# Patient Record
Sex: MALE | Race: WHITE | NOT HISPANIC OR LATINO
[De-identification: names, ages, dates, MRNs, and addresses within clinical notes are randomized per-mention and may not be internally consistent; named-entity substitution may affect disease eponyms.]

---

## 2017-01-25 ENCOUNTER — MEDICATION RENEWAL (OUTPATIENT)
Age: 77
End: 2017-01-25

## 2017-02-19 ENCOUNTER — RX RENEWAL (OUTPATIENT)
Age: 77
End: 2017-02-19

## 2017-03-17 ENCOUNTER — CLINICAL ADVICE (OUTPATIENT)
Age: 77
End: 2017-03-17

## 2017-04-11 ENCOUNTER — LABORATORY RESULT (OUTPATIENT)
Age: 77
End: 2017-04-11

## 2017-04-11 ENCOUNTER — APPOINTMENT (OUTPATIENT)
Dept: INTERNAL MEDICINE | Facility: CLINIC | Age: 77
End: 2017-04-11

## 2017-04-11 VITALS
HEART RATE: 76 BPM | TEMPERATURE: 97.9 F | OXYGEN SATURATION: 96 % | SYSTOLIC BLOOD PRESSURE: 130 MMHG | HEIGHT: 69 IN | RESPIRATION RATE: 16 BRPM | WEIGHT: 240 LBS | BODY MASS INDEX: 35.55 KG/M2 | DIASTOLIC BLOOD PRESSURE: 90 MMHG

## 2017-04-11 VITALS — SYSTOLIC BLOOD PRESSURE: 140 MMHG | DIASTOLIC BLOOD PRESSURE: 90 MMHG

## 2017-04-11 DIAGNOSIS — R21 RASH AND OTHER NONSPECIFIC SKIN ERUPTION: ICD-10-CM

## 2017-04-15 LAB
25(OH)D3 SERPL-MCNC: 33.6 NG/ML
ALBUMIN SERPL ELPH-MCNC: 4.8 G/DL
ALP BLD-CCNC: 26 U/L
ALT SERPL-CCNC: 35 U/L
ANION GAP SERPL CALC-SCNC: 20 MMOL/L
AST SERPL-CCNC: 34 U/L
BASOPHILS # BLD AUTO: 0.05 K/UL
BASOPHILS NFR BLD AUTO: 0.9 %
BILIRUB SERPL-MCNC: 0.4 MG/DL
BUN SERPL-MCNC: 37 MG/DL
CALCIUM SERPL-MCNC: 11 MG/DL
CHLORIDE SERPL-SCNC: 94 MMOL/L
CHOLEST SERPL-MCNC: 204 MG/DL
CHOLEST/HDLC SERPL: 7.6 RATIO
CK SERPL-CCNC: 128 U/L
CO2 SERPL-SCNC: 22 MMOL/L
CREAT SERPL-MCNC: 1.44 MG/DL
EOSINOPHIL # BLD AUTO: 0 K/UL
EOSINOPHIL NFR BLD AUTO: 0 %
GLUCOSE SERPL-MCNC: 152 MG/DL
HBA1C MFR BLD HPLC: 6.6 %
HCT VFR BLD CALC: 43.2 %
HDLC SERPL-MCNC: 27 MG/DL
HGB BLD-MCNC: 14 G/DL
LDLC SERPL CALC-MCNC: NORMAL MG/DL
LYMPHOCYTES # BLD AUTO: 1.52 K/UL
LYMPHOCYTES NFR BLD AUTO: 25.2 %
MAN DIFF?: NORMAL
MCHC RBC-ENTMCNC: 27.2 PG
MCHC RBC-ENTMCNC: 32.4 GM/DL
MCV RBC AUTO: 84 FL
MONOCYTES # BLD AUTO: 0.6 K/UL
MONOCYTES NFR BLD AUTO: 9.9 %
NEUTROPHILS # BLD AUTO: 3.59 K/UL
NEUTROPHILS NFR BLD AUTO: 59.5 %
PLATELET # BLD AUTO: 206 K/UL
POTASSIUM SERPL-SCNC: 5.3 MMOL/L
PROT SERPL-MCNC: 7 G/DL
PSA SERPL-MCNC: 0.77 NG/ML
RBC # BLD: 5.14 M/UL
RBC # FLD: 15.4 %
SODIUM SERPL-SCNC: 136 MMOL/L
TRIGL SERPL-MCNC: 640 MG/DL
TSH SERPL-ACNC: 3 UIU/ML
WBC # FLD AUTO: 6.03 K/UL

## 2017-05-15 ENCOUNTER — RX RENEWAL (OUTPATIENT)
Age: 77
End: 2017-05-15

## 2017-06-06 ENCOUNTER — MEDICATION RENEWAL (OUTPATIENT)
Age: 77
End: 2017-06-06

## 2017-07-19 ENCOUNTER — RX RENEWAL (OUTPATIENT)
Age: 77
End: 2017-07-19

## 2017-08-27 ENCOUNTER — INPATIENT (INPATIENT)
Facility: HOSPITAL | Age: 77
LOS: 3 days | Discharge: NURSING, REHAB OR LONG-TERM CARE | DRG: 177 | End: 2017-08-31
Attending: FAMILY MEDICINE | Admitting: HOSPITALIST
Payer: MEDICARE

## 2017-08-27 VITALS
RESPIRATION RATE: 18 BRPM | SYSTOLIC BLOOD PRESSURE: 134 MMHG | TEMPERATURE: 100 F | HEART RATE: 78 BPM | OXYGEN SATURATION: 95 % | WEIGHT: 229.94 LBS | DIASTOLIC BLOOD PRESSURE: 80 MMHG | HEIGHT: 69 IN

## 2017-08-27 DIAGNOSIS — N17.9 ACUTE KIDNEY FAILURE, UNSPECIFIED: ICD-10-CM

## 2017-08-27 DIAGNOSIS — I10 ESSENTIAL (PRIMARY) HYPERTENSION: ICD-10-CM

## 2017-08-27 DIAGNOSIS — R47.81 SLURRED SPEECH: ICD-10-CM

## 2017-08-27 DIAGNOSIS — D18.1 LYMPHANGIOMA, ANY SITE: ICD-10-CM

## 2017-08-27 DIAGNOSIS — E87.1 HYPO-OSMOLALITY AND HYPONATREMIA: ICD-10-CM

## 2017-08-27 DIAGNOSIS — N40.1 BENIGN PROSTATIC HYPERPLASIA WITH LOWER URINARY TRACT SYMPTOMS: ICD-10-CM

## 2017-08-27 DIAGNOSIS — R27.0 ATAXIA, UNSPECIFIED: ICD-10-CM

## 2017-08-27 DIAGNOSIS — E78.5 HYPERLIPIDEMIA, UNSPECIFIED: ICD-10-CM

## 2017-08-27 DIAGNOSIS — J18.9 PNEUMONIA, UNSPECIFIED ORGANISM: ICD-10-CM

## 2017-08-27 DIAGNOSIS — F17.200 NICOTINE DEPENDENCE, UNSPECIFIED, UNCOMPLICATED: ICD-10-CM

## 2017-08-27 DIAGNOSIS — R29.6 REPEATED FALLS: ICD-10-CM

## 2017-08-27 DIAGNOSIS — R47.1 DYSARTHRIA AND ANARTHRIA: ICD-10-CM

## 2017-08-27 DIAGNOSIS — E11.9 TYPE 2 DIABETES MELLITUS WITHOUT COMPLICATIONS: ICD-10-CM

## 2017-08-27 DIAGNOSIS — Z29.9 ENCOUNTER FOR PROPHYLACTIC MEASURES, UNSPECIFIED: ICD-10-CM

## 2017-08-27 LAB
ALBUMIN SERPL ELPH-MCNC: 3.1 G/DL — LOW (ref 3.3–5)
ALP SERPL-CCNC: 27 U/L — LOW (ref 40–120)
ALT FLD-CCNC: 25 U/L — SIGNIFICANT CHANGE UP (ref 12–78)
ANION GAP SERPL CALC-SCNC: 15 MMOL/L — SIGNIFICANT CHANGE UP (ref 5–17)
APTT BLD: 31.8 SEC — SIGNIFICANT CHANGE UP (ref 27.5–37.4)
AST SERPL-CCNC: 28 U/L — SIGNIFICANT CHANGE UP (ref 15–37)
BASOPHILS # BLD AUTO: 0.1 K/UL — SIGNIFICANT CHANGE UP (ref 0–0.2)
BASOPHILS NFR BLD AUTO: 0.9 % — SIGNIFICANT CHANGE UP (ref 0–2)
BILIRUB SERPL-MCNC: 0.9 MG/DL — SIGNIFICANT CHANGE UP (ref 0.2–1.2)
BUN SERPL-MCNC: 30 MG/DL — HIGH (ref 7–23)
CALCIUM SERPL-MCNC: 9.2 MG/DL — SIGNIFICANT CHANGE UP (ref 8.5–10.1)
CHLORIDE SERPL-SCNC: 99 MMOL/L — SIGNIFICANT CHANGE UP (ref 96–108)
CK MB BLD-MCNC: 0.1 % — SIGNIFICANT CHANGE UP (ref 0–3.5)
CK MB CFR SERPL CALC: 0.9 NG/ML — SIGNIFICANT CHANGE UP (ref 0–3.6)
CK MB CFR SERPL CALC: 1.4 NG/ML — SIGNIFICANT CHANGE UP (ref 0–3.6)
CK MB CFR SERPL CALC: 1.6 NG/ML — SIGNIFICANT CHANGE UP (ref 0–3.6)
CK SERPL-CCNC: 1266 U/L — HIGH (ref 26–308)
CK SERPL-CCNC: 1604 U/L — HIGH (ref 26–308)
CK SERPL-CCNC: 1853 U/L — HIGH (ref 26–308)
CO2 SERPL-SCNC: 20 MMOL/L — LOW (ref 22–31)
CREAT SERPL-MCNC: 1.7 MG/DL — HIGH (ref 0.5–1.3)
EOSINOPHIL # BLD AUTO: 0 K/UL — SIGNIFICANT CHANGE UP (ref 0–0.5)
EOSINOPHIL NFR BLD AUTO: 0.3 % — SIGNIFICANT CHANGE UP (ref 0–6)
GLUCOSE SERPL-MCNC: 192 MG/DL — HIGH (ref 70–99)
HCT VFR BLD CALC: 39 % — SIGNIFICANT CHANGE UP (ref 39–50)
HGB BLD-MCNC: 12.9 G/DL — LOW (ref 13–17)
INR BLD: 1.4 RATIO — HIGH (ref 0.88–1.16)
LACTATE SERPL-SCNC: 1.2 MMOL/L — SIGNIFICANT CHANGE UP (ref 0.7–2)
LIDOCAIN IGE QN: 101 U/L — SIGNIFICANT CHANGE UP (ref 73–393)
LYMPHOCYTES # BLD AUTO: 2 K/UL — SIGNIFICANT CHANGE UP (ref 1–3.3)
LYMPHOCYTES # BLD AUTO: 20 % — SIGNIFICANT CHANGE UP (ref 13–44)
MCHC RBC-ENTMCNC: 28.1 PG — SIGNIFICANT CHANGE UP (ref 27–34)
MCHC RBC-ENTMCNC: 33.1 GM/DL — SIGNIFICANT CHANGE UP (ref 32–36)
MCV RBC AUTO: 84.7 FL — SIGNIFICANT CHANGE UP (ref 80–100)
MONOCYTES # BLD AUTO: 1.1 K/UL — HIGH (ref 0–0.9)
MONOCYTES NFR BLD AUTO: 11 % — HIGH (ref 1–9)
NEUTROPHILS # BLD AUTO: 6.7 K/UL — SIGNIFICANT CHANGE UP (ref 1.8–7.4)
NEUTROPHILS NFR BLD AUTO: 67.7 % — SIGNIFICANT CHANGE UP (ref 43–77)
NT-PROBNP SERPL-SCNC: 1728 PG/ML — HIGH (ref 0–450)
PLATELET # BLD AUTO: 168 K/UL — SIGNIFICANT CHANGE UP (ref 150–400)
POTASSIUM SERPL-MCNC: 4.5 MMOL/L — SIGNIFICANT CHANGE UP (ref 3.5–5.3)
POTASSIUM SERPL-SCNC: 4.5 MMOL/L — SIGNIFICANT CHANGE UP (ref 3.5–5.3)
PROCALCITONIN SERPL-MCNC: 0.14 NG/ML — HIGH (ref 0–0.04)
PROT SERPL-MCNC: 6.6 G/DL — SIGNIFICANT CHANGE UP (ref 6–8.3)
PROTHROM AB SERPL-ACNC: 15.4 SEC — HIGH (ref 9.8–12.7)
RAPID RVP RESULT: SIGNIFICANT CHANGE UP
RBC # BLD: 4.61 M/UL — SIGNIFICANT CHANGE UP (ref 4.2–5.8)
RBC # FLD: 14.1 % — SIGNIFICANT CHANGE UP (ref 10.3–14.5)
SODIUM SERPL-SCNC: 134 MMOL/L — LOW (ref 135–145)
TROPONIN I SERPL-MCNC: <.015 NG/ML — SIGNIFICANT CHANGE UP (ref 0.01–0.04)
WBC # BLD: 9.9 K/UL — SIGNIFICANT CHANGE UP (ref 3.8–10.5)
WBC # FLD AUTO: 9.9 K/UL — SIGNIFICANT CHANGE UP (ref 3.8–10.5)

## 2017-08-27 PROCEDURE — 93010 ELECTROCARDIOGRAM REPORT: CPT

## 2017-08-27 PROCEDURE — 71250 CT THORAX DX C-: CPT | Mod: 26

## 2017-08-27 PROCEDURE — 70450 CT HEAD/BRAIN W/O DYE: CPT | Mod: 26

## 2017-08-27 PROCEDURE — 99285 EMERGENCY DEPT VISIT HI MDM: CPT

## 2017-08-27 PROCEDURE — 74176 CT ABD & PELVIS W/O CONTRAST: CPT | Mod: 26

## 2017-08-27 PROCEDURE — 99223 1ST HOSP IP/OBS HIGH 75: CPT | Mod: AI,GC

## 2017-08-27 PROCEDURE — 71010: CPT | Mod: 26

## 2017-08-27 RX ORDER — DEXTROSE 50 % IN WATER 50 %
12.5 SYRINGE (ML) INTRAVENOUS ONCE
Qty: 0 | Refills: 0 | Status: DISCONTINUED | OUTPATIENT
Start: 2017-08-27 | End: 2017-08-31

## 2017-08-27 RX ORDER — CEFTRIAXONE 500 MG/1
1 INJECTION, POWDER, FOR SOLUTION INTRAMUSCULAR; INTRAVENOUS ONCE
Qty: 0 | Refills: 0 | Status: COMPLETED | OUTPATIENT
Start: 2017-08-27 | End: 2017-08-27

## 2017-08-27 RX ORDER — INSULIN LISPRO 100/ML
VIAL (ML) SUBCUTANEOUS
Qty: 0 | Refills: 0 | Status: DISCONTINUED | OUTPATIENT
Start: 2017-08-27 | End: 2017-08-30

## 2017-08-27 RX ORDER — ACETAMINOPHEN 500 MG
650 TABLET ORAL EVERY 6 HOURS
Qty: 0 | Refills: 0 | Status: DISCONTINUED | OUTPATIENT
Start: 2017-08-27 | End: 2017-08-27

## 2017-08-27 RX ORDER — ACETAMINOPHEN 500 MG
650 TABLET ORAL ONCE
Qty: 0 | Refills: 0 | Status: COMPLETED | OUTPATIENT
Start: 2017-08-27 | End: 2017-08-27

## 2017-08-27 RX ORDER — GLUCAGON INJECTION, SOLUTION 0.5 MG/.1ML
1 INJECTION, SOLUTION SUBCUTANEOUS ONCE
Qty: 0 | Refills: 0 | Status: DISCONTINUED | OUTPATIENT
Start: 2017-08-27 | End: 2017-08-31

## 2017-08-27 RX ORDER — HEPARIN SODIUM 5000 [USP'U]/ML
5000 INJECTION INTRAVENOUS; SUBCUTANEOUS EVERY 12 HOURS
Qty: 0 | Refills: 0 | Status: DISCONTINUED | OUTPATIENT
Start: 2017-08-27 | End: 2017-08-31

## 2017-08-27 RX ORDER — AZITHROMYCIN 500 MG/1
500 TABLET, FILM COATED ORAL EVERY 24 HOURS
Qty: 0 | Refills: 0 | Status: DISCONTINUED | OUTPATIENT
Start: 2017-08-28 | End: 2017-08-30

## 2017-08-27 RX ORDER — CEFTRIAXONE 500 MG/1
1 INJECTION, POWDER, FOR SOLUTION INTRAMUSCULAR; INTRAVENOUS EVERY 24 HOURS
Qty: 0 | Refills: 0 | Status: DISCONTINUED | OUTPATIENT
Start: 2017-08-28 | End: 2017-08-30

## 2017-08-27 RX ORDER — SODIUM CHLORIDE 9 MG/ML
1000 INJECTION INTRAMUSCULAR; INTRAVENOUS; SUBCUTANEOUS
Qty: 0 | Refills: 0 | Status: DISCONTINUED | OUTPATIENT
Start: 2017-08-27 | End: 2017-08-29

## 2017-08-27 RX ORDER — AZITHROMYCIN 500 MG/1
500 TABLET, FILM COATED ORAL ONCE
Qty: 0 | Refills: 0 | Status: COMPLETED | OUTPATIENT
Start: 2017-08-27 | End: 2017-08-27

## 2017-08-27 RX ORDER — SODIUM CHLORIDE 9 MG/ML
1000 INJECTION INTRAMUSCULAR; INTRAVENOUS; SUBCUTANEOUS
Qty: 0 | Refills: 0 | Status: DISCONTINUED | OUTPATIENT
Start: 2017-08-27 | End: 2017-08-27

## 2017-08-27 RX ORDER — DEXTROSE 50 % IN WATER 50 %
1 SYRINGE (ML) INTRAVENOUS ONCE
Qty: 0 | Refills: 0 | Status: DISCONTINUED | OUTPATIENT
Start: 2017-08-27 | End: 2017-08-31

## 2017-08-27 RX ORDER — DEXTROSE 50 % IN WATER 50 %
25 SYRINGE (ML) INTRAVENOUS ONCE
Qty: 0 | Refills: 0 | Status: DISCONTINUED | OUTPATIENT
Start: 2017-08-27 | End: 2017-08-31

## 2017-08-27 RX ORDER — ALPRAZOLAM 0.25 MG
2 TABLET ORAL THREE TIMES A DAY
Qty: 0 | Refills: 0 | Status: DISCONTINUED | OUTPATIENT
Start: 2017-08-27 | End: 2017-08-31

## 2017-08-27 RX ORDER — IPRATROPIUM/ALBUTEROL SULFATE 18-103MCG
3 AEROSOL WITH ADAPTER (GRAM) INHALATION ONCE
Qty: 0 | Refills: 0 | Status: COMPLETED | OUTPATIENT
Start: 2017-08-27 | End: 2017-08-27

## 2017-08-27 RX ORDER — NICOTINE POLACRILEX 2 MG
1 GUM BUCCAL DAILY
Qty: 0 | Refills: 0 | Status: DISCONTINUED | OUTPATIENT
Start: 2017-08-27 | End: 2017-08-31

## 2017-08-27 RX ORDER — ISOSORBIDE MONONITRATE 60 MG/1
60 TABLET, EXTENDED RELEASE ORAL DAILY
Qty: 0 | Refills: 0 | Status: DISCONTINUED | OUTPATIENT
Start: 2017-08-27 | End: 2017-08-31

## 2017-08-27 RX ORDER — LABETALOL HCL 100 MG
200 TABLET ORAL THREE TIMES A DAY
Qty: 0 | Refills: 0 | Status: DISCONTINUED | OUTPATIENT
Start: 2017-08-27 | End: 2017-08-31

## 2017-08-27 RX ORDER — SODIUM CHLORIDE 9 MG/ML
1000 INJECTION, SOLUTION INTRAVENOUS
Qty: 0 | Refills: 0 | Status: DISCONTINUED | OUTPATIENT
Start: 2017-08-27 | End: 2017-08-31

## 2017-08-27 RX ORDER — ACETAMINOPHEN 500 MG
650 TABLET ORAL EVERY 6 HOURS
Qty: 0 | Refills: 0 | Status: DISCONTINUED | OUTPATIENT
Start: 2017-08-27 | End: 2017-08-31

## 2017-08-27 RX ORDER — AMLODIPINE BESYLATE 2.5 MG/1
10 TABLET ORAL DAILY
Qty: 0 | Refills: 0 | Status: DISCONTINUED | OUTPATIENT
Start: 2017-08-27 | End: 2017-08-31

## 2017-08-27 RX ADMIN — Medication 200 MILLIGRAM(S): at 14:53

## 2017-08-27 RX ADMIN — Medication 3 MILLILITER(S): at 08:30

## 2017-08-27 RX ADMIN — Medication 200 MILLIGRAM(S): at 21:14

## 2017-08-27 RX ADMIN — Medication 650 MILLIGRAM(S): at 08:33

## 2017-08-27 RX ADMIN — AMLODIPINE BESYLATE 10 MILLIGRAM(S): 2.5 TABLET ORAL at 19:32

## 2017-08-27 RX ADMIN — Medication 2 MILLIGRAM(S): at 22:34

## 2017-08-27 RX ADMIN — CEFTRIAXONE 100 GRAM(S): 500 INJECTION, POWDER, FOR SOLUTION INTRAMUSCULAR; INTRAVENOUS at 09:52

## 2017-08-27 RX ADMIN — HEPARIN SODIUM 5000 UNIT(S): 5000 INJECTION INTRAVENOUS; SUBCUTANEOUS at 18:00

## 2017-08-27 RX ADMIN — AZITHROMYCIN 255 MILLIGRAM(S): 500 TABLET, FILM COATED ORAL at 10:20

## 2017-08-27 RX ADMIN — Medication 2 MILLIGRAM(S): at 12:13

## 2017-08-27 RX ADMIN — SODIUM CHLORIDE 70 MILLILITER(S): 9 INJECTION INTRAMUSCULAR; INTRAVENOUS; SUBCUTANEOUS at 17:06

## 2017-08-27 RX ADMIN — Medication 1 PATCH: at 14:53

## 2017-08-27 RX ADMIN — Medication 1: at 16:30

## 2017-08-27 RX ADMIN — Medication: at 13:00

## 2017-08-27 RX ADMIN — ISOSORBIDE MONONITRATE 60 MILLIGRAM(S): 60 TABLET, EXTENDED RELEASE ORAL at 12:13

## 2017-08-27 RX ADMIN — Medication 650 MILLIGRAM(S): at 19:32

## 2017-08-27 NOTE — H&P ADULT - PROBLEM SELECTOR PLAN 3
Type 2 with no complications, hold home hyperglycemia agents   Low dose ISS with hypoglycemic protocol  Check HgbA1c

## 2017-08-27 NOTE — H&P ADULT - NSHPREVIEWOFSYSTEMS_GEN_ALL_CORE
Constitutional: denies fever, chills, diaphoresis   HEENT: denies blurry vision, difficulty hearing  Respiratory: denies SOB, CISNEROS, cough, sputum production, wheezing, hemoptysis  Cardiovascular: denies CP, palpitations, edema  Gastrointestinal: denies nausea, vomiting, diarrhea, constipation, abdominal pain, melena, hematochezia   Genitourinary: denies dysuria, frequency, urgency, hematuria   Skin/Breast: denies rash, itching  Musculoskeletal: denies myalgias, joint swelling, muscle weakness  Neurologic: denies headache, weakness, dizziness, paresthesias, numbness/tingling  Psychiatric: denies feeling anxious, depressed, suicidal, homicidal thoughts  Hematology/Oncology: denies bruising, tender or enlarged lymph nodes   ROS negative except as noted above Constitutional: ++ fever, chills,  no diaphoresis   HEENT: denies blurry vision, difficulty hearing  Respiratory: ++ SOB, CISNEROS, cough, - sputum production, wheezing, hemoptysis  Cardiovascular: denies CP, palpitations, edema  Gastrointestinal: denies nausea, vomiting, diarrhea, constipation, abdominal pain, melena, hematochezia   Genitourinary: denies dysuria, frequency, urgency, hematuria   Skin/Breast: denies rash, itching  Musculoskeletal: denies myalgias, joint swelling, muscle weakness  Neurologic: denies headache, weakness, dizziness, paresthesias, numbness/tingling  Psychiatric: denies feeling anxious, depressed, suicidal, homicidal thoughts  Hematology/Oncology: denies bruising, tender or enlarged lymph nodes   ROS negative except as noted above

## 2017-08-27 NOTE — PATIENT PROFILE ADULT. - SOCIAL CONCERNS
son expressing concerns about pt returning back to his apartment by himself, would like to speak with

## 2017-08-27 NOTE — PATIENT PROFILE ADULT. - HEALTHCARE QUESTIONS, PROFILE
"my son was not aware of I keep my night time meds in the bathroom cabenet he said he will call you with those so you can add them to my list"

## 2017-08-27 NOTE — H&P ADULT - NSHPOUTPATIENTPROVIDERS_GEN_ALL_CORE
PMD Dr. Dima Bush.    Cardio Dr. Mcallister PMD- Dr. Dima Bush  Cardio- Dr. Mcallister  Psych - Dr. Mukherjee

## 2017-08-27 NOTE — H&P ADULT - NSHPSOCIALHISTORY_GEN_ALL_CORE
Social History/Preventive Medicine:    Marital Status:   Occupation:   Lives with:   Ambulates at home:    Substance Use:  Tobacco Usage:    Alcohol Usage:  Illicit Drug Usage:    Health Management     Last Physical Exam:  Last Mammo:   Last Pap:   Last prostate exam:        Immunization Hx: Flu, Pneumonia, Tetanus, Hepatitis    Advanced Directives: Social History/Preventive Medicine:    Marital Status: Unknown   Occupation: Retired   Lives with: Alone   Ambulates at home: Limited, without walker     Substance Use:  Tobacco Usage:    Alcohol Usage:  Illicit Drug Usage: Social History/Preventive Medicine:    Marital Status: Unknown   Occupation: Retired   Lives with: Alone   Ambulates at home: Limited, without walker     Substance Use:  Tobacco Usage: 10-15 cigarettes per day, 60 yrs     Alcohol Usage: Denies   Illicit Drug Usage: Denies

## 2017-08-27 NOTE — H&P ADULT - HISTORY OF PRESENT ILLNESS
In ED, pt ahd temp at 101.3, As EMS, o2 sat was 86% on RA, other vitals wnl. No leukocytosis, no elevated lactate. Other CBC lab resultes grossly wnl. Mild hyponatremia 134, Elevated bun /cr at 30/1.70. CE positive with elevated CK at 1853. BNP at 1728. Pt had CT head done which showed no acute pathology, X ray and CT chest and abd with no contrast showed Right perihilar and lower lobe airspace opacities as discussed, may reflect infection/bronchopneumonia; cannot exclude perihilar mass; likely reflecting chronic interstitial lung disease bilateral lungs; Nonspecific subpleural opacity left lower lobe. Hepatic steatosis with hepatosplenomegaly. EKG showed___________________________Pt received 1x azithromycin, 1x Rocephin, 1x tylenols, 1x duoneb, BCX and UCX was sent. Pt was on O2 Patient is a 73 y/o male PMHX             In ED, pt ahd temp at 101.3, As EMS, o2 sat was 86% on RA, other vitals wnl. No leukocytosis, no elevated lactate. Other CBC lab resultes grossly wnl. Mild hyponatremia 134, Elevated bun /cr at 30/1.70. CE positive with elevated CK at 1853. BNP at 1728. Pt had CT head done which showed no acute pathology, X ray and CT chest and abd with no contrast showed Right perihilar and lower lobe airspace opacities as discussed, may reflect infection/bronchopneumonia; cannot exclude perihilar mass; likely reflecting chronic interstitial lung disease bilateral lungs; Nonspecific subpleural opacity left lower lobe. Hepatic steatosis with hepatosplenomegaly. EKG showed sinus rhythm with sinus arrhythmia with 1st degree AV block, inferior infarct age undetermined (unofficial read). Pt received 1x azithromycin, 1x Rocephin, 1x tylenols, 1x duoneb, BCX and UCX was sent. Pt was on O2. Patient is a 71 y/o male PMHx diastolic CHF, HTN, HLD, DM, GERD, BPH, Depression, Anxiety who presents with 1 week of progressively worsening SOB from his baseline SOB 2/2 diastolic CHF.    SOB was associated with productive cough, confusion (per son at bedside) and lower extremity weakness with an inability to get out of his chair. He admits to fever, chills, SOB, productive cough, right hip pain, back pain. He denies headache, dizziness, hearing or vision changes, sore throat, chest pain or palpitations, N/V/C/D, numbness or tingling in extremities.     Patient's son at bedside states his father walks with more difficulty in his apartment and has increased slurred speech from baseline since a fall onto his knees yesterday.     In ED, Vitals were: T 101.3, As EMS, o2 sat was 86% on RA, other vitals wnl. No leukocytosis, no elevated lactate. Other CBC lab results grossly wnl. Mild hyponatremia 134, Elevated bun /cr at 30/1.70. CE positive with elevated CK at 1853. BNP at 1728. Pt had CT head done which showed no acute pathology, X ray and CT chest and abd with no contrast showed Right perihilar and lower lobe airspace opacities as discussed, may reflect infection/bronchopneumonia; cannot exclude perihilar mass; likely reflecting chronic interstitial lung disease bilateral lungs; Nonspecific subpleural opacity left lower lobe. Hepatic steatosis with hepatosplenomegaly. EKG showed sinus rhythm with sinus arrhythmia with 1st degree AV block, inferior infarct age undetermined (unofficial read). Pt received 1x azithromycin, 1x Rocephin, 1x tylenols, 1x duoneb, BCX and UCX was sent. Pt was on 5L NC. Patient is a 71 y/o male PMHx diastolic CHF, HTN, HLD, DM, GERD, BPH, Depression, Anxiety who presents with 1 week of progressively worsening SOB from his baseline SOB 2/2 diastolic CHF.    SOB was associated with nonproductive cough, confusion (per son at bedside) and lower extremity weakness with an inability to get out of his chair. He admits to fever, chills, SOB, productive cough, right hip pain, back pain. He denies headache, dizziness, hearing or vision changes, sore throat, chest pain or palpitations, N/V/C/D, numbness or tingling in extremities.     Patient's son at bedside states his father walks with more difficulty in his apartment and has increased slurred speech from baseline since a fall onto his knees yesterday.     In ED, Vitals were: T 101.3, As EMS, o2 sat was 86% on RA, other vitals wnl. No leukocytosis, no elevated lactate. Other CBC lab results grossly wnl. Mild hyponatremia 134, Elevated bun /cr at 30/1.70. CE positive with elevated CK at 1853. BNP at 1728. Pt had CT head done which showed no acute pathology, X ray and CT chest and abd with no contrast showed Right perihilar and lower lobe airspace opacities as discussed, may reflect infection/bronchopneumonia; cannot exclude perihilar mass; likely reflecting chronic interstitial lung disease bilateral lungs; Nonspecific subpleural opacity left lower lobe. Hepatic steatosis with hepatosplenomegaly. EKG showed sinus rhythm with sinus arrhythmia with 1st degree AV block, inferior infarct age undetermined (unofficial read). Pt received 1x azithromycin, 1x Rocephin, 1x tylenols, 1x duoneb, BCX and UCX was sent. Pt was on 5L NC.

## 2017-08-27 NOTE — ED ADULT NURSE NOTE - PMH
BPH with obstruction/lower urinary tract symptoms    Diabetes mellitus    GERD (gastroesophageal reflux disease)    Hyperlipidemia    Hypertension    Smoker    Vitamin D deficiency

## 2017-08-27 NOTE — H&P ADULT - FAMILY HISTORY
<<-----Click on this checkbox to enter Family History Family history of lymphoma     Grandparent  Still living? No  Family history of heart disease, Age at diagnosis: Age Unknown

## 2017-08-27 NOTE — ED PROVIDER NOTE - OBJECTIVE STATEMENT
76 yo male hx of DM, CHF, HLD, HTN c/o 1 week of shortness of breath, getting progressively worse.  BIBEMS, here with son who states patient has not been able to get around his apartment this past week, no chest pain, also states he had a fall yesterday.  PMD Dr. Dima Bush.  Cards Dr. Mcallister.  As per EMS O2 sat 86% on RA. 78 yo male hx of DM, CHF, HLD, HTN c/o 1 week of shortness of breath, getting progressively worse.  BIBEMS, here with son who states patient has not been able to get around his apartment this past week, no chest pain, also states he had a fall yesterday.  PMD Dr. Dima Bush.  Cards Dr. Mcallister.  As per EMS O2 sat 86% on RA. Found to have fever of 101.3

## 2017-08-27 NOTE — PROVIDER CONTACT NOTE (OTHER) - BACKGROUND
during admit profile pt stated he "has his night time meds in the bathroom cabinet at home" ,son stated "only brought in the meds which were in the kitchen"

## 2017-08-27 NOTE — PROVIDER CONTACT NOTE (OTHER) - SITUATION
RN received callback from son"It would not be beneficial for me to come in with all the meds my father has in his bathroom,he has too many,someone must call his psychiatrist tomorrow and get the list"

## 2017-08-27 NOTE — H&P ADULT - ASSESSMENT
Patient is a 71 y/o male PMHx diastolic CHF, HTN, HLD, DM, GERD, BPH, Depression, Anxiety who presents with 1 week of progressively worsening SOB due to CAP.

## 2017-08-27 NOTE — PATIENT PROFILE ADULT. - PRO ANTICIPATED DISCH DISP
son requesting to speak with  r/t rehab or possible assisted living as a plan for discharge, son concern about pt living alone

## 2017-08-27 NOTE — H&P ADULT - PROBLEM SELECTOR PLAN 1
CAP, right lung,  Admit to Med/Surg floor.   Continue IV Ceftriazone, Azithromycin   f/u Legionella antigen, Strep antigen, RVP   Continue gentle hydration  f/u Blood and Urine Cxs, Procalcitonin

## 2017-08-27 NOTE — ED ADULT NURSE REASSESSMENT NOTE - NS ED NURSE REASSESS COMMENT FT1
report given to RN Bessie of [facility]. MD Rees speaking to RN regarding flu swab and awaiting neuro consult to discuss upgrading pt. flu swab sent. pt updated on POC. pt ate lunch. will monitor pt

## 2017-08-27 NOTE — H&P ADULT - ATTENDING COMMENTS
10: CK elevation, possibly 2/2 fall , pt has no chest pain and EKG no changes, monitor CE trend and cont IVF.  11. BNP elevation, most likely due to DEVYN, CXR is clear, there is no JVD, no LE edema, repeat at am.   12: Deconditioning and weakness, needs PT and possible JANELLE. spoke to his son in details and answered all his questions and concerns.   I notified Dr Bush pcp

## 2017-08-27 NOTE — ED ADULT NURSE NOTE - OBJECTIVE STATEMENT
pt brought in by EMS with reports of SOB, sats in the 80's on RA. pt states that he has been SOB for 1 week and has been having a hard time slpeeing. 2+ pedal edema noted. pt denies being on blood thinners. pt brought in by EMS with reports of SOB, sats in the 80's on RA. pt states that he has been SOB for 1 week and has been having a hard time sleeping. 2+ pedal edema noted. pt denies being on blood thinners. equal chest rise and fall, tachypnea but no use of accessory muscles. denies cough, chills, fever, cp, palpitations, lightheadedness, dizziness

## 2017-08-27 NOTE — H&P ADULT - PROBLEM SELECTOR PLAN 2
Due to dehydration, continue gentle hydration (will go with low rate IVF due to hx of heart failure)   f/u renal function   Check renal ultrasound r/o post renal hydronephrosis especially in this patient with hx of BPH. Will consider straight cath to be sure he is not retaining

## 2017-08-27 NOTE — H&P ADULT - NSHPPHYSICALEXAM_GEN_ALL_CORE
Physical Exam:  General: Well developed, well nourished, NAD  HEENT: NCAT, PERRLA, EOMI bl, moist mucous membranes   Neck: Supple, nontender, no mass  Neurology: A&Ox3, nonfocal, CN II-XII grossly intact, sensation intact, no gait abnormalities   Respiratory: CTA B/L, No W/R/R  CV: RRR, +S1/S2, no murmurs, rubs or gallops  Abdominal: Soft, NT, ND +BSx4  Extremities: No C/C/E, + peripheral pulses  MSK: Normal ROM, no joint erythema or warmth, no joint swelling   Skin: warm, dry, normal color, no rash or abnormal lesions Physical Exam:  General: Well developed, well nourished, NAD  HEENT: NCAT, PERRLA, EOMI bl, moist mucous membranes no JVD  Neck: Supple, nontender, no mass  Neurology: A&Ox3, nonfocal, CN II-XII grossly intact, sensation intact, no gait abnormalities   Respiratory: CTA B/L, No W/R/R  CV: RRR, +S1/S2, no murmurs, rubs or gallops  Abdominal: Soft, NT, ND +BSx4  Extremities: No C/C/E, + peripheral pulses  MSK: Normal ROM, no joint erythema or warmth, no joint swelling   Skin: warm, dry, normal color, no rash or abnormal lesions

## 2017-08-27 NOTE — H&P ADULT - PROBLEM SELECTOR PLAN 8
CT Head negative and no focal neurological deficits. On full dose ASA. Less likely CVA but due to risk factors will consult Neurology and defer to him for need for MRI CT Head negative and no focal neurological deficits. On full dose ASA. Less likely CVA but due to risk factors will consult Neurology and defer to him for need for MRI  will check bed side speech and swallow.

## 2017-08-27 NOTE — PROVIDER CONTACT NOTE (OTHER) - RECOMMENDATIONS
as per dr lind he will have someone follow up call to pt's psychiatrist Dr Mukherjee tomorrow so an accurate home med reconciliation can be completed. Phone number for Dr Mukherjee provided to Dr Lind

## 2017-08-28 DIAGNOSIS — E66.9 OBESITY, UNSPECIFIED: ICD-10-CM

## 2017-08-28 DIAGNOSIS — I50.30 UNSPECIFIED DIASTOLIC (CONGESTIVE) HEART FAILURE: ICD-10-CM

## 2017-08-28 LAB
ANION GAP SERPL CALC-SCNC: 14 MMOL/L — SIGNIFICANT CHANGE UP (ref 5–17)
APPEARANCE UR: ABNORMAL
BASOPHILS # BLD AUTO: 0 K/UL — SIGNIFICANT CHANGE UP (ref 0–0.2)
BASOPHILS NFR BLD AUTO: 0.6 % — SIGNIFICANT CHANGE UP (ref 0–2)
BILIRUB UR-MCNC: NEGATIVE — SIGNIFICANT CHANGE UP
BUN SERPL-MCNC: 25 MG/DL — HIGH (ref 7–23)
CALCIUM SERPL-MCNC: 8.8 MG/DL — SIGNIFICANT CHANGE UP (ref 8.5–10.1)
CHLORIDE SERPL-SCNC: 99 MMOL/L — SIGNIFICANT CHANGE UP (ref 96–108)
CO2 SERPL-SCNC: 22 MMOL/L — SIGNIFICANT CHANGE UP (ref 22–31)
COLOR SPEC: YELLOW — SIGNIFICANT CHANGE UP
CREAT SERPL-MCNC: 1.3 MG/DL — SIGNIFICANT CHANGE UP (ref 0.5–1.3)
DIFF PNL FLD: NEGATIVE — SIGNIFICANT CHANGE UP
EOSINOPHIL # BLD AUTO: 0 K/UL — SIGNIFICANT CHANGE UP (ref 0–0.5)
EOSINOPHIL NFR BLD AUTO: 0.3 % — SIGNIFICANT CHANGE UP (ref 0–6)
GLUCOSE SERPL-MCNC: 200 MG/DL — HIGH (ref 70–99)
GLUCOSE UR QL: 100 MG/DL
HBA1C BLD-MCNC: 7.9 % — HIGH (ref 4–5.6)
HCT VFR BLD CALC: 36.4 % — LOW (ref 39–50)
HGB BLD-MCNC: 12 G/DL — LOW (ref 13–17)
KETONES UR-MCNC: ABNORMAL
LEGIONELLA AG UR QL: NEGATIVE — SIGNIFICANT CHANGE UP
LEUKOCYTE ESTERASE UR-ACNC: NEGATIVE — SIGNIFICANT CHANGE UP
LYMPHOCYTES # BLD AUTO: 1.3 K/UL — SIGNIFICANT CHANGE UP (ref 1–3.3)
LYMPHOCYTES # BLD AUTO: 17.2 % — SIGNIFICANT CHANGE UP (ref 13–44)
MCHC RBC-ENTMCNC: 27.9 PG — SIGNIFICANT CHANGE UP (ref 27–34)
MCHC RBC-ENTMCNC: 32.8 GM/DL — SIGNIFICANT CHANGE UP (ref 32–36)
MCV RBC AUTO: 84.9 FL — SIGNIFICANT CHANGE UP (ref 80–100)
MONOCYTES # BLD AUTO: 0.8 K/UL — SIGNIFICANT CHANGE UP (ref 0–0.9)
MONOCYTES NFR BLD AUTO: 11 % — HIGH (ref 1–9)
NEUTROPHILS # BLD AUTO: 5.3 K/UL — SIGNIFICANT CHANGE UP (ref 1.8–7.4)
NEUTROPHILS NFR BLD AUTO: 70.9 % — SIGNIFICANT CHANGE UP (ref 43–77)
NITRITE UR-MCNC: NEGATIVE — SIGNIFICANT CHANGE UP
NT-PROBNP SERPL-SCNC: 1627 PG/ML — HIGH (ref 0–450)
PH UR: 5 — SIGNIFICANT CHANGE UP (ref 5–8)
PLATELET # BLD AUTO: 169 K/UL — SIGNIFICANT CHANGE UP (ref 150–400)
POTASSIUM SERPL-MCNC: 4.3 MMOL/L — SIGNIFICANT CHANGE UP (ref 3.5–5.3)
POTASSIUM SERPL-SCNC: 4.3 MMOL/L — SIGNIFICANT CHANGE UP (ref 3.5–5.3)
PROT UR-MCNC: 75 MG/DL
RBC # BLD: 4.29 M/UL — SIGNIFICANT CHANGE UP (ref 4.2–5.8)
RBC # FLD: 14 % — SIGNIFICANT CHANGE UP (ref 10.3–14.5)
SODIUM SERPL-SCNC: 135 MMOL/L — SIGNIFICANT CHANGE UP (ref 135–145)
SP GR SPEC: 1.02 — SIGNIFICANT CHANGE UP (ref 1.01–1.02)
UROBILINOGEN FLD QL: 1
WBC # BLD: 7.5 K/UL — SIGNIFICANT CHANGE UP (ref 3.8–10.5)
WBC # FLD AUTO: 7.5 K/UL — SIGNIFICANT CHANGE UP (ref 3.8–10.5)

## 2017-08-28 PROCEDURE — 99233 SBSQ HOSP IP/OBS HIGH 50: CPT | Mod: GC

## 2017-08-28 PROCEDURE — 99223 1ST HOSP IP/OBS HIGH 75: CPT

## 2017-08-28 RX ORDER — ALBUTEROL 90 UG/1
2.5 AEROSOL, METERED ORAL EVERY 8 HOURS
Qty: 0 | Refills: 0 | Status: DISCONTINUED | OUTPATIENT
Start: 2017-08-28 | End: 2017-08-31

## 2017-08-28 RX ORDER — ASENAPINE MALEATE 10 MG/1
10 TABLET SUBLINGUAL
Qty: 0 | Refills: 0 | Status: DISCONTINUED | OUTPATIENT
Start: 2017-08-28 | End: 2017-08-30

## 2017-08-28 RX ORDER — ACETAMINOPHEN 500 MG
650 TABLET ORAL EVERY 6 HOURS
Qty: 0 | Refills: 0 | Status: DISCONTINUED | OUTPATIENT
Start: 2017-08-28 | End: 2017-08-31

## 2017-08-28 RX ORDER — BUPROPION HYDROCHLORIDE 150 MG/1
300 TABLET, EXTENDED RELEASE ORAL DAILY
Qty: 0 | Refills: 0 | Status: DISCONTINUED | OUTPATIENT
Start: 2017-08-28 | End: 2017-08-31

## 2017-08-28 RX ORDER — INSULIN GLARGINE 100 [IU]/ML
5 INJECTION, SOLUTION SUBCUTANEOUS AT BEDTIME
Qty: 0 | Refills: 0 | Status: DISCONTINUED | OUTPATIENT
Start: 2017-08-28 | End: 2017-08-30

## 2017-08-28 RX ORDER — VENLAFAXINE HCL 75 MG
187.5 CAPSULE, EXT RELEASE 24 HR ORAL EVERY 12 HOURS
Qty: 0 | Refills: 0 | Status: DISCONTINUED | OUTPATIENT
Start: 2017-08-28 | End: 2017-08-30

## 2017-08-28 RX ADMIN — Medication 3: at 13:00

## 2017-08-28 RX ADMIN — HEPARIN SODIUM 5000 UNIT(S): 5000 INJECTION INTRAVENOUS; SUBCUTANEOUS at 05:35

## 2017-08-28 RX ADMIN — Medication 650 MILLIGRAM(S): at 21:39

## 2017-08-28 RX ADMIN — HEPARIN SODIUM 5000 UNIT(S): 5000 INJECTION INTRAVENOUS; SUBCUTANEOUS at 17:27

## 2017-08-28 RX ADMIN — Medication 650 MILLIGRAM(S): at 20:39

## 2017-08-28 RX ADMIN — Medication 200 MILLIGRAM(S): at 21:20

## 2017-08-28 RX ADMIN — ASENAPINE MALEATE 10 MILLIGRAM(S): 10 TABLET SUBLINGUAL at 21:20

## 2017-08-28 RX ADMIN — Medication 1 PATCH: at 14:54

## 2017-08-28 RX ADMIN — SODIUM CHLORIDE 70 MILLILITER(S): 9 INJECTION INTRAMUSCULAR; INTRAVENOUS; SUBCUTANEOUS at 08:29

## 2017-08-28 RX ADMIN — SODIUM CHLORIDE 70 MILLILITER(S): 9 INJECTION INTRAMUSCULAR; INTRAVENOUS; SUBCUTANEOUS at 05:34

## 2017-08-28 RX ADMIN — ISOSORBIDE MONONITRATE 60 MILLIGRAM(S): 60 TABLET, EXTENDED RELEASE ORAL at 13:00

## 2017-08-28 RX ADMIN — CEFTRIAXONE 100 GRAM(S): 500 INJECTION, POWDER, FOR SOLUTION INTRAMUSCULAR; INTRAVENOUS at 08:24

## 2017-08-28 RX ADMIN — Medication 2 MILLIGRAM(S): at 17:30

## 2017-08-28 RX ADMIN — Medication 1 PATCH: at 12:59

## 2017-08-28 RX ADMIN — Medication 1: at 08:24

## 2017-08-28 RX ADMIN — AMLODIPINE BESYLATE 10 MILLIGRAM(S): 2.5 TABLET ORAL at 05:35

## 2017-08-28 RX ADMIN — Medication 2 MILLIGRAM(S): at 08:24

## 2017-08-28 RX ADMIN — Medication 200 MILLIGRAM(S): at 13:01

## 2017-08-28 RX ADMIN — INSULIN GLARGINE 5 UNIT(S): 100 INJECTION, SOLUTION SUBCUTANEOUS at 21:21

## 2017-08-28 RX ADMIN — Medication 2: at 17:27

## 2017-08-28 RX ADMIN — Medication 200 MILLIGRAM(S): at 05:35

## 2017-08-28 RX ADMIN — AZITHROMYCIN 255 MILLIGRAM(S): 500 TABLET, FILM COATED ORAL at 10:00

## 2017-08-28 NOTE — SWALLOW BEDSIDE ASSESSMENT ADULT - COMMENTS
Patient is a 71 y/o male PMHx diastolic CHF, HTN, HLD, DM, GERD, BPH, Depression, Anxiety who presents with 1 week of progressively worsening SOB from his baseline SOB 2/2 diastolic CHF with nonproductive cough, confusion and lower extremity weakness with an inability to get out of his chair. fever, chills, SOB, productive cough, right hip pain, back pain.  Pt c discoordinated breathe/ swallow pattern increasing aspiration risk c thin liquids

## 2017-08-28 NOTE — CONSULT NOTE ADULT - SUBJECTIVE AND OBJECTIVE BOX
Date/Time Patient Seen:  		  Referring MD:   Data Reviewed	       Patient is a 77y old  Male who presents with a chief complaint of SOB, s/p fall early sat am per son (27 Aug 2017 16:14)      Subjective/HPI     seen and examined, vs and meds reviewed, ct chest and abd pel reviewed  on oxygen  currently on ABX for PNA    Patient is a 73 y/o male PMHx diastolic dysfunction, nonobstructive CAD based on cardiac cath from 12/2015, normal lvef (echo 10/2016), HTN, HLD, DM, GERD, BPH, Depression, Anxiety.  He has longstanding dyspnea for which multiple cardiac explanations have been considered, including CAD and diastolic impairment.  He has been found to have a degree of interstitial lung disease, the significance of which is unclear.    He presents with 1 week of progressively worsening SOB from his baseline.  SOB was associated with nonproductive cough, confusion and lower extremity weakness with an inability to get out of his chair. He admitted to fever, chills, SOB, productive cough, right hip pain, back pain. He denied headache, dizziness, hearing or vision changes, sore throat, chest pain or palpitations, N/V/C/D, numbness or tingling in extremities.     PAST MEDICAL & SURGICAL HISTORY:  Smoker  BPH with obstruction/lower urinary tract symptoms  Vitamin D deficiency  GERD (gastroesophageal reflux disease)  Diabetes mellitus  Hyperlipidemia  Hypertension  No significant past surgical history        Medication list         MEDICATIONS  (STANDING):  azithromycin  IVPB 500 milliGRAM(s) IV Intermittent every 24 hours  cefTRIAXone   IVPB 1 Gram(s) IV Intermittent every 24 hours  heparin  Injectable 5000 Unit(s) SubCutaneous every 12 hours  isosorbide   mononitrate ER Tablet (IMDUR) 60 milliGRAM(s) Oral daily  labetalol 200 milliGRAM(s) Oral three times a day  insulin lispro (HumaLOG) corrective regimen sliding scale   SubCutaneous three times a day before meals  dextrose 5%. 1000 milliLiter(s) (50 mL/Hr) IV Continuous <Continuous>  dextrose 50% Injectable 12.5 Gram(s) IV Push once  dextrose 50% Injectable 25 Gram(s) IV Push once  dextrose 50% Injectable 25 Gram(s) IV Push once  amLODIPine   Tablet 10 milliGRAM(s) Oral daily  sodium chloride 0.9%. 1000 milliLiter(s) (70 mL/Hr) IV Continuous <Continuous>  nicotine -   7 mG/24Hr(s) Patch 1 patch Transdermal daily  venlafaxine 187.5 milliGRAM(s) Oral every 12 hours  buPROPion XL . 300 milliGRAM(s) Oral daily  asenapine SL 10 milliGRAM(s) SubLingual two times a day  insulin glargine Injectable (LANTUS) 5 Unit(s) SubCutaneous at bedtime    MEDICATIONS  (PRN):  acetaminophen   Tablet 650 milliGRAM(s) Oral every 6 hours PRN For Temp greater than 38 C (100.4 F) & Pain  ALPRAZolam 2 milliGRAM(s) Oral three times a day PRN Anxiety  dextrose Gel 1 Dose(s) Oral once PRN Blood Glucose LESS THAN 70 milliGRAM(s)/deciliter  glucagon  Injectable 1 milliGRAM(s) IntraMuscular once PRN Glucose LESS THAN 70 milligrams/deciliter  acetaminophen   Tablet. 650 milliGRAM(s) Oral every 6 hours PRN Mild Pain (1 - 3)         Vitals log        ICU Vital Signs Last 24 Hrs  T(C): 37.1 (28 Aug 2017 21:19), Max: 37.2 (28 Aug 2017 05:31)  T(F): 98.8 (28 Aug 2017 21:19), Max: 99 (28 Aug 2017 05:31)  HR: 72 (28 Aug 2017 21:19) (72 - 82)  BP: 158/83 (28 Aug 2017 21:19) (144/78 - 168/92)  BP(mean): --  ABP: --  ABP(mean): --  RR: 17 (28 Aug 2017 20:00) (17 - 18)  SpO2: 97% (28 Aug 2017 20:00) (94% - 97%)           Input and Output:  I&O's Detail    27 Aug 2017 07:01  -  28 Aug 2017 07:00  --------------------------------------------------------  IN:    Oral Fluid: 240 mL    sodium chloride 0.9%.: 840 mL  Total IN: 1080 mL    OUT:    Voided: 800 mL  Total OUT: 800 mL    Total NET: 280 mL      28 Aug 2017 07:01  -  28 Aug 2017 21:56  --------------------------------------------------------  IN:    Oral Fluid: 640 mL  Total IN: 640 mL    OUT:    Voided: 300 mL  Total OUT: 300 mL    Total NET: 340 mL          Lab Data                        12.0   7.5   )-----------( 169      ( 28 Aug 2017 06:42 )             36.4     08-28    135  |  99  |  25<H>  ----------------------------<  200<H>  4.3   |  22  |  1.30    Ca    8.8      28 Aug 2017 06:42    TPro  6.6  /  Alb  3.1<L>  /  TBili  0.9  /  DBili  x   /  AST  28  /  ALT  25  /  AlkPhos  27<L>  08-27      CARDIAC MARKERS ( 27 Aug 2017 20:16 )  <.015 ng/mL / x     / 1266 U/L / x     / 0.9 ng/mL  CARDIAC MARKERS ( 27 Aug 2017 14:39 )  <.015 ng/mL / x     / 1604 U/L / x     / 1.4 ng/mL  CARDIAC MARKERS ( 27 Aug 2017 08:18 )  <.015 ng/mL / x     / 1853 U/L / x     / 1.6 ng/mL    SOCIAL HISTORY: No active tobacco, alcohol or illicit drug use    FAMILY HISTORY:  Family history of heart disease (Grandparent)  Family history of lymphoma    Review of Systems	      Objective     Physical Examination    obese  head at  heart - s1s2  lungs - dec BS  abd - soft  extr - chr changes, edema trace  neck obese      Pertinent Lab findings & Imaging      Sánchez:  NO   Adequate UO     I&O's Detail    27 Aug 2017 07:01  -  28 Aug 2017 07:00  --------------------------------------------------------  IN:    Oral Fluid: 240 mL    sodium chloride 0.9%.: 840 mL  Total IN: 1080 mL    OUT:    Voided: 800 mL  Total OUT: 800 mL    Total NET: 280 mL      28 Aug 2017 07:01  -  28 Aug 2017 21:56  --------------------------------------------------------  IN:    Oral Fluid: 640 mL  Total IN: 640 mL    OUT:    Voided: 300 mL  Total OUT: 300 mL    Total NET: 340 mL               Discussed with:     Cultures:	        Radiology      EXAM:  CT ABDOMEN AND PELVIS                          EXAM:  CT CHEST                            PROCEDURE DATE:  08/27/2017          INTERPRETATION:  CT CHEST/ABDOMEN/PELVIS:     CLINICAL INFORMATION:  Chest and abdominal pain following fall.    COMPARISON: None.    PROCEDURE:  Using multislice helical CT, 2.5 mm sections were obtained   from the thoracic inlet through the ischial tuberosities.    Multiplanar reformatted images.    There is right perihilar and lower lobe patchy airspace consolidation,   extending to the periphery of the basal segments of the right lower lobe,   with peribronchiolar thickening. There is mucous plugging involving   segmental right lower lobe bronchi.    There are poorly defined centrilobular nodular opacities throughout the   right lower lobe, likely reflecting infectious/inflammatory airway   disease.  There are scattered small subpleural nodular opacities right lower lobe.    There is peripheral/subpleural reticular banding and intralobular septal   thickening with small peripheral cystic airspaces right upper and right   lower lobe, likely representing sequelae of chronic interstitial lung   disease. Similar findings are noted within the left upper/left lingula   lobes.  There is an approximately  15-mm subpleural nodule opacity left lower   lobe.    No pleural effusion or pneumothorax is noted.    There is arteriosclerotic calcification of the thoracic aorta and   coronary artery calcifications.  There are small shotty prevascular, pretracheal and subcarinal   mediastinal lymph nodes.  The evaluation of the pulmonary hilum is limited without intravenous   contrast.    There is a chronic appearing sternal fracture.  There are multilevel degenerative changes of the thoracic spine.    The evaluation of the solid organ parenchyma is limited with out   intravenous contrast.    There is diffuse fatty infiltration of the liver which is enlarged.  No intra or extrahepatic biliary ductal dilatation is noted.  The gallbladder appears unremarkable.    The adrenal glands and nonenhanced pancreas are unremarkable in   appearance.    There is mild fullness of the left renal pelvis; no hydroureteronephrosis   or obstructing ureteral calculi are noted. There is mild to moderate   bilateral perinephric stranding.  There are several small low density renal lesions, likely representing   cysts.    There is arteriosclerotic calcification of the abdominal aorta.  No enlarged retroperitoneal lymphadenopathy is noted.    The evaluation of the stomach and gastrointestinal tract is limited   without oral contrast distention.  No bowel obstruction is noted. There is colonic diverticulosis, without   CT evidence of diverticulitis.  No localized intra-abdominal fluid collection or pneumoperitoneum is   noted.    There is a normal-appearing appendix.    There is mild enlargement of the prostate gland with coarse   calcifications. There is mild circumferential bladder wall thickening,   correlate for cystitis.  No free fluid is noted within the pelvis.    There is multilevel lumbar degenerative disc disease.  No acute lumbar compression fracture is noted.  The pelvic ring and sacroiliac joints appear intact.  There is periarticular ossification left hip.      Impression:    Right perihilar and lower lobe airspace opacities as discussed, may   reflect infection/bronchopneumonia; cannot exclude perihilar mass.    Findings likely reflecting chronic interstitial lung disease bilateral   lungs as discussed.    Nonspecific subpleural opacity left lower lobe.    Recommend short interval follow-up chest CT following the appropriate   clinical course of treatment.    Hepatic steatosis with hepatosplenomegaly.    No CT evidence for acute visceral abdominal organ injury.    Other findings as discussed above.                          MICHELINE GREENFIELD M.D., ATTENDING RADIOLOGIST  This document has been electronically signed. Aug 27 2017  9:32AM

## 2017-08-28 NOTE — PROGRESS NOTE ADULT - PROBLEM SELECTOR PLAN 1
CAP, right lung,  Continue IV Ceftriazone, Azithromycin   f/u Legionella antigen, Strep antigen,  RVP negative  Continue gentle hydration  f/u Blood and Urine Cxs, Procalcitonin CAP, right lung,  Continue IV Ceftriazone, Azithromycin   f/u Legionella antigen, Strep antigen,  RVP negative  Continue gentle hydration  f/u Blood and Urine Cxs, Procalcitonin  Pulm to evaluate for long standing shortness of breath.

## 2017-08-28 NOTE — PROGRESS NOTE ADULT - PROBLEM SELECTOR PLAN 3
Type 2 with no complications, hold home hyperglycemia agents   Low dose ISS with hypoglycemic protocol  Check HgbA1c Type 2 with no complications, hold home hyperglycemia agents   Will add Lantus 5 units qHS and adjust as needed.  Low dose ISS with hypoglycemic protocol  Check HgbA1c

## 2017-08-28 NOTE — PROGRESS NOTE ADULT - PROBLEM SELECTOR PLAN 2
continue to encourage hydration  - creatinine down WNL, BUN trending down  Check renal ultrasound r/o post renal hydronephrosis especially in this patient with hx of BPH. Will consider straight cath to be sure he is not retaining continue to encourage hydration  - creatinine down WNL, BUN trending down

## 2017-08-28 NOTE — PROGRESS NOTE ADULT - SUBJECTIVE AND OBJECTIVE BOX
Neurology follow up note    DAVID [id number]      Interval History:    Patient feels ok no new complaints.    MEDICATIONS    azithromycin  IVPB 500 milliGRAM(s) IV Intermittent every 24 hours  cefTRIAXone   IVPB 1 Gram(s) IV Intermittent every 24 hours  heparin  Injectable 5000 Unit(s) SubCutaneous every 12 hours  acetaminophen   Tablet 650 milliGRAM(s) Oral every 6 hours PRN  isosorbide   mononitrate ER Tablet (IMDUR) 60 milliGRAM(s) Oral daily  ALPRAZolam 2 milliGRAM(s) Oral three times a day PRN  labetalol 200 milliGRAM(s) Oral three times a day  insulin lispro (HumaLOG) corrective regimen sliding scale   SubCutaneous three times a day before meals  dextrose 5%. 1000 milliLiter(s) IV Continuous <Continuous>  dextrose Gel 1 Dose(s) Oral once PRN  dextrose 50% Injectable 12.5 Gram(s) IV Push once  dextrose 50% Injectable 25 Gram(s) IV Push once  dextrose 50% Injectable 25 Gram(s) IV Push once  glucagon  Injectable 1 milliGRAM(s) IntraMuscular once PRN  amLODIPine   Tablet 10 milliGRAM(s) Oral daily  sodium chloride 0.9%. 1000 milliLiter(s) IV Continuous <Continuous>  nicotine -   7 mG/24Hr(s) Patch 1 patch Transdermal daily      Allergies    No Known Allergies    Intolerances          Weight (kg): 110.9 ( @ 16:51)    Vital Signs Last 24 Hrs  T(C): 37.2 (28 Aug 2017 05:31), Max: 37.4 (27 Aug 2017 16:51)  T(F): 99 (28 Aug 2017 05:31), Max: 99.4 (27 Aug 2017 16:51)  HR: 82 (28 Aug 2017 05:31) (74 - 82)  BP: 168/92 (28 Aug 2017 05:31) (110/72 - 168/92)  BP(mean): --  RR: 17 (28 Aug 2017 05:31) (16 - 17)  SpO2: 95% (28 Aug 2017 05:31) (93% - 95%)      REVIEW OF SYSTEMS: Non Verbal  Limit or unable to obtain secondary to patient's poor mental status.    Constitutional: No fever, chills, fatigue, + weakness  Eyes: no eye pain, visual disturbances, or discharge  ENT:  No difficulty hearing, tinnitus, vertigo; No sinus or throat pain  Neck: No pain or stiffness  Respiratory: + cough, dyspnea, wheezing   Cardiovascular: No chest pain, palpitations,   Gastrointestinal: No abdominal or epigastric pain. No nausea, vomiting  No diarrhea or constipation.   Genitourinary: No dysuria, frequency, hematuria or incontinence  Neurological: No headaches, lightheadedness, vertigo, numbness or tremors  Psychiatric: No depression, anxiety, mood swings or difficulty sleeping  Musculoskeletal: No joint pain or swelling; No muscle, back or extremity pain  Skin: No itching, burning, rashes or lesions   Lymph Nodes: No enlarged glands  Endocrine: No heat or cold intolerance; No hair loss   Allergy and Immunologic: No hives or eczema    On Neurological Examination:    Mental Status - Patient is alert, awake, oriented       Follow simple commands  Follow complex commands    Speech -   slight  Dysarthria                       Cranial Nerves - Pupils 3 mm equal and reactive to light,   extraocular eye movements intact.   smile symmetric  intact bilateral NLF    Motor Exam -   Right upper 4/5  Left upper 4/5  Right lower 3/5  Left lower 3/5    Muscle tone - is normal all over.  No asymmetry is seen.      Sensory    Bilateral intact to light touch      GENERAL Exam: Nontoxic , No Acute Distress   	  HEENT:  normocephalic, atraumatic  		  LUNGS:  Decreased bilaterally  	  HEART: Normal S1S2   No murmur RRR        	  GI/ ABDOMEN:  Soft  Non tender    EXTREMITIES:   No Edema  No Clubbing  No Cyanosis No Edema    MUSCULOSKELETAL: Normal Range of Motion  	   SKIN: Normal  No Ecchymosis               LABS:  CBC Full  -  ( 28 Aug 2017 06:42 )  WBC Count : 7.5 K/uL  Hemoglobin : 12.0 g/dL  Hematocrit : 36.4 %  Platelet Count - Automated : 169 K/uL  Mean Cell Volume : 84.9 fl  Mean Cell Hemoglobin : 27.9 pg  Mean Cell Hemoglobin Concentration : 32.8 gm/dL  Auto Neutrophil # : 5.3 K/uL  Auto Lymphocyte # : 1.3 K/uL  Auto Monocyte # : 0.8 K/uL  Auto Eosinophil # : 0.0 K/uL  Auto Basophil # : 0.0 K/uL  Auto Neutrophil % : 70.9 %  Auto Lymphocyte % : 17.2 %  Auto Monocyte % : 11.0 %  Auto Eosinophil % : 0.3 %  Auto Basophil % : 0.6 %    Urinalysis Basic - ( 28 Aug 2017 08:15 )    Color: Yellow / Appearance: x / S.020 / pH: x  Gluc: x / Ketone: Trace  / Bili: Negative / Urobili: 1   Blood: x / Protein: 75 mg/dL / Nitrite: Negative   Leuk Esterase: Negative / RBC: 0-2 /HPF / WBC x   Sq Epi: x / Non Sq Epi: x / Bacteria: x          135  |  99  |  25<H>  ----------------------------<  200<H>  4.3   |  22  |  1.30    Ca    8.8      28 Aug 2017 06:42    TPro  6.6  /  Alb  3.1<L>  /  TBili  0.9  /  DBili  x   /  AST  28  /  ALT  25  /  AlkPhos  27<L>      Hemoglobin A1C: Hemoglobin A1C, Whole Blood: 7.9 % ( @ 08:08)      LIVER FUNCTIONS - ( 27 Aug 2017 08:18 )  Alb: 3.1 g/dL / Pro: 6.6 g/dL / ALK PHOS: 27 U/L / ALT: 25 U/L / AST: 28 U/L / GGT: x           Vitamin B12   PT/INR - ( 27 Aug 2017 08:18 )   PT: 15.4 sec;   INR: 1.40 ratio         PTT - ( 27 Aug 2017 08:18 )  PTT:31.8 sec      RADIOLOGY    ANALYSIS AND PLAN:  This is a 77-year-old with history of frequent falls, bilateral subdural hydromas/hematomas, dysarthria, and ataxia.    1.	For episode of frequent falls, suspect most likely secondary to age related changes and body habitus.  2.	For bilateral subdural hydromas/hematomas, these appeared to be chronic, would recommend supportive therapy.  3.	For dysarthria, questionable could be secondary to underlying respiratory issues versus medication.  4.	For ataxia, secondary to age related changes.  5.	For rhabdomyolysis, IV fluids.  6.	Had a lengthy discussion with son, Balaji.  We will see how the patient does after general medical issues have been corrected to evaluate dysarthria secondary to the patient shortness of breath and body habitus.  The patient has not lied flat for quite some time, unclear if he would be able to lie flat for an MRI.  7.	We will continue to follow.  8.	Spoke with son, Balaji, at [phone] understands reasoning and thought process.    Thank you for the courtesy of consultation.    Physical therapy evaluation.  OOB to chair/ambulation with assistance only.    30 minutes spent on total encounter; more than 50% of the visit was spent counseling and/or coordinating care by the attending physician.

## 2017-08-28 NOTE — PROGRESS NOTE ADULT - PROBLEM SELECTOR PLAN 5
continue Labetolol and Amlodipine with hold parameters    Hold ACE, HCTZ due to DEVYN  - 168/92 at 0530

## 2017-08-28 NOTE — CONSULT NOTE ADULT - PROBLEM SELECTOR RECOMMENDATION 4
supportive treatment
at risk for JOAQUIN  will check ABG in am to assess for OHS  keep HOB elevated  check Thyroid studies  supp o2  keep sat > 90 pct  will follow

## 2017-08-28 NOTE — CONSULT NOTE ADULT - SUBJECTIVE AND OBJECTIVE BOX
[facility]         Franco Mcallister, Benitez, Josse, Skylar, Kalia, Robert        [phone] (office)    CHIEF COMPLAINT: Patient is a 77y old  Male who presents with a chief complaint of SOB, s/p fall early sat am per son (27 Aug 2017 16:14)      HPI:  Patient is a 73 y/o male PMHx diastolic dysfunction, nonobstructive CAD based on cardiac cath from 12/2015, normal lvef (echo 10/2016), HTN, HLD, DM, GERD, BPH, Depression, Anxiety.  He has longstanding dyspnea for which multiple cardiac explanations have been considered, including CAD and diastolic impairment.  He has been found to have a degree of interstitial lung disease, the significance of which is unclear.    He presents with 1 week of progressively worsening SOB from his baseline.  SOB was associated with nonproductive cough, confusion and lower extremity weakness with an inability to get out of his chair. He admitted to fever, chills, SOB, productive cough, right hip pain, back pain. He denied headache, dizziness, hearing or vision changes, sore throat, chest pain or palpitations, N/V/C/D, numbness or tingling in extremities.     He had a fall prior to coming to the er.    In er, Vitals were: T 101.3, As EMS, o2 sat was 86% on RA, other vitals wnl. No leukocytosis, no elevated lactate. Other CBC lab results grossly wnl. Mild hyponatremia 134, Elevated bun /cr at 30/1.70. CE positive with elevated CK at 1853. BNP at 1728. Pt had CT head done which showed no acute pathology, X ray and CT chest and abd with no contrast showed Right perihilar and lower lobe airspace opacities as discussed, may reflect infection/bronchopneumonia; cannot exclude perihilar mass; likely reflecting chronic interstitial lung disease bilateral lungs; Nonspecific subpleural opacity left lower lobe. Hepatic steatosis with hepatosplenomegaly. EKG showed sinus rhythm with sinus arrhythmia with 1st degree AV block, inferior infarct age undetermined (unofficial read). Pt received 1x azithromycin, 1x Rocephin, 1x tylenols, 1x duoneb, BCX and UCX was sent. Pt was on 5L NC. (27 Aug 2017 10:01)      PAST MEDICAL & SURGICAL HISTORY:  Smoker  BPH with obstruction/lower urinary tract symptoms  Vitamin D deficiency  GERD (gastroesophageal reflux disease)  Diabetes mellitus  Hyperlipidemia  Hypertension  No significant past surgical history      SOCIAL HISTORY: No active tobacco, alcohol or illicit drug use    FAMILY HISTORY:  Family history of heart disease (Grandparent)  Family history of lymphoma      Outpatient medications:    MEDICATIONS  (STANDING):  azithromycin  IVPB 500 milliGRAM(s) IV Intermittent every 24 hours  cefTRIAXone   IVPB 1 Gram(s) IV Intermittent every 24 hours  heparin  Injectable 5000 Unit(s) SubCutaneous every 12 hours  isosorbide   mononitrate ER Tablet (IMDUR) 60 milliGRAM(s) Oral daily  labetalol 200 milliGRAM(s) Oral three times a day  insulin lispro (HumaLOG) corrective regimen sliding scale   SubCutaneous three times a day before meals  dextrose 5%. 1000 milliLiter(s) (50 mL/Hr) IV Continuous <Continuous>  dextrose 50% Injectable 12.5 Gram(s) IV Push once  dextrose 50% Injectable 25 Gram(s) IV Push once  dextrose 50% Injectable 25 Gram(s) IV Push once  amLODIPine   Tablet 10 milliGRAM(s) Oral daily  sodium chloride 0.9%. 1000 milliLiter(s) (70 mL/Hr) IV Continuous <Continuous>  nicotine -   7 mG/24Hr(s) Patch 1 patch Transdermal daily    MEDICATIONS  (PRN):  acetaminophen   Tablet 650 milliGRAM(s) Oral every 6 hours PRN For Temp greater than 38 C (100.4 F) & Pain  ALPRAZolam 2 milliGRAM(s) Oral three times a day PRN Anxiety  dextrose Gel 1 Dose(s) Oral once PRN Blood Glucose LESS THAN 70 milliGRAM(s)/deciliter  glucagon  Injectable 1 milliGRAM(s) IntraMuscular once PRN Glucose LESS THAN 70 milligrams/deciliter      Allergies    No Known Allergies    Intolerances        REVIEW OF SYSTEMS: Is negative for eye, ENT, GI, , allergic, dermatologic, musculoskeletal and neurologic, except as described above.    VITAL SIGNS:   Vital Signs Last 24 Hrs  T(C): 36.4 (28 Aug 2017 13:37), Max: 37.4 (27 Aug 2017 16:51)  T(F): 97.6 (28 Aug 2017 13:37), Max: 99.4 (27 Aug 2017 16:51)  HR: 76 (28 Aug 2017 13:37) (76 - 82)  BP: 152/90 (28 Aug 2017 13:37) (140/90 - 168/92)  BP(mean): --  RR: 18 (28 Aug 2017 13:37) (17 - 18)  SpO2: 94% (28 Aug 2017 13:37) (93% - 95%)    I&O's Summary    27 Aug 2017 07:01  -  28 Aug 2017 07:00  --------------------------------------------------------  IN: 1080 mL / OUT: 800 mL / NET: 280 mL    28 Aug 2017 07:01  -  28 Aug 2017 16:29  --------------------------------------------------------  IN: 240 mL / OUT: 300 mL / NET: -60 mL        PHYSICAL EXAM:    Constitutional: NAD, sleeping but arousable, well-developed  Eyes:  EOMI, no oral cyanosis, conjunctivae clear, anicteric.  Pulmonary: Non-labored, rhonchi with some wheezing river  Cardiovascular:  regular S1 and S2. No murmur.  No rubs, gallops or clicks  Gastrointestinal: Bowel Sounds present, soft, nontender.   Lymph: No peripheral edema.   Neurological: lethargic, strength and sensitivity cannot be evaluated  Skin: No obvious lesions/rashes.   Psych:  lethargic.    LABS: All Labs Reviewed:                        12.0   7.5   )-----------( 169      ( 28 Aug 2017 06:42 )             36.4                         12.9   9.9   )-----------( 168      ( 27 Aug 2017 08:18 )             39.0     28 Aug 2017 06:42    135    |  99     |  25     ----------------------------<  200    4.3     |  22     |  1.30   27 Aug 2017 08:18    134    |  99     |  30     ----------------------------<  192    4.5     |  20     |  1.70     Ca    8.8        28 Aug 2017 06:42  Ca    9.2        27 Aug 2017 08:18    TPro  6.6    /  Alb  3.1    /  TBili  0.9    /  DBili  x      /  AST  28     /  ALT  25     /  AlkPhos  27     27 Aug 2017 08:18    PT/INR - ( 27 Aug 2017 08:18 )   PT: 15.4 sec;   INR: 1.40 ratio         PTT - ( 27 Aug 2017 08:18 )  PTT:31.8 sec  CARDIAC MARKERS ( 27 Aug 2017 20:16 )  <.015 ng/mL / x     / 1266 U/L / x     / 0.9 ng/mL  CARDIAC MARKERS ( 27 Aug 2017 14:39 )  <.015 ng/mL / x     / 1604 U/L / x     / 1.4 ng/mL  CARDIAC MARKERS ( 27 Aug 2017 08:18 )  <.015 ng/mL / x     / 1853 U/L / x     / 1.6 ng/mL      Blood Culture:   08-28 @ 06:42  Pro Bnp 1627  08-27 @ 08:18  Pro Bnp 1728        RADIOLOGY:  < from: CT Chest No Cont (08.27.17 @ 09:05) >    EXAM:  CT ABDOMEN AND PELVIS                          EXAM:  CT CHEST                            PROCEDURE DATE:  08/27/2017          INTERPRETATION:  CT CHEST/ABDOMEN/PELVIS:     CLINICAL INFORMATION:  Chest and abdominal pain following fall.    COMPARISON: None.    PROCEDURE:  Using multislice helical CT, 2.5 mm sections were obtained   from the thoracic inlet through the ischial tuberosities.    Multiplanar reformatted images.    There is right perihilar and lower lobe patchy airspace consolidation,   extending to the periphery of the basal segments of the right lower lobe,   with peribronchiolar thickening. There is mucous plugging involving   segmental right lower lobe bronchi.    There are poorly defined centrilobular nodular opacities throughout the   right lower lobe, likely reflecting infectious/inflammatory airway   disease.  There are scattered small subpleural nodular opacities right lower lobe.    There is peripheral/subpleural reticular banding and intralobular septal   thickening with small peripheral cystic airspaces right upper and right   lower lobe, likely representing sequelae of chronic interstitial lung   disease. Similar findings are noted within the left upper/left lingula   lobes.  There is an [id number] subpleural nodule opacity left lower   lobe.    No pleural effusion or pneumothorax is noted.    There is arteriosclerotic calcification of the thoracic aorta and   coronary artery calcifications.  There are small shotty prevascular, pretracheal and subcarinal   mediastinal lymph nodes.  The evaluation of the pulmonary hilum is limited without intravenous   contrast.    There is a chronic appearing sternal fracture.  There are multilevel degenerative changes of the thoracic spine.    The evaluation of the solid organ parenchyma is limited with out   intravenous contrast.    There is diffuse fatty infiltration of the liver which is enlarged.  No intra or extrahepatic biliary ductal dilatation is noted.  The gallbladder appears unremarkable.    The adrenal glands and nonenhanced pancreas are unremarkable in   appearance.    There is mild fullness of the left renal pelvis; no hydroureteronephrosis   or obstructing ureteral calculi are noted. There is mild to moderate   bilateral perinephric stranding.  There are several small low density renal lesions, likely representing   cysts.    There is arteriosclerotic calcification of the abdominal aorta.  No enlarged retroperitoneal lymphadenopathy is noted.    The evaluation of the stomach and gastrointestinal tract is limited   without oral contrast distention.  No bowel obstruction is noted. There is colonic diverticulosis, without   CT evidence of diverticulitis.  No localized intra-abdominal fluid collection or pneumoperitoneum is  noted.    There is a normal-appearing appendix.    There is mild enlargement of the prostate gland with coarse   calcifications. There is mild circumferential bladder wall thickening,   correlate for cystitis.  No free fluid is noted within the pelvis.    There is multilevel lumbar degenerative disc disease.  No acute lumbar compression fracture is noted.  The pelvic ring and sacroiliac joints appear intact.  There is periarticular ossification left hip.      Impression:    Right perihilar and lower lobe airspace opacities as discussed, may   reflect infection/bronchopneumonia; cannot exclude perihilar mass.    Findings likely reflecting chronic interstitial lung disease bilateral   lungs as discussed.    Nonspecific subpleural opacity leftlower lobe.    Recommend short interval follow-up chest CT following the appropriate   clinical course of treatment.    Hepatic steatosis with hepatosplenomegaly.    No CT evidence for acute visceral abdominal organ injury.    Other findings as discussed above.                            MICHELINE GREENFIELD M.D., ATTENDING RADIOLOGIST  This document has been electronically signed. Aug 27 2017  9:32AM                < end of copied text >        EKG: sr 1st deg avb, imi ?age

## 2017-08-28 NOTE — PROGRESS NOTE ADULT - PROBLEM SELECTOR PLAN 8
CT Head negative and no focal neurological deficits. On full dose ASA. Less likely CVA but due to risk factors will consult Neurology and defer to him for need for MRI  will check bed side speech and swallow. CT Head negative and no focal neurological deficits.   Likely due to infection and/or polypharmacy.   Monitor.

## 2017-08-28 NOTE — PROGRESS NOTE ADULT - SUBJECTIVE AND OBJECTIVE BOX
Patient reports that he continues to have a productive cough.  He reports continuing "emotional problems" and reports feeling depressed.    We have verified home psych meds and his son will be bringing them from home for him.    HPI:  Patient is a 71 y/o male PMHx diastolic CHF, HTN, HLD, DM, GERD, BPH, Depression, Anxiety who presents with 1 week of progressively worsening SOB from his baseline SOB 2/2 diastolic CHF.    SOB was associated with nonproductive cough, confusion (per son at bedside) and lower extremity weakness with an inability to get out of his chair. He admits to fever, chills, SOB, productive cough, right hip pain, back pain. He denies headache, dizziness, hearing or vision changes, sore throat, chest pain or palpitations, N/V/C/D, numbness or tingling in extremities.     Patient's son at bedside states his father walks with more difficulty in his apartment and has increased slurred speech from baseline since a fall onto his knees yesterday.     In ED, Vitals were: T 101.3, As EMS, o2 sat was 86% on RA, other vitals wnl. No leukocytosis, no elevated lactate. Other CBC lab results grossly wnl. Mild hyponatremia 134, Elevated bun /cr at 30/1.70. CE positive with elevated CK at 1853. BNP at 1728. Pt had CT head done which showed no acute pathology, X ray and CT chest and abd with no contrast showed Right perihilar and lower lobe airspace opacities as discussed, may reflect infection/bronchopneumonia; cannot exclude perihilar mass; likely reflecting chronic interstitial lung disease bilateral lungs; Nonspecific subpleural opacity left lower lobe. Hepatic steatosis with hepatosplenomegaly. EKG showed sinus rhythm with sinus arrhythmia with 1st degree AV block, inferior infarct age undetermined (unofficial read). Pt received 1x azithromycin, 1x Rocephin, 1x tylenols, 1x duoneb, BCX and UCX was sent. Pt was on 5L NC. (27 Aug 2017 10:01)    REVIEW OF SYSTEMS:    CONSTITUTIONAL: No weakness, fevers or chills  EYES/ENT: No visual changes, no throat pain   RESPIRATORY: + cough, SOB, wheezing; No hemoptysis;   CARDIOVASCULAR: No chest pain or palpitations  GASTROINTESTINAL: + nausea;  No abdominal pain, vomiting, or hematemesis; No diarrhea or constipation. No melena or hematochezia.  GENITOURINARY: No dysuria, frequency or hematuria  NEUROLOGICAL: No dizziness, numbness, or weakness  SKIN: No itching, burning, rashes, or lesions   All other review of systems is negative unless indicated above.    VITAL SIGNS:  Vital Signs Last 24 Hrs  T(C): 36.4 (28 Aug 2017 13:37), Max: 37.4 (27 Aug 2017 16:51)  T(F): 97.6 (28 Aug 2017 13:37), Max: 99.4 (27 Aug 2017 16:51)  HR: 76 (28 Aug 2017 13:37) (74 - 82)  BP: 152/90 (28 Aug 2017 13:37) (140/90 - 168/92)  RR: 18 (28 Aug 2017 13:37) (16 - 18)  SpO2: 94% (28 Aug 2017 13:37) (93% - 95%)      PHYSICAL EXAM:     GENERAL: no acute distress  HEENT: NC/AT, EOMI, neck supple, MMM  RESPIRATORY: bilateral rhonchi at lung bases  CARDIOVASCULAR: RRR, no murmurs, gallops, rubs  ABDOMINAL: soft, non-tender, distended, positive bowel sounds   EXTREMITIES: no clubbing, cyanosis, or edema  NEUROLOGICAL: alert and oriented x 3, non-focal  SKIN: no rashes or lesions   MUSCULOSKELETAL: no gross joint deformity  PSYCH: patient emotionally labile; denies thoughts of harming himself or others                          12.0   7.5   )-----------( 169      ( 28 Aug 2017 06:42 )             36.4     08-28    135  |  99  |  25<H>  ----------------------------<  200<H>  4.3   |  22  |  1.30    Ca    8.8      28 Aug 2017 06:42    TPro  6.6  /  Alb  3.1<L>  /  TBili  0.9  /  DBili  x   /  AST  28  /  ALT  25  /  AlkPhos  27<L>  08-27      CAPILLARY BLOOD GLUCOSE  297 (28 Aug 2017 12:07)  194 (28 Aug 2017 07:55)  249 (27 Aug 2017 20:55)  171 (27 Aug 2017 16:55)          MEDICATIONS  (STANDING):  azithromycin  IVPB 500 milliGRAM(s) IV Intermittent every 24 hours  cefTRIAXone   IVPB 1 Gram(s) IV Intermittent every 24 hours  heparin  Injectable 5000 Unit(s) SubCutaneous every 12 hours  isosorbide   mononitrate ER Tablet (IMDUR) 60 milliGRAM(s) Oral daily  labetalol 200 milliGRAM(s) Oral three times a day  insulin lispro (HumaLOG) corrective regimen sliding scale   SubCutaneous three times a day before meals  dextrose 5%. 1000 milliLiter(s) (50 mL/Hr) IV Continuous <Continuous>  dextrose 50% Injectable 12.5 Gram(s) IV Push once  dextrose 50% Injectable 25 Gram(s) IV Push once  dextrose 50% Injectable 25 Gram(s) IV Push once  amLODIPine   Tablet 10 milliGRAM(s) Oral daily  sodium chloride 0.9%. 1000 milliLiter(s) (70 mL/Hr) IV Continuous <Continuous>  nicotine -   7 mG/24Hr(s) Patch 1 patch Transdermal daily Patient reports that he continues to have a productive cough.  He reports continuing "emotional problems" and reports feeling depressed.    We have verified home psych meds and his son will be bringing them from home for him.    HPI:  Patient is a 71 y/o male PMHx diastolic CHF, HTN, HLD, DM, GERD, BPH, Depression, Anxiety who presents with 1 week of progressively worsening SOB from his baseline SOB 2/2 diastolic CHF.    SOB was associated with nonproductive cough, confusion (per son at bedside) and lower extremity weakness with an inability to get out of his chair. He admits to fever, chills, SOB, productive cough, right hip pain, back pain. He denies headache, dizziness, hearing or vision changes, sore throat, chest pain or palpitations, N/V/C/D, numbness or tingling in extremities.     Patient's son at bedside states his father walks with more difficulty in his apartment and has increased slurred speech from baseline since a fall onto his knees yesterday.     In ED, Vitals were: T 101.3, As EMS, o2 sat was 86% on RA, other vitals wnl. No leukocytosis, no elevated lactate. Other CBC lab results grossly wnl. Mild hyponatremia 134, Elevated bun /cr at 30/1.70. CE positive with elevated CK at 1853. BNP at 1728. Pt had CT head done which showed no acute pathology, X ray and CT chest and abd with no contrast showed Right perihilar and lower lobe airspace opacities as discussed, may reflect infection/bronchopneumonia; cannot exclude perihilar mass; likely reflecting chronic interstitial lung disease bilateral lungs; Nonspecific subpleural opacity left lower lobe. Hepatic steatosis with hepatosplenomegaly. EKG showed sinus rhythm with sinus arrhythmia with 1st degree AV block, inferior infarct age undetermined (unofficial read). Pt received 1x azithromycin, 1x Rocephin, 1x tylenols, 1x duoneb, BCX and UCX was sent. Pt was on 5L NC. (27 Aug 2017 10:01)    REVIEW OF SYSTEMS:    CONSTITUTIONAL: No weakness, fevers or chills  EYES/ENT: No visual changes, no throat pain   RESPIRATORY: + cough, SOB, wheezing; No hemoptysis;   CARDIOVASCULAR: No chest pain or palpitations  GASTROINTESTINAL: + nausea;  No abdominal pain, vomiting, or hematemesis; No diarrhea or constipation. No melena or hematochezia.  GENITOURINARY: No dysuria, frequency or hematuria  NEUROLOGICAL: No dizziness, numbness, or weakness  SKIN: No itching, burning, rashes, or lesions   All other review of systems is negative unless indicated above.    VITAL SIGNS:  Vital Signs Last 24 Hrs  T(C): 36.4 (28 Aug 2017 13:37), Max: 37.4 (27 Aug 2017 16:51)  T(F): 97.6 (28 Aug 2017 13:37), Max: 99.4 (27 Aug 2017 16:51)  HR: 76 (28 Aug 2017 13:37) (74 - 82)  BP: 152/90 (28 Aug 2017 13:37) (140/90 - 168/92)  RR: 18 (28 Aug 2017 13:37) (16 - 18)  SpO2: 94% (28 Aug 2017 13:37) (93% - 95%)      PHYSICAL EXAM:     GENERAL: no acute distress  HEENT: NC/AT, EOMI, neck supple, MMM  RESPIRATORY: bilateral rhonchi   CARDIOVASCULAR: RRR, no murmurs, gallops, rubs  ABDOMINAL: soft, non-tender, distended, positive bowel sounds   EXTREMITIES: no clubbing, cyanosis, or edema  NEUROLOGICAL: alert and oriented x 3, non-focal  SKIN: no rashes or lesions   MUSCULOSKELETAL: no gross joint deformity  PSYCH: patient emotionally labile; denies thoughts of harming himself or others                          12.0   7.5   )-----------( 169      ( 28 Aug 2017 06:42 )             36.4     08-28    135  |  99  |  25<H>  ----------------------------<  200<H>  4.3   |  22  |  1.30    Ca    8.8      28 Aug 2017 06:42    TPro  6.6  /  Alb  3.1<L>  /  TBili  0.9  /  DBili  x   /  AST  28  /  ALT  25  /  AlkPhos  27<L>  08-27      CAPILLARY BLOOD GLUCOSE  297 (28 Aug 2017 12:07)  194 (28 Aug 2017 07:55)  249 (27 Aug 2017 20:55)  171 (27 Aug 2017 16:55)          MEDICATIONS  (STANDING):  azithromycin  IVPB 500 milliGRAM(s) IV Intermittent every 24 hours  cefTRIAXone   IVPB 1 Gram(s) IV Intermittent every 24 hours  heparin  Injectable 5000 Unit(s) SubCutaneous every 12 hours  isosorbide   mononitrate ER Tablet (IMDUR) 60 milliGRAM(s) Oral daily  labetalol 200 milliGRAM(s) Oral three times a day  insulin lispro (HumaLOG) corrective regimen sliding scale   SubCutaneous three times a day before meals  dextrose 5%. 1000 milliLiter(s) (50 mL/Hr) IV Continuous <Continuous>  dextrose 50% Injectable 12.5 Gram(s) IV Push once  dextrose 50% Injectable 25 Gram(s) IV Push once  dextrose 50% Injectable 25 Gram(s) IV Push once  amLODIPine   Tablet 10 milliGRAM(s) Oral daily  sodium chloride 0.9%. 1000 milliLiter(s) (70 mL/Hr) IV Continuous <Continuous>  nicotine -   7 mG/24Hr(s) Patch 1 patch Transdermal daily

## 2017-08-28 NOTE — CONSULT NOTE ADULT - ASSESSMENT
73 y/o male PMHx diastolic dysfunction, nonobstructive CAD based on cardiac cath from 12/2015, normal lvef (echo 10/2016), HTN, HLD, DM, GERD, BPH, Depression, Anxiety.  He has longstanding dyspnea for which multiple cardiac explanations have been considered, including CAD and diastolic impairment.  He has been found to have a degree of interstitial lung disease, the significance of which is unclear.  He presents with 1 week of progressively worsening SOB from his baseline, and other sxs as described.    -there is no evidence of acute ischemia.  -ce all negative and ekg is without evidence for ischemia  -there is no evidence of significant arrhythmia.  -there is no evidence for meaningful  volume overload.  -although his dyspnea has previously been felt to be related to diastolic dysfunction, the significance of his lung disease was not known when that statement was made.  -it seems likely that his longstanding dyspnea reflects interstitial lung disease, and not his diastolic dysfunction  -his acute on chronic presentation seems to be on the basis of an acute febrile illness, pna  -abx  -his outpt bp regimen includes amlodipine, benazepril, labetolol and httz; would resume what is not now being given when his renal fxn normalizes  -cont isosorbide  -should be on asa  -follow mental status  -dvt prophylaxis  -follow lytes and replete as needed  -will follow  -DVT prophylaxis  -monitor electrolytes, keep k>4, Mg>2  -cardiovascular status is otherwise without acute change. 71 y/o male PMHx diastolic dysfunction, nonobstructive CAD based on cardiac cath from 12/2015, normal lvef (echo 10/2016), HTN, HLD, DM, GERD, BPH, Depression, Anxiety.  He has longstanding dyspnea for which multiple cardiac explanations have been considered, including CAD and diastolic impairment.  He has been found to have a degree of interstitial lung disease, the significance of which is unclear.  He presents with 1 week of progressively worsening SOB from his baseline, and other sxs as described.    -there is no evidence of acute ischemia.  -ce all negative and ekg is without evidence for ischemia  -there is no evidence of significant arrhythmia.  -there is no evidence for meaningful  volume overload.  -although his dyspnea has previously been felt to be related to diastolic dysfunction, the significance of his lung disease was not known when that statement was made.  -it seems likely that his longstanding dyspnea reflects interstitial lung disease, and not his diastolic dysfunction  -his acute on chronic presentation seems to be on the basis of an acute febrile illness, pna  -i do not believe that there is any meaningful volume overload, despite the elevated bnp level  -abx  -his outpt bp regimen includes amlodipine, benazepril, labetolol and httz; would resume what is not now being given when his renal fxn normalizes  -cont isosorbide  -should be on asa  -follow mental status  -dvt prophylaxis  -follow lytes and replete as needed  -will follow  -DVT prophylaxis  -monitor electrolytes, keep k>4, Mg>2  -cardiovascular status is otherwise without acute change.

## 2017-08-28 NOTE — CONSULT NOTE ADULT - PROBLEM SELECTOR RECOMMENDATION 2
age related changes
on emp ABX  chronic lung disease  ct chest reviewe  cx data reviewed  suspect aspiration as the main culprit  pt is on dysphagia diet  keep HOB elevated  smoking cess ed  Albuterol ATC and Solumedrol IV  will check ABG in am  cont supp o2 support, keep sat > 90 pct  oral and skin care  prognosis guarded  suspect a combination of interstitial lung disease due to chronic aspiration and possible COPD due to smoking history

## 2017-08-28 NOTE — PHYSICAL THERAPY INITIAL EVALUATION ADULT - PERTINENT HX OF CURRENT PROBLEM, REHAB EVAL
Pt with PMH of diastolic CHF , HTN, HLD, DM, GERD, BPH, depression, anxiety admitted after falling to knees at home , pt also with slurred speech, increased confusion and SOB

## 2017-08-29 DIAGNOSIS — F39 UNSPECIFIED MOOD [AFFECTIVE] DISORDER: ICD-10-CM

## 2017-08-29 DIAGNOSIS — J98.4 OTHER DISORDERS OF LUNG: ICD-10-CM

## 2017-08-29 LAB
ANION GAP SERPL CALC-SCNC: 13 MMOL/L — SIGNIFICANT CHANGE UP (ref 5–17)
BASE EXCESS BLDA CALC-SCNC: 1.6 MMOL/L — SIGNIFICANT CHANGE UP (ref -2–2)
BLOOD GAS COMMENTS ARTERIAL: SIGNIFICANT CHANGE UP
BUN SERPL-MCNC: 22 MG/DL — SIGNIFICANT CHANGE UP (ref 7–23)
CALCIUM SERPL-MCNC: 8.9 MG/DL — SIGNIFICANT CHANGE UP (ref 8.5–10.1)
CHLORIDE SERPL-SCNC: 101 MMOL/L — SIGNIFICANT CHANGE UP (ref 96–108)
CO2 SERPL-SCNC: 24 MMOL/L — SIGNIFICANT CHANGE UP (ref 22–31)
CREAT SERPL-MCNC: 1.3 MG/DL — SIGNIFICANT CHANGE UP (ref 0.5–1.3)
CULTURE RESULTS: NO GROWTH — SIGNIFICANT CHANGE UP
GLUCOSE SERPL-MCNC: 191 MG/DL — HIGH (ref 70–99)
HCO3 BLDA-SCNC: 25 MMOL/L — SIGNIFICANT CHANGE UP (ref 23–27)
HCT VFR BLD CALC: 35.4 % — LOW (ref 39–50)
HGB BLD-MCNC: 11.6 G/DL — LOW (ref 13–17)
HOROWITZ INDEX BLDA+IHG-RTO: 21 — SIGNIFICANT CHANGE UP
MCHC RBC-ENTMCNC: 28 PG — SIGNIFICANT CHANGE UP (ref 27–34)
MCHC RBC-ENTMCNC: 32.6 GM/DL — SIGNIFICANT CHANGE UP (ref 32–36)
MCV RBC AUTO: 85.8 FL — SIGNIFICANT CHANGE UP (ref 80–100)
PCO2 BLDA: 43 MMHG — SIGNIFICANT CHANGE UP (ref 32–46)
PH BLDA: 7.4 — SIGNIFICANT CHANGE UP (ref 7.35–7.45)
PLATELET # BLD AUTO: 182 K/UL — SIGNIFICANT CHANGE UP (ref 150–400)
PO2 BLDA: 54 MMHG — LOW (ref 74–108)
POTASSIUM SERPL-MCNC: 4.2 MMOL/L — SIGNIFICANT CHANGE UP (ref 3.5–5.3)
POTASSIUM SERPL-SCNC: 4.2 MMOL/L — SIGNIFICANT CHANGE UP (ref 3.5–5.3)
RBC # BLD: 4.12 M/UL — LOW (ref 4.2–5.8)
RBC # FLD: 13.9 % — SIGNIFICANT CHANGE UP (ref 10.3–14.5)
SAO2 % BLDA: 86 % — LOW (ref 92–96)
SODIUM SERPL-SCNC: 138 MMOL/L — SIGNIFICANT CHANGE UP (ref 135–145)
SPECIMEN SOURCE: SIGNIFICANT CHANGE UP
T3 SERPL-MCNC: 62 NG/DL — LOW (ref 80–200)
T4 AB SER-ACNC: 5.5 UG/DL — SIGNIFICANT CHANGE UP (ref 4.6–12)
TSH SERPL-MCNC: 1.12 UIU/ML — SIGNIFICANT CHANGE UP (ref 0.36–3.74)
WBC # BLD: 6.4 K/UL — SIGNIFICANT CHANGE UP (ref 3.8–10.5)
WBC # FLD AUTO: 6.4 K/UL — SIGNIFICANT CHANGE UP (ref 3.8–10.5)

## 2017-08-29 PROCEDURE — 99232 SBSQ HOSP IP/OBS MODERATE 35: CPT

## 2017-08-29 PROCEDURE — 99233 SBSQ HOSP IP/OBS HIGH 50: CPT | Mod: GC

## 2017-08-29 RX ORDER — HYDROCHLOROTHIAZIDE 25 MG
12.5 TABLET ORAL DAILY
Qty: 0 | Refills: 0 | Status: DISCONTINUED | OUTPATIENT
Start: 2017-08-29 | End: 2017-08-30

## 2017-08-29 RX ADMIN — Medication 200 MILLIGRAM(S): at 17:13

## 2017-08-29 RX ADMIN — Medication 20 MILLIGRAM(S): at 17:13

## 2017-08-29 RX ADMIN — Medication 1 PATCH: at 12:03

## 2017-08-29 RX ADMIN — Medication 187.5 MILLIGRAM(S): at 17:17

## 2017-08-29 RX ADMIN — ALBUTEROL 2.5 MILLIGRAM(S): 90 AEROSOL, METERED ORAL at 05:20

## 2017-08-29 RX ADMIN — Medication 187.5 MILLIGRAM(S): at 06:18

## 2017-08-29 RX ADMIN — ASENAPINE MALEATE 10 MILLIGRAM(S): 10 TABLET SUBLINGUAL at 09:01

## 2017-08-29 RX ADMIN — CEFTRIAXONE 100 GRAM(S): 500 INJECTION, POWDER, FOR SOLUTION INTRAMUSCULAR; INTRAVENOUS at 09:01

## 2017-08-29 RX ADMIN — INSULIN GLARGINE 5 UNIT(S): 100 INJECTION, SOLUTION SUBCUTANEOUS at 21:59

## 2017-08-29 RX ADMIN — Medication 3: at 11:58

## 2017-08-29 RX ADMIN — HEPARIN SODIUM 5000 UNIT(S): 5000 INJECTION INTRAVENOUS; SUBCUTANEOUS at 06:18

## 2017-08-29 RX ADMIN — AZITHROMYCIN 255 MILLIGRAM(S): 500 TABLET, FILM COATED ORAL at 09:45

## 2017-08-29 RX ADMIN — Medication 200 MILLIGRAM(S): at 06:18

## 2017-08-29 RX ADMIN — Medication 20 MILLIGRAM(S): at 06:18

## 2017-08-29 RX ADMIN — AMLODIPINE BESYLATE 10 MILLIGRAM(S): 2.5 TABLET ORAL at 06:18

## 2017-08-29 RX ADMIN — ISOSORBIDE MONONITRATE 60 MILLIGRAM(S): 60 TABLET, EXTENDED RELEASE ORAL at 11:58

## 2017-08-29 RX ADMIN — ASENAPINE MALEATE 10 MILLIGRAM(S): 10 TABLET SUBLINGUAL at 21:59

## 2017-08-29 RX ADMIN — Medication 2 MILLIGRAM(S): at 12:02

## 2017-08-29 RX ADMIN — BUPROPION HYDROCHLORIDE 300 MILLIGRAM(S): 150 TABLET, EXTENDED RELEASE ORAL at 11:58

## 2017-08-29 RX ADMIN — Medication 12.5 MILLIGRAM(S): at 09:46

## 2017-08-29 RX ADMIN — ALBUTEROL 2.5 MILLIGRAM(S): 90 AEROSOL, METERED ORAL at 15:48

## 2017-08-29 RX ADMIN — HEPARIN SODIUM 5000 UNIT(S): 5000 INJECTION INTRAVENOUS; SUBCUTANEOUS at 17:09

## 2017-08-29 RX ADMIN — ALBUTEROL 2.5 MILLIGRAM(S): 90 AEROSOL, METERED ORAL at 08:05

## 2017-08-29 RX ADMIN — ALBUTEROL 2.5 MILLIGRAM(S): 90 AEROSOL, METERED ORAL at 23:55

## 2017-08-29 RX ADMIN — Medication 2: at 08:59

## 2017-08-29 RX ADMIN — Medication 200 MILLIGRAM(S): at 21:59

## 2017-08-29 RX ADMIN — Medication 3: at 17:10

## 2017-08-29 NOTE — PROGRESS NOTE ADULT - SUBJECTIVE AND OBJECTIVE BOX
Neurology follow up note    DAVID [id number]      Interval History:    Patient seen with son at bedside today speech is doing better     MEDICATIONS    azithromycin  IVPB 500 milliGRAM(s) IV Intermittent every 24 hours  cefTRIAXone   IVPB 1 Gram(s) IV Intermittent every 24 hours  heparin  Injectable 5000 Unit(s) SubCutaneous every 12 hours  acetaminophen   Tablet 650 milliGRAM(s) Oral every 6 hours PRN  isosorbide   mononitrate ER Tablet (IMDUR) 60 milliGRAM(s) Oral daily  ALPRAZolam 2 milliGRAM(s) Oral three times a day PRN  labetalol 200 milliGRAM(s) Oral three times a day  insulin lispro (HumaLOG) corrective regimen sliding scale   SubCutaneous three times a day before meals  dextrose 5%. 1000 milliLiter(s) IV Continuous <Continuous>  dextrose Gel 1 Dose(s) Oral once PRN  dextrose 50% Injectable 12.5 Gram(s) IV Push once  dextrose 50% Injectable 25 Gram(s) IV Push once  dextrose 50% Injectable 25 Gram(s) IV Push once  glucagon  Injectable 1 milliGRAM(s) IntraMuscular once PRN  amLODIPine   Tablet 10 milliGRAM(s) Oral daily  nicotine -   7 mG/24Hr(s) Patch 1 patch Transdermal daily  venlafaxine 187.5 milliGRAM(s) Oral every 12 hours  buPROPion XL . 300 milliGRAM(s) Oral daily  asenapine SL 10 milliGRAM(s) SubLingual two times a day  insulin glargine Injectable (LANTUS) 5 Unit(s) SubCutaneous at bedtime  acetaminophen   Tablet. 650 milliGRAM(s) Oral every 6 hours PRN  ALBUTerol    0.083% 2.5 milliGRAM(s) Nebulizer every 8 hours  methylPREDNISolone sodium succinate Injectable 20 milliGRAM(s) IV Push two times a day  hydrochlorothiazide 12.5 milliGRAM(s) Oral daily      Allergies    No Known Allergies    Intolerances            Vital Signs Last 24 Hrs  T(C): 36.6 (29 Aug 2017 05:29), Max: 37.1 (28 Aug 2017 20:00)  T(F): 97.9 (29 Aug 2017 05:29), Max: 98.8 (28 Aug 2017 21:19)  HR: 85 (29 Aug 2017 08:05) (69 - 86)  BP: 174/96 (29 Aug 2017 07:01) (144/78 - 185/100)  BP(mean): --  RR: 20 (29 Aug 2017 05:29) (17 - 20)  SpO2: 95% (29 Aug 2017 08:05) (88% - 97%)        REVIEW OF SYSTEMS: Non Verbal  Limit or unable to obtain secondary to patient's poor mental status.    Constitutional: No fever, chills, fatigue, + weakness generalized   Eyes: no eye pain, visual disturbances, or discharge  ENT:  No difficulty hearing, tinnitus, vertigo; No sinus or throat pain  Neck: No pain or stiffness  Respiratory: + cough, dyspnea, wheezing   Cardiovascular: No chest pain, palpitations,   Gastrointestinal: No abdominal or epigastric pain. No nausea, vomiting  No diarrhea or constipation.   Genitourinary: No dysuria, frequency, hematuria or incontinence  Neurological: No headaches, lightheadedness, vertigo, numbness or tremors  Psychiatric: No depression, anxiety, mood swings or difficulty sleeping  Musculoskeletal: No joint pain or swelling; No muscle, back or extremity pain  Skin: No itching, burning, rashes or lesions   Lymph Nodes: No enlarged glands  Endocrine: No heat or cold intolerance; No hair loss   Allergy and Immunologic: No hives or eczema    On Neurological Examination:    Mental Status - Patient is alert, awake, oriented       Follow simple commands  Follow complex commands    Speech -   slight  Dysarthria --- day by day better                       Cranial Nerves - Pupils 3 mm equal and reactive to light,   extraocular eye movements intact.   smile symmetric  intact bilateral NLF    Motor Exam -   Right upper 4/5  Left upper 4/5  Right lower 3/5  Left lower 3/5    Muscle tone - is normal all over.  No asymmetry is seen.      Sensory    Bilateral intact to light touch      GENERAL Exam: Nontoxic , No Acute Distress   	  HEENT:  normocephalic, atraumatic  		  LUNGS:  Decreased bilaterally  	  HEART: Normal S1S2   No murmur RRR        	  GI/ ABDOMEN:  Soft  Non tender    EXTREMITIES:   No Edema  No Clubbing  No Cyanosis No Edema    MUSCULOSKELETAL: Normal Range of Motion  	   SKIN: Normal  No Ecchymosis               LABS:  CBC Full  -  ( 29 Aug 2017 06:49 )  WBC Count : 6.4 K/uL  Hemoglobin : 11.6 g/dL  Hematocrit : 35.4 %  Platelet Count - Automated : 182 K/uL  Mean Cell Volume : 85.8 fl  Mean Cell Hemoglobin : 28.0 pg  Mean Cell Hemoglobin Concentration : 32.6 gm/dL  Auto Neutrophil # : x  Auto Lymphocyte # : x  Auto Monocyte # : x  Auto Eosinophil # : x  Auto Basophil # : x  Auto Neutrophil % : x  Auto Lymphocyte % : x  Auto Monocyte % : x  Auto Eosinophil % : x  Auto Basophil % : x    Urinalysis Basic - ( 28 Aug 2017 08:15 )    Color: Yellow / Appearance: x / S.020 / pH: x  Gluc: x / Ketone: Trace  / Bili: Negative / Urobili: 1   Blood: x / Protein: 75 mg/dL / Nitrite: Negative   Leuk Esterase: Negative / RBC: 0-2 /HPF / WBC x   Sq Epi: x / Non Sq Epi: x / Bacteria: x          138  |  101  |  22  ----------------------------<  191<H>  4.2   |  24  |  1.30    Ca    8.9      29 Aug 2017 06:49      Hemoglobin A1C:       Vitamin B12         RADIOLOGY        ANALYSIS AND PLAN:  This is a 77-year-old with history of frequent falls, bilateral subdural hydromas/hematomas, dysarthria, and ataxia.    1.	For episode of frequent falls, suspect most likely secondary to age related changes, body habitus deconditioning   2.	For bilateral subdural hydromas/hematomas, these appeared to be chronic, would recommend supportive therapy.  3.	For dysarthria, questionable could be secondary to underlying respiratory issues versus medication overall doing better speech wise   4.	For ataxia, secondary to age related changes.  5.	For rhabdomyolysis, IV fluids as needed  6.	Had a lengthy discussion with sonBalaji at bedside today   7.	We will continue to follow.  8.	Spoke with sonBalaji, at [phone] understands reasoning and thought process.    Thank you for the courtesy of consultation.    Physical therapy evaluation.  OOB to chair/ambulation with assistance only.    30 minutes spent on total encounter; more than 50% of the visit was spent counseling and/or coordinating care by the attending physician.

## 2017-08-29 NOTE — PROGRESS NOTE ADULT - PROBLEM SELECTOR PLAN 2
at risk for JOAQUIN  obesity, male sex, and age  monitor sat  supp o2  keep sat > 90 pct  outpatient eval with PSG

## 2017-08-29 NOTE — PROGRESS NOTE ADULT - SUBJECTIVE AND OBJECTIVE BOX
Resident Progress Note    Patient is a 78 yo M who presents with a chief complaint of SOB, s/p fall early sat am per son (27 Aug 2017 16:14).      HPI: Patient seen and examined at bedside. No acute events overnight.    ROS:  Constitutional: denies fever, chills  HEENT: denies blurry vision, difficulty hearing  Respiratory: reports SOB, cough w/ minimal sputum; denies hemoptysis  Cardiovascular: denies CP, palpitations, edema  Gastrointestinal: denies nausea, vomiting, diarrhea, constipation, abdominal pain, melena, hematochezia   Genitourinary: reports increased urinary frequency; denies dysuria, hematuria   Skin/Breast: denies rash, itching  Musculoskeletal: reports muscle pains in lower back, arms, and legs  Neurologic: denies headache, weakness, dizziness, paresthesias, numbness/tingling  ROS negative except as noted above    Vital Signs Last 24 Hrs  T(C): 36.6 (17 @ 05:29), Max: 37.1 (17 @ 20:00)  T(F): 97.9 (17 @ 05:29), Max: 98.8 (17 @ 21:19)  HR: 69 (17 @ 07:01) (69 - 84)  BP: 174/96 (17 @ 07:01) (144/78 - 185/100)  BP(mean): --  RR: 20 (17 @ 05:29) (17 - 20)  SpO2: 94% (17 @ 07:01) (88% - 97%)    Physical Exam:  General: Well developed, well nourished, NAD  HEENT: NCAT, EOMI BL, moist mucous membranes   Neurology: A&Ox3, nonfocal, CN II-XII grossly intact  Respiratory: BL rhonchi appreciated  CV: +S1/S2, no murmurs, rubs or gallops  Abdominal: Soft, NT, ND +BSx4  Extremities: No C/C/E, 2+ peripheral pulses  Skin: warm, mild diaphoresis    LABS:    29 Aug 2017 06:49    138    |  101    |  22     ----------------------------<  191    4.2     |  24     |  1.30     Ca    8.9        29 Aug 2017 06:49      Blood Gas Profile - Arterial in AM (17 @ 05:49)    pH, Arterial: 7.40    pCO2, Arterial: 43 mmHg    pO2, Arterial: 54 mmHg    HCO3, Arterial: 25 mmol/L    Base Excess, Arterial: 1.6 mmol/L    Oxygen Saturation, Arterial: 86 %    FIO2, Arterial: 21.0    Blood Gas Comments Arterial: dr becker aware of low pao2/sats on room air      PT/INR - ( 27 Aug 2017 08:18 )   PT: 15.4 sec;   INR: 1.40 ratio     PTT - ( 27 Aug 2017 08:18 )  PTT:31.8 sec      CARDIAC MARKERS ( 27 Aug 2017 20:16 )  <.015 ng/mL / x     / 1266 U/L / x     / 0.9 ng/mL  CARDIAC MARKERS ( 27 Aug 2017 14:39 )  <.015 ng/mL / x     / 1604 U/L / x     / 1.4 ng/mL  CARDIAC MARKERS ( 27 Aug 2017 08:18 )  <.015 ng/mL / x     / 1853 U/L / x     / 1.6 ng/mL      Urinalysis Basic - ( 28 Aug 2017 08:15 )    Color: Yellow / Appearance: x / S.020 / pH: x  Gluc: x / Ketone: Trace  / Bili: Negative / Urobili: 1   Blood: x / Protein: 75 mg/dL / Nitrite: Negative   Leuk Esterase: Negative / RBC: 0-2 /HPF / WBC x   Sq Epi: x / Non Sq Epi: x / Bacteria: x      CAPILLARY BLOOD GLUCOSE  207 (28 Aug 2017 21:19)  209 (28 Aug 2017 17:15)  297 (28 Aug 2017 12:07)        RADIOLOGY & ADDITIONAL TESTS: no new tests        MEDICATIONS  (STANDING):  azithromycin  IVPB 500 milliGRAM(s) IV Intermittent every 24 hours  cefTRIAXone   IVPB 1 Gram(s) IV Intermittent every 24 hours  heparin  Injectable 5000 Unit(s) SubCutaneous every 12 hours  isosorbide   mononitrate ER Tablet (IMDUR) 60 milliGRAM(s) Oral daily  labetalol 200 milliGRAM(s) Oral three times a day  insulin lispro (HumaLOG) corrective regimen sliding scale   SubCutaneous three times a day before meals  dextrose 5%. 1000 milliLiter(s) (50 mL/Hr) IV Continuous <Continuous>  dextrose 50% Injectable 12.5 Gram(s) IV Push once  dextrose 50% Injectable 25 Gram(s) IV Push once  dextrose 50% Injectable 25 Gram(s) IV Push once  amLODIPine   Tablet 10 milliGRAM(s) Oral daily  nicotine -   7 mG/24Hr(s) Patch 1 patch Transdermal daily  venlafaxine 187.5 milliGRAM(s) Oral every 12 hours  buPROPion XL . 300 milliGRAM(s) Oral daily  asenapine SL 10 milliGRAM(s) SubLingual two times a day  insulin glargine Injectable (LANTUS) 5 Unit(s) SubCutaneous at bedtime  ALBUTerol    0.083% 2.5 milliGRAM(s) Nebulizer every 8 hours  methylPREDNISolone sodium succinate Injectable 20 milliGRAM(s) IV Push two times a day    MEDICATIONS  (PRN):  acetaminophen   Tablet 650 milliGRAM(s) Oral every 6 hours PRN For Temp greater than 38 C (100.4 F) & Pain  ALPRAZolam 2 milliGRAM(s) Oral three times a day PRN Anxiety  dextrose Gel 1 Dose(s) Oral once PRN Blood Glucose LESS THAN 70 milliGRAM(s)/deciliter  glucagon  Injectable 1 milliGRAM(s) IntraMuscular once PRN Glucose LESS THAN 70 milligrams/deciliter  acetaminophen   Tablet. 650 milliGRAM(s) Oral every 6 hours PRN Mild Pain (1 - 3)      Allergies    No Known Allergies    Intolerances Resident Progress Note    Patient is a 78 yo M who presents with a chief complaint of SOB, s/p fall early sat am per son (27 Aug 2017 16:14).      HPI: Patient seen and examined at bedside. No acute events overnight.    ROS:  Constitutional: denies fever, chills  HEENT: denies blurry vision, difficulty hearing  Respiratory: reports SOB, cough w/ minimal sputum; denies hemoptysis  Cardiovascular: denies CP, palpitations, edema  Gastrointestinal: denies nausea, vomiting, diarrhea, constipation, abdominal pain, melena, hematochezia   Genitourinary: reports increased urinary frequency; denies dysuria, hematuria   Skin/Breast: denies rash, itching  Musculoskeletal: reports muscle pains in lower back, arms, and legs  Neurologic: denies headache, weakness, dizziness, paresthesias, numbness/tingling  ROS negative except as noted above    Vital Signs Last 24 Hrs  T(C): 36.6 (17 @ 05:29), Max: 37.1 (17 @ 20:00)  T(F): 97.9 (17 @ 05:29), Max: 98.8 (17 @ 21:19)  HR: 69 (17 @ 07:01) (69 - 84)  BP: 174/96 (17 @ 07:01) (144/78 - 185/100)  BP(mean): --  RR: 20 (17 @ 05:29) (17 - 20)  SpO2: 94% (17 @ 07:01) (88% - 97%)    Physical Exam:  General: Well developed, well nourished, NAD  HEENT: NCAT, EOMI BL, moist mucous membranes   Neurology: A&Ox3, nonfocal, CN II-XII grossly intact  Respiratory: BL rhonchi appreciated  CV: +S1/S2, no murmurs, rubs or gallops  Abdominal: Soft, NT, ND +BSx4  Extremities: No C/C/E, 2+ peripheral pulses  Skin: warm, mild diaphoresis    LABS:                          11.6   6.4   )-----------( 182      ( 29 Aug 2017 06:49 )             35.4       29 Aug 2017 06:49    138    |  101    |  22     ----------------------------<  191    4.2     |  24     |  1.30     Ca    8.9        29 Aug 2017 06:49      Blood Gas Profile - Arterial in AM (17 @ 05:49)    pH, Arterial: 7.40    pCO2, Arterial: 43 mmHg    pO2, Arterial: 54 mmHg    HCO3, Arterial: 25 mmol/L    Base Excess, Arterial: 1.6 mmol/L    Oxygen Saturation, Arterial: 86 %    FIO2, Arterial: 21.0    Blood Gas Comments Arterial: dr bustamante aware of low pao2/sats on room air      PT/INR - ( 27 Aug 2017 08:18 )   PT: 15.4 sec;   INR: 1.40 ratio     PTT - ( 27 Aug 2017 08:18 )  PTT:31.8 sec      CARDIAC MARKERS ( 27 Aug 2017 20:16 )  <.015 ng/mL / x     / 1266 U/L / x     / 0.9 ng/mL  CARDIAC MARKERS ( 27 Aug 2017 14:39 )  <.015 ng/mL / x     / 1604 U/L / x     / 1.4 ng/mL  CARDIAC MARKERS ( 27 Aug 2017 08:18 )  <.015 ng/mL / x     / 1853 U/L / x     / 1.6 ng/mL      Urinalysis Basic - ( 28 Aug 2017 08:15 )    Color: Yellow / Appearance: x / S.020 / pH: x  Gluc: x / Ketone: Trace  / Bili: Negative / Urobili: 1   Blood: x / Protein: 75 mg/dL / Nitrite: Negative   Leuk Esterase: Negative / RBC: 0-2 /HPF / WBC x   Sq Epi: x / Non Sq Epi: x / Bacteria: x      CAPILLARY BLOOD GLUCOSE  207 (28 Aug 2017 21:19)  209 (28 Aug 2017 17:15)  297 (28 Aug 2017 12:07)        RADIOLOGY & ADDITIONAL TESTS: no new tests, past images reviewed        MEDICATIONS  (STANDING):  azithromycin  IVPB 500 milliGRAM(s) IV Intermittent every 24 hours  cefTRIAXone   IVPB 1 Gram(s) IV Intermittent every 24 hours  heparin  Injectable 5000 Unit(s) SubCutaneous every 12 hours  isosorbide   mononitrate ER Tablet (IMDUR) 60 milliGRAM(s) Oral daily  labetalol 200 milliGRAM(s) Oral three times a day  insulin lispro (HumaLOG) corrective regimen sliding scale   SubCutaneous three times a day before meals  dextrose 5%. 1000 milliLiter(s) (50 mL/Hr) IV Continuous <Continuous>  dextrose 50% Injectable 12.5 Gram(s) IV Push once  dextrose 50% Injectable 25 Gram(s) IV Push once  dextrose 50% Injectable 25 Gram(s) IV Push once  amLODIPine   Tablet 10 milliGRAM(s) Oral daily  nicotine -   7 mG/24Hr(s) Patch 1 patch Transdermal daily  venlafaxine 187.5 milliGRAM(s) Oral every 12 hours  buPROPion XL . 300 milliGRAM(s) Oral daily  asenapine SL 10 milliGRAM(s) SubLingual two times a day  insulin glargine Injectable (LANTUS) 5 Unit(s) SubCutaneous at bedtime  ALBUTerol    0.083% 2.5 milliGRAM(s) Nebulizer every 8 hours  methylPREDNISolone sodium succinate Injectable 20 milliGRAM(s) IV Push two times a day    MEDICATIONS  (PRN):  acetaminophen   Tablet 650 milliGRAM(s) Oral every 6 hours PRN For Temp greater than 38 C (100.4 F) & Pain  ALPRAZolam 2 milliGRAM(s) Oral three times a day PRN Anxiety  dextrose Gel 1 Dose(s) Oral once PRN Blood Glucose LESS THAN 70 milliGRAM(s)/deciliter  glucagon  Injectable 1 milliGRAM(s) IntraMuscular once PRN Glucose LESS THAN 70 milligrams/deciliter  acetaminophen   Tablet. 650 milliGRAM(s) Oral every 6 hours PRN Mild Pain (1 - 3)      Allergies    No Known Allergies    Intolerances Resident Progress Note    Patient is a 76 yo M who presents with a chief complaint of SOB, s/p fall early sat am per son (27 Aug 2017 16:14).      HPI: Patient seen and examined at bedside. No acute events overnight. Still short of breath but somewhat improved. +cough.     ROS:  Constitutional: denies fever, chills  HEENT: denies blurry vision, difficulty hearing  Respiratory: reports SOB, cough w/ minimal sputum; denies hemoptysis  Cardiovascular: denies CP, palpitations, edema  Gastrointestinal: denies nausea, vomiting, diarrhea, constipation, abdominal pain, melena, hematochezia   Genitourinary: reports increased urinary frequency; denies dysuria, hematuria   Skin/Breast: denies rash, itching  Musculoskeletal: reports muscle pains in lower back, arms, and legs  Neurologic: denies headache, weakness, dizziness, paresthesias, numbness/tingling  ROS negative except as noted above    Vital Signs Last 24 Hrs  T(C): 36.6 (17 @ 05:29), Max: 37.1 (17 @ 20:00)  T(F): 97.9 (17 @ 05:29), Max: 98.8 (17 @ 21:19)  HR: 69 (17 @ 07:01) (69 - 84)  BP: 174/96 (17 @ 07:01) (144/78 - 185/100)  BP(mean): --  RR: 20 (17 @ 05:29) (17 - 20)  SpO2: 94% (17 @ 07:01) (88% - 97%)    Physical Exam:  General: Well developed, well nourished, NAD  HEENT: NCAT, EOMI BL, moist mucous membranes   Neurology: A&Ox3, nonfocal, CN II-XII grossly intact  Respiratory: BL rhonchi appreciated  CV: +S1/S2, no murmurs, rubs or gallops  Abdominal: Soft, NT, ND +BSx4  Extremities: No C/C/E, 2+ peripheral pulses  Skin: warm, mild diaphoresis    LABS:                          11.6   6.4   )-----------( 182      ( 29 Aug 2017 06:49 )             35.4       29 Aug 2017 06:49    138    |  101    |  22     ----------------------------<  191    4.2     |  24     |  1.30     Ca    8.9        29 Aug 2017 06:49      Blood Gas Profile - Arterial in AM (17 @ 05:49)    pH, Arterial: 7.40    pCO2, Arterial: 43 mmHg    pO2, Arterial: 54 mmHg    HCO3, Arterial: 25 mmol/L    Base Excess, Arterial: 1.6 mmol/L    Oxygen Saturation, Arterial: 86 %    FIO2, Arterial: 21.0    Blood Gas Comments Arterial: dr bustamante aware of low pao2/sats on room air      PT/INR - ( 27 Aug 2017 08:18 )   PT: 15.4 sec;   INR: 1.40 ratio     PTT - ( 27 Aug 2017 08:18 )  PTT:31.8 sec      CARDIAC MARKERS ( 27 Aug 2017 20:16 )  <.015 ng/mL / x     / 1266 U/L / x     / 0.9 ng/mL  CARDIAC MARKERS ( 27 Aug 2017 14:39 )  <.015 ng/mL / x     / 1604 U/L / x     / 1.4 ng/mL  CARDIAC MARKERS ( 27 Aug 2017 08:18 )  <.015 ng/mL / x     / 1853 U/L / x     / 1.6 ng/mL      Urinalysis Basic - ( 28 Aug 2017 08:15 )    Color: Yellow / Appearance: x / S.020 / pH: x  Gluc: x / Ketone: Trace  / Bili: Negative / Urobili: 1   Blood: x / Protein: 75 mg/dL / Nitrite: Negative   Leuk Esterase: Negative / RBC: 0-2 /HPF / WBC x   Sq Epi: x / Non Sq Epi: x / Bacteria: x      CAPILLARY BLOOD GLUCOSE  207 (28 Aug 2017 21:19)  209 (28 Aug 2017 17:15)  297 (28 Aug 2017 12:07)        RADIOLOGY & ADDITIONAL TESTS: no new tests, past images reviewed        MEDICATIONS  (STANDING):  azithromycin  IVPB 500 milliGRAM(s) IV Intermittent every 24 hours  cefTRIAXone   IVPB 1 Gram(s) IV Intermittent every 24 hours  heparin  Injectable 5000 Unit(s) SubCutaneous every 12 hours  isosorbide   mononitrate ER Tablet (IMDUR) 60 milliGRAM(s) Oral daily  labetalol 200 milliGRAM(s) Oral three times a day  insulin lispro (HumaLOG) corrective regimen sliding scale   SubCutaneous three times a day before meals  dextrose 5%. 1000 milliLiter(s) (50 mL/Hr) IV Continuous <Continuous>  dextrose 50% Injectable 12.5 Gram(s) IV Push once  dextrose 50% Injectable 25 Gram(s) IV Push once  dextrose 50% Injectable 25 Gram(s) IV Push once  amLODIPine   Tablet 10 milliGRAM(s) Oral daily  nicotine -   7 mG/24Hr(s) Patch 1 patch Transdermal daily  venlafaxine 187.5 milliGRAM(s) Oral every 12 hours  buPROPion XL . 300 milliGRAM(s) Oral daily  asenapine SL 10 milliGRAM(s) SubLingual two times a day  insulin glargine Injectable (LANTUS) 5 Unit(s) SubCutaneous at bedtime  ALBUTerol    0.083% 2.5 milliGRAM(s) Nebulizer every 8 hours  methylPREDNISolone sodium succinate Injectable 20 milliGRAM(s) IV Push two times a day    MEDICATIONS  (PRN):  acetaminophen   Tablet 650 milliGRAM(s) Oral every 6 hours PRN For Temp greater than 38 C (100.4 F) & Pain  ALPRAZolam 2 milliGRAM(s) Oral three times a day PRN Anxiety  dextrose Gel 1 Dose(s) Oral once PRN Blood Glucose LESS THAN 70 milliGRAM(s)/deciliter  glucagon  Injectable 1 milliGRAM(s) IntraMuscular once PRN Glucose LESS THAN 70 milligrams/deciliter  acetaminophen   Tablet. 650 milliGRAM(s) Oral every 6 hours PRN Mild Pain (1 - 3)      Allergies    No Known Allergies    Intolerances

## 2017-08-29 NOTE — DIETITIAN INITIAL EVALUATION ADULT. - PROBLEM SELECTOR PLAN 8
CT Head negative and no focal neurological deficits. On full dose ASA. Less likely CVA but due to risk factors will consult Neurology and defer to him for need for MRI  will check bed side speech and swallow.

## 2017-08-29 NOTE — GOALS OF CARE CONVERSATION - PERSONAL ADVANCE DIRECTIVE - CONVERSATION DETAILS
met pt, has hcp, will request copy from son, Balaji. pt has had discussions w son regarding his wishes. pt is not a dnr at present, pt wants all measures at this time. pt is a full code. contact # given.

## 2017-08-29 NOTE — PROGRESS NOTE ADULT - PROBLEM SELECTOR PLAN 7
Heparin SubQ and SCDs   Protonix for GERD CT Head negative and no focal neurological deficits.   Likely due to infection and/or polypharmacy  continue to monitor

## 2017-08-29 NOTE — DIETITIAN INITIAL EVALUATION ADULT. - OTHER INFO
Pt seen for HgbA1c of 7.9%. Pt admitted for SOB found with PNA and DEVYN. S/p speech and swallow eval 8/28 with recommendation for Dysphagia 3 soft with nectar thick liquids. Pt reports some difficulty chewing due to recent teeth removal. Pt states he does not check blood glucose at home nor modifies intake of carbohydrates. Pt denies GI distress. States UBW of 240 pounds remaining consistent for the past year. Per previous H&P (12/23/2015) pt noted with weight of 231 pounds showing 13 pound weight gain during previous 3 years. Reports NKFA.

## 2017-08-29 NOTE — PROGRESS NOTE ADULT - SUBJECTIVE AND OBJECTIVE BOX
Date/Time Patient Seen:  		  Referring MD:   Data Reviewed	       Patient is a 77y old  Male who presents with a chief complaint of SOB, s/p fall early sat am per son (27 Aug 2017 16:14)    in bed  seen and examined  am ABG noted on room air    vs and meds reviewed        Subjective/HPI     PAST MEDICAL & SURGICAL HISTORY:  Smoker  BPH with obstruction/lower urinary tract symptoms  Vitamin D deficiency  GERD (gastroesophageal reflux disease)  Diabetes mellitus  Hyperlipidemia  Hypertension  No significant past surgical history        Medication list         MEDICATIONS  (STANDING):  azithromycin  IVPB 500 milliGRAM(s) IV Intermittent every 24 hours  cefTRIAXone   IVPB 1 Gram(s) IV Intermittent every 24 hours  heparin  Injectable 5000 Unit(s) SubCutaneous every 12 hours  isosorbide   mononitrate ER Tablet (IMDUR) 60 milliGRAM(s) Oral daily  labetalol 200 milliGRAM(s) Oral three times a day  insulin lispro (HumaLOG) corrective regimen sliding scale   SubCutaneous three times a day before meals  dextrose 5%. 1000 milliLiter(s) (50 mL/Hr) IV Continuous <Continuous>  dextrose 50% Injectable 12.5 Gram(s) IV Push once  dextrose 50% Injectable 25 Gram(s) IV Push once  dextrose 50% Injectable 25 Gram(s) IV Push once  amLODIPine   Tablet 10 milliGRAM(s) Oral daily  nicotine -   7 mG/24Hr(s) Patch 1 patch Transdermal daily  venlafaxine 187.5 milliGRAM(s) Oral every 12 hours  buPROPion XL . 300 milliGRAM(s) Oral daily  asenapine SL 10 milliGRAM(s) SubLingual two times a day  insulin glargine Injectable (LANTUS) 5 Unit(s) SubCutaneous at bedtime  ALBUTerol    0.083% 2.5 milliGRAM(s) Nebulizer every 8 hours  methylPREDNISolone sodium succinate Injectable 20 milliGRAM(s) IV Push two times a day    MEDICATIONS  (PRN):  acetaminophen   Tablet 650 milliGRAM(s) Oral every 6 hours PRN For Temp greater than 38 C (100.4 F) & Pain  ALPRAZolam 2 milliGRAM(s) Oral three times a day PRN Anxiety  dextrose Gel 1 Dose(s) Oral once PRN Blood Glucose LESS THAN 70 milliGRAM(s)/deciliter  glucagon  Injectable 1 milliGRAM(s) IntraMuscular once PRN Glucose LESS THAN 70 milligrams/deciliter  acetaminophen   Tablet. 650 milliGRAM(s) Oral every 6 hours PRN Mild Pain (1 - 3)         Vitals log        ICU Vital Signs Last 24 Hrs  T(C): 36.6 (29 Aug 2017 05:29), Max: 37.1 (28 Aug 2017 20:00)  T(F): 97.9 (29 Aug 2017 05:29), Max: 98.8 (28 Aug 2017 21:19)  HR: 84 (29 Aug 2017 05:29) (72 - 84)  BP: 158/83 (28 Aug 2017 21:19) (144/78 - 158/83)  BP(mean): --  ABP: --  ABP(mean): --  RR: 20 (29 Aug 2017 05:29) (17 - 20)  SpO2: 95% (29 Aug 2017 06:07) (88% - 97%)           Input and Output:  I&O's Detail    27 Aug 2017 07:01  -  28 Aug 2017 07:00  --------------------------------------------------------  IN:    Oral Fluid: 240 mL    sodium chloride 0.9%: 840 mL  Total IN: 1080 mL    OUT:    Voided: 800 mL  Total OUT: 800 mL    Total NET: 280 mL      28 Aug 2017 07:01  -  29 Aug 2017 06:19  --------------------------------------------------------  IN:    Oral Fluid: 640 mL  Total IN: 640 mL    OUT:    Voided: 300 mL  Total OUT: 300 mL    Total NET: 340 mL          Lab Data                        12.0   7.5   )-----------( 169      ( 28 Aug 2017 06:42 )             36.4     08-28    135  |  99  |  25<H>  ----------------------------<  200<H>  4.3   |  22  |  1.30    Ca    8.8      28 Aug 2017 06:42    TPro  6.6  /  Alb  3.1<L>  /  TBili  0.9  /  DBili  x   /  AST  28  /  ALT  25  /  AlkPhos  27<L>  08-27    ABG - ( 29 Aug 2017 05:49 )  pH: 7.40  /  pCO2: 43    /  pO2: 54    / HCO3: 25    / Base Excess: 1.6   /  SaO2: 86                CARDIAC MARKERS ( 27 Aug 2017 20:16 )  <.015 ng/mL / x     / 1266 U/L / x     / 0.9 ng/mL  CARDIAC MARKERS ( 27 Aug 2017 14:39 )  <.015 ng/mL / x     / 1604 U/L / x     / 1.4 ng/mL  CARDIAC MARKERS ( 27 Aug 2017 08:18 )  <.015 ng/mL / x     / 1853 U/L / x     / 1.6 ng/mL        Review of Systems	      Objective     Physical Examination  obese  head at  heart - s1s2  cn grossly int  moves extr  lungs - occ rhonchi          Pertinent Lab findings & Imaging      Princess:  NO   Adequate UO     I&O's Detail    27 Aug 2017 07:01  -  28 Aug 2017 07:00  --------------------------------------------------------  IN:    Oral Fluid: 240 mL    sodium chloride 0.9%: 840 mL  Total IN: 1080 mL    OUT:    Voided: 800 mL  Total OUT: 800 mL    Total NET: 280 mL      28 Aug 2017 07:01  -  29 Aug 2017 06:19  --------------------------------------------------------  IN:    Oral Fluid: 640 mL  Total IN: 640 mL    OUT:    Voided: 300 mL  Total OUT: 300 mL    Total NET: 340 mL               Discussed with:     Cultures:	        Radiology

## 2017-08-29 NOTE — PROGRESS NOTE ADULT - PROBLEM SELECTOR PLAN 5
Continue Isosorbide moninitrate, Labetolol and Amlodipine with hold parameters    Hold ACE, HCTZ due to DEVYN  - 174/96 at 07:01 after receiving meds Continue Isosorbide moninitrate, Labetolol and Amlodipine with hold parameters    - 174/96 at 07:01 after receiving meds  - Restart HCTZ 12.5 mg 1 capsule PO qD in the morning  - Hold ACEi for now; gradually add home meds depending on renal function Continue Isosorbide moninitrate, Labetolol and Amlodipine with hold parameters    Restart HCTZ 12.5 mg 1 capsule PO qd in the morning  Hold ACEI for now; gradually add home meds depending on renal function

## 2017-08-29 NOTE — PROGRESS NOTE ADULT - PROBLEM SELECTOR PLAN 6
suspect chronic lung disease is a combination of years smoked and chronic aspiration  asp prec  oral care  HOB elevation  monitor sat, keep sat > 90 pct  dysphagia diet  COPD regimen - albuterol, Steroids Systemic and 02 support  am ABG reviewed

## 2017-08-29 NOTE — PROGRESS NOTE ADULT - ASSESSMENT
Patient is a 76 yo M w/ PMHx diastolic CHF, HTN, HLD, DM, GERD, BPH, Depression, Anxiety, and CT findings of interstitial lung disease who presents with 1 week of progressively worsening SOB due to CAP. Patient is a 76 yo M w/ PMHx diastolic CHF, HTN, HLD, DM, GERD, BPH, Depression, Anxiety, and CT findings of interstitial lung disease who presents with 1 week of progressively worsening SOB due to CAP and possible interstitial lung disease.

## 2017-08-29 NOTE — DIETITIAN INITIAL EVALUATION ADULT. - NS AS NUTRI INTERV ED CONTENT
Provided written and verbal education on T2DM nutrition therapy including which foods have carbohydrates and how to meal plan with MyPlate method. Provided written and verbal education on heart healthy nutrition therapy.

## 2017-08-29 NOTE — PROGRESS NOTE ADULT - PROBLEM SELECTOR PLAN 2
- Cr trending down 1.7 -> 1.3 -> 1.3  - BUN trending down 30 -> 25 -> 22 - Cr trending down 1.7 -> 1.3 -> 1.3  - BUN trending down 30 -> 25 -> 22  - Continue to encourage hydration improving, continue to encourage PO hydration

## 2017-08-29 NOTE — PROGRESS NOTE ADULT - PROBLEM SELECTOR PLAN 1
CAP, right lung; per pulm, aspiration suspected on top of interstitial lung disease  - Continue IV Ceftriaxone, Azithromycin   - Legionella Ag negative; f/u Strep antigen  - RVP negative  - IVF D/C per pulmonology  - Blood Cx show no growth; f/u Urine Cxs, Procalcitonin CAP, right lung; per pulm, aspiration suspected on top of interstitial lung disease  Continue IV Ceftriaxone for total 7 days, Azithromycin for total 5 day course Legionella Ag negative; f/u Strep antigen; RVP negative  Blood Cx/Urine Cultures showed no growth to date; procalcitonin of 0.14

## 2017-08-29 NOTE — PROGRESS NOTE ADULT - PROBLEM SELECTOR PLAN 3
Type 2 with no complications, hold home hyperglycemia agents   - Continue Lantus 5 units qHS and adjust as needed.  - Low dose ISS with hypoglycemic protocol  - Monitor HgbA1c Type 2 with no complications, hold home oral diabetic agents   continue Lantus 5 units qhs and Low dose ISS with hypoglycemic protocol  continue blood glucose testing with accuchecks

## 2017-08-29 NOTE — PROGRESS NOTE ADULT - ASSESSMENT
73 y/o male PMHx diastolic dysfunction, nonobstructive CAD based on cardiac cath from 12/2015, normal lvef (echo 10/2016), HTN, HLD, DM, GERD, BPH, Depression, Anxiety.  He has longstanding dyspnea for which multiple cardiac explanations have been considered, including CAD and diastolic impairment.  He has been found to have a degree of interstitial lung disease, the significance of which is unclear.  He presents with 1 week of progressively worsening SOB from his baseline, and other sxs as described.    -there is no evidence of acute ischemia.  -CE all negative and ekg is without evidence for ischemia  -there is no evidence of significant arrhythmia.  -his BNP is elevated, and he has some wheezing on exam, but there is no significant sign of volume overload.  - if no improvement with pulm therapy, I would consider giving him a one time Lasix 40 IV x 1 and see if there is improvement.  -dyspnea believed to be from diastolic dysfunction in the past, but now with interstitial lung dieseas  -his acute on chronic presentation seems to be on the basis of an acute febrile illness, pna  -abx and pulmonary follow up.  -his outpt bp regimen includes amlodipine, benazepril, labetolol and HCTZ  -cont isosorbide  -should be on asa  -DVT prophylaxis  -monitor electrolytes, keep k>4, Mg>2  - Will follow with you

## 2017-08-29 NOTE — PROGRESS NOTE ADULT - PROBLEM SELECTOR PLAN 5
smoking cess ed  prob has COPD  ct chest reviewed  will need rpt for nodule monitoring  NEBS and Solumedrol for COPD

## 2017-08-29 NOTE — PROGRESS NOTE ADULT - SUBJECTIVE AND OBJECTIVE BOX
[facility] - Franco Mcallister, Benitez, Josse, Skyalr, Robert Motta  Office Number:  [phone]    Patient resting comfortably in bed in NAD.  Laying flat with no respiratory distress.  Reports issues with his breathing    In er, Vitals were: T 101.3, As EMS, o2 sat was 86% on RA, other vitals wnl. No leukocytosis, no elevated lactate. Other CBC lab results grossly wnl. Mild hyponatremia 134, Elevated bun cr at 30/1.70. CE positive with elevated CK at 1853. BNP at 1728. Pt had CT head done which showed no acute pathology, X ray and CT chest and abd with no contrast showed Right perihilar and lower lobe airspace opacities as discussed, may reflect infection/bronchopneumonia; cannot exclude perihilar mass; likely reflecting chronic interstitial lung disease bilateral lungs; Nonspecific subpleural opacity left lower lobe. Hepatic steatosis with hepatosplenomegaly. EKG showed sinus rhythm with sinus arrhythmia with 1st degree AV block, inferior infarct age undetermined (unofficial read). Pt received 1x azithromycin, 1x Rocephin, 1x tylenols, 1x duoneb, BCX and UCX was sent. Pt was on 5L NC. (27 Aug 2017 10:01)      ROS: negative unless otherwise mentioned.    Telemetry:  Not on tele    MEDICATIONS  (STANDING):  azithromycin  IVPB 500 milliGRAM(s) IV Intermittent every 24 hours  cefTRIAXone   IVPB 1 Gram(s) IV Intermittent every 24 hours  heparin  Injectable 5000 Unit(s) SubCutaneous every 12 hours  isosorbide   mononitrate ER Tablet (IMDUR) 60 milliGRAM(s) Oral daily  labetalol 200 milliGRAM(s) Oral three times a day  insulin lispro (HumaLOG) corrective regimen sliding scale   SubCutaneous three times a day before meals  dextrose 5%. 1000 milliLiter(s) (50 mL/Hr) IV Continuous <Continuous>  dextrose 50% Injectable 12.5 Gram(s) IV Push once  dextrose 50% Injectable 25 Gram(s) IV Push once  dextrose 50% Injectable 25 Gram(s) IV Push once  amLODIPine   Tablet 10 milliGRAM(s) Oral daily  nicotine -   7 mG/24Hr(s) Patch 1 patch Transdermal daily  venlafaxine 187.5 milliGRAM(s) Oral every 12 hours  buPROPion XL . 300 milliGRAM(s) Oral daily  asenapine SL 10 milliGRAM(s) SubLingual two times a day  insulin glargine Injectable (LANTUS) 5 Unit(s) SubCutaneous at bedtime  ALBUTerol    0.083% 2.5 milliGRAM(s) Nebulizer every 8 hours  methylPREDNISolone sodium succinate Injectable 20 milliGRAM(s) IV Push two times a day  hydrochlorothiazide 12.5 milliGRAM(s) Oral daily    MEDICATIONS  (PRN):  acetaminophen   Tablet 650 milliGRAM(s) Oral every 6 hours PRN For Temp greater than 38 C (100.4 F) & Pain  ALPRAZolam 2 milliGRAM(s) Oral three times a day PRN Anxiety  dextrose Gel 1 Dose(s) Oral once PRN Blood Glucose LESS THAN 70 milliGRAM(s)/deciliter  glucagon  Injectable 1 milliGRAM(s) IntraMuscular once PRN Glucose LESS THAN 70 milligrams/deciliter  acetaminophen   Tablet. 650 milliGRAM(s) Oral every 6 hours PRN Mild Pain (1 - 3)      Allergies    No Known Allergies    Intolerances        Vital Signs Last 24 Hrs  T(C): 36.9 (29 Aug 2017 12:55), Max: 37.1 (28 Aug 2017 20:00)  T(F): 98.5 (29 Aug 2017 12:55), Max: 98.8 (28 Aug 2017 21:19)  HR: 77 (29 Aug 2017 12:55) (69 - 86)  BP: 150/83 (29 Aug 2017 12:55) (144/78 - 185/100)  BP(mean): --  RR: 28 (29 Aug 2017 12:55) (17 - 28)  SpO2: 95% (29 Aug 2017 12:55) (88% - 97%)    I&O's Summary    28 Aug 2017 07:01  -  29 Aug 2017 07:00  --------------------------------------------------------  IN: 640 mL / OUT: 300 mL / NET: 340 mL    29 Aug 2017 07:01  -  29 Aug 2017 13:57  --------------------------------------------------------  IN: 320 mL / OUT: 200 mL / NET: 120 mL        ON EXAM:    Constitutional: NAD, sleeping but arousable, well-developed  Eyes:  EOMI, no oral cyanosis, conjunctivae clear, anicteric.  Pulmonary: Non-labored, rhonchi with some wheezing river  Cardiovascular:  regular S1 and S2. No murmur.  No rubs, gallops or clicks  Gastrointestinal: Bowel Sounds present, soft, nontender.   Lymph: No peripheral edema.   Neurological: lethargic, strength and sensitivity cannot be evaluated  Skin: No obvious lesions/rashes.         LABS: All Labs Reviewed:                        11.6   6.4   )-----------( 182      ( 29 Aug 2017 06:49 )             35.4                         12.0   7.5   )-----------( 169      ( 28 Aug 2017 06:42 )             36.4                         12.9   9.9   )-----------( 168      ( 27 Aug 2017 08:18 )             39.0     29 Aug 2017 06:49    138    |  101    |  22     ----------------------------<  191    4.2     |  24     |  1.30   28 Aug 2017 06:42    135    |  99     |  25     ----------------------------<  200    4.3     |  22     |  1.30   27 Aug 2017 08:18    134    |  99     |  30     ----------------------------<  192    4.5     |  20     |  1.70     Ca    8.9        29 Aug 2017 06:49  Ca    8.8        28 Aug 2017 06:42  Ca    9.2        27 Aug 2017 08:18    TPro  6.6    /  Alb  3.1    /  TBili  0.9    /  DBili  x      /  AST  28     /  ALT  25     /  AlkPhos  27     27 Aug 2017 08:18      CARDIAC MARKERS ( 27 Aug 2017 20:16 )  <.015 ng/mL / x     / 1266 U/L / x     / 0.9 ng/mL  CARDIAC MARKERS ( 27 Aug 2017 14:39 )  <.015 ng/mL / x     / 1604 U/L / x     / 1.4 ng/mL      Blood Culture:   08-29 @ 06:49  Pro Bnp 1544  08-28 @ 06:42  Pro Bnp 1627  08-27 @ 08:18  Pro Bnp 1728    08-29 @ 00:13  TSH: 1.12

## 2017-08-29 NOTE — PROGRESS NOTE ADULT - PROBLEM SELECTOR PLAN 8
CT Head negative and no focal neurological deficits.   - Likely due to infection and/or polypharmacy.   - Monitor. continue wellbutrin, effexor, xanax PRN, saphris

## 2017-08-29 NOTE — DIETITIAN INITIAL EVALUATION ADULT. - NS AS NUTRI INTERV MEALS SNACK
Recommend change diet to Dysphagia 3 Soft Nectar Thick diet, Consistent Carbohydrate no snack, DASH/TLC

## 2017-08-30 ENCOUNTER — TRANSCRIPTION ENCOUNTER (OUTPATIENT)
Age: 77
End: 2017-08-30

## 2017-08-30 LAB
ANION GAP SERPL CALC-SCNC: 7 MMOL/L — SIGNIFICANT CHANGE UP (ref 5–17)
BUN SERPL-MCNC: 35 MG/DL — HIGH (ref 7–23)
CALCIUM SERPL-MCNC: 9.5 MG/DL — SIGNIFICANT CHANGE UP (ref 8.5–10.1)
CHLORIDE SERPL-SCNC: 99 MMOL/L — SIGNIFICANT CHANGE UP (ref 96–108)
CO2 SERPL-SCNC: 30 MMOL/L — SIGNIFICANT CHANGE UP (ref 22–31)
CREAT SERPL-MCNC: 1.6 MG/DL — HIGH (ref 0.5–1.3)
GLUCOSE SERPL-MCNC: 334 MG/DL — HIGH (ref 70–99)
HCT VFR BLD CALC: 35.4 % — LOW (ref 39–50)
HGB BLD-MCNC: 11.7 G/DL — LOW (ref 13–17)
MCHC RBC-ENTMCNC: 28.3 PG — SIGNIFICANT CHANGE UP (ref 27–34)
MCHC RBC-ENTMCNC: 33 GM/DL — SIGNIFICANT CHANGE UP (ref 32–36)
MCV RBC AUTO: 85.9 FL — SIGNIFICANT CHANGE UP (ref 80–100)
PLATELET # BLD AUTO: 207 K/UL — SIGNIFICANT CHANGE UP (ref 150–400)
POTASSIUM SERPL-MCNC: 4.8 MMOL/L — SIGNIFICANT CHANGE UP (ref 3.5–5.3)
POTASSIUM SERPL-SCNC: 4.8 MMOL/L — SIGNIFICANT CHANGE UP (ref 3.5–5.3)
RBC # BLD: 4.12 M/UL — LOW (ref 4.2–5.8)
RBC # FLD: 14.1 % — SIGNIFICANT CHANGE UP (ref 10.3–14.5)
S PNEUM AG UR QL: NEGATIVE — SIGNIFICANT CHANGE UP
SODIUM SERPL-SCNC: 136 MMOL/L — SIGNIFICANT CHANGE UP (ref 135–145)
WBC # BLD: 5.2 K/UL — SIGNIFICANT CHANGE UP (ref 3.8–10.5)
WBC # FLD AUTO: 5.2 K/UL — SIGNIFICANT CHANGE UP (ref 3.8–10.5)

## 2017-08-30 PROCEDURE — 99232 SBSQ HOSP IP/OBS MODERATE 35: CPT

## 2017-08-30 PROCEDURE — 99233 SBSQ HOSP IP/OBS HIGH 50: CPT | Mod: GC

## 2017-08-30 RX ORDER — ASENAPINE MALEATE 10 MG/1
10 TABLET SUBLINGUAL
Qty: 0 | Refills: 0 | Status: DISCONTINUED | OUTPATIENT
Start: 2017-08-30 | End: 2017-08-31

## 2017-08-30 RX ORDER — CEFUROXIME AXETIL 250 MG
250 TABLET ORAL EVERY 12 HOURS
Qty: 0 | Refills: 0 | Status: DISCONTINUED | OUTPATIENT
Start: 2017-08-31 | End: 2017-08-31

## 2017-08-30 RX ORDER — ASPIRIN/CALCIUM CARB/MAGNESIUM 324 MG
81 TABLET ORAL DAILY
Qty: 0 | Refills: 0 | Status: DISCONTINUED | OUTPATIENT
Start: 2017-08-30 | End: 2017-08-31

## 2017-08-30 RX ORDER — INSULIN GLARGINE 100 [IU]/ML
10 INJECTION, SOLUTION SUBCUTANEOUS AT BEDTIME
Qty: 0 | Refills: 0 | Status: DISCONTINUED | OUTPATIENT
Start: 2017-08-30 | End: 2017-08-31

## 2017-08-30 RX ORDER — INSULIN LISPRO 100/ML
VIAL (ML) SUBCUTANEOUS
Qty: 0 | Refills: 0 | Status: DISCONTINUED | OUTPATIENT
Start: 2017-08-30 | End: 2017-08-31

## 2017-08-30 RX ORDER — VENLAFAXINE HCL 75 MG
137.5 CAPSULE, EXT RELEASE 24 HR ORAL EVERY 12 HOURS
Qty: 0 | Refills: 0 | Status: DISCONTINUED | OUTPATIENT
Start: 2017-08-30 | End: 2017-08-31

## 2017-08-30 RX ORDER — INSULIN GLARGINE 100 [IU]/ML
8 INJECTION, SOLUTION SUBCUTANEOUS AT BEDTIME
Qty: 0 | Refills: 0 | Status: DISCONTINUED | OUTPATIENT
Start: 2017-08-30 | End: 2017-08-30

## 2017-08-30 RX ORDER — HYDRALAZINE HCL 50 MG
25 TABLET ORAL EVERY 6 HOURS
Qty: 0 | Refills: 0 | Status: DISCONTINUED | OUTPATIENT
Start: 2017-08-30 | End: 2017-08-31

## 2017-08-30 RX ORDER — AZITHROMYCIN 500 MG/1
500 TABLET, FILM COATED ORAL DAILY
Qty: 0 | Refills: 0 | Status: COMPLETED | OUTPATIENT
Start: 2017-08-30 | End: 2017-08-31

## 2017-08-30 RX ADMIN — AZITHROMYCIN 500 MILLIGRAM(S): 500 TABLET, FILM COATED ORAL at 11:18

## 2017-08-30 RX ADMIN — INSULIN GLARGINE 10 UNIT(S): 100 INJECTION, SOLUTION SUBCUTANEOUS at 22:58

## 2017-08-30 RX ADMIN — Medication 137.5 MILLIGRAM(S): at 17:22

## 2017-08-30 RX ADMIN — Medication 1 PATCH: at 11:16

## 2017-08-30 RX ADMIN — Medication 20 MILLIGRAM(S): at 18:08

## 2017-08-30 RX ADMIN — Medication 8: at 17:31

## 2017-08-30 RX ADMIN — ALBUTEROL 2.5 MILLIGRAM(S): 90 AEROSOL, METERED ORAL at 14:41

## 2017-08-30 RX ADMIN — Medication 20 MILLIGRAM(S): at 06:23

## 2017-08-30 RX ADMIN — CEFTRIAXONE 100 GRAM(S): 500 INJECTION, POWDER, FOR SOLUTION INTRAMUSCULAR; INTRAVENOUS at 09:33

## 2017-08-30 RX ADMIN — Medication 10: at 12:30

## 2017-08-30 RX ADMIN — Medication 4: at 08:30

## 2017-08-30 RX ADMIN — ALBUTEROL 2.5 MILLIGRAM(S): 90 AEROSOL, METERED ORAL at 07:43

## 2017-08-30 RX ADMIN — HEPARIN SODIUM 5000 UNIT(S): 5000 INJECTION INTRAVENOUS; SUBCUTANEOUS at 17:19

## 2017-08-30 RX ADMIN — Medication 12.5 MILLIGRAM(S): at 06:22

## 2017-08-30 RX ADMIN — Medication 81 MILLIGRAM(S): at 12:30

## 2017-08-30 RX ADMIN — ALBUTEROL 2.5 MILLIGRAM(S): 90 AEROSOL, METERED ORAL at 23:02

## 2017-08-30 RX ADMIN — BUPROPION HYDROCHLORIDE 300 MILLIGRAM(S): 150 TABLET, EXTENDED RELEASE ORAL at 11:16

## 2017-08-30 RX ADMIN — Medication 187.5 MILLIGRAM(S): at 06:22

## 2017-08-30 RX ADMIN — HEPARIN SODIUM 5000 UNIT(S): 5000 INJECTION INTRAVENOUS; SUBCUTANEOUS at 06:23

## 2017-08-30 RX ADMIN — AMLODIPINE BESYLATE 10 MILLIGRAM(S): 2.5 TABLET ORAL at 06:22

## 2017-08-30 RX ADMIN — Medication 200 MILLIGRAM(S): at 22:58

## 2017-08-30 RX ADMIN — Medication 200 MILLIGRAM(S): at 15:45

## 2017-08-30 RX ADMIN — Medication 200 MILLIGRAM(S): at 06:23

## 2017-08-30 RX ADMIN — Medication 25 MILLIGRAM(S): at 18:49

## 2017-08-30 RX ADMIN — ASENAPINE MALEATE 10 MILLIGRAM(S): 10 TABLET SUBLINGUAL at 22:58

## 2017-08-30 RX ADMIN — ISOSORBIDE MONONITRATE 60 MILLIGRAM(S): 60 TABLET, EXTENDED RELEASE ORAL at 11:16

## 2017-08-30 NOTE — PROGRESS NOTE ADULT - PROBLEM SELECTOR PLAN 5
Continue Isosorbide moninitrate, Labetolol and Amlodipine with hold parameters    Restarted HCTZ d/t persistently elevated BP but BUN/Cr trending up  Hold ACEI and HCTZ for now Continue Isosorbide moninitrate, Labetolol and Amlodipine with hold parameters    Hold ACEI and HCTZ for now due to DEVYN continue Isosorbide moninitrate, Labetolol and Amlodipine with hold parameters    discuss addition of additional antihypertensive or increased dosage of current medication with cardiology  hold ACEI and HCTZ for now due to DEVYN Continue wellbutrin, effexor, xanax PRN, saphris

## 2017-08-30 NOTE — PROGRESS NOTE ADULT - PROBLEM SELECTOR PLAN 3
Type 2 with no complications, hold home oral diabetic agents   continue Lantus 5 units qhs and Low dose ISS with hypoglycemic protocol  continue blood glucose testing with accuchecks Type 2 with no complications, hold home oral diabetic agents    -> 297 -> 256 -> 337 -> 322  continue Lantus 5 units qhs and Low dose ISS with hypoglycemic protocol  continue blood glucose testing with accuchecks Type 2 with no complications, hold home oral diabetic agents   Fingerstick readings trending up, likely correlated to addition of IV steroids  increase basal Lantus to 8 units qhs and continue BA with hypoglycemic protocol  continue blood glucose testing with accuchecks Type 2 with no complications, hold home oral diabetic agents   Fingerstick readings trending up, likely correlated to addition of IV steroids  increase basal Lantus to 10 units qhs and increase to moderate dose ISS with hypoglycemic protocol; continue blood glucose testing with accuchecks

## 2017-08-30 NOTE — PROGRESS NOTE ADULT - PROBLEM SELECTOR PLAN 1
CAP, right lung; per pulm, aspiration suspected on top of interstitial lung disease  Continue IV Ceftriaxone for total 7 days (today day 4), Azithromycin for total 5 days (today day 4)  Legionella Ag negative; f/u Strep antigen; RVP negative  Blood Cx/Urine Cultures showed no growth to date; procalcitonin of 0.14 CAP, right lung; per pulm, aspiration suspected on top of interstitial lung disease  Continue IV Ceftriaxone (day 4/7), Azithromycin (day 4/5)  Legionella Ag negative; f/u Strep antigen; RVP negative  Blood Cx/Urine Cultures showed no growth to date; procalcitonin of 0.14 CAP, right lung; per pulm, aspiration suspected on top of interstitial lung disease  Transition to PO antibiotics: ceftin 250mg bid, azithro 500mg qd  Today is day 4/7 of cephalosporin and day 4/5 of azithromycin  Blood Cx/Urine Cultures showed NGTD, Legionella Ag neg  f/u Dr. Banuelos (pulm) recs on continuation of IV steroids vs transition to oral  dispo plan for JANELLE tentatively tomorrow CAP, right lung; per pulm, aspiration suspected on top of interstitial lung disease  Transition to PO antibiotics: ceftin 250mg bid, azithro 500mg qd  Today is day 4/7 of cephalosporin and day 4/5 of azithromycin  Blood Cx/Urine Cultures showed NGTD, Legionella Ag neg  f/u Dr. Banuelos (pulm) recs on continuation of IV steroids vs transition to oral  check room air O2 sats at rest and on ambulation, evaluate for home O2 per pulm  dispo plan for JANELLE tentatively tomorrow

## 2017-08-30 NOTE — PROGRESS NOTE ADULT - SUBJECTIVE AND OBJECTIVE BOX
Neurology follow up note    DAVID [id number]      Interval History:    Patient feels ok no new complaints.    MEDICATIONS    heparin  Injectable 5000 Unit(s) SubCutaneous every 12 hours  acetaminophen   Tablet 650 milliGRAM(s) Oral every 6 hours PRN  isosorbide   mononitrate ER Tablet (IMDUR) 60 milliGRAM(s) Oral daily  ALPRAZolam 2 milliGRAM(s) Oral three times a day PRN  labetalol 200 milliGRAM(s) Oral three times a day  dextrose 5%. 1000 milliLiter(s) IV Continuous <Continuous>  dextrose Gel 1 Dose(s) Oral once PRN  dextrose 50% Injectable 12.5 Gram(s) IV Push once  dextrose 50% Injectable 25 Gram(s) IV Push once  dextrose 50% Injectable 25 Gram(s) IV Push once  glucagon  Injectable 1 milliGRAM(s) IntraMuscular once PRN  amLODIPine   Tablet 10 milliGRAM(s) Oral daily  nicotine -   7 mG/24Hr(s) Patch 1 patch Transdermal daily  buPROPion XL . 300 milliGRAM(s) Oral daily  acetaminophen   Tablet. 650 milliGRAM(s) Oral every 6 hours PRN  ALBUTerol    0.083% 2.5 milliGRAM(s) Nebulizer every 8 hours  methylPREDNISolone sodium succinate Injectable 20 milliGRAM(s) IV Push two times a day  venlafaxine 137.5 milliGRAM(s) Oral every 12 hours  azithromycin   Tablet 500 milliGRAM(s) Oral daily  insulin glargine Injectable (LANTUS) 10 Unit(s) SubCutaneous at bedtime  insulin lispro (HumaLOG) corrective regimen sliding scale   SubCutaneous three times a day before meals  aspirin  chewable 81 milliGRAM(s) Oral daily  asenapine SL 10 milliGRAM(s) SubLingual two times a day  hydrALAZINE 25 milliGRAM(s) Oral every 6 hours      Allergies    No Known Allergies    Intolerances            Vital Signs Last 24 Hrs  T(C): 36.6 (30 Aug 2017 05:27), Max: 37.2 (30 Aug 2017 00:49)  T(F): 97.9 (30 Aug 2017 05:27), Max: 98.9 (30 Aug 2017 00:49)  HR: 66 (30 Aug 2017 07:46) (61 - 84)  BP: 160/89 (30 Aug 2017 05:27) (140/90 - 176/90)  BP(mean): --  RR: 18 (30 Aug 2017 05:27) (18 - 28)  SpO2: 96% (30 Aug 2017 07:46) (95% - 96%)            REVIEW OF SYSTEMS: Non Verbal  Limit or unable to obtain secondary to patient's poor mental status.    Constitutional: No fever, chills, fatigue, + weakness generalized   Eyes: no eye pain, visual disturbances, or discharge  ENT:  No difficulty hearing, tinnitus, vertigo; No sinus or throat pain  Neck: No pain or stiffness  Respiratory: + cough, dyspnea, wheezing   Cardiovascular: No chest pain, palpitations,   Gastrointestinal: No abdominal or epigastric pain. No nausea, vomiting  No diarrhea or constipation.   Genitourinary: No dysuria, frequency, hematuria or incontinence  Neurological: No headaches, lightheadedness, vertigo, numbness or tremors  Psychiatric: No depression, anxiety, mood swings or difficulty sleeping  Musculoskeletal: No joint pain or swelling; No muscle, back or extremity pain  Skin: No itching, burning, rashes or lesions   Lymph Nodes: No enlarged glands  Endocrine: No heat or cold intolerance; No hair loss   Allergy and Immunologic: No hives or eczema    On Neurological Examination:    Mental Status - Patient is alert, awake, oriented       Follow simple commands  Follow complex commands    Speech -   slight  Dysarthria --- day by day better                       Cranial Nerves - Pupils 3 mm equal and reactive to light,   extraocular eye movements intact.   smile symmetric  intact bilateral NLF    Motor Exam -   Right upper 4/5  Left upper 4/5  Right lower 3/5  Left lower 3/5    Muscle tone - is normal all over.  No asymmetry is seen.      Sensory    Bilateral intact to light touch      GENERAL Exam: Nontoxic , No Acute Distress   	  HEENT:  normocephalic, atraumatic  		  LUNGS:  Decreased bilaterally  	  HEART: Normal S1S2   No murmur RRR        	  GI/ ABDOMEN:  Soft  Non tender    EXTREMITIES:   No Edema  No Clubbing  No Cyanosis No Edema    MUSCULOSKELETAL: Normal Range of Motion  	   SKIN: Normal  No Ecchymosis                  LABS:  CBC Full  -  ( 30 Aug 2017 06:59 )  WBC Count : 5.2 K/uL  Hemoglobin : 11.7 g/dL  Hematocrit : 35.4 %  Platelet Count - Automated : 207 K/uL  Mean Cell Volume : 85.9 fl  Mean Cell Hemoglobin : 28.3 pg  Mean Cell Hemoglobin Concentration : 33.0 gm/dL  Auto Neutrophil # : x  Auto Lymphocyte # : x  Auto Monocyte # : x  Auto Eosinophil # : x  Auto Basophil # : x  Auto Neutrophil % : x  Auto Lymphocyte % : x  Auto Monocyte % : x  Auto Eosinophil % : x  Auto Basophil % : x      08-30    136  |  99  |  35<H>  ----------------------------<  334<H>  4.8   |  30  |  1.60<H>    Ca    9.5      30 Aug 2017 06:59      Hemoglobin A1C:       Vitamin B12         RADIOLOGY      ANALYSIS AND PLAN:  This is a 77-year-old with history of frequent falls, bilateral subdural hydromas/hematomas, dysarthria, and ataxia.    1.	For episode of frequent falls, suspect most likely secondary to age related changes, body habitus deconditioning   2.	For bilateral subdural hydromas/hematomas, these appeared to be chronic, would recommend supportive therapy.  3.	For dysarthria, questionable could be secondary to underlying respiratory issues versus medication overall doing better speech wise   4.	For ataxia, secondary to age related changes.  5.	For rhabdomyolysis, IV fluids as needed  6.	Had a lengthy discussion with sonBalaji at bedside yesterday  7.	We will continue to follow.  8.	Spoke with sonBalaji, at [phone] 8/29/17 understands reasoning and thought process. left message today  9.	overall speech is doing better     Thank you for the courtesy of consultation.    Physical therapy evaluation.  OOB to chair/ambulation with assistance only.    30 minutes spent on total encounter; more than 50% of the visit was spent counseling and/or coordinating care by the attending physician.

## 2017-08-30 NOTE — DISCHARGE NOTE ADULT - NSTOBACCOREFERRAL_GEN_A_CS
Yes not motivated to stop smoking stated pt/Patient declined information not motivated to stop smoking stated pt/Yes

## 2017-08-30 NOTE — PROGRESS NOTE ADULT - ASSESSMENT
73 y/o male PMHx diastolic dysfunction, nonobstructive CAD based on cardiac cath from 12/2015, normal lvef (echo 10/2016), HTN, HLD, DM, GERD, BPH, Depression, Anxiety.  He has longstanding dyspnea for which multiple cardiac explanations have been considered, including CAD and diastolic impairment.  He has been found to have a degree of interstitial lung disease, the significance of which is unclear.  He presents with 1 week of progressively worsening SOB from his baseline, and other sxs as described.    -there is no evidence of acute ischemia.  -CE all negative and ekg is without evidence for ischemia  -there is no evidence of significant arrhythmia.  -his BNP is elevated, and he has some findings on exam, but there is no significant sign of volume overload.  -dyspnea believed to be from diastolic dysfunction in the past, but now with interstitial lung dieseas  -his acute on chronic presentation seems to be on the basis of an acute febrile illness, pna  -abx and pulmonary follow up.  -his outpt bp regimen includes amlodipine, benazepril, labetolol and HCTZ  -cont isosorbide  -cont asa  -DVT prophylaxis  -monitor electrolytes, keep k>4, Mg>2  - Will follow with you

## 2017-08-30 NOTE — PROGRESS NOTE ADULT - PROBLEM SELECTOR PLAN 1
chronic lung disease - likely due to chronic aspiration  on steroids and NEBS  asp prec  oral care  HOB elevation  ct chest reviewed  monitor sat  keep sat > 90 pct  prognosis guarded  will likely need home 02

## 2017-08-30 NOTE — DISCHARGE NOTE ADULT - CARE PLAN
Principal Discharge DX:	Pneumonia  Goal:	resolution  Instructions for follow-up, activity and diet:	- continue antibiotics as directed  - f/u with PMD in 1 week  - home O2  Secondary Diagnosis:	Chronic lung disease  Instructions for follow-up, activity and diet:	- f/u w/ Dr. Banuelos in 1 week  - consider smoking cessation  Secondary Diagnosis:	Diabetes mellitus  Instructions for follow-up, activity and diet:	- uncontrolled on admission  - f/u with PMD in 1 week  Secondary Diagnosis:	Hypertension  Instructions for follow-up, activity and diet:	- f/u with Dr. Marquis in 1 week  - continue  Secondary Diagnosis:	Hyperlipidemia  Instructions for follow-up, activity and diet:	- f/u with PMD in 1 week  - continue home meds  Secondary Diagnosis:	BPH with obstruction/lower urinary tract symptoms  Instructions for follow-up, activity and diet:	- f/u with urology outpatient  Secondary Diagnosis:	Ataxia  Instructions for follow-up, activity and diet:	- f/u with neurology Dr. Lowery in 1 week Principal Discharge DX:	Pneumonia  Goal:	resolution  Instructions for follow-up, activity and diet:	- continue antibiotics as directed  - f/u with PMD in 1 week  - home O2  Secondary Diagnosis:	Chronic lung disease  Instructions for follow-up, activity and diet:	- f/u with in 1 week  - consider smoking cessation  -o2 as needed.  Secondary Diagnosis:	Diabetes mellitus  Instructions for follow-up, activity and diet:	- uncontrolled on admission  -patient being discharged on lantus and sliding scale insulin as he has elevated creatinine and hyperglycemia so Janumet not an ideal choice at this time. Can possibly be transitioned back to oral meds outpatient.   - f/u with PMD in 1 week  Secondary Diagnosis:	Hypertension  Instructions for follow-up, activity and diet:	- f/u with Dr. Marquis in 1 week  - continue  Secondary Diagnosis:	Hyperlipidemia  Instructions for follow-up, activity and diet:	- f/u with PMD in 1 week  - continue home meds  Secondary Diagnosis:	BPH with obstruction/lower urinary tract symptoms  Instructions for follow-up, activity and diet:	- f/u with urology outpatient  Secondary Diagnosis:	Smoking  Instructions for follow-up, activity and diet:	Do not smoke. Principal Discharge DX:	Pneumonia  Goal:	resolution  Instructions for follow-up, activity and diet:	- continue antibiotics as directed  - f/u with PMD in 1 week  - home O2  Secondary Diagnosis:	Chronic lung disease  Instructions for follow-up, activity and diet:	- f/u with in 1 week  - consider smoking cessation  -o2 as needed.  Secondary Diagnosis:	Diabetes mellitus  Instructions for follow-up, activity and diet:	- uncontrolled on admission  -patient being discharged on lantus and sliding scale insulin as he has elevated creatinine and hyperglycemia so Janumet not an ideal choice at this time. Can possibly be transitioned back to oral meds outpatient.   - f/u with PMD in 1 week  Secondary Diagnosis:	Hypertension  Instructions for follow-up, activity and diet:	- f/u with Dr. Marquis in 1 week  - continue  Secondary Diagnosis:	Hyperlipidemia  Instructions for follow-up, activity and diet:	- f/u with PMD in 1 week  - continue home meds  Secondary Diagnosis:	BPH with obstruction/lower urinary tract symptoms  Instructions for follow-up, activity and diet:	- f/u with urology outpatient. Monitor for signs of urinary retention in setting of decreased mobility  Secondary Diagnosis:	Smoking  Instructions for follow-up, activity and diet:	Do not smoke.

## 2017-08-30 NOTE — PROGRESS NOTE ADULT - ASSESSMENT
Patient is a 76 yo M w/ PMHx diastolic CHF, HTN, HLD, DM, GERD, BPH, Depression, Anxiety, and CT findings of interstitial lung disease who presents with 1 week of progressively worsening SOB due to CAP and possible interstitial lung disease. 78 y/o M w/ PMHx diastolic CHF, HTN, HLD, DM, GERD, BPH, Depression, Anxiety, and CT findings of interstitial lung disease who presents with 1 week of progressively worsening SOB due to CAP and possible interstitial lung disease.

## 2017-08-30 NOTE — DISCHARGE NOTE ADULT - MEDICATION SUMMARY - MEDICATIONS TO STOP TAKING
I will STOP taking the medications listed below when I get home from the hospital:    amlodipine-benazepril 10 mg-20 mg oral capsule  -- 1 cap(s) by mouth once a day    hydroCHLOROthiazide 12.5 mg oral capsule  -- 1 cap(s) by mouth once a day    Janumet 50 mg-1000 mg oral tablet  -- 1 tab(s) by mouth 2 times a day

## 2017-08-30 NOTE — DISCHARGE NOTE ADULT - CARE PROVIDER_API CALL
Frederick Bush), Internal Medicine  [address] [place] [address]  Phone: [phone]  Fax: [phone]

## 2017-08-30 NOTE — PROGRESS NOTE ADULT - PROBLEM SELECTOR PLAN 4
smoking cess ed  pt has COPD  cont NEBS and Steroids - systemic  02 support  will likely need home o2  keep sat > 90 pct  mobilize as tolerated  pt has poor insight into his medical situation

## 2017-08-30 NOTE — PROGRESS NOTE ADULT - SUBJECTIVE AND OBJECTIVE BOX
Follow up: HFPEF    HPI:  Patient is a 71 y/o male PMHx diastolic CHF, HTN, HLD, DM, GERD, BPH, Depression, Anxiety who presents with 1 week of progressively worsening SOB from his baseline SOB 2/2 diastolic CHF.    SOB was associated with nonproductive cough, confusion and lower extremity weakness with an inability to get out of his chair. He admits to fever, chills, SOB, productive cough, right hip pain, back pain. He denies headache, dizziness, hearing or vision changes, sore throat, chest pain or palpitations, N/V/C/D, numbness or tingling in extremities.     he is being treated for pneumonia and followed by neurology for ataxia, dysarthria, and bilateral nonsense or hematomas. He is awake and alert with no new complaint    PAST MEDICAL & SURGICAL HISTORY:  Smoker  BPH with obstruction/lower urinary tract symptoms  Vitamin D deficiency  GERD (gastroesophageal reflux disease)  Diabetes mellitus  Hyperlipidemia  Hypertension  No significant past surgical history      MEDICATIONS  (STANDING):  heparin  Injectable 5000 Unit(s) SubCutaneous every 12 hours  isosorbide   mononitrate ER Tablet (IMDUR) 60 milliGRAM(s) Oral daily  labetalol 200 milliGRAM(s) Oral three times a day  dextrose 5%. 1000 milliLiter(s) (50 mL/Hr) IV Continuous <Continuous>  dextrose 50% Injectable 12.5 Gram(s) IV Push once  dextrose 50% Injectable 25 Gram(s) IV Push once  dextrose 50% Injectable 25 Gram(s) IV Push once  amLODIPine   Tablet 10 milliGRAM(s) Oral daily  nicotine -   7 mG/24Hr(s) Patch 1 patch Transdermal daily  buPROPion XL . 300 milliGRAM(s) Oral daily  ALBUTerol    0.083% 2.5 milliGRAM(s) Nebulizer every 8 hours  methylPREDNISolone sodium succinate Injectable 20 milliGRAM(s) IV Push two times a day  venlafaxine 137.5 milliGRAM(s) Oral every 12 hours  azithromycin   Tablet 500 milliGRAM(s) Oral daily  insulin glargine Injectable (LANTUS) 10 Unit(s) SubCutaneous at bedtime  insulin lispro (HumaLOG) corrective regimen sliding scale   SubCutaneous three times a day before meals  aspirin  chewable 81 milliGRAM(s) Oral daily  asenapine SL 10 milliGRAM(s) SubLingual two times a day  hydrALAZINE 25 milliGRAM(s) Oral every 6 hours    MEDICATIONS  (PRN):  acetaminophen   Tablet 650 milliGRAM(s) Oral every 6 hours PRN For Temp greater than 38 C (100.4 F) & Pain  ALPRAZolam 2 milliGRAM(s) Oral three times a day PRN Anxiety  dextrose Gel 1 Dose(s) Oral once PRN Blood Glucose LESS THAN 70 milliGRAM(s)/deciliter  glucagon  Injectable 1 milliGRAM(s) IntraMuscular once PRN Glucose LESS THAN 70 milligrams/deciliter  acetaminophen   Tablet. 650 milliGRAM(s) Oral every 6 hours PRN Mild Pain (1 - 3)    Vital Signs Last 24 Hrs  T(C): 36.9 (30 Aug 2017 13:23), Max: 37.2 (30 Aug 2017 00:49)  T(F): 98.5 (30 Aug 2017 13:23), Max: 98.9 (30 Aug 2017 00:49)  HR: 65 (30 Aug 2017 14:43) (61 - 77)  BP: 161/73 (30 Aug 2017 13:23) (140/90 - 176/90)  BP(mean): --  RR: 20 (30 Aug 2017 13:23) (18 - 20)  SpO2: 93% (30 Aug 2017 14:43) (93% - 96%)    I&O's Summary    29 Aug 2017 07:01  -  30 Aug 2017 07:00  --------------------------------------------------------  IN: 680 mL / OUT: 1200 mL / NET: -520 mL    30 Aug 2017 07:01  -  30 Aug 2017 16:31  --------------------------------------------------------  IN: 720 mL / OUT: 650 mL / NET: 70 mL        PHYSICAL EXAM:    Constitutional: NAD, awake and alert, well-developed  Eyes:  Pupils round, no lesions  ENMT: no exudate or erythema  Pulmonary: Bilateral rhonchi  Cardiovascular: PMI not palpable RRR normal S1 and S2, no murmurs, rubs, gallops or clicks  Gastrointestinal: Bowel Sounds present, soft, nontender.   Lymph: No cervical lymphadenopathy.  Skin: No rashes.  No cyanosis.  Psych: cannot assess  Ext: No lower ext edema                                11.7   5.2   )-----------( 207      ( 30 Aug 2017 06:59 )             35.4     08-30    136  |  99  |  35<H>  ----------------------------<  334<H>  4.8   |  30  |  1.60<H>    Ca    9.5      30 Aug 2017 06:59      < from: 12 Lead ECG (08.27.17 @ 08:20) >    Ventricular Rate 78 BPM    Atrial Rate 78 BPM    P-R Interval 254 ms    QRS Duration 96 ms    Q-T Interval 372 ms    QTC Calculation(Bezet) 424 ms    P Axis 9 degrees    R Axis -23 degrees    T Axis 43 degrees    Diagnosis Line Sinus rhythm with sinus arrhythmia with 1st degree AV block  Inferior infarct , age undetermined  Abnormal ECG    Confirmed by Otf Atkins ([id number]) on 8/28/2017 9:08:51 AM    < end of copied text >  < from: CT Chest No Cont (08.27.17 @ 09:05) >    EXAM:  CT ABDOMEN AND PELVIS                          EXAM:  CT CHEST                            PROCEDURE DATE:  08/27/2017          INTERPRETATION:  CT CHEST/ABDOMEN/PELVIS:     CLINICAL INFORMATION:  Chest and abdominal pain following fall.    COMPARISON: None.    PROCEDURE:  Using multislice helical CT, 2.5 mm sections were obtained   from the thoracic inlet through the ischial tuberosities.    Multiplanar reformatted images.    There is right perihilar and lower lobe patchy airspace consolidation,   extending to the periphery of the basal segments of the right lower lobe,   with peribronchiolar thickening. There is mucous plugging involving   segmental right lower lobe bronchi.    There are poorly defined centrilobular nodular opacities throughout the   right lower lobe, likely reflecting infectious/inflammatory airway   disease.  There are scattered small subpleural nodular opacities right lower lobe.    There is peripheral/subpleural reticular banding and intralobular septal   thickening with small peripheral cystic airspaces right upper and right   lower lobe, likely representing sequelae of chronic interstitial lung   disease. Similar findings are noted within the left upper/left lingula   lobes.  There is an [id number] subpleural nodule opacity left lower   lobe.    No pleural effusion or pneumothorax is noted.    There is arteriosclerotic calcification of the thoracic aorta and   coronary artery calcifications.  There are small shotty prevascular, pretracheal and subcarinal   mediastinal lymph nodes.  The evaluation of the pulmonary hilum is limited without intravenous   contrast.    There is a chronic appearing sternal fracture.  There are multilevel degenerative changes of the thoracic spine.    The evaluation of the solid organ parenchyma is limited with out   intravenous contrast.    There is diffuse fatty infiltration of the liver which is enlarged.  No intra or extrahepatic biliary ductal dilatation is noted.  The gallbladder appears unremarkable.    The adrenal glands and nonenhanced pancreas are unremarkable in   appearance.    There is mild fullness of the left renal pelvis; no hydroureteronephrosis   or obstructing ureteral calculi are noted. There is mild to moderate   bilateral perinephric stranding.  There are several small low density renal lesions, likely representing   cysts.    There is arteriosclerotic calcification of the abdominal aorta.  No enlarged retroperitoneal lymphadenopathy is noted.    The evaluation of the stomach and gastrointestinal tract is limited   without oral contrast distention.  No bowel obstruction is noted. There is colonic diverticulosis, without   CT evidence of diverticulitis.  No localized intra-abdominal fluid collection or pneumoperitoneum is  noted.    There is a normal-appearing appendix.    There is mild enlargement of the prostate gland with coarse   calcifications. There is mild circumferential bladder wall thickening,   correlate for cystitis.  No free fluid is noted within the pelvis.    There is multilevel lumbar degenerative disc disease.  No acute lumbar compression fracture is noted.  The pelvic ring and sacroiliac joints appear intact.  There is periarticular ossification left hip.      Impression:    Right perihilar and lower lobe airspace opacities as discussed, may   reflect infection/bronchopneumonia; cannot exclude perihilar mass.    Findings likely reflecting chronic interstitial lung disease bilateral   lungs as discussed.    Nonspecific subpleural opacity leftlower lobe.    Recommend short interval follow-up chest CT following the appropriate   clinical course of treatment.    Hepatic steatosis with hepatosplenomegaly.    No CT evidence for acute visceral abdominal organ injury.    Other findings as discussed above.                            MICHELINE GREENFIELD M.D., ATTENDING RADIOLOGIST  This document has been electronically signed. Aug 27 2017  9:32AM                < end of copied text >

## 2017-08-30 NOTE — DISCHARGE NOTE ADULT - PATIENT PORTAL LINK FT
“You can access the Follow[facility] Patient Portal, offered by [facility], by registering with the following website: [email or web address]”

## 2017-08-30 NOTE — DISCHARGE NOTE ADULT - MEDICATION SUMMARY - MEDICATIONS TO TAKE
I will START or STAY ON the medications listed below when I get home from the hospital:    predniSONE 5 mg oral tablet  -- 3 tab(s) by mouth 2 times a day for 5 days.  -- Indication: For reactive airway disease    acetaminophen 325 mg oral tablet  -- 2 tab(s) by mouth every 6 hours, As needed, Mild Pain (1 - 3)  -- Indication: For Pain    aspirin 81 mg oral tablet  -- 1 tab(s) by mouth once a day  -- Indication: For Prevention    isosorbide mononitrate 60 mg oral tablet, extended release  -- 1 tab(s) by mouth once a day (in the morning)  -- Indication: For Chf    venlafaxine extended release  -- 375 milligram(s) by mouth once a day  -- Indication: For Mood disorder    insulin glargine  -- 10 unit(s) subcutaneous once a day (at bedtime)  -- Indication: For Diabetes mellitus    insulin lispro 100 units/mL subcutaneous solution  --  subcutaneous 3 times a day (before meals); 2 Unit(s) if Glucose 151 - 200  4 Unit(s) if Glucose 201 - 250  6 Unit(s) if Glucose 251 - 300  8 Unit(s) if Glucose 301 - 350  10 Unit(s) if Glucose 351 - 400  12 Unit(s) if Glucose Greater Than 400  -- Indication: For Diabetes mellitus    Saphris 10 mg sublingual tablet  -- 1 tab(s) under tongue 2 times a day  -- Indication: For Mood disorder    Xanax 2 mg oral tablet  -- 1 tab(s) by mouth 3 times a day, As Needed  -- Indication: For Anxiety    labetalol 200 mg oral tablet  -- 1 tab(s) by mouth 3 times a day  -- Indication: For Htn    albuterol 2.5 mg/3 mL (0.083%) inhalation solution  -- 1 dose(s) inhaled every 8 hours, As Needed  -- Indication: For reactive airway disease    amLODIPine 10 mg oral tablet  -- 1 tab(s) by mouth once a day  -- Indication: For Htn    cefuroxime 250 mg oral tablet  -- 1 tab(s) by mouth every 12 hours for 2 more days.  -- Indication: For Pneumonia    Wellbutrin  mg/24 hours oral tablet, extended release  -- 1 tab(s) by mouth every 24 hours  -- Indication: For Mood disorder    hydrALAZINE 50 mg oral tablet  -- 1 tab(s) by mouth every 6 hours  -- Indication: For Htn

## 2017-08-30 NOTE — DISCHARGE NOTE ADULT - MEDICATION SUMMARY - MEDICATIONS TO CHANGE
I will SWITCH the dose or number of times a day I take the medications listed below when I get home from the hospital:    labetalol 200 mg oral tablet  -- 2 tab(s) by mouth once a day (in AM)    labetalol 200 mg oral tablet  -- 1 tab(s) by mouth once a day (at bedtime)

## 2017-08-30 NOTE — DISCHARGE NOTE ADULT - HOSPITAL COURSE
Patient is a 73 y/o male PMHx diastolic CHF, HTN, HLD, DM, GERD, BPH, Depression, Anxiety who presents with 1 week of progressively worsening SOB from his baseline SOB 2/2 diastolic CHF.    SOB was associated with nonproductive cough, confusion (per son at bedside) and lower extremity weakness with an inability to get out of his chair. He admits to fever, chills, SOB, productive cough, right hip pain, back pain. He denies headache, dizziness, hearing or vision changes, sore throat, chest pain or palpitations, N/V/C/D, numbness or tingling in extremities.     Patient's son at bedside states his father walks with more difficulty in his apartment and has increased slurred speech from baseline since a fall onto his knees yesterday.     In ED, Vitals were: T 101.3, As EMS, o2 sat was 86% on RA, other vitals wnl. No leukocytosis, no elevated lactate. Other CBC lab results grossly wnl. Mild hyponatremia 134, Elevated bun /cr at 30/1.70. CE positive with elevated CK at 1853. BNP at 1728. Pt had CT head done which showed no acute pathology, X ray and CT chest and abd with no contrast showed Right perihilar and lower lobe airspace opacities as discussed, may reflect infection/bronchopneumonia; cannot exclude perihilar mass; likely reflecting chronic interstitial lung disease bilateral lungs; Nonspecific subpleural opacity left lower lobe. Hepatic steatosis with hepatosplenomegaly. EKG showed sinus rhythm with sinus arrhythmia with 1st degree AV block, inferior infarct age undetermined (unofficial read). Pt received 1x azithromycin, 1x Rocephin, 1x tylenols, 1x duoneb, BCX and UCX was sent. Pt was on 5L NC. Patient is a 73 y/o male PMHx diastolic CHF, HTN, HLD, DM, GERD, BPH, Depression, Anxiety who presents with 1 week of progressively worsening SOB from his baseline SOB 2/2 diastolic CHF.    SOB was associated with nonproductive cough, confusion (per son at bedside) and lower extremity weakness with an inability to get out of his chair. He admits to fever, chills, SOB, productive cough, right hip pain, back pain. He denies headache, dizziness, hearing or vision changes, sore throat, chest pain or palpitations, N/V/C/D, numbness or tingling in extremities.     Patient's son at bedside states his father walks with more difficulty in his apartment and has increased slurred speech from baseline since a fall onto his knees yesterday.     In ED, Vitals were: T 101.3, As EMS, o2 sat was 86% on RA, other vitals wnl. No leukocytosis, no elevated lactate. Other CBC lab results grossly wnl. Mild hyponatremia 134, Elevated bun /cr at 30/1.70. CE positive with elevated CK at 1853. BNP at 1728. Pt had CT head done which showed no acute pathology, X ray and CT chest and abd with no contrast showed Right perihilar and lower lobe airspace opacities as discussed, may reflect infection/bronchopneumonia; cannot exclude perihilar mass; likely reflecting chronic interstitial lung disease bilateral lungs; Nonspecific subpleural opacity left lower lobe. Hepatic steatosis with hepatosplenomegaly. EKG showed sinus rhythm with sinus arrhythmia with 1st degree AV block, inferior infarct age undetermined (unofficial read). Pt received 1x azithromycin, 1x Rocephin, 1x tylenols, 1x duoneb, BCX and UCX was sent. Pt was on 5L NC.     Patient was admitted to general medical floor for pneumonia and started on empiric antibiotics. Patient was kept on 3L O2 with saturation of 94-96%.  Dr. Victoria was consulted and recommended Patient is a 71 y/o male PMHx diastolic CHF, HTN, HLD, DM, GERD, BPH, Depression, Anxiety who presents with 1 week of progressively worsening SOB from his baseline SOB 2/2 diastolic CHF.    SOB was associated with nonproductive cough, confusion (per son at bedside) and lower extremity weakness with an inability to get out of his chair. He admits to fever, chills, SOB, productive cough, right hip pain, back pain. He denies headache, dizziness, hearing or vision changes, sore throat, chest pain or palpitations, N/V/C/D, numbness or tingling in extremities.     Patient's son at bedside states his father walks with more difficulty in his apartment and has increased slurred speech from baseline since a fall onto his knees yesterday.     In ED, Vitals were: T 101.3, As EMS, o2 sat was 86% on RA, other vitals wnl. No leukocytosis, no elevated lactate. Other CBC lab results grossly wnl. Mild hyponatremia 134, Elevated bun /cr at 30/1.70. CE positive with elevated CK at 1853. BNP at 1728. Pt had CT head done which showed no acute pathology, X ray and CT chest and abd with no contrast showed Right perihilar and lower lobe airspace opacities as discussed, may reflect infection/bronchopneumonia; cannot exclude perihilar mass; likely reflecting chronic interstitial lung disease bilateral lungs; Nonspecific subpleural opacity left lower lobe. Hepatic steatosis with hepatosplenomegaly. EKG showed sinus rhythm with sinus arrhythmia with 1st degree AV block, inferior infarct age undetermined (unofficial read). Pt received 1x azithromycin, 1x Rocephin, 1x tylenols, 1x duoneb, BCX and UCX was sent. Pt was on 5L NC.     Patient was admitted to general medical floor for pneumonia and started on empiric antibiotics. Patient was kept on 3L O2 with saturation of 94-96%.  Dr. Brown was consulted and found that the patients dysarthria was likely due to underlying respiratory disease. Dr. Victoria was consulted and recommended continued O2 and empiric antibiotics for CAP. Patient is a 73 y/o male PMHx diastolic CHF, HTN, HLD, DM, GERD, BPH, Depression, Anxiety who presents with 1 week of progressively worsening SOB from his baseline SOB 2/2 diastolic CHF.    SOB was associated with nonproductive cough, confusion (per son at bedside) and lower extremity weakness with an inability to get out of his chair. He admits to fever, chills, SOB, productive cough, right hip pain, back pain. He denies headache, dizziness, hearing or vision changes, sore throat, chest pain or palpitations, N/V/C/D, numbness or tingling in extremities.     Patient's son at bedside states his father walks with more difficulty in his apartment and has increased slurred speech from baseline since a fall onto his knees yesterday.     In ED, Vitals were: T 101.3, As EMS, o2 sat was 86% on RA, other vitals wnl. No leukocytosis, no elevated lactate. Other CBC lab results grossly wnl. Mild hyponatremia 134, Elevated bun /cr at 30/1.70. CE positive with elevated CK at 1853. BNP at 1728. Pt had CT head done which showed no acute pathology, X ray and CT chest and abd with no contrast showed Right perihilar and lower lobe airspace opacities as discussed, may reflect infection/bronchopneumonia; cannot exclude perihilar mass; likely reflecting chronic interstitial lung disease bilateral lungs; Nonspecific subpleural opacity left lower lobe. Hepatic steatosis with hepatosplenomegaly. EKG showed sinus rhythm with sinus arrhythmia with 1st degree AV block, inferior infarct age undetermined (unofficial read). Pt received 1x azithromycin, 1x Rocephin, 1x tylenols, 1x duoneb, BCX and UCX was sent. Pt was on 5L NC.     Patient was admitted to general medical floor for pneumonia and started on empiric antibiotics. Patient was kept on 3L O2 with saturation of 94-96%.  Dr. Brown was consulted and found that the patients dysarthria was likely due to underlying respiratory disease. Dr. Victoria was consulted and recommended continued O2 and empiric antibiotics for pneumonia.     Patient showed signs of DEVYN and was d/c from HCTZ and ACE-I.  DEVYN resolved, however, patient became more hypertensive.  HCTZ was resumed, leading to increasing Creatinine 1.6 and BUN.  HCTZ was d/c and Dr. Marquis was consulted.  Per recs patient was started on hydralazine 25mg q6h. Patient is a 71 y/o male PMHx diastolic CHF, HTN, HLD, DM, GERD, BPH, Depression, Anxiety who presents with 1 week of progressively worsening SOB from his baseline SOB 2/2 diastolic CHF.    SOB was associated with nonproductive cough, confusion (per son at bedside) and lower extremity weakness with an inability to get out of his chair. He admits to fever, chills, SOB, productive cough, right hip pain, back pain. He denies headache, dizziness, hearing or vision changes, sore throat, chest pain or palpitations, N/V/C/D, numbness or tingling in extremities.     Patient's son at bedside states his father walks with more difficulty in his apartment and has increased slurred speech from baseline since a fall onto his knees yesterday.     In ED, Vitals were: T 101.3, As EMS, o2 sat was 86% on RA, other vitals wnl. No leukocytosis, no elevated lactate. Other CBC lab results grossly wnl. Mild hyponatremia 134, Elevated bun /cr at 30/1.70. CE positive with elevated CK at 1853. BNP at 1728. Pt had CT head done which showed no acute pathology, X ray and CT chest and abd with no contrast showed Right perihilar and lower lobe airspace opacities as discussed, may reflect infection/bronchopneumonia; cannot exclude perihilar mass; likely reflecting chronic interstitial lung disease bilateral lungs; Nonspecific subpleural opacity left lower lobe. Hepatic steatosis with hepatosplenomegaly. EKG showed sinus rhythm with sinus arrhythmia with 1st degree AV block, inferior infarct age undetermined (unofficial read). Pt received 1x azithromycin, 1x Rocephin, 1x tylenols, 1x duoneb, BCX and UCX was sent. Pt was on 5L NC.     Patient was admitted to general medical floor for pneumonia and started on empiric antibiotics. Patient was kept on 3L O2 with saturation of 94-96%.  Dr. Brown was consulted and found that the patients dysarthria was likely due to underlying respiratory disease, his dysarthyria resolved over the course of his hospital stay. Dr. Victoria was consulted and recommended continued O2 and empiric antibiotics for pneumonia, as well as IV prednisone.     Patient showed signs of DEVYN and was d/c from HCTZ and ACE-I.  DEVYN resolved, however, patient became more hypertensive.  HCTZ was resumed, leading to increasing Creatinine 1.6 and BUN.  HCTZ was d/c and Dr. Marquis was consulted.  Per recs patient was started on hydralazine 25mg q6h, but hypertension was unresponsive.  Per recs hydralazine was increased to 50mg q6h.    Patient was tapered on to oral prednisone as per recs by Dr. Banuelos. Patient is a 71 y/o male PMHx diastolic CHF, HTN, HLD, DM, GERD, BPH, Depression, Anxiety who presents with 1 week of progressively worsening SOB from his baseline SOB 2/2 pneumonia and reactive airway disease.    SOB was associated with nonproductive cough, confusion (per son at bedside) and lower extremity weakness with an inability to get out of his chair. He admits to fever, chills, SOB, productive cough, right hip pain, back pain. He denies headache, dizziness, hearing or vision changes, sore throat, chest pain or palpitations, N/V/C/D, numbness or tingling in extremities.     Patient's son at bedside states his father walks with more difficulty in his apartment and has increased slurred speech from baseline since a fall onto his knees the day prior to admission.    In ED, Vitals were: T 101.3, As EMS, o2 sat was 86% on RA, other vitals wnl. No leukocytosis, no elevated lactate. Other CBC lab results grossly wnl. Mild hyponatremia 134, Elevated bun /cr at 30/1.70. CE positive with elevated CK at 1853. BNP at 1728. Pt had CT head done which showed no acute pathology, X ray and CT chest and abd with no contrast showed Right perihilar and lower lobe airspace opacities as discussed, may reflect infection/bronchopneumonia; cannot exclude perihilar mass; likely reflecting chronic interstitial lung disease bilateral lungs; Nonspecific subpleural opacity left lower lobe. Hepatic steatosis with hepatosplenomegaly. EKG showed sinus rhythm with sinus arrhythmia with 1st degree AV block, inferior infarct age undetermined (unofficial read). Pt received 1x azithromycin, 1x Rocephin, 1x tylenols, 1x duoneb, BCX and UCX was sent. Pt was on 5L NC.     Patient was admitted to general medical floor for acute hypoxic respiratory failure secondary to pneumonia and likely reactive airway disease. He was started on empiric antibiotics. Patient was kept on 3L O2 with saturation of 94-96%.  Dr. Brown was consulted and found that the patients dysarthria was likely due to underlying respiratory disease, his dysarthyria resolved over the course of his hospital stay. Dr. Victoria was consulted and recommended continued O2 and empiric antibiotics for pneumonia, as well as IV Solumedrol.     Patient showed signs of DEVYN and was d/c from HCTZ and ACE-I.  DEVYN resolved, however, patient became more hypertensive.  HCTZ was resumed, leading to increasing Creatinine 1.6 and BUN.  HCTZ was d/c and Dr. Marquis was consulted.  Per recs patient was started on hydralazine 25mg q6h, but hypertension was unresponsive.  Per recs hydralazine was increased to 50mg q6h.    Patient was tapered on to oral prednisone as per recs by Dr. Banuelos. He was evaluated by PT and recommended JANELLE. Patient was eventually agreeable and he was accepted to [facility]. He will be transferred there.    Dr. Bush notified of discharge. DC will be faxed to him.

## 2017-08-30 NOTE — PROGRESS NOTE ADULT - PROBLEM SELECTOR PLAN 2
BUN and Cr trending up after restarting HCTZ for persistently elevated BP  BUN 30 -> 25 -> 22 -> 35  Cr 1.7 -> 1.3 -> 1.3 -> 1.6  Consider holding HCTZ  Continue to encourage PO hydration BUN and Cr trending up after restarting HCTZ for persistently elevated BP  D/C HCTZ  BUN 30 -> 25 -> 22 -> 35  Cr 1.7 -> 1.3 -> 1.3 -> 1.6  Consider holding HCTZ  Continue to encourage PO hydration worsening DEVYN after IV fluids were stopped and diuretic (hctz) restarted  will hold HCTZ and ACEI in hospital setting until DEVYN resolves  will renally dose effexor based on elevated creatinine  Continue to encourage PO hydration worsening DEVYN after IV fluids were stopped and diuretic (hctz) restarted  will hold HCTZ and ACEI in hospital setting until DEVYN resolves  will renally dose effexor based on elevated creatinine  Continue to encourage PO hydration, monitor i's and o's

## 2017-08-30 NOTE — PROGRESS NOTE ADULT - PROBLEM SELECTOR PLAN 7
CT Head negative and no focal neurological deficits.   Likely due to infection and/or polypharmacy  Continue to monitor Resolved, no slurring of speech noted today  CT Head negative and no focal neurological deficits.   Likely due to infection and/or polypharmacy, will continue to monitor Controlled, continue fenofibrate

## 2017-08-30 NOTE — DISCHARGE NOTE ADULT - SECONDARY DIAGNOSIS.
Chronic lung disease Diabetes mellitus Hypertension Hyperlipidemia BPH with obstruction/lower urinary tract symptoms Ataxia Smoking

## 2017-08-30 NOTE — PROGRESS NOTE ADULT - SUBJECTIVE AND OBJECTIVE BOX
Date/Time Patient Seen:  		  Referring MD:   Data Reviewed	       Patient is a 77y old  Male who presents with a chief complaint of SOB, s/p fall early sat am per son (27 Aug 2017 16:14)  in bed  seen and examined  vs and meds reviewed        Subjective/HPI     PAST MEDICAL & SURGICAL HISTORY:  Smoker  BPH with obstruction/lower urinary tract symptoms  Vitamin D deficiency  GERD (gastroesophageal reflux disease)  Diabetes mellitus  Hyperlipidemia  Hypertension  No significant past surgical history        Medication list         MEDICATIONS  (STANDING):  azithromycin  IVPB 500 milliGRAM(s) IV Intermittent every 24 hours  cefTRIAXone   IVPB 1 Gram(s) IV Intermittent every 24 hours  heparin  Injectable 5000 Unit(s) SubCutaneous every 12 hours  isosorbide   mononitrate ER Tablet (IMDUR) 60 milliGRAM(s) Oral daily  labetalol 200 milliGRAM(s) Oral three times a day  insulin lispro (HumaLOG) corrective regimen sliding scale   SubCutaneous three times a day before meals  dextrose 5%. 1000 milliLiter(s) (50 mL/Hr) IV Continuous <Continuous>  dextrose 50% Injectable 12.5 Gram(s) IV Push once  dextrose 50% Injectable 25 Gram(s) IV Push once  dextrose 50% Injectable 25 Gram(s) IV Push once  amLODIPine   Tablet 10 milliGRAM(s) Oral daily  nicotine -   7 mG/24Hr(s) Patch 1 patch Transdermal daily  venlafaxine 187.5 milliGRAM(s) Oral every 12 hours  buPROPion XL . 300 milliGRAM(s) Oral daily  asenapine SL 10 milliGRAM(s) SubLingual two times a day  insulin glargine Injectable (LANTUS) 5 Unit(s) SubCutaneous at bedtime  ALBUTerol    0.083% 2.5 milliGRAM(s) Nebulizer every 8 hours  methylPREDNISolone sodium succinate Injectable 20 milliGRAM(s) IV Push two times a day  hydrochlorothiazide 12.5 milliGRAM(s) Oral daily    MEDICATIONS  (PRN):  acetaminophen   Tablet 650 milliGRAM(s) Oral every 6 hours PRN For Temp greater than 38 C (100.4 F) & Pain  ALPRAZolam 2 milliGRAM(s) Oral three times a day PRN Anxiety  dextrose Gel 1 Dose(s) Oral once PRN Blood Glucose LESS THAN 70 milliGRAM(s)/deciliter  glucagon  Injectable 1 milliGRAM(s) IntraMuscular once PRN Glucose LESS THAN 70 milligrams/deciliter  acetaminophen   Tablet. 650 milliGRAM(s) Oral every 6 hours PRN Mild Pain (1 - 3)         Vitals log        ICU Vital Signs Last 24 Hrs  T(C): 36.6 (30 Aug 2017 05:27), Max: 37.2 (30 Aug 2017 00:49)  T(F): 97.9 (30 Aug 2017 05:27), Max: 98.9 (30 Aug 2017 00:49)  HR: 66 (30 Aug 2017 05:27) (61 - 86)  BP: 160/89 (30 Aug 2017 05:27) (140/90 - 176/90)  BP(mean): --  ABP: --  ABP(mean): --  RR: 18 (30 Aug 2017 05:27) (18 - 28)  SpO2: 96% (30 Aug 2017 05:27) (94% - 96%)           Input and Output:  I&O's Detail    28 Aug 2017 07:01  -  29 Aug 2017 07:00  --------------------------------------------------------  IN:    Oral Fluid: 640 mL  Total IN: 640 mL    OUT:    Voided: 300 mL  Total OUT: 300 mL    Total NET: 340 mL      29 Aug 2017 07:01  -  30 Aug 2017 06:14  --------------------------------------------------------  IN:    Oral Fluid: 680 mL  Total IN: 680 mL    OUT:    Voided: 1200 mL  Total OUT: 1200 mL    Total NET: -520 mL          Lab Data                        11.6   6.4   )-----------( 182      ( 29 Aug 2017 06:49 )             35.4     08-29    138  |  101  |  22  ----------------------------<  191<H>  4.2   |  24  |  1.30    Ca    8.9      29 Aug 2017 06:49      ABG - ( 29 Aug 2017 05:49 )  pH: 7.40  /  pCO2: 43    /  pO2: 54    / HCO3: 25    / Base Excess: 1.6   /  SaO2: 86                      Review of Systems	      Objective     Physical Examination    head at  heart - s1s2  lungs - dec BS  abd - soft  cn grossly int  obese      Pertinent Lab findings & Imaging      Princess:  NO   Adequate UO     I&O's Detail    28 Aug 2017 07:01  -  29 Aug 2017 07:00  --------------------------------------------------------  IN:    Oral Fluid: 640 mL  Total IN: 640 mL    OUT:    Voided: 300 mL  Total OUT: 300 mL    Total NET: 340 mL      29 Aug 2017 07:01  -  30 Aug 2017 06:14  --------------------------------------------------------  IN:    Oral Fluid: 680 mL  Total IN: 680 mL    OUT:    Voided: 1200 mL  Total OUT: 1200 mL    Total NET: -520 mL               Discussed with:     Cultures:	        Radiology

## 2017-08-30 NOTE — PROGRESS NOTE ADULT - PROBLEM SELECTOR PLAN 2
at risk for JOAQUIN  has all the risk factors and features of JOAQUIN patient  refuses trial of CPAP  will need eval as outpatient for JOAQUIN with PSG

## 2017-08-30 NOTE — PROGRESS NOTE ADULT - SUBJECTIVE AND OBJECTIVE BOX
Resident Progress Note    Patient is a 78 yo M who presents with a chief complaint of SOB, s/p fall early sat am per son (27 Aug 2017 16:14).      HPI: Patient seen and examined at bedside. Reports subjective improvement of SOB and body aches. No acute events overnight.    ROS:  Constitutional: denies fever, chills, diaphoresis   HEENT: denies blurry vision, difficulty hearing  Respiratory: reports SOB and cough with minimal sputum production; denies wheezing, hemoptysis  Cardiovascular: denies CP, palpitations, edema  Gastrointestinal: denies nausea, vomiting, diarrhea, constipation, abdominal pain, melena, hematochezia   Genitourinary: denies dysuria, frequency, urgency, hematuria   Musculoskeletal: denies myalgias, joint swelling, muscle weakness  Neurologic: denies headache, weakness, dizziness, paresthesias, numbness/tingling   ROS negative except as noted above    Vital Signs Last 24 Hrs  T(C): 36.6 (17 @ 05:27), Max: 37.2 (17 @ 00:49)  T(F): 97.9 (17 @ 05:27), Max: 98.9 (17 @ 00:49)  HR: 66 (17 @ 05:27) (61 - 86)  BP: 160/89 (17 @ 05:27) (140/90 - 176/90)  BP(mean): --  RR: 18 (17 @ 05:27) (18 - 28)  SpO2: 96% (17 @ 05:27) (95% - 96%)    Physical Exam:  General: Well developed, well nourished, NAD  HEENT: NCAT, EOMI BL, moist mucous membranes   Neurology: A&Ox3, nonfocal, CN II-XII grossly intact  Respiratory: BL rhonchi  CV: RRR, +S1/S2, no murmurs, rubs or gallops  Abdominal: Soft, NT, ND +BSx4  Extremities: No C/C/E, + peripheral pulses  MSK: Normal ROM, no joint erythema or warmth, no joint swelling   Skin: warm, dry, normal color, no rash or abnormal lesions    LABS:                        11.7   5.2   )-----------( 207      ( 30 Aug 2017 06:59 )             35.4     30 Aug 2017 06:59    136    |  99     |  35     ----------------------------<  334    4.8     |  30     |  1.60     Ca    9.5        30 Aug 2017 06:59        Blood Gas Profile - Arterial in AM (17 @ 05:49)    pH, Arterial: 7.40    pCO2, Arterial: 43 mmHg    pO2, Arterial: 54 mmHg    HCO3, Arterial: 25 mmol/L    Base Excess, Arterial: 1.6 mmol/L    Oxygen Saturation, Arterial: 86 %    FIO2, Arterial: 21.0    Blood Gas Comments Arterial: dr becker aware of low pao2/sats on room air        Urinalysis Basic - ( 28 Aug 2017 08:15 )    Color: Yellow / Appearance: x / S.020 / pH: x  Gluc: x / Ketone: Trace  / Bili: Negative / Urobili: 1   Blood: x / Protein: 75 mg/dL / Nitrite: Negative   Leuk Esterase: Negative / RBC: 0-2 /HPF / WBC x   Sq Epi: x / Non Sq Epi: x / Bacteria: x      CAPILLARY BLOOD GLUCOSE  337 (29 Aug 2017 20:44)  256 (29 Aug 2017 16:42)  297 (29 Aug 2017 11:51)  206 (29 Aug 2017 08:12)        RADIOLOGY & ADDITIONAL TESTS: no new tests, past images reviewed        MEDICATIONS  (STANDING):  azithromycin  IVPB 500 milliGRAM(s) IV Intermittent every 24 hours  cefTRIAXone   IVPB 1 Gram(s) IV Intermittent every 24 hours  heparin  Injectable 5000 Unit(s) SubCutaneous every 12 hours  isosorbide   mononitrate ER Tablet (IMDUR) 60 milliGRAM(s) Oral daily  labetalol 200 milliGRAM(s) Oral three times a day  insulin lispro (HumaLOG) corrective regimen sliding scale   SubCutaneous three times a day before meals  dextrose 5%. 1000 milliLiter(s) (50 mL/Hr) IV Continuous <Continuous>  dextrose 50% Injectable 12.5 Gram(s) IV Push once  dextrose 50% Injectable 25 Gram(s) IV Push once  dextrose 50% Injectable 25 Gram(s) IV Push once  amLODIPine   Tablet 10 milliGRAM(s) Oral daily  nicotine -   7 mG/24Hr(s) Patch 1 patch Transdermal daily  venlafaxine 187.5 milliGRAM(s) Oral every 12 hours  buPROPion XL . 300 milliGRAM(s) Oral daily  asenapine SL 10 milliGRAM(s) SubLingual two times a day  insulin glargine Injectable (LANTUS) 5 Unit(s) SubCutaneous at bedtime  ALBUTerol    0.083% 2.5 milliGRAM(s) Nebulizer every 8 hours  methylPREDNISolone sodium succinate Injectable 20 milliGRAM(s) IV Push two times a day  hydrochlorothiazide 12.5 milliGRAM(s) Oral daily    MEDICATIONS  (PRN):  acetaminophen   Tablet 650 milliGRAM(s) Oral every 6 hours PRN For Temp greater than 38 C (100.4 F) & Pain  ALPRAZolam 2 milliGRAM(s) Oral three times a day PRN Anxiety  dextrose Gel 1 Dose(s) Oral once PRN Blood Glucose LESS THAN 70 milliGRAM(s)/deciliter  glucagon  Injectable 1 milliGRAM(s) IntraMuscular once PRN Glucose LESS THAN 70 milligrams/deciliter  acetaminophen   Tablet. 650 milliGRAM(s) Oral every 6 hours PRN Mild Pain (1 - 3)      Allergies    No Known Allergies    Intolerances Resident Progress Note    Patient is a 78 yo M who presents with a chief complaint of SOB, s/p fall early sat am per son (27 Aug 2017 16:14).      HPI: Patient seen and examined at bedside. Reports subjective improvement of SOB (states that he feels "90% better") and body aches. No acute events overnight.    ROS:  Constitutional: denies fever, chills, diaphoresis   HEENT: denies blurry vision, difficulty hearing  Respiratory: reports SOB and cough with minimal sputum production; denies wheezing, hemoptysis  Cardiovascular: denies CP, palpitations, edema  Gastrointestinal: denies nausea, vomiting, diarrhea, constipation, abdominal pain, melena, hematochezia   Genitourinary: denies dysuria, frequency, urgency, hematuria   Musculoskeletal: denies myalgias, joint swelling, muscle weakness  Neurologic: denies headache, weakness, dizziness, paresthesias, numbness/tingling   ROS negative except as noted above    Vital Signs Last 24 Hrs  T(C): 36.6 (17 @ 05:27), Max: 37.2 (17 @ 00:49)  T(F): 97.9 (17 @ 05:27), Max: 98.9 (- @ 00:49)  HR: 66 (17 @ 05:27) (61 - 86)  BP: 160/89 (17 @ 05:27) (140/90 - 176/90)  BP(mean): --  RR: 18 (17 @ 05:27) (18 - 28)  SpO2: 96% (17 @ 05:27) (95% - 96%)    Physical Exam:  General: Well developed, well nourished, NAD  HEENT: NCAT, EOMI BL, moist mucous membranes   Neurology: A&Ox3, nonfocal, CN II-XII grossly intact  Respiratory: BL rhonchi  CV: RRR, +S1/S2, no murmurs, rubs or gallops  Abdominal: Soft, NT, ND +BSx4  Extremities: No C/C/E, +peripheral pulses  MSK: Normal ROM, no joint erythema or warmth, no joint swelling   Skin: warm, dry, normal color, no rash or abnormal lesions    LABS:                        11.7   5.2   )-----------( 207      ( 30 Aug 2017 06:59 )             35.4     30 Aug 2017 06:59    136    |  99     |  35     ----------------------------<  334    4.8     |  30     |  1.60     Ca    9.5        30 Aug 2017 06:59        Blood Gas Profile - Arterial in AM (17 @ 05:49)    pH, Arterial: 7.40    pCO2, Arterial: 43 mmHg    pO2, Arterial: 54 mmHg    HCO3, Arterial: 25 mmol/L    Base Excess, Arterial: 1.6 mmol/L    Oxygen Saturation, Arterial: 86 %    FIO2, Arterial: 21.0    Blood Gas Comments Arterial: dr becker aware of low pao2/sats on room air        Urinalysis Basic - ( 28 Aug 2017 08:15 )    Color: Yellow / Appearance: x / S.020 / pH: x  Gluc: x / Ketone: Trace  / Bili: Negative / Urobili: 1   Blood: x / Protein: 75 mg/dL / Nitrite: Negative   Leuk Esterase: Negative / RBC: 0-2 /HPF / WBC x   Sq Epi: x / Non Sq Epi: x / Bacteria: x      CAPILLARY BLOOD GLUCOSE  337 (29 Aug 2017 20:44)  256 (29 Aug 2017 16:42)  297 (29 Aug 2017 11:51)  206 (29 Aug 2017 08:12)        RADIOLOGY & ADDITIONAL TESTS: no new tests, past images reviewed        MEDICATIONS  (STANDING):  azithromycin  IVPB 500 milliGRAM(s) IV Intermittent every 24 hours  cefTRIAXone   IVPB 1 Gram(s) IV Intermittent every 24 hours  heparin  Injectable 5000 Unit(s) SubCutaneous every 12 hours  isosorbide   mononitrate ER Tablet (IMDUR) 60 milliGRAM(s) Oral daily  labetalol 200 milliGRAM(s) Oral three times a day  insulin lispro (HumaLOG) corrective regimen sliding scale   SubCutaneous three times a day before meals  dextrose 5%. 1000 milliLiter(s) (50 mL/Hr) IV Continuous <Continuous>  dextrose 50% Injectable 12.5 Gram(s) IV Push once  dextrose 50% Injectable 25 Gram(s) IV Push once  dextrose 50% Injectable 25 Gram(s) IV Push once  amLODIPine   Tablet 10 milliGRAM(s) Oral daily  nicotine -   7 mG/24Hr(s) Patch 1 patch Transdermal daily  venlafaxine 187.5 milliGRAM(s) Oral every 12 hours  buPROPion XL . 300 milliGRAM(s) Oral daily  asenapine SL 10 milliGRAM(s) SubLingual two times a day  insulin glargine Injectable (LANTUS) 5 Unit(s) SubCutaneous at bedtime  ALBUTerol    0.083% 2.5 milliGRAM(s) Nebulizer every 8 hours  methylPREDNISolone sodium succinate Injectable 20 milliGRAM(s) IV Push two times a day  hydrochlorothiazide 12.5 milliGRAM(s) Oral daily    MEDICATIONS  (PRN):  acetaminophen   Tablet 650 milliGRAM(s) Oral every 6 hours PRN For Temp greater than 38 C (100.4 F) & Pain  ALPRAZolam 2 milliGRAM(s) Oral three times a day PRN Anxiety  dextrose Gel 1 Dose(s) Oral once PRN Blood Glucose LESS THAN 70 milliGRAM(s)/deciliter  glucagon  Injectable 1 milliGRAM(s) IntraMuscular once PRN Glucose LESS THAN 70 milligrams/deciliter  acetaminophen   Tablet. 650 milliGRAM(s) Oral every 6 hours PRN Mild Pain (1 - 3)      Allergies    No Known Allergies    Intolerances Resident Progress Note    Patient is a 76 yo M who presents with a chief complaint of SOB, s/p fall early sat am per son (27 Aug 2017 16:14).      HPI: Patient seen and examined at bedside. Reports subjective improvement of SOB (states that he feels "90% better") and body aches. No acute events overnight.    ROS:  Constitutional: denies fever, chills, diaphoresis   HEENT: denies blurry vision, difficulty hearing  Respiratory: reports SOB and cough with minimal sputum production; denies wheezing, hemoptysis  Cardiovascular: denies CP, palpitations, edema  Gastrointestinal: denies nausea, vomiting, diarrhea, constipation, abdominal pain, melena, hematochezia   Genitourinary: denies dysuria, frequency, urgency, hematuria   Musculoskeletal: denies myalgias, joint swelling, muscle weakness  Neurologic: denies headache, weakness, dizziness, paresthesias, numbness/tingling   ROS negative except as noted above    Vital Signs Last 24 Hrs  T(C): 36.6 (17 @ 05:27), Max: 37.2 (17 @ 00:49)  T(F): 97.9 (17 @ 05:27), Max: 98.9 (- @ 00:49)  HR: 66 (17 @ 05:27) (61 - 86)  BP: 160/89 (17 @ 05:27) (140/90 - 176/90)  BP(mean): --  RR: 18 (17 @ 05:27) (18 - 28)  SpO2: 96% (17 @ 05:27) (95% - 96%)    Physical Exam:  General: Well developed, well nourished, NAD  HEENT: NCAT, EOMI BL, moist mucous membranes   Neurology: A&Ox3, nonfocal, CN II-XII grossly intact  Respiratory: BL rhonchi  CV: RRR, +S1/S2, no murmurs, rubs or gallops  Abdominal: Soft, NT, ND +BSx4  Extremities: No C/C/E, +peripheral pulses  MSK: Normal ROM, no joint erythema or warmth, no joint swelling   Skin: warm, dry, normal color, no rash or abnormal lesions    LABS:                        11.7   5.2   )-----------( 207      ( 30 Aug 2017 06:59 )             35.4     30 Aug 2017 06:59    136    |  99     |  35     ----------------------------<  334    4.8     |  30     |  1.60     Ca    9.5        30 Aug 2017 06:59        Blood Gas Profile - Arterial in AM (17 @ 05:49)    pH, Arterial: 7.40    pCO2, Arterial: 43 mmHg    pO2, Arterial: 54 mmHg    HCO3, Arterial: 25 mmol/L    Base Excess, Arterial: 1.6 mmol/L    Oxygen Saturation, Arterial: 86 %    FIO2, Arterial: 21.0    Blood Gas Comments Arterial: dr bustamante aware of low pao2/sats on room air        Urinalysis Basic - ( 28 Aug 2017 08:15 )    Color: Yellow / Appearance: x / S.020 / pH: x  Gluc: x / Ketone: Trace  / Bili: Negative / Urobili: 1   Blood: x / Protein: 75 mg/dL / Nitrite: Negative   Leuk Esterase: Negative / RBC: 0-2 /HPF / WBC x   Sq Epi: x / Non Sq Epi: x / Bacteria: x      CAPILLARY BLOOD GLUCOSE  337 (29 Aug 2017 20:44)  256 (29 Aug 2017 16:42)  297 (29 Aug 2017 11:51)  206 (29 Aug 2017 08:12)        RADIOLOGY & ADDITIONAL TESTS: no new tests, past images reviewed        MEDICATIONS  (STANDING):  azithromycin  IVPB 500 milliGRAM(s) IV Intermittent every 24 hours  cefTRIAXone   IVPB 1 Gram(s) IV Intermittent every 24 hours  heparin  Injectable 5000 Unit(s) SubCutaneous every 12 hours  isosorbide   mononitrate ER Tablet (IMDUR) 60 milliGRAM(s) Oral daily  labetalol 200 milliGRAM(s) Oral three times a day  insulin lispro (HumaLOG) corrective regimen sliding scale   SubCutaneous three times a day before meals  dextrose 5%. 1000 milliLiter(s) (50 mL/Hr) IV Continuous <Continuous>  dextrose 50% Injectable 12.5 Gram(s) IV Push once  dextrose 50% Injectable 25 Gram(s) IV Push once  dextrose 50% Injectable 25 Gram(s) IV Push once  amLODIPine   Tablet 10 milliGRAM(s) Oral daily  nicotine -   7 mG/24Hr(s) Patch 1 patch Transdermal daily  venlafaxine 187.5 milliGRAM(s) Oral every 12 hours  buPROPion XL . 300 milliGRAM(s) Oral daily  asenapine SL 10 milliGRAM(s) SubLingual two times a day  insulin glargine Injectable (LANTUS) 5 Unit(s) SubCutaneous at bedtime  ALBUTerol    0.083% 2.5 milliGRAM(s) Nebulizer every 8 hours  methylPREDNISolone sodium succinate Injectable 20 milliGRAM(s) IV Push two times a day  hydrochlorothiazide 12.5 milliGRAM(s) Oral daily    MEDICATIONS  (PRN):  acetaminophen   Tablet 650 milliGRAM(s) Oral every 6 hours PRN For Temp greater than 38 C (100.4 F) & Pain  ALPRAZolam 2 milliGRAM(s) Oral three times a day PRN Anxiety  dextrose Gel 1 Dose(s) Oral once PRN Blood Glucose LESS THAN 70 milliGRAM(s)/deciliter  glucagon  Injectable 1 milliGRAM(s) IntraMuscular once PRN Glucose LESS THAN 70 milligrams/deciliter  acetaminophen   Tablet. 650 milliGRAM(s) Oral every 6 hours PRN Mild Pain (1 - 3)      Allergies    No Known Allergies    Intolerances

## 2017-08-30 NOTE — PROGRESS NOTE ADULT - PROBLEM SELECTOR PLAN 9
Heparin SubQ and SCDs   Protonix for GERD patient counseled on obesity and risk of JOAQUIN  may need outpatient evaluation with sleep study  currently refusing CPAP trial

## 2017-08-30 NOTE — DISCHARGE NOTE ADULT - PLAN OF CARE
resolution - continue antibiotics as directed  - f/u with PMD in 1 week  - home O2 - f/u w/ Dr. Banuelos in 1 week  - consider smoking cessation - uncontrolled on admission  - f/u with PMD in 1 week - f/u with Dr. Marquis in 1 week  - continue - f/u with PMD in 1 week  - continue home meds - f/u with urology outpatient - f/u with neurology Dr. Lowery in 1 week - f/u with in 1 week  - consider smoking cessation  -o2 as needed. - uncontrolled on admission  -patient being discharged on lantus and sliding scale insulin as he has elevated creatinine and hyperglycemia so Janumet not an ideal choice at this time. Can possibly be transitioned back to oral meds outpatient.   - f/u with PMD in 1 week Do not smoke. - f/u with urology outpatient. Monitor for signs of urinary retention in setting of decreased mobility

## 2017-08-30 NOTE — DISCHARGE NOTE ADULT - NSTOBACCOHOTLINE_GEN_A_CS
[facility] Smokers Quitline ([phone]) [facility] Smokers Quitline ([phone]-LC-[phone])

## 2017-08-30 NOTE — PROGRESS NOTE ADULT - PROBLEM SELECTOR PLAN 6
Controlled, continue fenofibrate restart home dose of aspirin  continue beta blocker, imdur  daily weights, strict i's and o's restart home dose of aspirin (baby aspirin)  continue beta blocker, imdur  daily weights, strict i's and o's

## 2017-08-30 NOTE — PROGRESS NOTE ADULT - PROBLEM SELECTOR PLAN 3
may need diuresis  cardio following  consider a trial of LASIX as a one time dose  old echo in EMR  BP control

## 2017-08-30 NOTE — PROGRESS NOTE ADULT - PROBLEM SELECTOR PLAN 8
Continue wellbutrin, effexor, xanax PRN, saphris Patient not retaining urine; f/u outpatient patient counseled on obesity and risk of JOAQUIN  may need outpatient evaluation with sleep study  currently refusing CPAP trial Resolved, no slurring of speech noted today  CT Head negative and no focal neurological deficits.   Likely due to infection and/or polypharmacy, will continue to monitor Resolving, no slurring of speech noted today  CT Head negative and no focal neurological deficits.   Likely due to infection and/or polypharmacy, will continue to monitor

## 2017-08-30 NOTE — PROGRESS NOTE ADULT - PROBLEM SELECTOR PLAN 4
Patient not retaining urine; f/u outpatient continue Isosorbide mononitrate, Labetalol and Amlodipine with hold parameters    discuss addition of additional antihypertensive or increased dosage of current medication with cardiology (Dr. Jones)  hold ACEI and HCTZ for now due to DEVYN continue Labetalol and Amlodipine with hold parameters    discuss addition of additional antihypertensive or increased dosage of current medication with cardiology (Dr. Jones)  hold ACEI and HCTZ for now due to DEVYN continue Labetalol and Amlodipine with hold parameters    discussed addition of additional antihypertensive or increased dosage of current medication with cardiology (Dr. Marquis)--hydralazine 25mg q6 added  hold ACEI and HCTZ for now due to DEVYN

## 2017-08-31 VITALS
SYSTOLIC BLOOD PRESSURE: 138 MMHG | OXYGEN SATURATION: 94 % | HEART RATE: 62 BPM | RESPIRATION RATE: 18 BRPM | DIASTOLIC BLOOD PRESSURE: 80 MMHG

## 2017-08-31 LAB
ANION GAP SERPL CALC-SCNC: 9 MMOL/L — SIGNIFICANT CHANGE UP (ref 5–17)
BUN SERPL-MCNC: 41 MG/DL — HIGH (ref 7–23)
CALCIUM SERPL-MCNC: 9.9 MG/DL — SIGNIFICANT CHANGE UP (ref 8.5–10.1)
CHLORIDE SERPL-SCNC: 96 MMOL/L — SIGNIFICANT CHANGE UP (ref 96–108)
CO2 SERPL-SCNC: 31 MMOL/L — SIGNIFICANT CHANGE UP (ref 22–31)
CREAT SERPL-MCNC: 1.5 MG/DL — HIGH (ref 0.5–1.3)
GLUCOSE SERPL-MCNC: 247 MG/DL — HIGH (ref 70–99)
HCT VFR BLD CALC: 38.9 % — LOW (ref 39–50)
HGB BLD-MCNC: 12.6 G/DL — LOW (ref 13–17)
MCHC RBC-ENTMCNC: 27.8 PG — SIGNIFICANT CHANGE UP (ref 27–34)
MCHC RBC-ENTMCNC: 32.2 GM/DL — SIGNIFICANT CHANGE UP (ref 32–36)
MCV RBC AUTO: 86.1 FL — SIGNIFICANT CHANGE UP (ref 80–100)
PLATELET # BLD AUTO: 266 K/UL — SIGNIFICANT CHANGE UP (ref 150–400)
POTASSIUM SERPL-MCNC: 4.3 MMOL/L — SIGNIFICANT CHANGE UP (ref 3.5–5.3)
POTASSIUM SERPL-SCNC: 4.3 MMOL/L — SIGNIFICANT CHANGE UP (ref 3.5–5.3)
RBC # BLD: 4.52 M/UL — SIGNIFICANT CHANGE UP (ref 4.2–5.8)
RBC # FLD: 13.8 % — SIGNIFICANT CHANGE UP (ref 10.3–14.5)
SODIUM SERPL-SCNC: 136 MMOL/L — SIGNIFICANT CHANGE UP (ref 135–145)
WBC # BLD: 7.6 K/UL — SIGNIFICANT CHANGE UP (ref 3.8–10.5)
WBC # FLD AUTO: 7.6 K/UL — SIGNIFICANT CHANGE UP (ref 3.8–10.5)

## 2017-08-31 PROCEDURE — 96365 THER/PROPH/DIAG IV INF INIT: CPT

## 2017-08-31 PROCEDURE — 97116 GAIT TRAINING THERAPY: CPT

## 2017-08-31 PROCEDURE — 87086 URINE CULTURE/COLONY COUNT: CPT

## 2017-08-31 PROCEDURE — 82803 BLOOD GASES ANY COMBINATION: CPT

## 2017-08-31 PROCEDURE — 94760 N-INVAS EAR/PLS OXIMETRY 1: CPT

## 2017-08-31 PROCEDURE — 96376 TX/PRO/DX INJ SAME DRUG ADON: CPT

## 2017-08-31 PROCEDURE — 87798 DETECT AGENT NOS DNA AMP: CPT

## 2017-08-31 PROCEDURE — 84480 ASSAY TRIIODOTHYRONINE (T3): CPT

## 2017-08-31 PROCEDURE — 36600 WITHDRAWAL OF ARTERIAL BLOOD: CPT

## 2017-08-31 PROCEDURE — 87581 M.PNEUMON DNA AMP PROBE: CPT

## 2017-08-31 PROCEDURE — 99239 HOSP IP/OBS DSCHRG MGMT >30: CPT

## 2017-08-31 PROCEDURE — 87899 AGENT NOS ASSAY W/OPTIC: CPT

## 2017-08-31 PROCEDURE — 99221 1ST HOSP IP/OBS SF/LOW 40: CPT

## 2017-08-31 PROCEDURE — 99285 EMERGENCY DEPT VISIT HI MDM: CPT | Mod: 25

## 2017-08-31 PROCEDURE — 97530 THERAPEUTIC ACTIVITIES: CPT

## 2017-08-31 PROCEDURE — 87449 NOS EACH ORGANISM AG IA: CPT

## 2017-08-31 PROCEDURE — 82550 ASSAY OF CK (CPK): CPT

## 2017-08-31 PROCEDURE — 99232 SBSQ HOSP IP/OBS MODERATE 35: CPT

## 2017-08-31 PROCEDURE — 93005 ELECTROCARDIOGRAM TRACING: CPT

## 2017-08-31 PROCEDURE — 84145 PROCALCITONIN (PCT): CPT

## 2017-08-31 PROCEDURE — 82553 CREATINE MB FRACTION: CPT

## 2017-08-31 PROCEDURE — 80048 BASIC METABOLIC PNL TOTAL CA: CPT

## 2017-08-31 PROCEDURE — 87040 BLOOD CULTURE FOR BACTERIA: CPT

## 2017-08-31 PROCEDURE — 84436 ASSAY OF TOTAL THYROXINE: CPT

## 2017-08-31 PROCEDURE — 96367 TX/PROPH/DG ADDL SEQ IV INF: CPT

## 2017-08-31 PROCEDURE — 87486 CHLMYD PNEUM DNA AMP PROBE: CPT

## 2017-08-31 PROCEDURE — 85730 THROMBOPLASTIN TIME PARTIAL: CPT

## 2017-08-31 PROCEDURE — 94664 DEMO&/EVAL PT USE INHALER: CPT

## 2017-08-31 PROCEDURE — 84443 ASSAY THYROID STIM HORMONE: CPT

## 2017-08-31 PROCEDURE — 97110 THERAPEUTIC EXERCISES: CPT

## 2017-08-31 PROCEDURE — 74176 CT ABD & PELVIS W/O CONTRAST: CPT

## 2017-08-31 PROCEDURE — 81001 URINALYSIS AUTO W/SCOPE: CPT

## 2017-08-31 PROCEDURE — 83036 HEMOGLOBIN GLYCOSYLATED A1C: CPT

## 2017-08-31 PROCEDURE — 80053 COMPREHEN METABOLIC PANEL: CPT

## 2017-08-31 PROCEDURE — 85027 COMPLETE CBC AUTOMATED: CPT

## 2017-08-31 PROCEDURE — 70450 CT HEAD/BRAIN W/O DYE: CPT

## 2017-08-31 PROCEDURE — 94640 AIRWAY INHALATION TREATMENT: CPT

## 2017-08-31 PROCEDURE — 71045 X-RAY EXAM CHEST 1 VIEW: CPT

## 2017-08-31 PROCEDURE — 97162 PT EVAL MOD COMPLEX 30 MIN: CPT

## 2017-08-31 PROCEDURE — 87633 RESP VIRUS 12-25 TARGETS: CPT

## 2017-08-31 PROCEDURE — 83880 ASSAY OF NATRIURETIC PEPTIDE: CPT

## 2017-08-31 PROCEDURE — 85610 PROTHROMBIN TIME: CPT

## 2017-08-31 PROCEDURE — 96372 THER/PROPH/DIAG INJ SC/IM: CPT | Mod: 59

## 2017-08-31 PROCEDURE — 71250 CT THORAX DX C-: CPT

## 2017-08-31 PROCEDURE — 83605 ASSAY OF LACTIC ACID: CPT

## 2017-08-31 PROCEDURE — 83690 ASSAY OF LIPASE: CPT

## 2017-08-31 PROCEDURE — 84484 ASSAY OF TROPONIN QUANT: CPT

## 2017-08-31 RX ORDER — CEFUROXIME AXETIL 250 MG
1 TABLET ORAL
Qty: 0 | Refills: 0 | COMMUNITY
Start: 2017-08-31

## 2017-08-31 RX ORDER — VENLAFAXINE HCL 75 MG
137.5 CAPSULE, EXT RELEASE 24 HR ORAL
Qty: 0 | Refills: 0 | COMMUNITY
Start: 2017-08-31

## 2017-08-31 RX ORDER — INSULIN GLARGINE 100 [IU]/ML
10 INJECTION, SOLUTION SUBCUTANEOUS
Qty: 0 | Refills: 0 | COMMUNITY
Start: 2017-08-31

## 2017-08-31 RX ORDER — HYDRALAZINE HCL 50 MG
25 TABLET ORAL ONCE
Qty: 0 | Refills: 0 | Status: COMPLETED | OUTPATIENT
Start: 2017-08-31 | End: 2017-08-31

## 2017-08-31 RX ORDER — ACETAMINOPHEN 500 MG
2 TABLET ORAL
Qty: 0 | Refills: 0 | COMMUNITY
Start: 2017-08-31

## 2017-08-31 RX ORDER — ALBUTEROL 90 UG/1
1 AEROSOL, METERED ORAL
Qty: 0 | Refills: 0 | DISCHARGE
Start: 2017-08-31

## 2017-08-31 RX ORDER — HYDRALAZINE HCL 50 MG
50 TABLET ORAL EVERY 6 HOURS
Qty: 0 | Refills: 0 | Status: DISCONTINUED | OUTPATIENT
Start: 2017-08-31 | End: 2017-08-31

## 2017-08-31 RX ORDER — HYDRALAZINE HCL 50 MG
1 TABLET ORAL
Qty: 0 | Refills: 0 | COMMUNITY
Start: 2017-08-31

## 2017-08-31 RX ORDER — AMLODIPINE BESYLATE 2.5 MG/1
1 TABLET ORAL
Qty: 0 | Refills: 0 | DISCHARGE
Start: 2017-08-31

## 2017-08-31 RX ORDER — INSULIN LISPRO 100/ML
0 VIAL (ML) SUBCUTANEOUS
Qty: 0 | Refills: 0 | COMMUNITY
Start: 2017-08-31

## 2017-08-31 RX ADMIN — Medication 25 MILLIGRAM(S): at 00:04

## 2017-08-31 RX ADMIN — Medication 25 MILLIGRAM(S): at 06:45

## 2017-08-31 RX ADMIN — Medication 2 MILLIGRAM(S): at 13:31

## 2017-08-31 RX ADMIN — AZITHROMYCIN 500 MILLIGRAM(S): 500 TABLET, FILM COATED ORAL at 11:08

## 2017-08-31 RX ADMIN — Medication 25 MILLIGRAM(S): at 11:08

## 2017-08-31 RX ADMIN — Medication 200 MILLIGRAM(S): at 06:45

## 2017-08-31 RX ADMIN — ALBUTEROL 2.5 MILLIGRAM(S): 90 AEROSOL, METERED ORAL at 08:05

## 2017-08-31 RX ADMIN — Medication 137.5 MILLIGRAM(S): at 06:47

## 2017-08-31 RX ADMIN — Medication 81 MILLIGRAM(S): at 11:08

## 2017-08-31 RX ADMIN — AMLODIPINE BESYLATE 10 MILLIGRAM(S): 2.5 TABLET ORAL at 06:45

## 2017-08-31 RX ADMIN — Medication 1 PATCH: at 11:07

## 2017-08-31 RX ADMIN — Medication 4: at 07:54

## 2017-08-31 RX ADMIN — ISOSORBIDE MONONITRATE 60 MILLIGRAM(S): 60 TABLET, EXTENDED RELEASE ORAL at 11:08

## 2017-08-31 RX ADMIN — BUPROPION HYDROCHLORIDE 300 MILLIGRAM(S): 150 TABLET, EXTENDED RELEASE ORAL at 11:08

## 2017-08-31 RX ADMIN — Medication 1 PATCH: at 11:06

## 2017-08-31 RX ADMIN — Medication 6: at 11:54

## 2017-08-31 RX ADMIN — HEPARIN SODIUM 5000 UNIT(S): 5000 INJECTION INTRAVENOUS; SUBCUTANEOUS at 06:45

## 2017-08-31 RX ADMIN — Medication 50 MILLIGRAM(S): at 11:08

## 2017-08-31 RX ADMIN — ASENAPINE MALEATE 10 MILLIGRAM(S): 10 TABLET SUBLINGUAL at 11:07

## 2017-08-31 RX ADMIN — Medication 200 MILLIGRAM(S): at 13:28

## 2017-08-31 RX ADMIN — Medication 250 MILLIGRAM(S): at 06:45

## 2017-08-31 NOTE — PROGRESS NOTE ADULT - PROBLEM SELECTOR PLAN 3
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

## 2017-08-31 NOTE — PROGRESS NOTE ADULT - PROBLEM SELECTOR PLAN 3
at risk for JOAQUIN  will need outpatient work up  keep sat > 90 pct  may need supp oxygen at night time

## 2017-08-31 NOTE — PROGRESS NOTE ADULT - SUBJECTIVE AND OBJECTIVE BOX
[facility] -- Franco Mcallister, Josse Marquis, Kalia Cheng Savella  Office # [phone]      Follow Up:  dyspnea    Subjective/Observations: Patient seen and examined. Events noted. Resting comfortably in bed. No complaints of chest pain,  or palpitations reported. dyspnea improved.       REVIEW OF SYSTEMS: All other review of systems is negative unless indicated above    PAST MEDICAL & SURGICAL HISTORY:  Smoker  BPH with obstruction/lower urinary tract symptoms  Vitamin D deficiency  GERD (gastroesophageal reflux disease)  Diabetes mellitus  Hyperlipidemia  Hypertension  No significant past surgical history      MEDICATIONS  (STANDING):  heparin  Injectable 5000 Unit(s) SubCutaneous every 12 hours  isosorbide   mononitrate ER Tablet (IMDUR) 60 milliGRAM(s) Oral daily  labetalol 200 milliGRAM(s) Oral three times a day  dextrose 5%. 1000 milliLiter(s) (50 mL/Hr) IV Continuous <Continuous>  dextrose 50% Injectable 12.5 Gram(s) IV Push once  dextrose 50% Injectable 25 Gram(s) IV Push once  dextrose 50% Injectable 25 Gram(s) IV Push once  amLODIPine   Tablet 10 milliGRAM(s) Oral daily  nicotine -   7 mG/24Hr(s) Patch 1 patch Transdermal daily  buPROPion XL . 300 milliGRAM(s) Oral daily  ALBUTerol    0.083% 2.5 milliGRAM(s) Nebulizer every 8 hours  venlafaxine 137.5 milliGRAM(s) Oral every 12 hours  azithromycin   Tablet 500 milliGRAM(s) Oral daily  cefuroxime   Tablet 250 milliGRAM(s) Oral every 12 hours  insulin glargine Injectable (LANTUS) 10 Unit(s) SubCutaneous at bedtime  insulin lispro (HumaLOG) corrective regimen sliding scale   SubCutaneous three times a day before meals  aspirin  chewable 81 milliGRAM(s) Oral daily  asenapine SL 10 milliGRAM(s) SubLingual two times a day  hydrALAZINE 25 milliGRAM(s) Oral every 6 hours  predniSONE   Tablet 15 milliGRAM(s) Oral two times a day    MEDICATIONS  (PRN):  acetaminophen   Tablet 650 milliGRAM(s) Oral every 6 hours PRN For Temp greater than 38 C (100.4 F) & Pain  ALPRAZolam 2 milliGRAM(s) Oral three times a day PRN Anxiety  dextrose Gel 1 Dose(s) Oral once PRN Blood Glucose LESS THAN 70 milliGRAM(s)/deciliter  glucagon  Injectable 1 milliGRAM(s) IntraMuscular once PRN Glucose LESS THAN 70 milligrams/deciliter  acetaminophen   Tablet. 650 milliGRAM(s) Oral every 6 hours PRN Mild Pain (1 - 3)      Allergies    No Known Allergies    Intolerances            Vital Signs Last 24 Hrs  T(C): 36.7 (31 Aug 2017 05:07), Max: 36.9 (30 Aug 2017 13:23)  T(F): 98 (31 Aug 2017 05:07), Max: 98.5 (30 Aug 2017 13:23)  HR: 60 (31 Aug 2017 08:07) (59 - 74)  BP: 193/90 (31 Aug 2017 06:39) (161/73 - 194/99)  BP(mean): --  RR: 18 (31 Aug 2017 05:07) (18 - 20)  SpO2: 94% (31 Aug 2017 08:21) (91% - 96%)    I&O's Summary    30 Aug 2017 07:01  -  31 Aug 2017 07:00  --------------------------------------------------------  IN: 1080 mL / OUT: 1425 mL / NET: -345 mL      Weight (kg): 112.7 (08-31 @ 06:34)    PHYSICAL EXAM:     Constitutional: NAD, awake and alert, well-developed  HEENT: Moist Mucous Membranes, Anicteric  Pulmonary: Decreased breath sounds b/l.   Cardiovascular: Regular, S1 and S2, No murmurs, rubs, gallops or clicks  Gastrointestinal: Bowel Sounds present, soft, nontender.   Lymph: No peripheral edema. No lymphadenopathy.  Skin: No visible rashes or ulcers.  Psych: flat affect    LABS: All Labs Reviewed:                        12.6   7.6   )-----------( 266      ( 31 Aug 2017 07:17 )             38.9                         11.7   5.2   )-----------( 207      ( 30 Aug 2017 06:59 )             35.4                         11.6   6.4   )-----------( 182      ( 29 Aug 2017 06:49 )             35.4     31 Aug 2017 07:17    136    |  96     |  41     ----------------------------<  247    4.3     |  31     |  1.50   30 Aug 2017 06:59    136    |  99     |  35     ----------------------------<  334    4.8     |  30     |  1.60   29 Aug 2017 06:49    138    |  101    |  22     ----------------------------<  191    4.2     |  24     |  1.30     Ca    9.9        31 Aug 2017 07:17  Ca    9.5        30 Aug 2017 06:59  Ca    8.9        29 Aug 2017 06:49

## 2017-08-31 NOTE — PROGRESS NOTE ADULT - PROBLEM SELECTOR PLAN 4
smoking cess ed  NRT  COPD regimen and treatment  prognosis guarded  poor insight into medical situation

## 2017-08-31 NOTE — PROGRESS NOTE ADULT - PROBLEM SELECTOR PLAN 1
CAP, right lung; per pulm, aspiration suspected on top of interstitial lung disease  Transitioned to PO antibiotics: ceftin 250mg bid, azithro 500mg qd  Today is day 5/7 of cephalosporin and day 5/5 of azithromycin  Blood Cx/Urine Cultures showed NGTD, Legionella Ag neg, Strep Ag negative  per Lakisha, transitioned to Prednisone 15mg PO BID and taper  check room air O2 sats at rest and on ambulation, evaluate for home O2 per pulm  dispo plan for [facility] tentatively tomorrow CAP, right lung; per pulm, aspiration suspected on top of interstitial lung disease  Transitioned to PO antibiotics: ceftin 250mg bid, azithro 500mg qd  Today is day 5/7 of cephalosporin and day 5/5 of azithromycin  Blood Cx/Urine Cultures showed NGTD, Legionella Ag neg, Strep Ag negative  per Lakisha, transitioned to Prednisone 15mg PO BID and taper, rec's CXR in 3-4 weeks to evaluate resolution  check room air O2 sats at rest and on ambulation, evaluate for home O2 per pulm  dispo plan for [facility] tentatively tomorrow CAP, right lung; per pulm, aspiration suspected on top of interstitial lung disease  Transitioned to PO antibiotics: ceftin 250mg bid, azithro 500mg qd  Today is day 5/7 of cephalosporin and day 5/5 of azithromycin  Blood Cx/Urine Cultures showed NGTD, Legionella Ag neg, Strep Ag negative  per Lakisha, transitioned to Prednisone 15mg PO BID and taper, continue Albuterol nebs, rec's CXR in 3-4 weeks to evaluate resolution  check room air O2 sats at rest and on ambulation, evaluate for home O2 per pulm  dispo plan for [facility] tentatively tomorrow CAP, right lung; per pulm, aspiration suspected on top of interstitial lung disease  Transitioned to PO antibiotics: ceftin 250mg bid, azithro 500mg qd  Today is day 5/7 of cephalosporin and day 5/5 of azithromycin  Blood Cx/Urine Cultures showed NGTD, Legionella Ag neg, Strep Ag negative  per Lakisha, transitioned to Prednisone 15mg PO BID and taper, continue Albuterol nebs, rec's CXR in 3-4 weeks to evaluate resolution  check room air O2 sats at rest and on ambulation, evaluate for home O2 per pulm  dispo plan for discharge to [facility]

## 2017-08-31 NOTE — PROGRESS NOTE ADULT - SUBJECTIVE AND OBJECTIVE BOX
Resident Progress Note    Patient is a 76 yo M who presents with a chief complaint of SOB, s/p fall early sat am per son (30 Aug 2017 15:24)      HPI: Patient seen and examined at bedside. Continues to report improvement of SOB and denies cough. No longer on supplemental oxygen. No acute events overnight.    ROS:  Constitutional: denies fever, chills, diaphoresis   HEENT: denies blurry vision, difficulty hearing  Respiratory: denies SOB, cough, sputum production, hemoptysis  Cardiovascular: denies CP, palpitations, edema  Gastrointestinal: denies nausea, vomiting, diarrhea, constipation, abdominal pain, melena, hematochezia   Genitourinary: denies dysuria, frequency, urgency, hematuria   Skin/Breast: denies rash, itching  Musculoskeletal: denies myalgias, joint swelling, muscle weakness  Neurologic: denies headache, weakness, dizziness, paresthesias, numbness/tingling  Psychiatric: denies feeling anxious, depressed, suicidal, homicidal thoughts  Hematology/Oncology: denies bruising, tender or enlarged lymph nodes   ROS negative except as noted above    Vital Signs Last 24 Hrs  T(C): 36.7 (08-31-17 @ 05:07), Max: 36.9 (08-30-17 @ 13:23)  T(F): 98 (08-31-17 @ 05:07), Max: 98.5 (08-30-17 @ 13:23)  HR: 61 (08-31-17 @ 06:39) (59 - 74)  BP: 193/90 (08-31-17 @ 06:39) (161/73 - 194/99)  BP(mean): --  RR: 18 (08-31-17 @ 05:07) (18 - 20)  SpO2: 94% (08-31-17 @ 05:07) (91% - 96%)    Physical Exam:  General: Well developed, well nourished, NAD  HEENT: NCAT, EOMI BL, moist mucous membranes   Neurology: A&Ox3, nonfocal, CN II-XII grossly intact  Respiratory: BL rhonchi improvement, No W/R/R  CV: RRR, +S1/S2, no murmurs, rubs or gallops  Abdominal: Soft, NT, ND +BSx4  Extremities: No C/C/E, 2+ peripheral pulses  MSK: No joint erythema or warmth, no joint swelling   Skin: Warm, dry, normal color, no rash or abnormal lesions      LABS:      Ca    9.5        30 Aug 2017 06:59              CAPILLARY BLOOD GLUCOSE  282 (30 Aug 2017 21:00)  347 (30 Aug 2017 17:28)  381 (30 Aug 2017 11:54)  322 (30 Aug 2017 07:57)        RADIOLOGY & ADDITIONAL TESTS: no new tests        MEDICATIONS  (STANDING):  heparin  Injectable 5000 Unit(s) SubCutaneous every 12 hours  isosorbide   mononitrate ER Tablet (IMDUR) 60 milliGRAM(s) Oral daily  labetalol 200 milliGRAM(s) Oral three times a day  dextrose 5%. 1000 milliLiter(s) (50 mL/Hr) IV Continuous <Continuous>  dextrose 50% Injectable 12.5 Gram(s) IV Push once  dextrose 50% Injectable 25 Gram(s) IV Push once  dextrose 50% Injectable 25 Gram(s) IV Push once  amLODIPine   Tablet 10 milliGRAM(s) Oral daily  nicotine -   7 mG/24Hr(s) Patch 1 patch Transdermal daily  buPROPion XL . 300 milliGRAM(s) Oral daily  ALBUTerol    0.083% 2.5 milliGRAM(s) Nebulizer every 8 hours  venlafaxine 137.5 milliGRAM(s) Oral every 12 hours  azithromycin   Tablet 500 milliGRAM(s) Oral daily  cefuroxime   Tablet 250 milliGRAM(s) Oral every 12 hours  insulin glargine Injectable (LANTUS) 10 Unit(s) SubCutaneous at bedtime  insulin lispro (HumaLOG) corrective regimen sliding scale   SubCutaneous three times a day before meals  aspirin  chewable 81 milliGRAM(s) Oral daily  asenapine SL 10 milliGRAM(s) SubLingual two times a day  hydrALAZINE 25 milliGRAM(s) Oral every 6 hours  predniSONE   Tablet 15 milliGRAM(s) Oral two times a day    MEDICATIONS  (PRN):  acetaminophen   Tablet 650 milliGRAM(s) Oral every 6 hours PRN For Temp greater than 38 C (100.4 F) & Pain  ALPRAZolam 2 milliGRAM(s) Oral three times a day PRN Anxiety  dextrose Gel 1 Dose(s) Oral once PRN Blood Glucose LESS THAN 70 milliGRAM(s)/deciliter  glucagon  Injectable 1 milliGRAM(s) IntraMuscular once PRN Glucose LESS THAN 70 milligrams/deciliter  acetaminophen   Tablet. 650 milliGRAM(s) Oral every 6 hours PRN Mild Pain (1 - 3)      Allergies    No Known Allergies    Intolerances Resident Progress Note    Patient is a 76 yo M who presents with a chief complaint of SOB, s/p fall early sat am per son (30 Aug 2017 15:24)      HPI: Patient seen and examined at bedside. Continues to report improvement of SOB and denies cough. No longer on supplemental oxygen. No acute events overnight.    ROS:  Constitutional: denies fever, chills, diaphoresis   HEENT: denies blurry vision, difficulty hearing  Respiratory: denies SOB, cough, sputum production, hemoptysis  Cardiovascular: denies CP, palpitations, edema  Gastrointestinal: denies nausea, vomiting, diarrhea, constipation, abdominal pain, melena, hematochezia   Genitourinary: denies dysuria, frequency, urgency, hematuria   Skin/Breast: denies rash, itching  Musculoskeletal: denies myalgias, joint swelling, muscle weakness  Neurologic: denies headache, weakness, dizziness, paresthesias, numbness/tingling  Psychiatric: denies feeling anxious, depressed, suicidal, homicidal thoughts  Hematology/Oncology: denies bruising, tender or enlarged lymph nodes   ROS negative except as noted above    Vital Signs Last 24 Hrs  T(C): 36.7 (08-31-17 @ 05:07), Max: 36.9 (08-30-17 @ 13:23)  T(F): 98 (08-31-17 @ 05:07), Max: 98.5 (08-30-17 @ 13:23)  HR: 61 (08-31-17 @ 06:39) (59 - 74)  BP: 193/90 (08-31-17 @ 06:39) (161/73 - 194/99)  BP(mean): --  RR: 18 (08-31-17 @ 05:07) (18 - 20)  SpO2: 94% (08-31-17 @ 05:07) (91% - 96%)    Physical Exam:  General: Well developed, well nourished, NAD  HEENT: NCAT, EOMI BL, moist mucous membranes   Neurology: A&Ox3, nonfocal, CN II-XII grossly intact  Respiratory: BL rhonchi improvement, No W/R/R  CV: RRR, +S1/S2, no murmurs, rubs or gallops  Abdominal: Soft, NT, ND +BSx4  Extremities: No C/C/E, 2+ peripheral pulses  MSK: No joint erythema or warmth, no joint swelling   Skin: Warm, dry, normal color, no rash or abnormal lesions        LABS:                 12.6  7.6   )-----------( 266      ( 31 Aug 2017 07:517 )             38.9       31 Aug 2017 07:17    136    |  96     |  41  ----------------------------<  247  4.3     |  31     | 1.5           CAPILLARY BLOOD GLUCOSE  282 (30 Aug 2017 21:00)  347 (30 Aug 2017 17:28)  381 (30 Aug 2017 11:54)  322 (30 Aug 2017 07:57)        RADIOLOGY & ADDITIONAL TESTS: no new tests        MEDICATIONS  (STANDING):  heparin  Injectable 5000 Unit(s) SubCutaneous every 12 hours  isosorbide   mononitrate ER Tablet (IMDUR) 60 milliGRAM(s) Oral daily  labetalol 200 milliGRAM(s) Oral three times a day  dextrose 5%. 1000 milliLiter(s) (50 mL/Hr) IV Continuous <Continuous>  dextrose 50% Injectable 12.5 Gram(s) IV Push once  dextrose 50% Injectable 25 Gram(s) IV Push once  dextrose 50% Injectable 25 Gram(s) IV Push once  amLODIPine   Tablet 10 milliGRAM(s) Oral daily  nicotine -   7 mG/24Hr(s) Patch 1 patch Transdermal daily  buPROPion XL . 300 milliGRAM(s) Oral daily  ALBUTerol    0.083% 2.5 milliGRAM(s) Nebulizer every 8 hours  venlafaxine 137.5 milliGRAM(s) Oral every 12 hours  azithromycin   Tablet 500 milliGRAM(s) Oral daily  cefuroxime   Tablet 250 milliGRAM(s) Oral every 12 hours  insulin glargine Injectable (LANTUS) 10 Unit(s) SubCutaneous at bedtime  insulin lispro (HumaLOG) corrective regimen sliding scale   SubCutaneous three times a day before meals  aspirin  chewable 81 milliGRAM(s) Oral daily  asenapine SL 10 milliGRAM(s) SubLingual two times a day  hydrALAZINE 25 milliGRAM(s) Oral every 6 hours  predniSONE   Tablet 15 milliGRAM(s) Oral two times a day    MEDICATIONS  (PRN):  acetaminophen   Tablet 650 milliGRAM(s) Oral every 6 hours PRN For Temp greater than 38 C (100.4 F) & Pain  ALPRAZolam 2 milliGRAM(s) Oral three times a day PRN Anxiety  dextrose Gel 1 Dose(s) Oral once PRN Blood Glucose LESS THAN 70 milliGRAM(s)/deciliter  glucagon  Injectable 1 milliGRAM(s) IntraMuscular once PRN Glucose LESS THAN 70 milligrams/deciliter  acetaminophen   Tablet. 650 milliGRAM(s) Oral every 6 hours PRN Mild Pain (1 - 3)      Allergies    No Known Allergies    Intolerances Resident Progress Note    Patient is a 78 yo M who presents with a chief complaint of SOB, s/p fall early sat am per son (30 Aug 2017 15:24)      HPI: Patient seen and examined at bedside. Continues to report improvement of SOB and denies cough. No longer on supplemental oxygen. No acute events overnight.    ROS:  Constitutional: denies fever, chills, diaphoresis   HEENT: denies blurry vision, difficulty hearing  Respiratory: denies SOB, cough, sputum production, hemoptysis  Cardiovascular: denies CP, palpitations, edema  Gastrointestinal: denies nausea, vomiting, diarrhea, constipation, abdominal pain, melena, hematochezia   Genitourinary: denies dysuria, frequency, urgency, hematuria   Skin/Breast: denies rash, itching  Musculoskeletal: denies myalgias, joint swelling, muscle weakness  Neurologic: denies headache, weakness, dizziness, paresthesias, numbness/tingling  Psychiatric: denies feeling anxious, depressed, suicidal, homicidal thoughts  Hematology/Oncology: denies bruising, tender or enlarged lymph nodes   ROS negative except as noted above    Vital Signs Last 24 Hrs  T(C): 36.7 (08-31-17 @ 05:07), Max: 36.9 (08-30-17 @ 13:23)  T(F): 98 (08-31-17 @ 05:07), Max: 98.5 (08-30-17 @ 13:23)  HR: 61 (08-31-17 @ 06:39) (59 - 74)  BP: 193/90 (08-31-17 @ 06:39) (161/73 - 194/99)  BP(mean): --  RR: 18 (08-31-17 @ 05:07) (18 - 20)  SpO2: 94% (08-31-17 @ 05:07) (91% - 96%)    Physical Exam:  General: Well developed, well nourished, NAD  HEENT: NCAT, EOMI BL, moist mucous membranes   Neurology: A&Ox3, nonfocal, CN II-XII grossly intact  Respiratory: BL rhonchi improvement, No W/R/R  CV: RRR, +S1/S2, no murmurs, rubs or gallops  Abdominal: Soft, NT, ND +BSx4  Extremities: No C/C/E, 2+ peripheral pulses  MSK: No joint erythema or warmth, no joint swelling   Skin: Warm, dry, normal color, no rash or abnormal lesions        LABS:                 12.6  7.6   )-----------( 266      ( 31 Aug 2017 07:517 )             38.9       31 Aug 2017 07:17    136    |  96     |  41  ----------------------------<  247  4.3     |  31     | 1.5       Ca,  9.9 mg/dL (08.31.17 @ 07:17)      CAPILLARY BLOOD GLUCOSE  282 (30 Aug 2017 21:00)  347 (30 Aug 2017 17:28)  381 (30 Aug 2017 11:54)  322 (30 Aug 2017 07:57)        RADIOLOGY & ADDITIONAL TESTS: no new tests        MEDICATIONS  (STANDING):  heparin  Injectable 5000 Unit(s) SubCutaneous every 12 hours  isosorbide   mononitrate ER Tablet (IMDUR) 60 milliGRAM(s) Oral daily  labetalol 200 milliGRAM(s) Oral three times a day  dextrose 5%. 1000 milliLiter(s) (50 mL/Hr) IV Continuous <Continuous>  dextrose 50% Injectable 12.5 Gram(s) IV Push once  dextrose 50% Injectable 25 Gram(s) IV Push once  dextrose 50% Injectable 25 Gram(s) IV Push once  amLODIPine   Tablet 10 milliGRAM(s) Oral daily  nicotine -   7 mG/24Hr(s) Patch 1 patch Transdermal daily  buPROPion XL . 300 milliGRAM(s) Oral daily  ALBUTerol    0.083% 2.5 milliGRAM(s) Nebulizer every 8 hours  venlafaxine 137.5 milliGRAM(s) Oral every 12 hours  azithromycin   Tablet 500 milliGRAM(s) Oral daily  cefuroxime   Tablet 250 milliGRAM(s) Oral every 12 hours  insulin glargine Injectable (LANTUS) 10 Unit(s) SubCutaneous at bedtime  insulin lispro (HumaLOG) corrective regimen sliding scale   SubCutaneous three times a day before meals  aspirin  chewable 81 milliGRAM(s) Oral daily  asenapine SL 10 milliGRAM(s) SubLingual two times a day  hydrALAZINE 25 milliGRAM(s) Oral every 6 hours  predniSONE   Tablet 15 milliGRAM(s) Oral two times a day    MEDICATIONS  (PRN):  acetaminophen   Tablet 650 milliGRAM(s) Oral every 6 hours PRN For Temp greater than 38 C (100.4 F) & Pain  ALPRAZolam 2 milliGRAM(s) Oral three times a day PRN Anxiety  dextrose Gel 1 Dose(s) Oral once PRN Blood Glucose LESS THAN 70 milliGRAM(s)/deciliter  glucagon  Injectable 1 milliGRAM(s) IntraMuscular once PRN Glucose LESS THAN 70 milligrams/deciliter  acetaminophen   Tablet. 650 milliGRAM(s) Oral every 6 hours PRN Mild Pain (1 - 3)      Allergies    No Known Allergies    Intolerances

## 2017-08-31 NOTE — PROGRESS NOTE ADULT - PROBLEM SELECTOR PROBLEM 1
Pneumonia
Chronic lung disease
Chronic lung disease
Diastolic heart failure

## 2017-08-31 NOTE — PROGRESS NOTE ADULT - PROBLEM SELECTOR PROBLEM 2
DEVYN (acute kidney injury)
Diastolic heart failure
Obesity
Obesity

## 2017-08-31 NOTE — PROGRESS NOTE ADULT - ASSESSMENT
71 y/o male PMHx diastolic dysfunction, nonobstructive CAD based on cardiac cath from 12/2015, normal lvef (echo 10/2016), HTN, HLD, DM, GERD, BPH, Depression, Anxiety.  He has longstanding dyspnea for which multiple cardiac explanations have been considered, including CAD and diastolic impairment.  He has been found to have a degree of interstitial lung disease, the significance of which is unclear.  He presents with 1 week of progressively worsening SOB from his baseline, and other sxs as described.    -there is no evidence of acute ischemia.  -CE all negative and ekg is without evidence for ischemia  -there is no evidence of significant arrhythmia.  - no signs of sig vol ol.   -dyspnea believed to be from diastolic dysfunction in the past, but now with interstitial lung disease  -his acute on chronic presentation seems to be on the basis of an acute febrile illness, pna  -abx and pulmonary follow up.  - BP uncontrolled. Cont labetelol 200mg q8, norvasc 10mg qday, Imdur 60mg Qday Increase hydralazine 50mg q6.   -cont asa  -DVT prophylaxis  -monitor electrolytes, keep k>4, Mg>2  - Further cardiac workup will depend on clinical course.   - All other workup per primary team. Will followup.

## 2017-08-31 NOTE — PROGRESS NOTE ADULT - SUBJECTIVE AND OBJECTIVE BOX
Neurology follow up note    DAVID [id number]      Interval History:    Patient feels ok no new complaints sitting in chair     MEDICATIONS    heparin  Injectable 5000 Unit(s) SubCutaneous every 12 hours  acetaminophen   Tablet 650 milliGRAM(s) Oral every 6 hours PRN  isosorbide   mononitrate ER Tablet (IMDUR) 60 milliGRAM(s) Oral daily  ALPRAZolam 2 milliGRAM(s) Oral three times a day PRN  labetalol 200 milliGRAM(s) Oral three times a day  dextrose 5%. 1000 milliLiter(s) IV Continuous <Continuous>  dextrose Gel 1 Dose(s) Oral once PRN  dextrose 50% Injectable 12.5 Gram(s) IV Push once  dextrose 50% Injectable 25 Gram(s) IV Push once  dextrose 50% Injectable 25 Gram(s) IV Push once  glucagon  Injectable 1 milliGRAM(s) IntraMuscular once PRN  amLODIPine   Tablet 10 milliGRAM(s) Oral daily  nicotine -   7 mG/24Hr(s) Patch 1 patch Transdermal daily  buPROPion XL . 300 milliGRAM(s) Oral daily  acetaminophen   Tablet. 650 milliGRAM(s) Oral every 6 hours PRN  ALBUTerol    0.083% 2.5 milliGRAM(s) Nebulizer every 8 hours  venlafaxine 137.5 milliGRAM(s) Oral every 12 hours  cefuroxime   Tablet 250 milliGRAM(s) Oral every 12 hours  insulin glargine Injectable (LANTUS) 10 Unit(s) SubCutaneous at bedtime  insulin lispro (HumaLOG) corrective regimen sliding scale   SubCutaneous three times a day before meals  aspirin  chewable 81 milliGRAM(s) Oral daily  asenapine SL 10 milliGRAM(s) SubLingual two times a day  predniSONE   Tablet 15 milliGRAM(s) Oral two times a day  hydrALAZINE 50 milliGRAM(s) Oral every 6 hours      Allergies    No Known Allergies    Intolerances          Weight (kg): 112.7 (08-31 @ 06:34)    Vital Signs Last 24 Hrs  T(C): 37.1 (31 Aug 2017 12:30), Max: 37.1 (31 Aug 2017 12:30)  T(F): 98.7 (31 Aug 2017 12:30), Max: 98.7 (31 Aug 2017 12:30)  HR: 69 (31 Aug 2017 12:30) (59 - 74)  BP: 142/82 (31 Aug 2017 12:30) (142/82 - 194/99)  BP(mean): --  RR: 16 (31 Aug 2017 12:30) (16 - 18)  SpO2: 92% (31 Aug 2017 12:30) (91% - 95%)    REVIEW OF SYSTEMS:     Constitutional: No fever, chills, fatigue,  Eyes: no eye pain, visual disturbances, or discharge  ENT:  No difficulty hearing, tinnitus, vertigo; No sinus or throat pain  Neck: No pain or stiffness  Respiratory: dyspnea, wheezing   Cardiovascular: No chest pain, palpitations,   Gastrointestinal: No abdominal or epigastric pain. No nausea, vomiting  No diarrhea or constipation.   Genitourinary: No dysuria, frequency, hematuria or incontinence  Neurological: No headaches, lightheadedness, vertigo, numbness or tremors  Psychiatric: No depression, anxiety, mood swings or difficulty sleeping  Musculoskeletal: No joint pain or swelling; No muscle, back or extremity pain  Skin: No itching, burning, rashes or lesions   Lymph Nodes: No enlarged glands  Endocrine: No heat or cold intolerance; No hair loss   Allergy and Immunologic: No hives or eczema    On Neurological Examination:    Mental Status - Patient is alert, awake, oriented       Follow simple commands  Follow complex commands    Speech -   appear fluent today overall better     Cranial Nerves - Pupils 3 mm equal and reactive to light,   extraocular eye movements intact.   smile symmetric  intact bilateral NLF    Motor Exam -   Right upper 4/5  Left upper 4/5  Right lower 3/5  Left lower 3/5    Muscle tone - is normal all over.  No asymmetry is seen.      Sensory    Bilateral intact to light touch      GENERAL Exam: Nontoxic , No Acute Distress   	  HEENT:  normocephalic, atraumatic  		  LUNGS:  Decreased bilaterally  	  HEART: Normal S1S2   No murmur RRR        	  GI/ ABDOMEN:  Soft  Non tender    EXTREMITIES:   No Edema  No Clubbing  No Cyanosis No Edema    MUSCULOSKELETAL: Normal Range of Motion  	   SKIN: Normal  No Ecchymosis        LABS:  CBC Full  -  ( 31 Aug 2017 07:17 )  WBC Count : 7.6 K/uL  Hemoglobin : 12.6 g/dL  Hematocrit : 38.9 %  Platelet Count - Automated : 266 K/uL  Mean Cell Volume : 86.1 fl  Mean Cell Hemoglobin : 27.8 pg  Mean Cell Hemoglobin Concentration : 32.2 gm/dL  Auto Neutrophil # : x  Auto Lymphocyte # : x  Auto Monocyte # : x  Auto Eosinophil # : x  Auto Basophil # : x  Auto Neutrophil % : x  Auto Lymphocyte % : x  Auto Monocyte % : x  Auto Eosinophil % : x  Auto Basophil % : x      08-31    136  |  96  |  41<H>  ----------------------------<  247<H>  4.3   |  31  |  1.50<H>    Ca    9.9      31 Aug 2017 07:17      Hemoglobin A1C:       Vitamin B12         RADIOLOGY        ANALYSIS AND PLAN:  This is a 77-year-old with history of frequent falls, bilateral subdural hydromas/hematomas, dysarthria, and ataxia.    1.	For episode of frequent falls, suspect most likely secondary to age related changes, body habitus deconditioning   2.	For bilateral subdural hydromas/hematomas, these appeared to be chronic, would recommend supportive therapy.  3.	For dysarthria, questionable could be secondary to underlying respiratory issues versus medication overall doing better speech wise   4.	For ataxia, secondary to age related changes.  5.	For rhabdomyolysis, IV fluids as needed  6.	We will continue to follow.  7.	Spoke with son, Balaji, at [phone] 8/29/17 understands reasoning and thought process. no response today   8.	overall speech is doing better   9.	neurology wise stable -- no new events     Thank you for the courtesy of consultation.    Physical therapy evaluation.  OOB to chair/ambulation with assistance only.    20 minutes spent on total encounter; more than 50% of the visit was spent counseling and/or coordinating care by the attending physician.

## 2017-08-31 NOTE — PROGRESS NOTE ADULT - PROVIDER SPECIALTY LIST ADULT
Cardiology
Hospitalist
Neurology
Pulmonology
Neurology

## 2017-08-31 NOTE — PROGRESS NOTE ADULT - PROBLEM SELECTOR PLAN 6
restart home dose of aspirin (baby aspirin)  continue beta blocker, imdur  daily weights, strict i's and o's continue aspirin 81mg qd  continue beta blocker, imdur  daily weights, strict i's and o's

## 2017-08-31 NOTE — PROGRESS NOTE ADULT - PROBLEM SELECTOR PLAN 4
continue Labetalol and Amlodipine with hold parameters    discussed addition of additional antihypertensive or increased dosage of current medication with cardiology (Dr. Marquis)--hydralazine 25mg q6 added  hold ACEI and HCTZ for now due to DEVYN persistently hypertensive  continue Labetalol and Amlodipine with hold parameters    hydralazine 25mg q6 was added yesterday, f/u cardio recs for further BP control  hold ACEI and HCTZ for now due to DEVYN uncontrolled, persistently hypertensive  continue Labetalol and Amlodipine with hold parameters    f/u cardio recs for further BP control - hydralazine increased to 50mg q6hrs  hold ACEI and HCTZ for now due to DEVYN

## 2017-08-31 NOTE — PROGRESS NOTE ADULT - PROBLEM SELECTOR PROBLEM 3
Diabetes mellitus
Ataxia
Diastolic heart failure
Obesity

## 2017-08-31 NOTE — PROGRESS NOTE ADULT - PROBLEM SELECTOR PLAN 10
Current smoker; offered nicotine patch and smoking cessation counseling.
Heparin SubQ and SCDs   Protonix for GERD
current smoker, offered nicotine patch and smoking cessation counseling.
Heparin SubQ and SCDs   Protonix for GERD

## 2017-08-31 NOTE — PROGRESS NOTE ADULT - PROBLEM SELECTOR PLAN 8
Resolving, no slurring of speech noted today  CT Head negative and no focal neurological deficits.   Likely due to infection and/or polypharmacy, will continue to monitor

## 2017-08-31 NOTE — PROGRESS NOTE ADULT - SUBJECTIVE AND OBJECTIVE BOX
Date/Time Patient Seen:  		  Referring MD:   Data Reviewed	       Patient is a 77y old  Male who presents with a chief complaint of SOB, s/p fall early sat am per son (30 Aug 2017 15:24)    in bed  on room air  appears better  occ cough    vs and meds reviewed        Subjective/HPI     PAST MEDICAL & SURGICAL HISTORY:  Smoker  BPH with obstruction/lower urinary tract symptoms  Vitamin D deficiency  GERD (gastroesophageal reflux disease)  Diabetes mellitus  Hyperlipidemia  Hypertension  No significant past surgical history        Medication list         MEDICATIONS  (STANDING):  heparin  Injectable 5000 Unit(s) SubCutaneous every 12 hours  isosorbide   mononitrate ER Tablet (IMDUR) 60 milliGRAM(s) Oral daily  labetalol 200 milliGRAM(s) Oral three times a day  dextrose 5%. 1000 milliLiter(s) (50 mL/Hr) IV Continuous <Continuous>  dextrose 50% Injectable 12.5 Gram(s) IV Push once  dextrose 50% Injectable 25 Gram(s) IV Push once  dextrose 50% Injectable 25 Gram(s) IV Push once  amLODIPine   Tablet 10 milliGRAM(s) Oral daily  nicotine -   7 mG/24Hr(s) Patch 1 patch Transdermal daily  buPROPion XL . 300 milliGRAM(s) Oral daily  ALBUTerol    0.083% 2.5 milliGRAM(s) Nebulizer every 8 hours  venlafaxine 137.5 milliGRAM(s) Oral every 12 hours  azithromycin   Tablet 500 milliGRAM(s) Oral daily  cefuroxime   Tablet 250 milliGRAM(s) Oral every 12 hours  insulin glargine Injectable (LANTUS) 10 Unit(s) SubCutaneous at bedtime  insulin lispro (HumaLOG) corrective regimen sliding scale   SubCutaneous three times a day before meals  aspirin  chewable 81 milliGRAM(s) Oral daily  asenapine SL 10 milliGRAM(s) SubLingual two times a day  hydrALAZINE 25 milliGRAM(s) Oral every 6 hours  predniSONE   Tablet 15 milliGRAM(s) Oral two times a day    MEDICATIONS  (PRN):  acetaminophen   Tablet 650 milliGRAM(s) Oral every 6 hours PRN For Temp greater than 38 C (100.4 F) & Pain  ALPRAZolam 2 milliGRAM(s) Oral three times a day PRN Anxiety  dextrose Gel 1 Dose(s) Oral once PRN Blood Glucose LESS THAN 70 milliGRAM(s)/deciliter  glucagon  Injectable 1 milliGRAM(s) IntraMuscular once PRN Glucose LESS THAN 70 milligrams/deciliter  acetaminophen   Tablet. 650 milliGRAM(s) Oral every 6 hours PRN Mild Pain (1 - 3)         Vitals log        ICU Vital Signs Last 24 Hrs  T(C): 36.7 (31 Aug 2017 05:07), Max: 36.9 (30 Aug 2017 13:23)  T(F): 98 (31 Aug 2017 05:07), Max: 98.5 (30 Aug 2017 13:23)  HR: 59 (31 Aug 2017 05:07) (59 - 74)  BP: 190/98 (31 Aug 2017 05:07) (161/73 - 194/99)  BP(mean): --  ABP: --  ABP(mean): --  RR: 18 (31 Aug 2017 05:07) (18 - 20)  SpO2: 94% (31 Aug 2017 05:07) (91% - 96%)           Input and Output:  I&O's Detail    29 Aug 2017 07:01  -  30 Aug 2017 07:00  --------------------------------------------------------  IN:    Oral Fluid: 680 mL  Total IN: 680 mL    OUT:    Voided: 1200 mL  Total OUT: 1200 mL    Total NET: -520 mL      30 Aug 2017 07:01  -  31 Aug 2017 06:07  --------------------------------------------------------  IN:    Oral Fluid: 1080 mL  Total IN: 1080 mL    OUT:    Voided: 1425 mL  Total OUT: 1425 mL    Total NET: -345 mL          Lab Data                        11.7   5.2   )-----------( 207      ( 30 Aug 2017 06:59 )             35.4     08-30    136  |  99  |  35<H>  ----------------------------<  334<H>  4.8   |  30  |  1.60<H>    Ca    9.5      30 Aug 2017 06:59              Review of Systems	      Objective     Physical Examination  head at  heart - s1s2  lungs - dec BS  abd - soft  cn grossly int  verbal          Pertinent Lab findings & Imaging      Princess:  NO   Adequate UO     I&O's Detail    29 Aug 2017 07:01  -  30 Aug 2017 07:00  --------------------------------------------------------  IN:    Oral Fluid: 680 mL  Total IN: 680 mL    OUT:    Voided: 1200 mL  Total OUT: 1200 mL    Total NET: -520 mL      30 Aug 2017 07:01  -  31 Aug 2017 06:07  --------------------------------------------------------  IN:    Oral Fluid: 1080 mL  Total IN: 1080 mL    OUT:    Voided: 1425 mL  Total OUT: 1425 mL    Total NET: -345 mL               Discussed with:     Cultures:	        Radiology

## 2017-08-31 NOTE — PROGRESS NOTE ADULT - PROBLEM SELECTOR PLAN 1
copd and aspiration on chronic basis  asp prec, oral care and skin care, HOB elevation, keep sat > 90 pct  smoking cess ed, copd management, albuterol NEBS, taper Prednisone to PO and 15 mg PO BID  overall prognosis guarded  pt will be going to [facility]

## 2017-08-31 NOTE — PROGRESS NOTE ADULT - ASSESSMENT
76 y/o M w/ PMHx diastolic CHF, HTN, HLD, DM, GERD, BPH, Depression, Anxiety, and CT findings of interstitial lung disease who presents with 1 week of progressively worsening SOB due to CAP and possible interstitial lung disease.

## 2017-08-31 NOTE — PROGRESS NOTE ADULT - PROBLEM SELECTOR PLAN 2
worsening DEVYN after IV fluids were stopped and diuretic (hctz) restarted  will hold HCTZ and ACEI in hospital setting until DEVYN resolves  will renally dose effexor based on elevated creatinine  Continue to encourage PO hydration, monitor i's and o's creatinine slightly improving  may see increase in BUN with addition of steroid therapy  will hold HCTZ and ACEI in hospital setting until DEVYN resolves  will renally dose effexor based on elevated creatinine  Continue to encourage PO hydration, monitor i's and o's

## 2017-08-31 NOTE — PROGRESS NOTE ADULT - PROBLEM SELECTOR PLAN 9
patient counseled on obesity and risk of JOAQUIN  may need outpatient evaluation with sleep study  currently refusing CPAP trial

## 2017-09-01 LAB
CULTURE RESULTS: SIGNIFICANT CHANGE UP
CULTURE RESULTS: SIGNIFICANT CHANGE UP
SPECIMEN SOURCE: SIGNIFICANT CHANGE UP
SPECIMEN SOURCE: SIGNIFICANT CHANGE UP

## 2017-09-05 ENCOUNTER — MEDICATION RENEWAL (OUTPATIENT)
Age: 77
End: 2017-09-05

## 2017-09-05 DIAGNOSIS — J44.9 CHRONIC OBSTRUCTIVE PULMONARY DISEASE, UNSPECIFIED: ICD-10-CM

## 2017-09-05 DIAGNOSIS — M25.551 PAIN IN RIGHT HIP: ICD-10-CM

## 2017-09-05 DIAGNOSIS — R47.81 SLURRED SPEECH: ICD-10-CM

## 2017-09-05 DIAGNOSIS — F17.210 NICOTINE DEPENDENCE, CIGARETTES, UNCOMPLICATED: ICD-10-CM

## 2017-09-05 DIAGNOSIS — M62.82 RHABDOMYOLYSIS: ICD-10-CM

## 2017-09-05 DIAGNOSIS — E86.0 DEHYDRATION: ICD-10-CM

## 2017-09-05 DIAGNOSIS — N40.1 BENIGN PROSTATIC HYPERPLASIA WITH LOWER URINARY TRACT SYMPTOMS: ICD-10-CM

## 2017-09-05 DIAGNOSIS — R27.0 ATAXIA, UNSPECIFIED: ICD-10-CM

## 2017-09-05 DIAGNOSIS — J45.909 UNSPECIFIED ASTHMA, UNCOMPLICATED: ICD-10-CM

## 2017-09-05 DIAGNOSIS — N17.9 ACUTE KIDNEY FAILURE, UNSPECIFIED: ICD-10-CM

## 2017-09-05 DIAGNOSIS — J96.01 ACUTE RESPIRATORY FAILURE WITH HYPOXIA: ICD-10-CM

## 2017-09-05 DIAGNOSIS — F41.9 ANXIETY DISORDER, UNSPECIFIED: ICD-10-CM

## 2017-09-05 DIAGNOSIS — E87.1 HYPO-OSMOLALITY AND HYPONATREMIA: ICD-10-CM

## 2017-09-05 DIAGNOSIS — I11.0 HYPERTENSIVE HEART DISEASE WITH HEART FAILURE: ICD-10-CM

## 2017-09-05 DIAGNOSIS — K21.9 GASTRO-ESOPHAGEAL REFLUX DISEASE WITHOUT ESOPHAGITIS: ICD-10-CM

## 2017-09-05 DIAGNOSIS — J69.0 PNEUMONITIS DUE TO INHALATION OF FOOD AND VOMIT: ICD-10-CM

## 2017-09-05 DIAGNOSIS — R16.0 HEPATOMEGALY, NOT ELSEWHERE CLASSIFIED: ICD-10-CM

## 2017-09-05 DIAGNOSIS — K76.0 FATTY (CHANGE OF) LIVER, NOT ELSEWHERE CLASSIFIED: ICD-10-CM

## 2017-09-05 DIAGNOSIS — M54.9 DORSALGIA, UNSPECIFIED: ICD-10-CM

## 2017-09-05 DIAGNOSIS — I50.32 CHRONIC DIASTOLIC (CONGESTIVE) HEART FAILURE: ICD-10-CM

## 2017-09-05 DIAGNOSIS — F32.9 MAJOR DEPRESSIVE DISORDER, SINGLE EPISODE, UNSPECIFIED: ICD-10-CM

## 2017-09-05 DIAGNOSIS — E11.65 TYPE 2 DIABETES MELLITUS WITH HYPERGLYCEMIA: ICD-10-CM

## 2017-09-05 DIAGNOSIS — E78.5 HYPERLIPIDEMIA, UNSPECIFIED: ICD-10-CM

## 2017-09-05 DIAGNOSIS — E66.9 OBESITY, UNSPECIFIED: ICD-10-CM

## 2017-09-05 DIAGNOSIS — I44.0 ATRIOVENTRICULAR BLOCK, FIRST DEGREE: ICD-10-CM

## 2017-09-05 DIAGNOSIS — I25.10 ATHEROSCLEROTIC HEART DISEASE OF NATIVE CORONARY ARTERY WITHOUT ANGINA PECTORIS: ICD-10-CM

## 2017-09-05 DIAGNOSIS — R47.1 DYSARTHRIA AND ANARTHRIA: ICD-10-CM

## 2017-09-05 DIAGNOSIS — E55.9 VITAMIN D DEFICIENCY, UNSPECIFIED: ICD-10-CM

## 2017-09-19 ENCOUNTER — LABORATORY RESULT (OUTPATIENT)
Age: 77
End: 2017-09-19

## 2017-09-19 ENCOUNTER — APPOINTMENT (OUTPATIENT)
Dept: INTERNAL MEDICINE | Facility: CLINIC | Age: 77
End: 2017-09-19
Payer: MEDICARE

## 2017-09-19 VITALS
HEART RATE: 72 BPM | WEIGHT: 240 LBS | BODY MASS INDEX: 39.99 KG/M2 | HEIGHT: 65 IN | RESPIRATION RATE: 16 BRPM | SYSTOLIC BLOOD PRESSURE: 120 MMHG | TEMPERATURE: 98 F | DIASTOLIC BLOOD PRESSURE: 70 MMHG | OXYGEN SATURATION: 92 %

## 2017-09-19 DIAGNOSIS — F41.9 ANXIETY DISORDER, UNSPECIFIED: ICD-10-CM

## 2017-09-19 DIAGNOSIS — J18.9 PNEUMONIA, UNSPECIFIED ORGANISM: ICD-10-CM

## 2017-09-19 PROCEDURE — 90686 IIV4 VACC NO PRSV 0.5 ML IM: CPT

## 2017-09-19 PROCEDURE — G0008: CPT

## 2017-09-19 PROCEDURE — 36415 COLL VENOUS BLD VENIPUNCTURE: CPT

## 2017-09-19 PROCEDURE — 99214 OFFICE O/P EST MOD 30 MIN: CPT | Mod: 25

## 2017-09-20 PROBLEM — Z00.00 ENCOUNTER FOR PREVENTIVE HEALTH EXAMINATION: Noted: 2017-09-20

## 2017-09-20 LAB
ALBUMIN SERPL ELPH-MCNC: 3.9 G/DL
ALP BLD-CCNC: 40 U/L
ALT SERPL-CCNC: 22 U/L
ANION GAP SERPL CALC-SCNC: 16 MMOL/L
AST SERPL-CCNC: 20 U/L
BASOPHILS # BLD AUTO: 0.08 K/UL
BASOPHILS NFR BLD AUTO: 0.9 %
BILIRUB SERPL-MCNC: 0.2 MG/DL
BUN SERPL-MCNC: 34 MG/DL
CALCIUM SERPL-MCNC: 10.9 MG/DL
CHLORIDE SERPL-SCNC: 98 MMOL/L
CHOLEST SERPL-MCNC: 177 MG/DL
CHOLEST/HDLC SERPL: 6.1 RATIO
CK SERPL-CCNC: 136 U/L
CO2 SERPL-SCNC: 23 MMOL/L
CREAT SERPL-MCNC: 1.67 MG/DL
EOSINOPHIL # BLD AUTO: 0.61 K/UL
EOSINOPHIL NFR BLD AUTO: 7 %
GLUCOSE SERPL-MCNC: 140 MG/DL
HBA1C MFR BLD HPLC: 8.2 %
HCT VFR BLD CALC: 34.6 %
HDLC SERPL-MCNC: 29 MG/DL
HGB BLD-MCNC: 10.8 G/DL
LDLC SERPL CALC-MCNC: 83 MG/DL
LYMPHOCYTES # BLD AUTO: 1.9 K/UL
LYMPHOCYTES NFR BLD AUTO: 21.7 %
MAN DIFF?: NORMAL
MCHC RBC-ENTMCNC: 26.9 PG
MCHC RBC-ENTMCNC: 31.2 GM/DL
MCV RBC AUTO: 86.1 FL
MONOCYTES # BLD AUTO: 0.68 K/UL
MONOCYTES NFR BLD AUTO: 7.8 %
NEUTROPHILS # BLD AUTO: 5.18 K/UL
NEUTROPHILS NFR BLD AUTO: 58.2 %
PLATELET # BLD AUTO: 139 K/UL
POTASSIUM SERPL-SCNC: 5 MMOL/L
PROT SERPL-MCNC: 6.6 G/DL
RBC # BLD: 4.02 M/UL
RBC # FLD: 17.1 %
SODIUM SERPL-SCNC: 137 MMOL/L
TRIGL SERPL-MCNC: 323 MG/DL
WBC # FLD AUTO: 8.76 K/UL

## 2017-09-21 ENCOUNTER — FORM ENCOUNTER (OUTPATIENT)
Age: 77
End: 2017-09-21

## 2017-09-22 ENCOUNTER — APPOINTMENT (OUTPATIENT)
Dept: RADIOLOGY | Facility: CLINIC | Age: 77
End: 2017-09-22

## 2017-09-22 ENCOUNTER — APPOINTMENT (OUTPATIENT)
Dept: CARDIOLOGY | Facility: CLINIC | Age: 77
End: 2017-09-22
Payer: MEDICARE

## 2017-09-22 ENCOUNTER — OUTPATIENT (OUTPATIENT)
Dept: OUTPATIENT SERVICES | Facility: HOSPITAL | Age: 77
LOS: 1 days | End: 2017-09-22
Payer: MEDICARE

## 2017-09-22 ENCOUNTER — NON-APPOINTMENT (OUTPATIENT)
Age: 77
End: 2017-09-22

## 2017-09-22 VITALS
DIASTOLIC BLOOD PRESSURE: 70 MMHG | WEIGHT: 240 LBS | OXYGEN SATURATION: 90 % | BODY MASS INDEX: 39.99 KG/M2 | SYSTOLIC BLOOD PRESSURE: 108 MMHG | HEIGHT: 65 IN | HEART RATE: 74 BPM

## 2017-09-22 DIAGNOSIS — Z00.8 ENCOUNTER FOR OTHER GENERAL EXAMINATION: ICD-10-CM

## 2017-09-22 PROCEDURE — 71046 X-RAY EXAM CHEST 2 VIEWS: CPT

## 2017-09-22 PROCEDURE — 71020: CPT | Mod: 26

## 2017-09-22 PROCEDURE — 99214 OFFICE O/P EST MOD 30 MIN: CPT

## 2017-09-22 PROCEDURE — 93000 ELECTROCARDIOGRAM COMPLETE: CPT

## 2017-09-25 ENCOUNTER — OTHER (OUTPATIENT)
Age: 77
End: 2017-09-25

## 2017-10-09 ENCOUNTER — APPOINTMENT (OUTPATIENT)
Dept: CT IMAGING | Facility: CLINIC | Age: 77
End: 2017-10-09
Payer: MEDICARE

## 2017-10-09 ENCOUNTER — OUTPATIENT (OUTPATIENT)
Dept: OUTPATIENT SERVICES | Facility: HOSPITAL | Age: 77
LOS: 1 days | End: 2017-10-09
Payer: MEDICARE

## 2017-10-09 DIAGNOSIS — Z00.8 ENCOUNTER FOR OTHER GENERAL EXAMINATION: ICD-10-CM

## 2017-10-09 PROCEDURE — 71250 CT THORAX DX C-: CPT | Mod: 26

## 2017-10-09 PROCEDURE — 71250 CT THORAX DX C-: CPT

## 2017-10-24 ENCOUNTER — RX RENEWAL (OUTPATIENT)
Age: 77
End: 2017-10-24

## 2017-11-12 RX ORDER — SITAGLIPTIN AND METFORMIN HYDROCHLORIDE 500; 50 MG/1; MG/1
1 TABLET, FILM COATED ORAL
Qty: 0 | Refills: 0 | COMMUNITY

## 2017-11-12 RX ORDER — ASENAPINE MALEATE 10 MG/1
1 TABLET SUBLINGUAL
Qty: 0 | Refills: 0 | COMMUNITY

## 2017-11-12 RX ORDER — LABETALOL HCL 100 MG
1 TABLET ORAL
Qty: 0 | Refills: 0 | COMMUNITY

## 2017-11-12 RX ORDER — AMLODIPINE BESYLATE AND BENAZEPRIL HYDROCHLORIDE 10; 20 MG/1; MG/1
1 CAPSULE ORAL
Qty: 0 | Refills: 0 | COMMUNITY

## 2017-11-12 RX ORDER — LABETALOL HCL 100 MG
2 TABLET ORAL
Qty: 0 | Refills: 0 | COMMUNITY

## 2017-11-29 ENCOUNTER — MEDICATION RENEWAL (OUTPATIENT)
Age: 77
End: 2017-11-29

## 2017-12-08 NOTE — PATIENT PROFILE ADULT. - NS TRANSFER EYEGLASSES PAIRS
"Pt's pharmacy switched to a new generic OCP and her period is a lot heavier than it was in the past on this new generic, changing pads every 2-3 hours. One pack so far of the new generic. Pt is wondering if it could be related and if so if she could request microgestin FE YESSICA because her \"pelvic floor has dropped\" so she cannot use tampons and would prefer to keep having her lighter periods if possible. Routing to Dr. Thompson to review and advise.   " 1 pair

## 2017-12-11 ENCOUNTER — APPOINTMENT (OUTPATIENT)
Dept: CARDIOLOGY | Facility: CLINIC | Age: 77
End: 2017-12-11

## 2017-12-28 ENCOUNTER — INPATIENT (INPATIENT)
Facility: HOSPITAL | Age: 77
LOS: 8 days | Discharge: HOME HEALTH | DRG: 194 | End: 2018-01-06
Attending: FAMILY MEDICINE | Admitting: HOSPITALIST
Payer: MEDICARE

## 2017-12-28 VITALS
DIASTOLIC BLOOD PRESSURE: 75 MMHG | TEMPERATURE: 98 F | OXYGEN SATURATION: 93 % | HEART RATE: 87 BPM | SYSTOLIC BLOOD PRESSURE: 113 MMHG | RESPIRATION RATE: 20 BRPM | WEIGHT: 235.01 LBS | HEIGHT: 69 IN

## 2017-12-28 DIAGNOSIS — Z90.89 ACQUIRED ABSENCE OF OTHER ORGANS: Chronic | ICD-10-CM

## 2017-12-28 LAB
ALBUMIN SERPL ELPH-MCNC: 2.6 G/DL — LOW (ref 3.3–5)
ALP SERPL-CCNC: 24 U/L — LOW (ref 40–120)
ALT FLD-CCNC: 14 U/L — SIGNIFICANT CHANGE UP (ref 12–78)
AMYLASE P1 CFR SERPL: 22 U/L — LOW (ref 25–115)
ANION GAP SERPL CALC-SCNC: 10 MMOL/L — SIGNIFICANT CHANGE UP (ref 5–17)
APPEARANCE UR: CLEAR — SIGNIFICANT CHANGE UP
APTT BLD: 33 SEC — SIGNIFICANT CHANGE UP (ref 27.5–37.4)
AST SERPL-CCNC: 17 U/L — SIGNIFICANT CHANGE UP (ref 15–37)
BASOPHILS # BLD AUTO: 0.1 K/UL — SIGNIFICANT CHANGE UP (ref 0–0.2)
BASOPHILS NFR BLD AUTO: 1.1 % — SIGNIFICANT CHANGE UP (ref 0–2)
BILIRUB SERPL-MCNC: 0.4 MG/DL — SIGNIFICANT CHANGE UP (ref 0.2–1.2)
BILIRUB UR-MCNC: NEGATIVE — SIGNIFICANT CHANGE UP
BUN SERPL-MCNC: 33 MG/DL — HIGH (ref 7–23)
CALCIUM SERPL-MCNC: 8.9 MG/DL — SIGNIFICANT CHANGE UP (ref 8.5–10.1)
CHLORIDE SERPL-SCNC: 101 MMOL/L — SIGNIFICANT CHANGE UP (ref 96–108)
CK MB BLD-MCNC: 2.2 % — SIGNIFICANT CHANGE UP (ref 0–3.5)
CK MB CFR SERPL CALC: 3.7 NG/ML — HIGH (ref 0–3.6)
CK SERPL-CCNC: 165 U/L — SIGNIFICANT CHANGE UP (ref 26–308)
CO2 SERPL-SCNC: 25 MMOL/L — SIGNIFICANT CHANGE UP (ref 22–31)
COLOR SPEC: YELLOW — SIGNIFICANT CHANGE UP
CREAT SERPL-MCNC: 1.4 MG/DL — HIGH (ref 0.5–1.3)
DIFF PNL FLD: NEGATIVE — SIGNIFICANT CHANGE UP
EOSINOPHIL # BLD AUTO: 0 K/UL — SIGNIFICANT CHANGE UP (ref 0–0.5)
EOSINOPHIL NFR BLD AUTO: 0.4 % — SIGNIFICANT CHANGE UP (ref 0–6)
GLUCOSE SERPL-MCNC: 120 MG/DL — HIGH (ref 70–99)
GLUCOSE UR QL: NEGATIVE — SIGNIFICANT CHANGE UP
HCT VFR BLD CALC: 34.7 % — LOW (ref 39–50)
HGB BLD-MCNC: 11.1 G/DL — LOW (ref 13–17)
INR BLD: 1.5 RATIO — HIGH (ref 0.88–1.16)
KETONES UR-MCNC: NEGATIVE — SIGNIFICANT CHANGE UP
LACTATE SERPL-SCNC: 1.2 MMOL/L — SIGNIFICANT CHANGE UP (ref 0.7–2)
LEUKOCYTE ESTERASE UR-ACNC: NEGATIVE — SIGNIFICANT CHANGE UP
LIDOCAIN IGE QN: 100 U/L — SIGNIFICANT CHANGE UP (ref 73–393)
LYMPHOCYTES # BLD AUTO: 2 K/UL — SIGNIFICANT CHANGE UP (ref 1–3.3)
LYMPHOCYTES # BLD AUTO: 21 % — SIGNIFICANT CHANGE UP (ref 13–44)
MCHC RBC-ENTMCNC: 25.6 PG — LOW (ref 27–34)
MCHC RBC-ENTMCNC: 32.1 GM/DL — SIGNIFICANT CHANGE UP (ref 32–36)
MCV RBC AUTO: 79.7 FL — LOW (ref 80–100)
MONOCYTES # BLD AUTO: 1.2 K/UL — HIGH (ref 0–0.9)
MONOCYTES NFR BLD AUTO: 12.8 % — HIGH (ref 1–9)
NEUTROPHILS # BLD AUTO: 6.1 K/UL — SIGNIFICANT CHANGE UP (ref 1.8–7.4)
NEUTROPHILS NFR BLD AUTO: 64.7 % — SIGNIFICANT CHANGE UP (ref 43–77)
NITRITE UR-MCNC: NEGATIVE — SIGNIFICANT CHANGE UP
NT-PROBNP SERPL-SCNC: 1881 PG/ML — HIGH (ref 0–450)
PH UR: 5 — SIGNIFICANT CHANGE UP (ref 5–8)
PLATELET # BLD AUTO: 299 K/UL — SIGNIFICANT CHANGE UP (ref 150–400)
POTASSIUM SERPL-MCNC: 4.3 MMOL/L — SIGNIFICANT CHANGE UP (ref 3.5–5.3)
POTASSIUM SERPL-SCNC: 4.3 MMOL/L — SIGNIFICANT CHANGE UP (ref 3.5–5.3)
PROCALCITONIN SERPL-MCNC: 0.28 NG/ML — HIGH (ref 0–0.04)
PROT SERPL-MCNC: 6.8 G/DL — SIGNIFICANT CHANGE UP (ref 6–8.3)
PROT UR-MCNC: 75 MG/DL
PROTHROM AB SERPL-ACNC: 16.5 SEC — HIGH (ref 9.8–12.7)
RAPID RVP RESULT: SIGNIFICANT CHANGE UP
RBC # BLD: 4.35 M/UL — SIGNIFICANT CHANGE UP (ref 4.2–5.8)
RBC # FLD: 14.9 % — HIGH (ref 10.3–14.5)
SODIUM SERPL-SCNC: 136 MMOL/L — SIGNIFICANT CHANGE UP (ref 135–145)
SP GR SPEC: 1.02 — SIGNIFICANT CHANGE UP (ref 1.01–1.02)
TROPONIN I SERPL-MCNC: <.015 NG/ML — SIGNIFICANT CHANGE UP (ref 0.01–0.04)
UROBILINOGEN FLD QL: NEGATIVE — SIGNIFICANT CHANGE UP
WBC # BLD: 9.4 K/UL — SIGNIFICANT CHANGE UP (ref 3.8–10.5)
WBC # FLD AUTO: 9.4 K/UL — SIGNIFICANT CHANGE UP (ref 3.8–10.5)

## 2017-12-28 PROCEDURE — 71250 CT THORAX DX C-: CPT | Mod: 26

## 2017-12-28 PROCEDURE — 93010 ELECTROCARDIOGRAM REPORT: CPT

## 2017-12-28 PROCEDURE — 71010: CPT | Mod: 26

## 2017-12-28 RX ORDER — HYDRALAZINE HCL 50 MG
50 TABLET ORAL ONCE
Qty: 0 | Refills: 0 | Status: COMPLETED | OUTPATIENT
Start: 2017-12-28 | End: 2017-12-28

## 2017-12-28 RX ORDER — CEFTRIAXONE 500 MG/1
1 INJECTION, POWDER, FOR SOLUTION INTRAMUSCULAR; INTRAVENOUS ONCE
Qty: 0 | Refills: 0 | Status: COMPLETED | OUTPATIENT
Start: 2017-12-28 | End: 2017-12-28

## 2017-12-28 RX ORDER — ALBUTEROL 90 UG/1
2.5 AEROSOL, METERED ORAL ONCE
Qty: 0 | Refills: 0 | Status: COMPLETED | OUTPATIENT
Start: 2017-12-28 | End: 2017-12-28

## 2017-12-28 RX ORDER — LABETALOL HCL 100 MG
200 TABLET ORAL ONCE
Qty: 0 | Refills: 0 | Status: COMPLETED | OUTPATIENT
Start: 2017-12-28 | End: 2017-12-28

## 2017-12-28 RX ORDER — IPRATROPIUM/ALBUTEROL SULFATE 18-103MCG
3 AEROSOL WITH ADAPTER (GRAM) INHALATION ONCE
Qty: 0 | Refills: 0 | Status: COMPLETED | OUTPATIENT
Start: 2017-12-28 | End: 2017-12-28

## 2017-12-28 RX ORDER — VENLAFAXINE HCL 75 MG
150 CAPSULE, EXT RELEASE 24 HR ORAL ONCE
Qty: 0 | Refills: 0 | Status: COMPLETED | OUTPATIENT
Start: 2017-12-28 | End: 2017-12-28

## 2017-12-28 RX ORDER — VENLAFAXINE HCL 75 MG
300 CAPSULE, EXT RELEASE 24 HR ORAL ONCE
Qty: 0 | Refills: 0 | Status: DISCONTINUED | OUTPATIENT
Start: 2017-12-28 | End: 2017-12-28

## 2017-12-28 RX ORDER — AZITHROMYCIN 500 MG/1
500 TABLET, FILM COATED ORAL ONCE
Qty: 0 | Refills: 0 | Status: COMPLETED | OUTPATIENT
Start: 2017-12-28 | End: 2017-12-28

## 2017-12-28 RX ORDER — SODIUM CHLORIDE 9 MG/ML
3 INJECTION INTRAMUSCULAR; INTRAVENOUS; SUBCUTANEOUS ONCE
Qty: 0 | Refills: 0 | Status: COMPLETED | OUTPATIENT
Start: 2017-12-28 | End: 2017-12-28

## 2017-12-28 RX ADMIN — SODIUM CHLORIDE 3 MILLILITER(S): 9 INJECTION INTRAMUSCULAR; INTRAVENOUS; SUBCUTANEOUS at 20:10

## 2017-12-28 RX ADMIN — Medication 3 MILLILITER(S): at 20:39

## 2017-12-28 NOTE — ED ADULT NURSE NOTE - OBJECTIVE STATEMENT
Pt A&Ox4, ambulatory to ED c/o shortness of breath.  Pt states that for the last week and a half he has had cough with very little sputum production, shortness of breath and difficulty breathing.  Pt has had some chills and "may" have had a low grade fever.  Pt was seen at urgent care today and sent to ED for eval.  Pt denies CP.

## 2017-12-28 NOTE — ED PROVIDER NOTE - MEDICAL DECISION MAKING DETAILS
screening septic work up, INR, cardiac enzymes, procalcitonin, BNP, RVP, chest x-ray, CT chest, IVF, oxygen, nebs, abx, observation

## 2017-12-28 NOTE — ED ADULT TRIAGE NOTE - CHIEF COMPLAINT QUOTE
sob, diarrhea, general malaise x 10 days. seen at urgent care, unable to read cxr, sent for eval. tested negative for the flu at urgent care.

## 2017-12-28 NOTE — ED PROVIDER NOTE - OBJECTIVE STATEMENT
Pt is a 76 yo male who presents to the ED with a cc of SOB.  PMHx of BPH, CHF last EF from 12/15 showed EF 65%, DM, GERD, HLD, HTN, pulmonary fibrosis, Vit D deficiency.  Pt reports that he has been experiencing SOB, diarrhea, and generalized weakness for the last 10 days.  Today he was seen at urgent care and sent to the ED for further work up.  Pt reports that symptoms seemed to significantly worsen last Thursday.  He reports that he developed a heavy chest, nasal congestion, cough non-productive, decreased appetite, watery stool, and diffuse myalgias.  He took 3 Imodium today at 1 pm and has not had a bowel movement since yesterday.   Pt reports that while having the diarrhea he did not note any blood in the stool, and he denied melena.  Denies fever, reports chills, and generalized lightheadedness.  Pt reports that the diagnosis of pulmonary fibrosis was recent and he is still in the process of developing a medication regime for this.

## 2017-12-28 NOTE — ED ADULT NURSE NOTE - PMH
BPH with obstruction/lower urinary tract symptoms    Congestive heart failure    Diabetes mellitus    GERD (gastroesophageal reflux disease)    Hyperlipidemia    Hypertension    Pulmonary fibrosis    Pulmonary fibrosis    Smoker    Vitamin D deficiency

## 2017-12-28 NOTE — ED PROVIDER NOTE - FAMILY HISTORY
Family history of lymphoma     Grandparent  Still living? No  Family history of heart disease, Age at diagnosis: Age Unknown

## 2017-12-28 NOTE — ED PROVIDER NOTE - CARE PLAN
Principal Discharge DX:	Pneumonia  Instructions for follow-up, activity and diet:	admit  Secondary Diagnosis:	Pulmonary fibrosis  Secondary Diagnosis:	Renal insufficiency

## 2017-12-29 ENCOUNTER — TRANSCRIPTION ENCOUNTER (OUTPATIENT)
Age: 77
End: 2017-12-29

## 2017-12-29 DIAGNOSIS — Z29.9 ENCOUNTER FOR PROPHYLACTIC MEASURES, UNSPECIFIED: ICD-10-CM

## 2017-12-29 DIAGNOSIS — N28.9 DISORDER OF KIDNEY AND URETER, UNSPECIFIED: ICD-10-CM

## 2017-12-29 DIAGNOSIS — E11.9 TYPE 2 DIABETES MELLITUS WITHOUT COMPLICATIONS: ICD-10-CM

## 2017-12-29 DIAGNOSIS — J18.9 PNEUMONIA, UNSPECIFIED ORGANISM: ICD-10-CM

## 2017-12-29 DIAGNOSIS — K21.9 GASTRO-ESOPHAGEAL REFLUX DISEASE WITHOUT ESOPHAGITIS: ICD-10-CM

## 2017-12-29 DIAGNOSIS — J22 UNSPECIFIED ACUTE LOWER RESPIRATORY INFECTION: ICD-10-CM

## 2017-12-29 DIAGNOSIS — N40.1 BENIGN PROSTATIC HYPERPLASIA WITH LOWER URINARY TRACT SYMPTOMS: ICD-10-CM

## 2017-12-29 DIAGNOSIS — F32.9 MAJOR DEPRESSIVE DISORDER, SINGLE EPISODE, UNSPECIFIED: ICD-10-CM

## 2017-12-29 DIAGNOSIS — E78.5 HYPERLIPIDEMIA, UNSPECIFIED: ICD-10-CM

## 2017-12-29 DIAGNOSIS — I50.9 HEART FAILURE, UNSPECIFIED: ICD-10-CM

## 2017-12-29 DIAGNOSIS — J84.10 PULMONARY FIBROSIS, UNSPECIFIED: ICD-10-CM

## 2017-12-29 DIAGNOSIS — I10 ESSENTIAL (PRIMARY) HYPERTENSION: ICD-10-CM

## 2017-12-29 DIAGNOSIS — R94.31 ABNORMAL ELECTROCARDIOGRAM [ECG] [EKG]: ICD-10-CM

## 2017-12-29 LAB
ANION GAP SERPL CALC-SCNC: 9 MMOL/L — SIGNIFICANT CHANGE UP (ref 5–17)
BASE EXCESS BLDA CALC-SCNC: -0.1 MMOL/L — SIGNIFICANT CHANGE UP (ref -2–2)
BLOOD GAS COMMENTS ARTERIAL: SIGNIFICANT CHANGE UP
BUN SERPL-MCNC: 27 MG/DL — HIGH (ref 7–23)
CALCIUM SERPL-MCNC: 8.9 MG/DL — SIGNIFICANT CHANGE UP (ref 8.5–10.1)
CHLORIDE SERPL-SCNC: 98 MMOL/L — SIGNIFICANT CHANGE UP (ref 96–108)
CO2 SERPL-SCNC: 27 MMOL/L — SIGNIFICANT CHANGE UP (ref 22–31)
CREAT SERPL-MCNC: 1.4 MG/DL — HIGH (ref 0.5–1.3)
CULTURE RESULTS: NO GROWTH — SIGNIFICANT CHANGE UP
GLUCOSE SERPL-MCNC: 203 MG/DL — HIGH (ref 70–99)
HCO3 BLDA-SCNC: 24 MMOL/L — SIGNIFICANT CHANGE UP (ref 23–27)
HCT VFR BLD CALC: 35.5 % — LOW (ref 39–50)
HGB BLD-MCNC: 11.4 G/DL — LOW (ref 13–17)
HOROWITZ INDEX BLDA+IHG-RTO: 21 — SIGNIFICANT CHANGE UP
MAGNESIUM SERPL-MCNC: 1.8 MG/DL — SIGNIFICANT CHANGE UP (ref 1.6–2.6)
MCHC RBC-ENTMCNC: 25.6 PG — LOW (ref 27–34)
MCHC RBC-ENTMCNC: 32 GM/DL — SIGNIFICANT CHANGE UP (ref 32–36)
MCV RBC AUTO: 80 FL — SIGNIFICANT CHANGE UP (ref 80–100)
PCO2 BLDA: 36 MMHG — SIGNIFICANT CHANGE UP (ref 32–46)
PH BLDA: 7.43 — SIGNIFICANT CHANGE UP (ref 7.35–7.45)
PLATELET # BLD AUTO: 301 K/UL — SIGNIFICANT CHANGE UP (ref 150–400)
PO2 BLDA: 83 MMHG — SIGNIFICANT CHANGE UP (ref 74–108)
POTASSIUM SERPL-MCNC: 4.6 MMOL/L — SIGNIFICANT CHANGE UP (ref 3.5–5.3)
POTASSIUM SERPL-SCNC: 4.6 MMOL/L — SIGNIFICANT CHANGE UP (ref 3.5–5.3)
RBC # BLD: 4.44 M/UL — SIGNIFICANT CHANGE UP (ref 4.2–5.8)
RBC # FLD: 15 % — HIGH (ref 10.3–14.5)
SAO2 % BLDA: 96 % — SIGNIFICANT CHANGE UP (ref 92–96)
SODIUM SERPL-SCNC: 134 MMOL/L — LOW (ref 135–145)
SPECIMEN SOURCE: SIGNIFICANT CHANGE UP
WBC # BLD: 7.3 K/UL — SIGNIFICANT CHANGE UP (ref 3.8–10.5)
WBC # FLD AUTO: 7.3 K/UL — SIGNIFICANT CHANGE UP (ref 3.8–10.5)

## 2017-12-29 PROCEDURE — 99281 EMR DPT VST MAYX REQ PHY/QHP: CPT

## 2017-12-29 PROCEDURE — 93010 ELECTROCARDIOGRAM REPORT: CPT

## 2017-12-29 PROCEDURE — 99223 1ST HOSP IP/OBS HIGH 75: CPT | Mod: AI,GC

## 2017-12-29 PROCEDURE — 99223 1ST HOSP IP/OBS HIGH 75: CPT

## 2017-12-29 PROCEDURE — 12345: CPT | Mod: GC,NC

## 2017-12-29 RX ORDER — INSULIN LISPRO 100/ML
VIAL (ML) SUBCUTANEOUS
Qty: 0 | Refills: 0 | Status: DISCONTINUED | OUTPATIENT
Start: 2017-12-29 | End: 2018-01-06

## 2017-12-29 RX ORDER — SODIUM CHLORIDE 9 MG/ML
1000 INJECTION, SOLUTION INTRAVENOUS
Qty: 0 | Refills: 0 | Status: DISCONTINUED | OUTPATIENT
Start: 2017-12-29 | End: 2018-01-06

## 2017-12-29 RX ORDER — DEXTROSE 50 % IN WATER 50 %
1 SYRINGE (ML) INTRAVENOUS ONCE
Qty: 0 | Refills: 0 | Status: DISCONTINUED | OUTPATIENT
Start: 2017-12-29 | End: 2018-01-06

## 2017-12-29 RX ORDER — HYDRALAZINE HCL 50 MG
50 TABLET ORAL THREE TIMES A DAY
Qty: 0 | Refills: 0 | Status: DISCONTINUED | OUTPATIENT
Start: 2017-12-29 | End: 2017-12-29

## 2017-12-29 RX ORDER — DEXTROSE 50 % IN WATER 50 %
25 SYRINGE (ML) INTRAVENOUS ONCE
Qty: 0 | Refills: 0 | Status: DISCONTINUED | OUTPATIENT
Start: 2017-12-29 | End: 2018-01-06

## 2017-12-29 RX ORDER — HYDRALAZINE HCL 50 MG
50 TABLET ORAL THREE TIMES A DAY
Qty: 0 | Refills: 0 | Status: DISCONTINUED | OUTPATIENT
Start: 2017-12-29 | End: 2018-01-01

## 2017-12-29 RX ORDER — BUPROPION HYDROCHLORIDE 150 MG/1
150 TABLET, EXTENDED RELEASE ORAL DAILY
Qty: 0 | Refills: 0 | Status: DISCONTINUED | OUTPATIENT
Start: 2017-12-29 | End: 2017-12-29

## 2017-12-29 RX ORDER — IPRATROPIUM/ALBUTEROL SULFATE 18-103MCG
3 AEROSOL WITH ADAPTER (GRAM) INHALATION EVERY 8 HOURS
Qty: 0 | Refills: 0 | Status: DISCONTINUED | OUTPATIENT
Start: 2017-12-29 | End: 2018-01-02

## 2017-12-29 RX ORDER — ISOSORBIDE MONONITRATE 60 MG/1
60 TABLET, EXTENDED RELEASE ORAL DAILY
Qty: 0 | Refills: 0 | Status: DISCONTINUED | OUTPATIENT
Start: 2017-12-29 | End: 2018-01-06

## 2017-12-29 RX ORDER — ASPIRIN/CALCIUM CARB/MAGNESIUM 324 MG
81 TABLET ORAL DAILY
Qty: 0 | Refills: 0 | Status: DISCONTINUED | OUTPATIENT
Start: 2017-12-29 | End: 2018-01-06

## 2017-12-29 RX ORDER — LABETALOL HCL 100 MG
200 TABLET ORAL THREE TIMES A DAY
Qty: 0 | Refills: 0 | Status: DISCONTINUED | OUTPATIENT
Start: 2017-12-29 | End: 2017-12-29

## 2017-12-29 RX ORDER — PANTOPRAZOLE SODIUM 20 MG/1
40 TABLET, DELAYED RELEASE ORAL ONCE
Qty: 0 | Refills: 0 | Status: COMPLETED | OUTPATIENT
Start: 2017-12-29 | End: 2017-12-29

## 2017-12-29 RX ORDER — ALPRAZOLAM 0.25 MG
2 TABLET ORAL THREE TIMES A DAY
Qty: 0 | Refills: 0 | Status: DISCONTINUED | OUTPATIENT
Start: 2017-12-29 | End: 2018-01-05

## 2017-12-29 RX ORDER — PANTOPRAZOLE SODIUM 20 MG/1
40 TABLET, DELAYED RELEASE ORAL
Qty: 0 | Refills: 0 | Status: DISCONTINUED | OUTPATIENT
Start: 2017-12-29 | End: 2018-01-06

## 2017-12-29 RX ORDER — LABETALOL HCL 100 MG
200 TABLET ORAL THREE TIMES A DAY
Qty: 0 | Refills: 0 | Status: DISCONTINUED | OUTPATIENT
Start: 2017-12-29 | End: 2018-01-06

## 2017-12-29 RX ORDER — AMLODIPINE BESYLATE 2.5 MG/1
10 TABLET ORAL DAILY
Qty: 0 | Refills: 0 | Status: DISCONTINUED | OUTPATIENT
Start: 2017-12-29 | End: 2017-12-29

## 2017-12-29 RX ORDER — AMLODIPINE BESYLATE 2.5 MG/1
10 TABLET ORAL DAILY
Qty: 0 | Refills: 0 | Status: DISCONTINUED | OUTPATIENT
Start: 2017-12-29 | End: 2018-01-06

## 2017-12-29 RX ORDER — VENLAFAXINE HCL 75 MG
150 CAPSULE, EXT RELEASE 24 HR ORAL DAILY
Qty: 0 | Refills: 0 | Status: DISCONTINUED | OUTPATIENT
Start: 2017-12-29 | End: 2017-12-29

## 2017-12-29 RX ORDER — HYDROCHLOROTHIAZIDE 25 MG
12.5 TABLET ORAL DAILY
Qty: 0 | Refills: 0 | Status: DISCONTINUED | OUTPATIENT
Start: 2017-12-29 | End: 2017-12-29

## 2017-12-29 RX ORDER — HYDROCHLOROTHIAZIDE 25 MG
12.5 TABLET ORAL DAILY
Qty: 0 | Refills: 0 | Status: DISCONTINUED | OUTPATIENT
Start: 2017-12-29 | End: 2018-01-03

## 2017-12-29 RX ORDER — ENOXAPARIN SODIUM 100 MG/ML
40 INJECTION SUBCUTANEOUS DAILY
Qty: 0 | Refills: 0 | Status: DISCONTINUED | OUTPATIENT
Start: 2017-12-29 | End: 2018-01-06

## 2017-12-29 RX ORDER — GLUCAGON INJECTION, SOLUTION 0.5 MG/.1ML
1 INJECTION, SOLUTION SUBCUTANEOUS ONCE
Qty: 0 | Refills: 0 | Status: DISCONTINUED | OUTPATIENT
Start: 2017-12-29 | End: 2018-01-06

## 2017-12-29 RX ORDER — VENLAFAXINE HCL 75 MG
150 CAPSULE, EXT RELEASE 24 HR ORAL
Qty: 0 | Refills: 0 | Status: DISCONTINUED | OUTPATIENT
Start: 2017-12-29 | End: 2018-01-06

## 2017-12-29 RX ORDER — BUPROPION HYDROCHLORIDE 150 MG/1
150 TABLET, EXTENDED RELEASE ORAL
Qty: 0 | Refills: 0 | Status: DISCONTINUED | OUTPATIENT
Start: 2017-12-29 | End: 2018-01-06

## 2017-12-29 RX ORDER — DEXTROSE 50 % IN WATER 50 %
12.5 SYRINGE (ML) INTRAVENOUS ONCE
Qty: 0 | Refills: 0 | Status: DISCONTINUED | OUTPATIENT
Start: 2017-12-29 | End: 2018-01-06

## 2017-12-29 RX ORDER — VENLAFAXINE HCL 75 MG
375 CAPSULE, EXT RELEASE 24 HR ORAL
Qty: 0 | Refills: 0 | Status: DISCONTINUED | OUTPATIENT
Start: 2017-12-29 | End: 2017-12-29

## 2017-12-29 RX ORDER — AZITHROMYCIN 500 MG/1
500 TABLET, FILM COATED ORAL EVERY 24 HOURS
Qty: 0 | Refills: 0 | Status: DISCONTINUED | OUTPATIENT
Start: 2017-12-29 | End: 2018-01-02

## 2017-12-29 RX ORDER — BUPROPION HYDROCHLORIDE 150 MG/1
300 TABLET, EXTENDED RELEASE ORAL DAILY
Qty: 0 | Refills: 0 | Status: DISCONTINUED | OUTPATIENT
Start: 2017-12-29 | End: 2017-12-29

## 2017-12-29 RX ORDER — CEFTRIAXONE 500 MG/1
1 INJECTION, POWDER, FOR SOLUTION INTRAMUSCULAR; INTRAVENOUS EVERY 24 HOURS
Qty: 0 | Refills: 0 | Status: DISCONTINUED | OUTPATIENT
Start: 2017-12-29 | End: 2018-01-03

## 2017-12-29 RX ADMIN — Medication 50 MILLIGRAM(S): at 06:23

## 2017-12-29 RX ADMIN — Medication 2 MILLIGRAM(S): at 06:23

## 2017-12-29 RX ADMIN — ISOSORBIDE MONONITRATE 60 MILLIGRAM(S): 60 TABLET, EXTENDED RELEASE ORAL at 12:58

## 2017-12-29 RX ADMIN — Medication 12.5 MILLIGRAM(S): at 06:24

## 2017-12-29 RX ADMIN — AMLODIPINE BESYLATE 10 MILLIGRAM(S): 2.5 TABLET ORAL at 06:24

## 2017-12-29 RX ADMIN — Medication 3 MILLILITER(S): at 15:01

## 2017-12-29 RX ADMIN — Medication 40 MILLIGRAM(S): at 06:24

## 2017-12-29 RX ADMIN — BUPROPION HYDROCHLORIDE 150 MILLIGRAM(S): 150 TABLET, EXTENDED RELEASE ORAL at 06:23

## 2017-12-29 RX ADMIN — Medication 200 MILLIGRAM(S): at 15:27

## 2017-12-29 RX ADMIN — BUPROPION HYDROCHLORIDE 150 MILLIGRAM(S): 150 TABLET, EXTENDED RELEASE ORAL at 17:42

## 2017-12-29 RX ADMIN — Medication 600 MILLIGRAM(S): at 17:41

## 2017-12-29 RX ADMIN — Medication 40 MILLIGRAM(S): at 15:27

## 2017-12-29 RX ADMIN — Medication 2 MILLIGRAM(S): at 22:21

## 2017-12-29 RX ADMIN — Medication 1: at 07:00

## 2017-12-29 RX ADMIN — Medication 150 MILLIGRAM(S): at 00:29

## 2017-12-29 RX ADMIN — Medication 81 MILLIGRAM(S): at 12:58

## 2017-12-29 RX ADMIN — Medication 3 MILLILITER(S): at 08:20

## 2017-12-29 RX ADMIN — AZITHROMYCIN 255 MILLIGRAM(S): 500 TABLET, FILM COATED ORAL at 22:21

## 2017-12-29 RX ADMIN — Medication 150 MILLIGRAM(S): at 09:10

## 2017-12-29 RX ADMIN — Medication 3 MILLILITER(S): at 23:36

## 2017-12-29 RX ADMIN — Medication 200 MILLIGRAM(S): at 22:21

## 2017-12-29 RX ADMIN — ENOXAPARIN SODIUM 40 MILLIGRAM(S): 100 INJECTION SUBCUTANEOUS at 12:00

## 2017-12-29 RX ADMIN — Medication 50 MILLIGRAM(S): at 00:29

## 2017-12-29 RX ADMIN — AZITHROMYCIN 255 MILLIGRAM(S): 500 TABLET, FILM COATED ORAL at 00:47

## 2017-12-29 RX ADMIN — Medication 2 MILLIGRAM(S): at 15:27

## 2017-12-29 RX ADMIN — Medication 60 MILLIGRAM(S): at 00:30

## 2017-12-29 RX ADMIN — Medication 150 MILLIGRAM(S): at 17:41

## 2017-12-29 RX ADMIN — Medication 200 MILLIGRAM(S): at 00:29

## 2017-12-29 RX ADMIN — Medication 200 MILLIGRAM(S): at 06:23

## 2017-12-29 RX ADMIN — Medication 50 MILLIGRAM(S): at 15:27

## 2017-12-29 RX ADMIN — Medication 2: at 11:30

## 2017-12-29 RX ADMIN — CEFTRIAXONE 100 GRAM(S): 500 INJECTION, POWDER, FOR SOLUTION INTRAMUSCULAR; INTRAVENOUS at 00:30

## 2017-12-29 RX ADMIN — AMLODIPINE BESYLATE 10 MILLIGRAM(S): 2.5 TABLET ORAL at 12:58

## 2017-12-29 RX ADMIN — Medication 50 MILLIGRAM(S): at 22:22

## 2017-12-29 RX ADMIN — PANTOPRAZOLE SODIUM 40 MILLIGRAM(S): 20 TABLET, DELAYED RELEASE ORAL at 02:16

## 2017-12-29 RX ADMIN — ALBUTEROL 2.5 MILLIGRAM(S): 90 AEROSOL, METERED ORAL at 00:31

## 2017-12-29 NOTE — DISCHARGE NOTE ADULT - PLAN OF CARE
prevent recurrence of infection You have completed 5 days of IV antibiotics. Your EKG showed 1st degree AV jose block which was unchanged from a prior EKG. Continue meds as above. You have completed a course of antibiotics (Azithromycin and Rocephin)   No need for further treatment.  Follow up with your PCP Dr. Bush.  Repeat CXR in 6 weeks. Your EKG showed 1st degree AV jose block which was unchanged from a prior EKG.  Follow up with outpatient cardiologist. You have diastolic CHF.   Continue meds as above. Hold Janumet until you follow up with your PCP and have repeat bloodwork and ensure that your kidney function has recovered. (eGFG at time of discharge is 45). Continue home meds as above.  Your Hydralazine dose has been increased to 75 mg three times daily.  Do not take Hydrochlorothiazide (HCTZ).

## 2017-12-29 NOTE — H&P ADULT - ASSESSMENT
76yo M with PMH of chronic diastolic CHF (normal LVF 2016), non obstructive CAD (cardiac cath 2015), interstitial lung disease, HTN, HLD, DM, GERD, BPH, depression, anxiety presents with SOB. admitted to [facility] for community acquired PNA. 76yo M with PMH of chronic diastolic CHF (normal LVF 2016), non obstructive CAD (cardiac cath 2015), interstitial lung disease, HTN, HLD, DM, GERD, BPH, depression, anxiety presents with SOB. Admitted to [facility] with remote telemetry for community acquired PNA.

## 2017-12-29 NOTE — H&P ADULT - NSHPLABSRESULTS_GEN_ALL_CORE
11.1   9.4   )-----------( 299      ( 28 Dec 2017 20:37 )             34.7     28 Dec 2017 20:37    136    |  101    |  33     ----------------------------<  120    4.3     |  25     |  1.40     Ca    8.9        28 Dec 2017 20:37    TPro  6.8    /  Alb  2.6    /  TBili  0.4    /  DBili  x      /  AST  17     /  ALT  14     /  AlkPhos  24     28 Dec 2017 20:37    LIVER FUNCTIONS - ( 28 Dec 2017 20:37 )  Alb: 2.6 g/dL / Pro: 6.8 g/dL / ALK PHOS: 24 U/L / ALT: 14 U/L / AST: 17 U/L / GGT: x           PT/INR - ( 28 Dec 2017 20:37 )   PT: 16.5 sec;   INR: 1.50 ratio    PTT - ( 28 Dec 2017 20:37 )  PTT:33.0 sec    CARDIAC MARKERS ( 28 Dec 2017 20:37 )  <.015 ng/mL / x     / 165 U/L / x     / 3.7 ng/mL    Urinalysis Basic - ( 28 Dec 2017 20:37 )  Color: Yellow / Appearance: Clear / S.020 / pH: x  Gluc: x / Ketone: Negative  / Bili: Negative / Urobili: Negative   Blood: x / Protein: 75 mg/dL / Nitrite: Negative   Leuk Esterase: Negative / RBC: Negative /HPF / WBC 0-2   Sq Epi: x / Non Sq Epi: Occasional / Bacteria: Negative    CT chest: interval increase in airspace opacity in the right lower lobe compatible with pneumonia; follow-up to resolution is recommended to exclude underlying neoplasm; again seen is interstitial lung disease

## 2017-12-29 NOTE — H&P ADULT - PROBLEM SELECTOR PLAN 2
-chronic diastolic CHF (normal LVF 2016)  -stable, currently not volume overloaded. Patient not on lasix at this time Patient has hx of chronic lung disease, pulmonary fibrosis with element of COPD.  -Consult (Lakisha)  -[place], steroids

## 2017-12-29 NOTE — DISCHARGE NOTE ADULT - HOSPITAL COURSE
76yo M with PMH of chronic diastolic CHF (normal LVF 2016), non obstructive CAD (cardiac cath 2015), interstitial lung disease, HTN, HLD, DM, GERD, BPH, depression, anxiety presents with SOB. Admitted to [facility] with remote telemetry for community acquired PNA. Was evaluated by Pulmonology (Dr. Banuelos) and was continued on IV Abx. Cardiology was consulted for initial EKG showing AV jose block, which was essentially unchanged from prior EKG, did not need remote tele monitoring. 78yo M with PMH of chronic diastolic CHF (normal LVF 2016), non obstructive CAD (cardiac cath 2015), interstitial lung disease, HTN, HLD, DM, GERD, BPH, depression, anxiety presents with SOB. Admitted to [facility] with remote telemetry for community acquired PNA. Was evaluated by Pulmonology (Dr. aBnuelos) and was continued on IV Abx for 5 days. Blood cultures remained negative to date and patient did not show any signs of worsening infection. Cardiology was consulted for initial EKG showing AV jose block, which was essentially unchanged from prior EKG, did not need remote tele monitoring. Patient clinically improved. Was evaluated by physical therapy and was recommended for [facility]. 76yo M with PMH of chronic diastolic CHF (normal LVF 2016), non obstructive CAD (cardiac cath 2015), interstitial lung disease, HTN, HLD, DM, GERD, BPH, depression, anxiety presents with SOB. Admitted to [facility] with remote telemetry for community acquired PNA. Was evaluated by Pulmonology (Dr. Banuelos) and was continued on IV Abx for 5 days. Blood cultures remained negative to date and patient did not show any signs of worsening infection. Cardiology was consulted for initial EKG showing AV jose block, which was essentially unchanged from prior EKG, did not need remote tele monitoring. RVP was completed on the patient and was found to have influenza. Patient was placed on Tamiflu. Patient clinically improved. Was evaluated by physical therapy and was recommended for [facility]. On day of discharge, patient was clinically stable discharge.     Upon day of discharge:    Vital Signs Last 24 Hrs  T(C): 36.3 (05 Jan 2018 05:20), Max: 36.9 (04 Jan 2018 13:54)  T(F): 97.3 (05 Jan 2018 05:20), Max: 98.5 (04 Jan 2018 13:54)  HR: 69 (05 Jan 2018 08:36) (64 - 78)  BP: 134/88 (05 Jan 2018 05:20) (102/68 - 134/88)  BP(mean): --  RR: 18 (05 Jan 2018 05:20) (17 - 18)  SpO2: 94% (05 Jan 2018 08:36) (91% - 95%)    PHYSICAL EXAM:  GENERAL: NAD, well-developed  HEAD:  Atraumatic, Normocephalic  EYES: EOMI, PERRLA, conjunctiva and sclera clear  NECK: Supple, No JVD  CHEST/LUNG: Clear to auscultation bilaterally; No wheeze  HEART: Regular rate and rhythm; No murmurs, rubs, or gallops  ABDOMEN: Soft, Nontender, Nondistended; Bowel sounds present  EXTREMITIES:  2+ Peripheral Pulses, No clubbing, cyanosis, or edema  PSYCH: AAOx3  NEUROLOGY: non-focal  SKIN: No rashes or lesions 76yo M with PMH of chronic diastolic CHF (normal LVF 2016), non obstructive CAD (cardiac cath 2015), interstitial lung disease, HTN, HLD, DM, GERD, BPH, depression, anxiety presents with SOB. Admitted to [facility] with remote telemetry for community acquired PNA. Was evaluated by Pulmonology (Dr. Banuelos) and completed a course of antibiotics. He was also treated with steroids. Blood cultures remained negative to date and patient did not show any signs of worsening infection. Cardiology was consulted for initial EKG showing AV jose block, which was essentially unchanged from prior EKG, did not need remote tele monitoring. RVP was sent when patient developed a fever and was influenza +. Patient was treated with Tamiflu and will complete course post discharge. Patient clinically improved. Patient had DEVYN and was seen by renal. Received gentle IV hydration and HCTZ stopped. It will be held at time of discharge, along with Janumet. Creatinine downtrending at time of discharge. Was evaluated by physical therapy and was recommended for [facility]. Patient refused and wanted to go home with home care.     Upon day of discharge:  Vital Signs Last 24 Hrs  T(C): 36.4 (06 Jan 2018 05:38), Max: 37 (05 Jan 2018 22:12)  T(F): 97.5 (06 Jan 2018 05:38), Max: 98.6 (05 Jan 2018 22:12)  HR: 67 (06 Jan 2018 05:38) (67 - 80)  BP: 137/93 (06 Jan 2018 05:38) (137/93 - 139/70)  BP(mean): --  RR: 18 (06 Jan 2018 05:38) (18 - 18)  SpO2: 96% (06 Jan 2018 05:38) (82% - 96%)    PHYSICAL EXAM:  GENERAL: NAD, well-developed  HEAD:  Atraumatic, Normocephalic  EYES: EOMI, PERRLA, conjunctiva and sclera clear  NECK: Supple, No JVD  CHEST/LUNG: scattered expiratory wheeze.  HEART: Regular rate and rhythm; No murmurs, rubs, or gallops  ABDOMEN: Soft, Nontender, Nondistended; Bowel sounds present  EXTREMITIES:  2+ Peripheral Pulses, No clubbing, cyanosis, or edema  PSYCH: somewhat flat affect  NEUROLOGY: non-focal exam.  SKIN: No rashes or lesions    Patient stable for discharge home. He will make an appointment to follow up with his PCP, his pulmonologist, and nephrology.   PCP Rachelle notified of discharge--dc note to be faxed to his office as well.    Time spent: 40 mins.

## 2017-12-29 NOTE — DISCHARGE NOTE ADULT - MEDICATION SUMMARY - MEDICATIONS TO TAKE
I will START or STAY ON the medications listed below when I get home from the hospital:    budesonide 0.5 mg/2 mL inhalation suspension  -- 0.5 milligram(s) inhaled 2 times a day   -- Indication: For Pulmonary fibrosis    predniSONE 10 mg oral tablet  -- 2 tabs daily x 1 day, then  1 tab daily x 3 days, then STOP.  -- It is very important that you take or use this exactly as directed.  Do not skip doses or discontinue unless directed by your doctor.  Obtain medical advice before taking any non-prescription drugs as some may affect the action of this medication.  Take with food or milk.    -- Indication: For Pulmonary fibrosis    aspirin 81 mg oral tablet  -- 1 tab(s) by mouth once a day  -- Indication: For Prevention    isosorbide mononitrate 60 mg oral tablet, extended release  -- 1 tab(s) by mouth once a day (in the morning)  -- Indication: For Htn    venlafaxine extended release  -- 375 milligram(s) by mouth once a day  -- Indication: For mood disorder    allopurinol 100 mg oral tablet  -- 1 tab(s) by mouth once a day  -- Indication: For Hyperuricemia    Saphris 10 mg sublingual tablet  -- 1 tab(s) under tongue once a day (at bedtime)  -- Indication: For mood disorder    oseltamivir 75 mg oral capsule  -- 1 cap(s) by mouth 2 times a day  -- Indication: For Flu    Xanax 2 mg oral tablet  -- 1 tab(s) by mouth 3 times a day, As Needed  -- Indication: For Anxiety    labetalol 200 mg oral tablet  -- 1 tab(s) by mouth 3 times a day  -- Indication: For Htn    albuterol 2.5 mg/3 mL (0.083%) inhalation solution  -- 1 dose(s) inhaled every 8 hours, As Needed  -- Indication: For Pulmonary fibrosis    amLODIPine 10 mg oral tablet  -- 1 tab(s) by mouth once a day  -- Indication: For Htn    guaiFENesin 600 mg oral tablet, extended release  -- 1 tab(s) by mouth every 12 hours, As Needed  -- Indication: For Congestion    docusate sodium 100 mg oral capsule  -- 1 cap(s) by mouth 2 times a day  -- Indication: For Constipation    polyethylene glycol 3350 oral powder for reconstitution  -- 17 gram(s) by mouth once a day  -- Indication: For Constipation    senna oral tablet  -- 2 tab(s) by mouth once a day (at bedtime)  -- Indication: For Constipation    Protonix 40 mg oral delayed release tablet  -- 1 tab(s) by mouth once a day (before a meal)  -- Indication: For GERD (gastroesophageal reflux disease)    Wellbutrin  mg/24 hours oral tablet, extended release  -- 1 tab(s) by mouth every 24 hours  -- Indication: For mood disorder    hydrALAZINE 25 mg oral tablet  -- 3 tab(s) by mouth 3 times a day   -- It is very important that you take or use this exactly as directed.  Do not skip doses or discontinue unless directed by your doctor.  Some non-prescription drugs may aggravate your condition.  Read all labels carefully.  If a warning appears, check with your doctor before taking.    -- Indication: For Htn

## 2017-12-29 NOTE — CONSULT NOTE ADULT - ASSESSMENT
73 y/o male PMHx diastolic dysfunction, nonobstructive CAD based on cardiac cath from 12/2015, normal lvef (echo 10/2016), HTN, HLD, DM, GERD, BPH, Depression, Anxiety.  He has longstanding dyspnea for which multiple cardiac explanations have been considered, including CAD and diastolic impairment.  He has been found to have a degree of interstitial lung disease, the significance of which is unclear.     now with AV block per tele tech.  -Stat EKG: sinus rhythm with 1st degree AV block and inferior infarct, appears unchanged from EKG on Aug 27/17 73 y/o male PMHx diastolic dysfunction, nonobstructive CAD based on cardiac cath from 12/2015, normal lvef (echo 10/2016), HTN, HLD, DM, GERD, BPH, Depression, Anxiety.  He has longstanding dyspnea for which multiple cardiac explanations have been considered, including CAD and diastolic impairment.  He has been found to have a degree of interstitial lung disease, the significance of which is unclear. Admitted with pna.    - now with AV block per tele tech.  -Stat EKG: sinus rhythm with 1st degree AV block and inferior infarct, appears unchanged from EKG on Aug 17  - No clear evidence of acute ischemia, CE slightly elevated likely 2/2 to demand ischemia  - ProBNP slightly elevated. LE slightly edematous b/l.   -Cardiac cath 2015: EF 65%, LAD 40% stenosis, circumflex 30% stenosis, RCA 40% stenosis  - Previous ECHO ?  - BP well controlled, continue home BP meds, monitor routine hemodynamics  - Monitor and replete lytes, keep K>4, Mg>2  - Other cardiovascular workup will depend on clinical course.  - All other workup per primary team  - Will follow 71 y/o male PMHx diastolic dysfunction, nonobstructive CAD based on cardiac cath from 12/2015, normal lvef (echo 10/2016), HTN, HLD, DM, GERD, BPH, Depression, Anxiety.  He has longstanding dyspnea for which multiple cardiac explanations have been considered, including CAD and diastolic impairment.  He has been found to have a degree of interstitial lung disease, the significance of which is unclear. Admitted with pna.    -EKG shows wandering atrial pacemaker, 1st degree av block, not a fib. Unchanged from previous EKG from September.   -Can dc tele  - No clear evidence of acute ischemia, CE slightly elevated likely 2/2 to demand ischemia  - ProBNP slightly elevated. LE slightly edematous b/l, but does not appear to be fluid overloaded.  -Cardiac cath 2015: EF 65%, LAD 40% stenosis, circumflex 30% stenosis, RCA 40% stenosis. ECHO October 2016: normal left ventricular function, normal LV and EF  - BP well controlled, continue home BP meds, monitor routine hemodynamics  - Monitor and replete lytes, keep K>4, Mg>2  - Other cardiovascular workup will depend on clinical course.  - All other workup per primary team  - Will follow 73 y/o male PMHx diastolic dysfunction, nonobstructive CAD based on cardiac cath from 12/2015, normal lvef (echo 10/2016), HTN, HLD, DM, GERD, BPH, Depression, Anxiety.  He has longstanding dyspnea for which multiple cardiac explanations have been considered, including CAD and diastolic impairment.  He has been found to have a degree of interstitial lung disease, the significance of which is unclear. Admitted with pna.    -EKG shows wandering atrial pacemaker, 1st degree av block, not a fib. Unchanged from previous EKG from September.   -Can dc tele  - No clear evidence of acute ischemia, CE slightly elevated likely 2/2 to demand ischemia  - ProBNP slightly elevated. LE slightly edematous b/l, but does not appear to have pulmonary edema  -Cardiac cath 2015: EF 65%, LAD 40% stenosis, circumflex 30% stenosis, RCA 40% stenosis. ECHO October 2016: normal left ventricular function, normal LV and EF  - BP well controlled, continue home BP meds, monitor routine hemodynamics  - Monitor and replete lytes, keep K>4, Mg>2  - Other cardiovascular workup will depend on clinical course.  - All other workup per primary team  - Will follow

## 2017-12-29 NOTE — DISCHARGE NOTE ADULT - PROVIDER TOKENS
TOKEN:'[id number] [id number]

## 2017-12-29 NOTE — H&P ADULT - NSHPREVIEWOFSYSTEMS_GEN_ALL_CORE
Constitutional: denies fever, chills, general malaise, weight loss, weight gain, diaphoresis   HEENT: denies dry mouth, sore throat, runny nose, photophobia, blurry vision, double vision, discharge, eye pain, difficulty hearing, vertigo, dysphagia, epistaxis, recent dental work    Respiratory: denies SOB, CINSEROS, cough, sputum production, wheezing, hemoptysis  Cardiovascular: denies CP, palpitations, edema  Gastrointestinal: denies nausea, vomiting, diarrhea, constipation, abdominal pain, melena, hematochezia   Genitourinary: denies dysuria, frequency, urgency, incontinence, hematuria   Skin/Breast: denies rash, hives, itching, masses, hair loss   Musculoskeletal: denies myalgias, arthritis, joint swelling, muscle weakness  Neurologic: denies syncope, LOC, headache, weakness, dizziness, parasthesias, numbness, seizures, confusion, dementia   Psychiatric: denies feeling anxious, depressed, suicidal, homicidal  Endocrine: denies increased fingerstick glucoses, cold or heat intolerance, polydipsia, polyuria, polyphagia   Hematology/Oncology: denies bruising, tender or enlarged lymph nodes   ROS negative except as noted above Constitutional: denies fever, chills, admits general malaise  HEENT: denies dry mouth, sore throat, runny nose, photophobia, blurry vision  Respiratory: admits SOB, CISNEROS, cough, sputum production, denies  wheezing, hemoptysis  Cardiovascular: denies CP, palpitations, edema  Gastrointestinal: denies nausea, vomiting, diarrhea, constipation, abdominal pain, melena, hematochezia   Genitourinary: denies dysuria, frequency, urgency, incontinence, hematuria   Skin/Breast: denies rash, hives  Musculoskeletal: denies myalgias, arthritis, joint swelling, muscle weakness  Neurologic: denies syncope, LOC, headache, weakness, dizziness, parasthesias  Psychiatric: denies feeling anxious, depressed  Endocrine: denies increased fingerstick glucoses  Hematology/Oncology: denies bruising, tender or enlarged lymph nodes

## 2017-12-29 NOTE — CONSULT NOTE ADULT - SUBJECTIVE AND OBJECTIVE BOX
[facility] Consultation    CHIEF COMPLAINT: Patient is a 77y old  Male who presents with a chief complaint of SOB, s/p fall early sat am per son (29 Dec 2017 03:18)      HPI:  78yo M with PMH of chronic diastolic CHF (normal LVF 2016), non obstructive CAD (cardiac cath 2015), interstitial lung disease, HTN, HLD, DMT2 on Janumet, GERD, BPH, depression, anxiety presents with SOB. Patient states that he has had dyspnea fro the past 1.5 weeks along with a productive cough  with white sputum. Patient went to walk-in clinic today where CXR was performed and showed consolidation in the lung so patient was told to come to ED. Patient uses O2 at home at nighttime but is unsure how much. He admits to weakness, lethargy as well. Denies f/c, n/v, headaches.    In ED, VSS, lactate 1.2, procalcitonin elevated 0.28. CT chest showed interval increase in airspace opacity in the right lower lobe compatible with pneumonia; follow-up to resolution is recommended to exclude underlying neoplasm; again seen is interstitial lung disease. Received azithromycin, rocephin, solumedrol, albuterol treatment. (29 Dec 2017 00:05)      PAST MEDICAL & SURGICAL HISTORY:  Congestive heart failure  Pulmonary fibrosis  Pulmonary fibrosis  Smoker  BPH with obstruction/lower urinary tract symptoms  Vitamin D deficiency  GERD (gastroesophageal reflux disease)  Diabetes mellitus  Hyperlipidemia  Hypertension  S/P tonsillectomy      SOCIAL HISTORY: No history of smoking, alcohol or illicit drugs    FAMILY HISTORY: FAMILY HISTORY:  Family history of heart disease (Grandparent)  Family history of lymphoma      MEDICATIONS  (STANDING):  ALBUTerol/ipratropium for Nebulization 3 milliLiter(s) Nebulizer every 8 hours  ALPRAZolam 2 milliGRAM(s) Oral three times a day  amLODIPine   Tablet 10 milliGRAM(s) Oral daily  aspirin enteric coated 81 milliGRAM(s) Oral daily  azithromycin  IVPB 500 milliGRAM(s) IV Intermittent every 24 hours  buPROPion  milliGRAM(s) Oral two times a day  cefTRIAXone   IVPB 1 Gram(s) IV Intermittent every 24 hours  dextrose 5%. 1000 milliLiter(s) (50 mL/Hr) IV Continuous <Continuous>  dextrose 50% Injectable 12.5 Gram(s) IV Push once  dextrose 50% Injectable 25 Gram(s) IV Push once  dextrose 50% Injectable 25 Gram(s) IV Push once  enoxaparin Injectable 40 milliGRAM(s) SubCutaneous daily  hydrALAZINE 50 milliGRAM(s) Oral three times a day  hydrochlorothiazide 12.5 milliGRAM(s) Oral daily  insulin lispro (HumaLOG) corrective regimen sliding scale   SubCutaneous three times a day before meals  isosorbide   mononitrate ER Tablet (IMDUR) 60 milliGRAM(s) Oral daily  labetalol 200 milliGRAM(s) Oral three times a day  methylPREDNISolone sodium succinate Injectable 40 milliGRAM(s) IV Push every 8 hours  venlafaxine 150 milliGRAM(s) Oral two times a day with meals    MEDICATIONS  (PRN):  dextrose Gel 1 Dose(s) Oral once PRN Blood Glucose LESS THAN 70 milliGRAM(s)/deciliter  glucagon  Injectable 1 milliGRAM(s) IntraMuscular once PRN Glucose LESS THAN 70 milligrams/deciliter      Allergies    No Known Allergies    Intolerances        REVIEW OF SYSTEMS:    CONSTITUTIONAL: No weakness, fevers or chills  EYES: No visual changes, No diplopia  ENMT: No throat pain , No exudate  NECK: No pain or stiffness  RESPIRATORY: No cough, wheezing, hemoptysis; No shortness of breath  CARDIOVASCULAR: No chest pain or palpitations  GASTROINTESTINAL: No abdominal pain. No nausea, vomiting, or hematemesis; No diarrhea or constipation. No melena or hematochezia.  GENITOURINARY: No dysuria, frequency or hematuria  NEUROLOGICAL: No numbness or weakness  SKIN: No itching or rash  All other review of systems is negative unless indicated above    VITAL SIGNS:   Vital Signs Last 24 Hrs  T(C): 37.1 (29 Dec 2017 05:42), Max: 37.2 (29 Dec 2017 00:41)  T(F): 98.8 (29 Dec 2017 05:42), Max: 99 (29 Dec 2017 00:41)  HR: 92 (29 Dec 2017 05:42) (87 - 107)  BP: 121/82 (29 Dec 2017 05:42) (113/75 - 162/79)  BP(mean): --  RR: 18 (29 Dec 2017 05:42) (17 - 20)  SpO2: 95% (29 Dec 2017 05:42) (93% - 96%)    I&O's Summary    28 Dec 2017 07:01  -  29 Dec 2017 07:00  --------------------------------------------------------  IN: 600 mL / OUT: 0 mL / NET: 600 mL        PHYSICAL EXAM:    	General: lethargic appearing, obese male, poorly nourished, NAD  	HEENT: NCAT, PERRLA, EOMI bl, moist mucous membranes   	Neck: Supple, nontender, no mass  	Neurology: A&Ox3, nonfocal, CN II-XII grossly intact, sensation intact  	Respiratory: +decreased right sided breath sounds, no wheezing, no rhales, +coughing, poor inspiratory effort  	CV: irregularly irregular, +S1/S2, no murmurs, rubs or gallops  	Abdominal: Soft, NT, +distention, +BSx4  	Extremities: No C/C/E, + peripheral pulses  	MSK: Normal ROM, no joint erythema or warmth, no joint swelling   Skin: warm, dry, normal color, no rash or abnormal lesions    LABS: All Labs Reviewed:                        11.1   9.4   )-----------( 299      ( 28 Dec 2017 20:37 )             34.7     28 Dec 2017 20:37    136    |  101    |  33     ----------------------------<  120    4.3     |  25     |  1.40     Ca    8.9        28 Dec 2017 20:37    TPro  6.8    /  Alb  2.6    /  TBili  0.4    /  DBili  x      /  AST  17     /  ALT  14     /  AlkPhos  24     28 Dec 2017 20:37    PT/INR - ( 28 Dec 2017 20:37 )   PT: 16.5 sec;   INR: 1.50 ratio         PTT - ( 28 Dec 2017 20:37 )  PTT:33.0 sec  CARDIAC MARKERS ( 28 Dec 2017 20:37 )  <.015 ng/mL / x     / 165 U/L / x     / 3.7 ng/mL      Blood Culture:   12-28 @ 20:37  Pro Bnp 1881        RADIOLOGY/EKG: [facility] Consultation    CHIEF COMPLAINT: Patient is a 77y old  Male who presents with a chief complaint of SOB (29 Dec 2017 03:18)      HPI:  76yo M with PMH of chronic diastolic CHF (normal LVF 2016), non obstructive CAD (cardiac cath 2015), interstitial lung disease, HTN, HLD, DMT2 on Janumet, GERD, BPH, depression, anxiety presents with SOB. Patient states that he has had dyspnea fro the past 1.5 weeks along with a productive cough  with white sputum. Patient went to walk-in clinic today where CXR was performed and showed consolidation in the lung so patient was told to come to ED. Patient uses O2 at home at nighttime but is unsure how much. He admits to weakness, lethargy as well. Denies f/c, n/v, headaches.    In ED, VSS, lactate 1.2, procalcitonin elevated 0.28. CT chest showed interval increase in airspace opacity in the right lower lobe compatible with pneumonia; follow-up to resolution is recommended to exclude underlying neoplasm; again seen is interstitial lung disease. Received azithromycin, rocephin, solumedrol, albuterol treatment. (29 Dec 2017 00:05)      PAST MEDICAL & SURGICAL HISTORY:  Congestive heart failure  Pulmonary fibrosis  Pulmonary fibrosis  Smoker  BPH with obstruction/lower urinary tract symptoms  Vitamin D deficiency  GERD (gastroesophageal reflux disease)  Diabetes mellitus  Hyperlipidemia  Hypertension  S/P tonsillectomy      SOCIAL HISTORY: No history of smoking, alcohol or illicit drugs    FAMILY HISTORY: FAMILY HISTORY:  Family history of heart disease (Grandparent)  Family history of lymphoma      MEDICATIONS  (STANDING):  ALBUTerol/ipratropium for Nebulization 3 milliLiter(s) Nebulizer every 8 hours  ALPRAZolam 2 milliGRAM(s) Oral three times a day  amLODIPine   Tablet 10 milliGRAM(s) Oral daily  aspirin enteric coated 81 milliGRAM(s) Oral daily  azithromycin  IVPB 500 milliGRAM(s) IV Intermittent every 24 hours  buPROPion  milliGRAM(s) Oral two times a day  cefTRIAXone   IVPB 1 Gram(s) IV Intermittent every 24 hours  dextrose 5%. 1000 milliLiter(s) (50 mL/Hr) IV Continuous <Continuous>  dextrose 50% Injectable 12.5 Gram(s) IV Push once  dextrose 50% Injectable 25 Gram(s) IV Push once  dextrose 50% Injectable 25 Gram(s) IV Push once  enoxaparin Injectable 40 milliGRAM(s) SubCutaneous daily  hydrALAZINE 50 milliGRAM(s) Oral three times a day  hydrochlorothiazide 12.5 milliGRAM(s) Oral daily  insulin lispro (HumaLOG) corrective regimen sliding scale   SubCutaneous three times a day before meals  isosorbide   mononitrate ER Tablet (IMDUR) 60 milliGRAM(s) Oral daily  labetalol 200 milliGRAM(s) Oral three times a day  methylPREDNISolone sodium succinate Injectable 40 milliGRAM(s) IV Push every 8 hours  venlafaxine 150 milliGRAM(s) Oral two times a day with meals    MEDICATIONS  (PRN):  dextrose Gel 1 Dose(s) Oral once PRN Blood Glucose LESS THAN 70 milliGRAM(s)/deciliter  glucagon  Injectable 1 milliGRAM(s) IntraMuscular once PRN Glucose LESS THAN 70 milligrams/deciliter      Allergies    No Known Allergies    Intolerances        REVIEW OF SYSTEMS:    CONSTITUTIONAL: admits to vweakness, no fevers or chills  EYES: No visual changes, No diplopia  ENMT: No throat pain , No exudate  NECK: No pain or stiffness  RESPIRATORY: admits to cough, wheezing, shortness of breath, no hemoptysis  CARDIOVASCULAR: No chest pain or palpitations  GASTROINTESTINAL: admits to diarrhea, possible dark stools, No abdominal pain. No nausea, vomiting, or hematemesis; No  constipation  GENITOURINARY: No dysuria, frequency or hematuria  NEUROLOGICAL: No numbness or weakness  SKIN: No itching or rash  All other review of systems is negative unless indicated above    VITAL SIGNS:   Vital Signs Last 24 Hrs  T(C): 37.1 (29 Dec 2017 05:42), Max: 37.2 (29 Dec 2017 00:41)  T(F): 98.8 (29 Dec 2017 05:42), Max: 99 (29 Dec 2017 00:41)  HR: 92 (29 Dec 2017 05:42) (87 - 107)  BP: 121/82 (29 Dec 2017 05:42) (113/75 - 162/79)  BP(mean): --  RR: 18 (29 Dec 2017 05:42) (17 - 20)  SpO2: 95% (29 Dec 2017 05:42) (93% - 96%)    I&O's Summary    28 Dec 2017 07:01  -  29 Dec 2017 07:00  --------------------------------------------------------  IN: 600 mL / OUT: 0 mL / NET: 600 mL        PHYSICAL EXAM:  General: obese male, NAD  HEENT: NCAT, PERRLA, EOMI bl, moist mucous membranes   Neck: Supple, nontender, no mass  Neurology: A&Ox3, nonfocal, CN II-XII grossly intact, sensation intact  Respiratory: +decreased right sided breath sounds, no wheezing, no rhales, +coughing, poor inspiratory effort  CV: irregularly irregular, +S1/S2, no murmurs, rubs or gallops  Abdominal: Soft, NT, +distention, +BSx4  Extremities: No C/C/E, + peripheral pulses  MSK: slight nonpitting edema in LE, R>L, Normal ROM, no joint erythema or warmth,   Skin: warm, dry, normal color, no rash or abnormal lesions    LABS: All Labs Reviewed:                        11.1   9.4   )-----------( 299      ( 28 Dec 2017 20:37 )             34.7     28 Dec 2017 20:37    136    |  101    |  33     ----------------------------<  120    4.3     |  25     |  1.40     Ca    8.9        28 Dec 2017 20:37    TPro  6.8    /  Alb  2.6    /  TBili  0.4    /  DBili  x      /  AST  17     /  ALT  14     /  AlkPhos  24     28 Dec 2017 20:37    PT/INR - ( 28 Dec 2017 20:37 )   PT: 16.5 sec;   INR: 1.50 ratio         PTT - ( 28 Dec 2017 20:37 )  PTT:33.0 sec  CARDIAC MARKERS ( 28 Dec 2017 20:37 )  <.015 ng/mL / x     / 165 U/L / x     / 3.7 ng/mL      Blood Culture:   12-28 @ 20:37  Pro Bnp 1881        RADIOLOGY/EKG:

## 2017-12-29 NOTE — CONSULT NOTE ADULT - PROBLEM SELECTOR RECOMMENDATION 9
HFpEF  Pulm HTN  CAD  ASHD  Cardio following  monitor labs  assess for volume overload on daily basis

## 2017-12-29 NOTE — DISCHARGE NOTE ADULT - PATIENT PORTAL LINK FT
“You can access the Follow[employer] Patient Portal, offered by [facility], by registering with the following website: [email or web address]”

## 2017-12-29 NOTE — H&P ADULT - NSHPPHYSICALEXAM_GEN_ALL_CORE
T(C): 36.7 (12-28-17 @ 19:09), Max: 36.7 (12-28-17 @ 19:09)  HR: 87 (12-28-17 @ 19:09) (87 - 87)  BP: 113/75 (12-28-17 @ 19:09) (113/75 - 113/75)  RR: 20 (12-28-17 @ 19:09) (20 - 20)  SpO2: 93% (12-28-17 @ 19:09) (93% - 93%)      General: Well developed, well nourished, NAD  HEENT: NCAT, PERRLA, EOMI bl, moist mucous membranes   Neck: Supple, nontender, no mass  Neurology: A&Ox3, nonfocal, CN II-XII grossly intact, sensation intact, no gait abnormalities   Respiratory: CTA B/L, No W/R/R  CV: RRR, +S1/S2, no murmurs, rubs or gallops  Abdominal: Soft, NT, ND +BSx4  Extremities: No C/C/E, + peripheral pulses  MSK: Normal ROM, no joint erythema or warmth, no joint swelling   Skin: warm, dry, normal color, no rash or abnormal lesions T(C): 36.7 (12-28-17 @ 19:09), Max: 36.7 (12-28-17 @ 19:09)  HR: 87 (12-28-17 @ 19:09) (87 - 87)  BP: 113/75 (12-28-17 @ 19:09) (113/75 - 113/75)  RR: 20 (12-28-17 @ 19:09) (20 - 20)  SpO2: 93% (12-28-17 @ 19:09) (93% - 93%)    General: lethargic appearing, obese male, poorly nourished, NAD  HEENT: NCAT, PERRLA, EOMI bl, moist mucous membranes   Neck: Supple, nontender, no mass  Neurology: A&Ox3, nonfocal, CN II-XII grossly intact, sensation intact  Respiratory: +decreased right sided breath sounds, no wheezing, no rhales, +coughing, poor inspiratory effort  CV: irregularly irregular, +S1/S2, no murmurs, rubs or gallops  Abdominal: Soft, NT, +distention, +BSx4  Extremities: No C/C/E, + peripheral pulses  MSK: Normal ROM, no joint erythema or warmth, no joint swelling   Skin: warm, dry, normal color, no rash or abnormal lesions

## 2017-12-29 NOTE — H&P ADULT - PROBLEM SELECTOR PLAN 10
DVt ppx: Lovenox  IMPROVE VTE Individual Risk Assessment          RISK                                                          Points  [ 0 ] Previous VTE                                                3  [ 0 ] Thrombophilia                                             2  [ 0 ] Lower limb paralysis                                   2        (unable to hold up >15 seconds)    [ 0 ] Current Cancer                                             2         (within 6 months)  [  0] Immobilization > 24 hrs                              1  [ 0 ] ICU/CCU stay > 24 hours                             1  [ 1 ] Age > 60                                                         1    IMPROVE VTE Score: 1

## 2017-12-29 NOTE — PHYSICAL THERAPY INITIAL EVALUATION ADULT - PERTINENT HX OF CURRENT PROBLEM, REHAB EVAL
Pt is a 78 y/o male admitted 12/29 with dx of PNA. PMH of chronic diastolic CHF (normal LVF 2016), non obstructive CAD (cardiac cath 2015), interstitial lung disease, HTN, HLD, DMT2 on Janumet, GERD, BPH, depression, anxiety presents with SOB, dyspnea for the past 1.5 weeks along with a productive cough with white sputum. CT Chest: + PNA, increase in airspace opacity in the right lower lobe. Chest X-ray: +PNA, trace pleural effusion

## 2017-12-29 NOTE — DISCHARGE NOTE ADULT - CARE PROVIDER_API CALL
Jonny Mcallister (MD), Cardiovascular Disease; Internal Medicine  [address] [place] [address]  Phone: [phone]  Fax: [phone]    Frederick Bush), Internal Medicine  [address] [place] [address]  Phone: [phone]  Fax: [phone] Jonny Mcallister (MD), Cardiovascular Disease; Internal Medicine  [address] [place] [address]  Phone: [phone]  Fax: [phone]    Frederick Bush), Internal Medicine  [address] [place] [address]  Phone: [phone]  Fax: [phone]    Bobby Page), Critical Care Medicine; Internal Medicine; Pulmonary Disease; Sleep Medicine  [address] [facility]  [address]  [place] [address]  Phone: [phone]  Fax: [phone]    Perry Ibanez), Medicine  [address] [facility]  [address] [place] [address]  Phone: [phone]  Fax: [phone]

## 2017-12-29 NOTE — H&P ADULT - PROBLEM SELECTOR PLAN 3
- EKG read as atrial fibrillation however looks like AV block. Compared with prior (which showed 1st degree AV Heart block). R/o new onset atrial fibrillation. Troponins WNL  -Cardio consult (Esvin) -chronic diastolic CHF (normal LVF 2016)  -stable, currently not volume overloaded. Patient not on lasix at this time  -Monitor closely for symptoms of volume overload

## 2017-12-29 NOTE — DISCHARGE NOTE ADULT - CARE PROVIDERS DIRECT ADDRESSES
,[email or web address],DirectAddress_Unknown,DirectAddress_Unknown

## 2017-12-29 NOTE — DISCHARGE NOTE ADULT - MEDICATION SUMMARY - MEDICATIONS TO STOP TAKING
I will STOP taking the medications listed below when I get home from the hospital:    predniSONE 5 mg oral tablet  -- 3 tab(s) by mouth 2 times a day for 5 days.    cefuroxime 250 mg oral tablet  -- 1 tab(s) by mouth every 12 hours for 2 more days.    insulin glargine  -- 10 unit(s) subcutaneous once a day (at bedtime)    insulin lispro 100 units/mL subcutaneous solution  --  subcutaneous 3 times a day (before meals); 2 Unit(s) if Glucose 151 - 200  4 Unit(s) if Glucose 201 - 250  6 Unit(s) if Glucose 251 - 300  8 Unit(s) if Glucose 301 - 350  10 Unit(s) if Glucose 351 - 400  12 Unit(s) if Glucose Greater Than 400    Janumet 50 mg-1000 mg oral tablet  -- 1 tab(s) by mouth 2 times a day    hydroCHLOROthiazide 12.5 mg oral tablet  -- 1 tab(s) by mouth once a day

## 2017-12-29 NOTE — DISCHARGE NOTE ADULT - ADDITIONAL INSTRUCTIONS
Follow up with your PCP within 1-2 weeks of discharge. Make an appointment with your pulmonologist within 1 week of discharge. Pulmonary fibrosis: Oxygen 24 hours/day until you see your pulmonologist. Continue Pulmicort and Albuterol nebulizer treatments.  Possible sleep apnea: You should have a sleep study outpatient.  Elevated uric acid: Take allopurinol daily. Prescription sent to your pharmacy.  Acute kidney injury: Follow up with nephrology Dr. Ibanez outpatient. Do not take HCTZ, Do not take Janumet. Follow up with PCP early next week for repeat blood work to check your kidney function.  Follow up with your PCP and pulmonologist within 1 week of discharge.

## 2017-12-29 NOTE — H&P ADULT - PROBLEM SELECTOR PLAN 7
-Continue home meds of Xanax, venlafaxine, buproprion. Patient will bring Saphris from home - Resistant HTN: continue home meds of labetalol, hydralazine, amlodipine, HCTZ, isosorbide mononitrate ER with hold parameters  -DASH diet

## 2017-12-29 NOTE — PHYSICAL THERAPY INITIAL EVALUATION ADULT - RANGE OF MOTION EXAMINATION, REHAB EVAL
grossly assessed/bilateral upper extremity ROM was WNL (within normal limits)/bilateral lower extremity was ROM was WNL (within normal limits)

## 2017-12-29 NOTE — CHART NOTE - NSCHARTNOTEFT_GEN_A_CORE
Called by RN that tele tech reported an questionable AV node block. Patient seen and examined at bedside, currently sleeping. Denies chest pain, palpitations, SOB.     Vital Signs Last 24 Hrs  T(C): 37.1 (29 Dec 2017 05:42), Max: 37.2 (29 Dec 2017 00:41)  T(F): 98.8 (29 Dec 2017 05:42), Max: 99 (29 Dec 2017 00:41)  HR: 92 (29 Dec 2017 05:42) (87 - 107)  BP: 121/82 (29 Dec 2017 05:42) (113/75 - 162/79)  BP(mean): --  RR: 18 (29 Dec 2017 05:42) (17 - 20)  SpO2: 95% (29 Dec 2017 05:42) (93% - 96%)    Physical Exam:  General: obese male in NAD   HEENT: NCAT, PERRL, EOMI bl  Neurology: A&Ox3, nonfocal, grossly moves all extremities    Respiratory: decreased breath sounds at bases  CV: RRR, +S1/S2, no murmurs, rubs or gallops  Abdominal: Soft, NT, ND    A/P: 77M with PMH of chronic diastolic CHF (normal LVF 2016), non obstructive CAD (cardiac cath 2015), interstitial lung disease, HTN, HLD, DM, GERD, BPH, depression, anxiety presents with SOB, now with AV block per tele tech.  -Stat EKG: sinus rhythm with 1st degree AV block and inferior infarct, appears unchanged from EKG on Aug 27/17  -Physical exam not consistent with a fib  -Patient currently asymptomatic, will continue to monitor  -RN to call for any changes

## 2017-12-29 NOTE — PROGRESS NOTE ADULT - PROBLEM SELECTOR PLAN 2
Patient has hx of chronic lung disease, pulmonary fibrosis with element of COPD.  -Consult (Lakisha)  -[place], steroids

## 2017-12-29 NOTE — H&P ADULT - PROBLEM SELECTOR PLAN 1
-Admit to [facility] w/ remote telemetry for community acquired PNA. Patient has hx of chronic lung disease, pulmonary fibrosis with element of COPD.   -Continue Rocephin, zithromax  -Consult (Lakisha)  -F/u blood cx, urine cx  -Fall precautions  -Diet: DASH with consistent carbs  -Duonebs, steroids  Activity: OOB with assistance -Admit to [facility] w/ remote telemetry for community acquired PNA. Currently no leukocytosis with left shift or fevers however clinically patient is hypoxic and imaging showed consolidation in RLL, representing PNA.  -Maintain O2>92%, patient on home O2, unclear how much  -Continue Rocephin, Zithromax  -Consult (Lakisha)  -F/u blood cx, urine cx  -Fall precautions  -F/u Am Labs  -Diet: DASH with consistent carbs  Activity: OOB with assistance -Admit to [facility] w/ remote telemetry for community acquired PNA. Currently no leukocytosis with left shift or fevers however clinically patient is hypoxic and imaging showed consolidation in RLL, representing PNA with abnormal PCT.   -Maintain O2>92%, patient on home O2, unclear how much  -Continue Rocephin, Zithromax  -Consult (Lakisha)  -F/u blood cx, urine cx  -Fall precautions  -F/u Am Labs  -Diet: DASH with consistent carbs  Activity: OOB with assistance

## 2017-12-29 NOTE — DISCHARGE NOTE ADULT - CARE PLAN
Principal Discharge DX:	Pneumonia  Goal:	prevent recurrence of infection  Secondary Diagnosis:	Abnormal EKG  Secondary Diagnosis:	Congestive heart failure  Secondary Diagnosis:	BPH with obstruction/lower urinary tract symptoms  Secondary Diagnosis:	Depression  Secondary Diagnosis:	Diabetes mellitus  Secondary Diagnosis:	Hypertension Principal Discharge DX:	Pneumonia  Goal:	prevent recurrence of infection  Instructions for follow-up, activity and diet:	You have completed 5 days of IV antibiotics.  Secondary Diagnosis:	Abnormal EKG  Instructions for follow-up, activity and diet:	Your EKG showed 1st degree AV jose block which was unchanged from a prior EKG.  Secondary Diagnosis:	Congestive heart failure  Instructions for follow-up, activity and diet:	Continue meds as above.  Secondary Diagnosis:	BPH with obstruction/lower urinary tract symptoms  Instructions for follow-up, activity and diet:	Continue meds as above.  Secondary Diagnosis:	Depression  Instructions for follow-up, activity and diet:	Continue meds as above.  Secondary Diagnosis:	Diabetes mellitus  Instructions for follow-up, activity and diet:	Continue meds as above.  Secondary Diagnosis:	Hypertension  Instructions for follow-up, activity and diet:	Continue meds as above. Principal Discharge DX:	Pneumonia  Goal:	prevent recurrence of infection  Instructions for follow-up, activity and diet:	You have completed a course of antibiotics (Azithromycin and Rocephin)   No need for further treatment.  Follow up with your PCP Dr. Bush.  Repeat CXR in 6 weeks.  Secondary Diagnosis:	Abnormal EKG  Instructions for follow-up, activity and diet:	Your EKG showed 1st degree AV jose block which was unchanged from a prior EKG.  Follow up with outpatient cardiologist.  Secondary Diagnosis:	Congestive heart failure  Instructions for follow-up, activity and diet:	You have diastolic CHF.   Continue meds as above.  Secondary Diagnosis:	BPH with obstruction/lower urinary tract symptoms  Instructions for follow-up, activity and diet:	Continue meds as above.  Secondary Diagnosis:	Depression  Instructions for follow-up, activity and diet:	Continue meds as above.  Secondary Diagnosis:	Diabetes mellitus  Instructions for follow-up, activity and diet:	Hold Janumet until you follow up with your PCP and have repeat bloodwork and ensure that your kidney function has recovered. (eGFG at time of discharge is 45).  Secondary Diagnosis:	Hypertension  Instructions for follow-up, activity and diet:	Continue home meds as above.  Your Hydralazine dose has been increased to 75 mg three times daily.  Do not take Hydrochlorothiazide (HCTZ). Principal Discharge DX:	Pneumonia  Goal:	prevent recurrence of infection  Instructions for follow-up, activity and diet:	You have completed a course of antibiotics (Azithromycin and Rocephin)   No need for further treatment.  Follow up with your PCP Dr. Bush.  Repeat CXR in 6 weeks.  Secondary Diagnosis:	Abnormal EKG  Instructions for follow-up, activity and diet:	Your EKG showed 1st degree AV jose block which was unchanged from a prior EKG.  Follow up with outpatient cardiologist.  Secondary Diagnosis:	Congestive heart failure  Instructions for follow-up, activity and diet:	You have diastolic CHF.   Continue meds as above.  Secondary Diagnosis:	Depression  Instructions for follow-up, activity and diet:	Continue meds as above.  Secondary Diagnosis:	Diabetes mellitus  Instructions for follow-up, activity and diet:	Continue meds as above.  Secondary Diagnosis:	Hypertension  Instructions for follow-up, activity and diet:	Hold Janumet until you follow up with your PCP and have repeat bloodwork and ensure that your kidney function has recovered. (eGFG at time of discharge is 45).  Instructions for follow-up, activity and diet:	Continue home meds as above.  Your Hydralazine dose has been increased to 75 mg three times daily.  Do not take Hydrochlorothiazide (HCTZ).

## 2017-12-29 NOTE — PHYSICAL THERAPY INITIAL EVALUATION ADULT - ADDITIONAL COMMENTS
Pt lives alone in a first floor apartment with no stairs. Pt owns a RW for ambulation and states he is independent with ADLs. Pt uses supplemental O2 via n/c at night. Pt drives, and has a son who lives 10 minutes away who stops by frequently. Pt states he would like to own a wheelchair in order for his son to travel with him long distances.

## 2017-12-29 NOTE — CONSULT NOTE ADULT - PROBLEM SELECTOR RECOMMENDATION 2
ILD - Pulm Fibrosis as per pt, however, CT chest does not necessarily support that  pt states that his PFTs were very poor in the office with Dr. Page  pt is home o2 dep.   pt uses walker to ambulate  cont NEBS  chest PT  I justyna  keep sat > 88 pct  ABG noted  CT chest reviewed  HOB elev  asp prec  PPI for possible GERD / acid reflux

## 2017-12-29 NOTE — H&P ADULT - HISTORY OF PRESENT ILLNESS
78yo M with PMH of chronic diastolic CHF (normal LVF 2016), non obstructive CAD (cardiac cath 2015), interstitial lung disease, HTN, HLD, DM, GERD, BPH, depression, anxiety presents with SOB.    In ED, VSS, lactate 1.2, procalcitonin elevated 0.28. CT chest showed interval increase in airspace opacity in the right lower lobe compatible with pneumonia; follow-up to resolution is recommended to exclude underlying neoplasm; again seen is interstitial lung disease. Received azithromycin, rocephin, solumedrol, albuterol treatment. 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 Patient states that he has had dyspnea fro the past 1.5 weeks along with a productive cough  with white sputum. Patient went to walk-in clinic today where CXR was performed and showed consolidation in the lung so patient was told to come to ED. Patient uses O2 at home at nighttime but is unsure how much. He admits to weakness, lethargy as well. Denies f/c, n/v, headaches.    In ED, VSS, lactate 1.2, procalcitonin elevated 0.28. CT chest showed interval increase in airspace opacity in the right lower lobe compatible with pneumonia; follow-up to resolution is recommended to exclude underlying neoplasm; again seen is interstitial lung disease. Received azithromycin, rocephin, solumedrol, albuterol treatment.

## 2017-12-29 NOTE — H&P ADULT - PROBLEM SELECTOR PLAN 5
-Currently at baseline with Cr function  -No IVF hydration necessary at this time -Hold home med of Janumet 50 mg, start low dose ISS

## 2017-12-29 NOTE — H&P ADULT - PROBLEM SELECTOR PLAN 6
- Resistant HTN: continue home meds of labetalol, hydralazine, amlodipine, HCTZ, isosorbide mononitrate ER with hold parameters  -DASH diet -Currently at baseline with Cr function  -No IVF hydration necessary at this time  -F/u Am BUN/Cr

## 2017-12-29 NOTE — DISCHARGE NOTE ADULT - NSTOBACCOHOTLINE_GEN_A_CS
[facility] Smokers Quitline ([id number]) [facility] Smokers Quitline ([id number]) [facility] Smokers Quitline ([phone]-[id number])

## 2017-12-29 NOTE — PROGRESS NOTE ADULT - PROBLEM SELECTOR PLAN 1
Tre Guzmanonald 85 y.o.   11/13/17    Subjective: Patient doing well has no complaints and states has gotten up to the bedside commode.  Objective: Vital signs stable still crackles in lungs and extremities with the  Vital Signs (last 72 hrs)       11/09 0700  -  11/10 0659 11/10 0700  -  11/11 0659 11/11 0700  -  11/12 0659 11/12 0700  -  11/13 0631   Most Recent    Temp (°F) 97.4 -  97.8    97.5 -  98.7    97.5 -  97.8    97.5 -  98     97.8 (36.6)    Heart Rate 78 -  89    68 -  83    79 -  81    77 -  92     80    Resp   18    18 -  20    18 -  20    18 -  20     20    /69 -  145/86    110/50 -  147/73    121/82 -  155/72    120/79 -  153/81     143/75    SpO2 (%) 97 -  99    93 -  98    92 -  97    94 -  97     96        Lab Results (last 72 hours)     Procedure Component Value Units Date/Time    Basic Metabolic Panel [[id number]]  (Abnormal) Collected:  11/10/17 0633    Specimen:  Blood Updated:  11/10/17 0717     Glucose 93 mg/dL      BUN 39 (H) mg/dL      Creatinine 2.03 (H) mg/dL      Sodium 140 mmol/L      Potassium 3.8 mmol/L      Chloride 104 mmol/L      CO2 30.2 mmol/L      Calcium 8.8 mg/dL      eGFR Non African Amer 23 (L) mL/min/1.73      BUN/Creatinine Ratio 19.2     Anion Gap 5.8 mmol/L     Narrative:       The MDRD GFR formula is only valid for adults with stable renal function between ages 18 and 70.    Osmolality, Calculated [[id number]]  (Normal) Collected:  11/10/17 0633    Specimen:  Blood Updated:  11/10/17 0717     Osmolality Calc 288.5 mOsm/kg     Protime-INR [[id number]]  (Abnormal) Collected:  11/11/17 0307    Specimen:  Blood Updated:  11/11/17 0337     Protime 29.5 (H) Seconds       Note new Reference Range        INR 2.75 (H)    Narrative:       Suggested INR therapeutic range for stable oral anticoagulant therapy:    Low Intensity therapy:   1.5-2.0  Moderate Intensity therapy:   2.0-3.0  High Intensity therapy:   2.5-4.0    CK [[id number]]  (Normal) Collected:  11/11/17 1843     Specimen:  Blood Updated:  11/11/17 1921     Creatine Kinase 32 U/L     Troponin [[id number]]  (Normal) Collected:  11/11/17 1844    Specimen:  Blood Updated:  11/11/17 1924     Troponin I 0.040 ng/mL     Narrative:       Ultra Troponin I Reference Range:         <=0.039 ng/mL: Negative    0.04-0.779 ng/mL: Indeterminate Range. Suspicious of MI.  Clinical correlation required.       >=0.78  ng/mL: Consistent with myocardial injury.  Clinical correlation required.    CK-MB [[id number]]  (Normal) Collected:  11/11/17 1844    Specimen:  Blood Updated:  11/11/17 1924     CKMB 1.79 ng/mL     CK-MB Index [[id number]]  (Abnormal) Collected:  11/11/17 1844    Specimen:  Blood Updated:  11/11/17 1924     CK-MB Index 5.6 (H) %     CBC & Differential [[id number]] Collected:  11/12/17 0026    Specimen:  Blood Updated:  11/12/17 0046    Narrative:       The following orders were created for panel order CBC & Differential.  Procedure                               Abnormality         Status                     ---------                               -----------         ------                     CBC Auto Differential[[id number]]        Abnormal            Final result                 Please view results for these tests on the individual orders.    CBC Auto Differential [[id number]]  (Abnormal) Collected:  11/12/17 0026    Specimen:  Blood Updated:  11/12/17 0046     WBC 4.83 10*3/mm3      RBC 3.51 (L) 10*6/mm3      Hemoglobin 9.7 (L) g/dL      Hematocrit 30.9 (L) %      MCV 88.0 fL      MCH 27.6 pg      MCHC 31.4 (L) g/dL      RDW 15.9 (H) %      RDW-SD 50.0 fl      MPV 9.6 fL      Platelets 172 10*3/mm3      Neutrophil % 71.3 %      Lymphocyte % 14.7 (L) %      Monocyte % 9.3 %      Eosinophil % 4.1 %      Basophil % 0.6 %      Immature Grans % 0.0 %      Neutrophils, Absolute 3.44 10*3/mm3      Lymphocytes, Absolute 0.71 (L) 10*3/mm3      Monocytes, Absolute 0.45 10*3/mm3      Eosinophils, Absolute 0.20 10*3/mm3      Basophils,  Absolute 0.03 10*3/mm3      Immature Grans, Absolute 0.00 10*3/mm3     Protime-INR [[id number]]  (Abnormal) Collected:  11/12/17 0026    Specimen:  Blood Updated:  11/12/17 0054     Protime 28.6 (H) Seconds       Note new Reference Range        INR 2.64 (H)    Narrative:       Suggested INR therapeutic range for stable oral anticoagulant therapy:    Low Intensity therapy:   1.5-2.0  Moderate Intensity therapy:   2.0-3.0  High Intensity therapy:   2.5-4.0    Basic Metabolic Panel [[id number]]  (Abnormal) Collected:  11/12/17 0026    Specimen:  Blood Updated:  11/12/17 0105     Glucose 110 mg/dL      BUN 44 (H) mg/dL      Creatinine 2.01 (H) mg/dL      Sodium 136 mmol/L      Potassium 4.0 mmol/L      Chloride 101 mmol/L      CO2 31.2 mmol/L      Calcium 8.5 mg/dL      eGFR Non African Amer 24 (L) mL/min/1.73      BUN/Creatinine Ratio 21.9     Anion Gap 3.8 mmol/L     Narrative:       The MDRD GFR formula is only valid for adults with stable renal function between ages 18 and 70.    Osmolality, Calculated [[id number]]  (Normal) Collected:  11/12/17 0026    Specimen:  Blood Updated:  11/12/17 0105     Osmolality Calc 283.8 mOsm/kg     Troponin [[id number]]  (Abnormal) Collected:  11/12/17 0026    Specimen:  Blood Updated:  11/12/17 0114     Troponin I 0.046 (H) ng/mL     Narrative:       Ultra Troponin I Reference Range:         <=0.039 ng/mL: Negative    0.04-0.779 ng/mL: Indeterminate Range. Suspicious of MI.  Clinical correlation required.       >=0.78  ng/mL: Consistent with myocardial injury.  Clinical correlation required.    Troponin [[id number]]  (Abnormal) Collected:  11/12/17 0657    Specimen:  Blood Updated:  11/12/17 0754     Troponin I 0.043 (H) ng/mL     Narrative:       Ultra Troponin I Reference Range:         <=0.039 ng/mL: Negative    0.04-0.779 ng/mL: Indeterminate Range. Suspicious of MI.  Clinical correlation required.       >=0.78  ng/mL: Consistent with myocardial injury.  Clinical correlation  required.    CBC & Differential [[id number]] Collected:  11/13/17 0022    Specimen:  Blood Updated:  11/13/17 0141    Narrative:       The following orders were created for panel order CBC & Differential.  Procedure                               Abnormality         Status                     ---------                               -----------         ------                     CBC Auto Differential[[id number]]        Abnormal            Final result                 Please view results for these tests on the individual orders.    CBC Auto Differential [[id number]]  (Abnormal) Collected:  11/13/17 0022    Specimen:  Blood Updated:  11/13/17 0141     WBC 4.08 (L) 10*3/mm3      RBC 3.61 (L) 10*6/mm3      Hemoglobin 9.8 (L) g/dL      Hematocrit 31.8 (L) %      MCV 88.1 fL      MCH 27.1 pg      MCHC 30.8 (L) g/dL      RDW 15.8 (H) %      RDW-SD 49.7 fl      MPV 10.3 (H) fL      Platelets 165 10*3/mm3      Neutrophil % 71.4 %      Lymphocyte % 12.0 (L) %      Monocyte % 11.0 %      Eosinophil % 4.9 %      Basophil % 0.7 %      Immature Grans % 0.0 %      Neutrophils, Absolute 2.91 10*3/mm3      Lymphocytes, Absolute 0.49 (L) 10*3/mm3      Monocytes, Absolute 0.45 10*3/mm3      Eosinophils, Absolute 0.20 10*3/mm3      Basophils, Absolute 0.03 10*3/mm3      Immature Grans, Absolute 0.00 10*3/mm3     Protime-INR [[id number]]  (Abnormal) Collected:  11/13/17 0022    Specimen:  Blood Updated:  11/13/17 0148     Protime 29.6 (H) Seconds       Note new Reference Range        INR 2.77 (H)    Narrative:       Suggested INR therapeutic range for stable oral anticoagulant therapy:    Low Intensity therapy:   1.5-2.0  Moderate Intensity therapy:   2.0-3.0  High Intensity therapy:   2.5-4.0    Basic Metabolic Panel [[id number]]  (Abnormal) Collected:  11/13/17 0022    Specimen:  Blood Updated:  11/13/17 0202     Glucose 120 (H) mg/dL      BUN 44 (H) mg/dL      Creatinine 2.03 (H) mg/dL      Sodium 136 mmol/L      Potassium 3.7 mmol/L       Chloride 101 mmol/L      CO2 31.3 mmol/L      Calcium 8.6 mg/dL      eGFR Non African Amer 23 (L) mL/min/1.73      BUN/Creatinine Ratio 21.7     Anion Gap 3.7 mmol/L     Narrative:       The MDRD GFR formula is only valid for adults with stable renal function between ages 18 and 70.    Osmolality, Calculated [[id number]]  (Normal) Collected:  11/13/17 0022    Specimen:  Blood Updated:  11/13/17 0202     Osmolality Calc 284.3 mOsm/kg         Imaging Results (last 24 hours)     ** No results found for the last 24 hours. **        Assessment:Active Problems:    CHF (congestive heart failure)    Congestive heart failure systolic continue with diuresis and nephrology consult for elevated creatinine  Plan:   -Admit to [facility] w/ remote telemetry for community acquired PNA. Currently no leukocytosis with left shift or fevers however clinically patient is hypoxic and imaging showed consolidation in RLL, representing PNA with abnormal PCT.   -Maintain O2>92%, patient on home O2, unclear how much  -Continue Rocephin, Zithromax  -Consult (Lakisha)  -F/u blood cx, urine cx  -Fall precautions  -F/u Am Labs  -Diet: DASH with consistent carbs  Activity: OOB with assistance

## 2017-12-29 NOTE — CONSULT NOTE ADULT - PROBLEM SELECTOR RECOMMENDATION 5
LRTI  procalcitonin noted  RVP neg  on emp ABX  assess speech and swallow eval  HOB elev  oral hygiene  skin care  attempt more out of bed  I justyna  NEBS and chest pt  increase activity as tolerated

## 2017-12-29 NOTE — PROGRESS NOTE ADULT - PROBLEM SELECTOR PLAN 3
-chronic diastolic CHF (normal LVF 2016)  -stable, currently not volume overloaded. Patient not on lasix at this time  -Monitor closely for symptoms of volume overload

## 2017-12-29 NOTE — PROGRESS NOTE ADULT - ASSESSMENT
78yo M with PMH of chronic diastolic CHF (normal LVF 2016), non obstructive CAD (cardiac cath 2015), interstitial lung disease, HTN, HLD, DM, GERD, BPH, depression, anxiety presents with SOB. Admitted to [facility] with remote telemetry for community acquired PNA.

## 2017-12-29 NOTE — H&P ADULT - PROBLEM SELECTOR PLAN 4
-Hold home med of Janumet 50 mg, start low dose ISS - EKG read as atrial fibrillation however looks like AV block. Compared with prior (which showed 1st degree AV Heart block). R/o new onset atrial fibrillation. Troponins WNL  -Cardio consult (Esvin)

## 2017-12-29 NOTE — PROGRESS NOTE ADULT - SUBJECTIVE AND OBJECTIVE BOX
Patient is a 77y old  Male who presents with a chief complaint of SOB, s/p fall early sat am per son (29 Dec 2017 03:18)   SOB    INTERVAL HPI/OVERNIGHT EVENTS: no acute events overnight, patient resting comfortably      MEDICATIONS  (STANDING):  ALBUTerol/ipratropium for Nebulization 3 milliLiter(s) Nebulizer every 8 hours  ALPRAZolam 2 milliGRAM(s) Oral three times a day  amLODIPine   Tablet 10 milliGRAM(s) Oral daily  aspirin enteric coated 81 milliGRAM(s) Oral daily  azithromycin  IVPB 500 milliGRAM(s) IV Intermittent every 24 hours  buPROPion  milliGRAM(s) Oral two times a day  cefTRIAXone   IVPB 1 Gram(s) IV Intermittent every 24 hours  dextrose 5%. 1000 milliLiter(s) (50 mL/Hr) IV Continuous <Continuous>  dextrose 50% Injectable 12.5 Gram(s) IV Push once  dextrose 50% Injectable 25 Gram(s) IV Push once  dextrose 50% Injectable 25 Gram(s) IV Push once  enoxaparin Injectable 40 milliGRAM(s) SubCutaneous daily  hydrALAZINE 50 milliGRAM(s) Oral three times a day  hydrochlorothiazide 12.5 milliGRAM(s) Oral daily  insulin lispro (HumaLOG) corrective regimen sliding scale   SubCutaneous three times a day before meals  isosorbide   mononitrate ER Tablet (IMDUR) 60 milliGRAM(s) Oral daily  labetalol 200 milliGRAM(s) Oral three times a day  methylPREDNISolone sodium succinate Injectable 40 milliGRAM(s) IV Push every 8 hours  venlafaxine 150 milliGRAM(s) Oral two times a day with meals    MEDICATIONS  (PRN):  dextrose Gel 1 Dose(s) Oral once PRN Blood Glucose LESS THAN 70 milliGRAM(s)/deciliter  glucagon  Injectable 1 milliGRAM(s) IntraMuscular once PRN Glucose LESS THAN 70 milligrams/deciliter      Allergies    No Known Allergies    Intolerances    REVIEW OF SYSTEMS:  CONSTITUTIONAL: No fever, weight loss, or fatigue  EYES: No eye pain, visual disturbances, or discharge  ENMT:  No difficulty hearing, tinnitus, vertigo; No sinus or throat pain  NECK: No pain or stiffness  BREASTS: No pain, masses, or nipple discharge  RESPIRATORY: No cough, wheezing, chills or hemoptysis; No shortness of breath  CARDIOVASCULAR: No chest pain, palpitations, dizziness, or leg swelling  GASTROINTESTINAL: No abdominal or epigastric pain. No nausea, vomiting, or hematemesis; No diarrhea or constipation. No melena or hematochezia.  GENITOURINARY: No dysuria, frequency, hematuria, or incontinence  NEUROLOGICAL: No headaches, memory loss, loss of strength, numbness, or tremors  SKIN: No itching, burning, rashes, or lesions   LYMPH NODES: No enlarged glands  ENDOCRINE: No heat or cold intolerance; No hair loss; No polydipsia or polyuria  MUSCULOSKELETAL: No joint pain or swelling; No muscle, back, or extremity pain  PSYCHIATRIC: No depression, anxiety, mood swings, or difficulty sleeping  HEME/LYMPH: No easy bruising, or bleeding gums  ALLERGY AND IMMUNOLOGIC: No hives or eczema    Vital Signs Last 24 Hrs  T(C): 37.1 (29 Dec 2017 05:42), Max: 37.2 (29 Dec 2017 00:41)  T(F): 98.8 (29 Dec 2017 05:42), Max: 99 (29 Dec 2017 00:41)  HR: 92 (29 Dec 2017 05:42) (87 - 107)  BP: 121/82 (29 Dec 2017 05:42) (113/75 - 162/79)  BP(mean): --  RR: 18 (29 Dec 2017 05:42) (17 - 20)  SpO2: 95% (29 Dec 2017 05:42) (93% - 96%)    PHYSICAL EXAM:  GENERAL: No acute distress, well-groomed, well-developed  HEAD:  Atraumatic, Normocephalic  EYES: EOMI, PERRL, conjunctiva and sclera clear  ENMT: No tonsillar erythema, exudates, or enlargement; Moist mucous membranes, Good dentition, No lesions  NECK: Supple, No Jugular Venous Distension, Normal thyroid  NERVOUS SYSTEM:  Alert & Oriented X3, Good concentration; Motor Strength 5/5 B/L upper and lower extremities; Deep Tendon Reflexess 2+ intact and symmetric  CHEST/LUNG: decreased right sided breath sounds, poor inspiratory effort; No rales, rhonchi, wheezing, or rubs  HEART: Regular rate and rhythm; No murmurs, rubs, or gallops  ABDOMEN: Soft, Nontender, Nondistended; Bowel sounds present  EXTREMITIES:  2+ Peripheral Pulses, No clubbing, cyanosis, or edema  LYMPH: No lymphadenopathy noted  SKIN: No rashes or lesions    LABS:                        11.1   9.4   )-----------( 299      ( 28 Dec 2017 20:37 )             34.7     28 Dec 2017 20:37    136    |  101    |  33     ----------------------------<  120    4.3     |  25     |  1.40     Ca    8.9        28 Dec 2017 20:37    TPro  6.8    /  Alb  2.6    /  TBili  0.4    /  DBili  x      /  AST  17     /  ALT  14     /  AlkPhos  24     28 Dec 2017 20:37    PT/INR - ( 28 Dec 2017 20:37 )   PT: 16.5 sec;   INR: 1.50 ratio         PTT - ( 28 Dec 2017 20:37 )  PTT:33.0 sec  CAPILLARY BLOOD GLUCOSE Patient is a 77y old  Male who presents with a chief complaint of SOB, s/p fall early sat am per son (29 Dec 2017 03:18)   SOB    INTERVAL HPI/OVERNIGHT EVENTS: no acute events overnight, patient complaining did not get good sleep, currently on 3L NC, had 1 episode of diarrhea     MEDICATIONS  (STANDING):  ALBUTerol/ipratropium for Nebulization 3 milliLiter(s) Nebulizer every 8 hours  ALPRAZolam 2 milliGRAM(s) Oral three times a day  amLODIPine   Tablet 10 milliGRAM(s) Oral daily  aspirin enteric coated 81 milliGRAM(s) Oral daily  azithromycin  IVPB 500 milliGRAM(s) IV Intermittent every 24 hours  buPROPion  milliGRAM(s) Oral two times a day  cefTRIAXone   IVPB 1 Gram(s) IV Intermittent every 24 hours  dextrose 5%. 1000 milliLiter(s) (50 mL/Hr) IV Continuous <Continuous>  dextrose 50% Injectable 12.5 Gram(s) IV Push once  dextrose 50% Injectable 25 Gram(s) IV Push once  dextrose 50% Injectable 25 Gram(s) IV Push once  enoxaparin Injectable 40 milliGRAM(s) SubCutaneous daily  hydrALAZINE 50 milliGRAM(s) Oral three times a day  hydrochlorothiazide 12.5 milliGRAM(s) Oral daily  insulin lispro (HumaLOG) corrective regimen sliding scale   SubCutaneous three times a day before meals  isosorbide   mononitrate ER Tablet (IMDUR) 60 milliGRAM(s) Oral daily  labetalol 200 milliGRAM(s) Oral three times a day  methylPREDNISolone sodium succinate Injectable 40 milliGRAM(s) IV Push every 8 hours  venlafaxine 150 milliGRAM(s) Oral two times a day with meals    MEDICATIONS  (PRN):  dextrose Gel 1 Dose(s) Oral once PRN Blood Glucose LESS THAN 70 milliGRAM(s)/deciliter  glucagon  Injectable 1 milliGRAM(s) IntraMuscular once PRN Glucose LESS THAN 70 milligrams/deciliter      Allergies    No Known Allergies    Intolerances    REVIEW OF SYSTEMS:  CONSTITUTIONAL: No fever, weight loss, or fatigue  EYES: No eye pain, visual disturbances, or discharge  ENMT:  No difficulty hearing, tinnitus, vertigo; No sinus or throat pain  NECK: No pain or stiffness  BREASTS: No pain, masses, or nipple discharge  RESPIRATORY: + shortness of breath, No cough, wheezing, chills or hemoptysis;   CARDIOVASCULAR: No chest pain, palpitations, dizziness, or leg swelling  GASTROINTESTINAL: + 1 episode diarrhea, No abdominal or epigastric pain. No nausea, vomiting, or hematemesis; No constipation. No melena or hematochezia.  GENITOURINARY: No dysuria, frequency, hematuria, or incontinence  NEUROLOGICAL: No headaches, memory loss, loss of strength, numbness, or tremors  SKIN: No itching, burning, rashes, or lesions   LYMPH NODES: No enlarged glands  ENDOCRINE: No heat or cold intolerance; No hair loss; No polydipsia or polyuria  MUSCULOSKELETAL: No joint pain or swelling; No muscle, back, or extremity pain  PSYCHIATRIC: No depression, anxiety, mood swings, or difficulty sleeping  HEME/LYMPH: No easy bruising, or bleeding gums  ALLERGY AND IMMUNOLOGIC: No hives or eczema    Vital Signs Last 24 Hrs  T(C): 37.1 (29 Dec 2017 05:42), Max: 37.2 (29 Dec 2017 00:41)  T(F): 98.8 (29 Dec 2017 05:42), Max: 99 (29 Dec 2017 00:41)  HR: 92 (29 Dec 2017 05:42) (87 - 107)  BP: 121/82 (29 Dec 2017 05:42) (113/75 - 162/79)  BP(mean): --  RR: 18 (29 Dec 2017 05:42) (17 - 20)  SpO2: 95% (29 Dec 2017 05:42) (93% - 96%)    PHYSICAL EXAM:  GENERAL: No acute distress, well-groomed, well-developed  HEAD:  Atraumatic, Normocephalic  EYES: EOMI, PERRL, conjunctiva and sclera clear  ENMT: No tonsillar erythema, exudates, or enlargement; Moist mucous membranes, Good dentition, No lesions  NECK: Supple, No Jugular Venous Distension, Normal thyroid  NERVOUS SYSTEM:  Alert & Oriented X3, Good concentration; Motor Strength 5/5 B/L upper and lower extremities; Deep Tendon Reflexess 2+ intact and symmetric  CHEST/LUNG: decreased right sided breath sounds, poor inspiratory effort; No rales, rhonchi, wheezing, or rubs  HEART: Regular rate and rhythm; No murmurs, rubs, or gallops  ABDOMEN: Soft, Nontender, Nondistended; Bowel sounds present  EXTREMITIES:  2+ Peripheral Pulses, No clubbing, cyanosis, or edema  LYMPH: No lymphadenopathy noted  SKIN: No rashes or lesions    LABS:                        11.1   9.4   )-----------( 299      ( 28 Dec 2017 20:37 )             34.7     28 Dec 2017 20:37    136    |  101    |  33     ----------------------------<  120    4.3     |  25     |  1.40     Ca    8.9        28 Dec 2017 20:37    TPro  6.8    /  Alb  2.6    /  TBili  0.4    /  DBili  x      /  AST  17     /  ALT  14     /  AlkPhos  24     28 Dec 2017 20:37    PT/INR - ( 28 Dec 2017 20:37 )   PT: 16.5 sec;   INR: 1.50 ratio         PTT - ( 28 Dec 2017 20:37 )  PTT:33.0 sec  CAPILLARY BLOOD GLUCOSE Patient is a 77y old  Male who presents with a chief complaint of SOB, s/p fall early sat am per son (29 Dec 2017 03:18)   SOB    INTERVAL HPI/OVERNIGHT EVENTS: no acute events overnight, patient complaining did not get good sleep, currently on 3L NC, had 1 episode of diarrhea     MEDICATIONS  (STANDING):  ALBUTerol/ipratropium for Nebulization 3 milliLiter(s) Nebulizer every 8 hours  ALPRAZolam 2 milliGRAM(s) Oral three times a day  amLODIPine   Tablet 10 milliGRAM(s) Oral daily  aspirin enteric coated 81 milliGRAM(s) Oral daily  azithromycin  IVPB 500 milliGRAM(s) IV Intermittent every 24 hours  buPROPion  milliGRAM(s) Oral two times a day  cefTRIAXone   IVPB 1 Gram(s) IV Intermittent every 24 hours  dextrose 5%. 1000 milliLiter(s) (50 mL/Hr) IV Continuous <Continuous>  dextrose 50% Injectable 12.5 Gram(s) IV Push once  dextrose 50% Injectable 25 Gram(s) IV Push once  dextrose 50% Injectable 25 Gram(s) IV Push once  enoxaparin Injectable 40 milliGRAM(s) SubCutaneous daily  hydrALAZINE 50 milliGRAM(s) Oral three times a day  hydrochlorothiazide 12.5 milliGRAM(s) Oral daily  insulin lispro (HumaLOG) corrective regimen sliding scale   SubCutaneous three times a day before meals  isosorbide   mononitrate ER Tablet (IMDUR) 60 milliGRAM(s) Oral daily  labetalol 200 milliGRAM(s) Oral three times a day  methylPREDNISolone sodium succinate Injectable 40 milliGRAM(s) IV Push every 8 hours  venlafaxine 150 milliGRAM(s) Oral two times a day with meals    MEDICATIONS  (PRN):  dextrose Gel 1 Dose(s) Oral once PRN Blood Glucose LESS THAN 70 milliGRAM(s)/deciliter  glucagon  Injectable 1 milliGRAM(s) IntraMuscular once PRN Glucose LESS THAN 70 milligrams/deciliter      Allergies    No Known Allergies    Intolerances    REVIEW OF SYSTEMS:  CONSTITUTIONAL: No fever, weight loss, or fatigue  EYES: No eye pain, visual disturbances, or discharge  ENMT:  No difficulty hearing, tinnitus, vertigo; No sinus or throat pain  NECK: No pain or stiffness  RESPIRATORY: + shortness of breath, No cough, wheezing, chills or hemoptysis;   CARDIOVASCULAR: No chest pain, palpitations, dizziness, or leg swelling  GASTROINTESTINAL: + 1 episode diarrhea, No abdominal or epigastric pain. No nausea, vomiting, or hematemesis; No constipation. No melena or hematochezia.  NEUROLOGICAL: No headaches, memory loss, loss of strength, numbness, or tremors  SKIN: No itching, burning, rashes, or lesions   LYMPH NODES: No enlarged glands  MUSCULOSKELETAL: No joint pain or swelling; No muscle, back, or extremity pain    Vital Signs Last 24 Hrs  T(C): 37.1 (29 Dec 2017 05:42), Max: 37.2 (29 Dec 2017 00:41)  T(F): 98.8 (29 Dec 2017 05:42), Max: 99 (29 Dec 2017 00:41)  HR: 92 (29 Dec 2017 05:42) (87 - 107)  BP: 121/82 (29 Dec 2017 05:42) (113/75 - 162/79)  BP(mean): --  RR: 18 (29 Dec 2017 05:42) (17 - 20)  SpO2: 95% (29 Dec 2017 05:42) (93% - 96%)    PHYSICAL EXAM:  GENERAL: No acute distress, well-groomed, well-developed  HEAD:  Atraumatic, Normocephalic  EYES: EOMI, PERRL, conjunctiva and sclera clear  ENMT: No tonsillar erythema, exudates, or enlargement; Moist mucous membranes, Good dentition, No lesions  NECK: Supple, No Jugular Venous Distension, Normal thyroid  CHEST/LUNG: decreased right sided breath sounds, poor inspiratory effort; No rales, rhonchi, wheezing, or rubs  HEART: Regular rate and rhythm; No murmurs, rubs, or gallops  ABDOMEN: Soft, Nontender, Nondistended; Bowel sounds present  EXTREMITIES:  2+ Peripheral Pulses, No clubbing, cyanosis, or edema  LYMPH: No lymphadenopathy noted  SKIN: No rashes or lesions    LABS:                        11.1   9.4   )-----------( 299      ( 28 Dec 2017 20:37 )             34.7     28 Dec 2017 20:37    136    |  101    |  33     ----------------------------<  120    4.3     |  25     |  1.40     Ca    8.9        28 Dec 2017 20:37    TPro  6.8    /  Alb  2.6    /  TBili  0.4    /  DBili  x      /  AST  17     /  ALT  14     /  AlkPhos  24     28 Dec 2017 20:37    PT/INR - ( 28 Dec 2017 20:37 )   PT: 16.5 sec;   INR: 1.50 ratio         PTT - ( 28 Dec 2017 20:37 )  PTT:33.0 sec  CAPILLARY BLOOD GLUCOSE

## 2017-12-29 NOTE — DISCHARGE NOTE ADULT - SECONDARY DIAGNOSIS.
Abnormal EKG Congestive heart failure BPH with obstruction/lower urinary tract symptoms Depression Diabetes mellitus Hypertension

## 2017-12-29 NOTE — H&P ADULT - NSHPSOCIALHISTORY_GEN_ALL_CORE
Social History:    Marital Status:  (   )    (   ) Single    (   )    (  )   Occupation:   Lives with: (  ) alone  (  ) children   (  ) spouse   (  ) parents  (  ) other    Substance Use (street drugs): (  ) never used  (  ) other:  Tobacco Usage:  (   ) never smoked   (   ) former smoker   (   ) current smoker  (     ) pack year  (        ) last cigarette date  Alcohol Usage:      Health Management    For male:  Last prostate exam: (     ) No  (    ) Yes  (    ) Date  (    ) Normal    Immunization Hx:   (  ) flu shot                               (     ) date   (  ) pneumonia shot               (     ) date  (  ) tetanus                               (     ) date     (     ) Advanced Directives: (     ) None    (      ) DNR    (     ) DNI    (     ) Health Care Proxy: Social History:      Occupation: retired    Lives with: (  ) alone  ( x ) children   (  ) spouse   (  ) parents  (  ) other    Substance Use (street drugs): ( x ) never used  (  ) other:  Tobacco Usage:  (   ) never smoked   ( x  ) former smoker   (   ) current smoker  ( 61    ) pack year  (   August 2017     ) last cigarette date  Alcohol Usage:      Immunization Hx:   (  x) flu shot                               (  2017   ) date

## 2017-12-29 NOTE — CONSULT NOTE ADULT - SUBJECTIVE AND OBJECTIVE BOX
Date/Time Patient Seen:  		  Referring MD:   Data Reviewed	       Patient is a 77y old  Male who presents with a chief complaint of SOB, s/p fall early sat am per son (29 Dec 2017 14:41)      Subjective/HPI  in bed  seen and examined  vs and meds reviewed  on ABX  on BP Regimen  on o2, home o2 dep, pt sees Dr. Page for Pulmonary Medicine  as per pt states that he was diagnosed with ILD  Pulmonary Fibrosis  pt reports several days of sx, cough, sob, dior, worse than usual  admitted for eval and tx of LRTI    76yo M with PMH of chronic diastolic CHF (normal LVF 2016), non obstructive CAD (cardiac cath 2015), interstitial lung disease, HTN, HLD, DMT2 on Janumet, GERD, BPH, depression, anxiety presents with SOB. Patient states that he has had dyspnea fro the past 1.5 weeks along with a productive cough  with white sputum. Patient went to walk-in clinic today where CXR was performed and showed consolidation in the lung so patient was told to come to ED. Patient uses O2 at home at nighttime but is unsure how much. He admits to weakness, lethargy as well. Denies f/c, n/v, headaches.    In ED, VSS, lactate 1.2, procalcitonin elevated 0.28. CT chest showed interval increase in airspace opacity in the right lower lobe compatible with pneumonia; follow-up to resolution is recommended to exclude underlying neoplasm; again seen is interstitial lung disease. Received azithromycin, rocephin, solumedrol, albuterol treatment.      PAST MEDICAL & SURGICAL HISTORY:  Congestive heart failure  Pulmonary fibrosis  Pulmonary fibrosis  Smoker  BPH with obstruction/lower urinary tract symptoms  Vitamin D deficiency  GERD (gastroesophageal reflux disease)  Diabetes mellitus  Hyperlipidemia  Hypertension  S/P tonsillectomy  No significant past surgical history        Medication list         MEDICATIONS  (STANDING):  ALBUTerol/ipratropium for Nebulization 3 milliLiter(s) Nebulizer every 8 hours  ALPRAZolam 2 milliGRAM(s) Oral three times a day  amLODIPine   Tablet 10 milliGRAM(s) Oral daily  aspirin enteric coated 81 milliGRAM(s) Oral daily  azithromycin  IVPB 500 milliGRAM(s) IV Intermittent every 24 hours  buPROPion  milliGRAM(s) Oral two times a day  cefTRIAXone   IVPB 1 Gram(s) IV Intermittent every 24 hours  dextrose 5%. 1000 milliLiter(s) (50 mL/Hr) IV Continuous <Continuous>  dextrose 50% Injectable 12.5 Gram(s) IV Push once  dextrose 50% Injectable 25 Gram(s) IV Push once  dextrose 50% Injectable 25 Gram(s) IV Push once  enoxaparin Injectable 40 milliGRAM(s) SubCutaneous daily  guaiFENesin  milliGRAM(s) Oral every 12 hours  hydrALAZINE 50 milliGRAM(s) Oral three times a day  hydrochlorothiazide 12.5 milliGRAM(s) Oral daily  insulin lispro (HumaLOG) corrective regimen sliding scale   SubCutaneous three times a day before meals  isosorbide   mononitrate ER Tablet (IMDUR) 60 milliGRAM(s) Oral daily  labetalol 200 milliGRAM(s) Oral three times a day  venlafaxine 150 milliGRAM(s) Oral two times a day with meals    MEDICATIONS  (PRN):  dextrose Gel 1 Dose(s) Oral once PRN Blood Glucose LESS THAN 70 milliGRAM(s)/deciliter  glucagon  Injectable 1 milliGRAM(s) IntraMuscular once PRN Glucose LESS THAN 70 milligrams/deciliter         Vitals log        ICU Vital Signs Last 24 Hrs  T(C): 37.2 (29 Dec 2017 13:24), Max: 37.2 (29 Dec 2017 00:41)  T(F): 98.9 (29 Dec 2017 13:24), Max: 99 (29 Dec 2017 00:41)  HR: 98 (29 Dec 2017 15:03) (82 - 107)  BP: 120/78 (29 Dec 2017 13:24) (113/75 - 162/79)  BP(mean): --  ABP: --  ABP(mean): --  RR: 18 (29 Dec 2017 13:24) (17 - 20)  SpO2: 92% (29 Dec 2017 15:03) (90% - 96%)           Input and Output:  I&O's Detail    28 Dec 2017 07:01  -  29 Dec 2017 07:00  --------------------------------------------------------  IN:    Oral Fluid: 600 mL  Total IN: 600 mL    OUT:  Total OUT: 0 mL    Total NET: 600 mL          Lab Data                        11.4   7.3   )-----------( 301      ( 29 Dec 2017 11:37 )             35.5     12-29    134<L>  |  98  |  27<H>  ----------------------------<  203<H>  4.6   |  27  |  1.40<H>    Ca    8.9      29 Dec 2017 11:37  Mg     1.8     12-29    TPro  6.8  /  Alb  2.6<L>  /  TBili  0.4  /  DBili  x   /  AST  17  /  ALT  14  /  AlkPhos  24<L>  12-28    ABG - ( 29 Dec 2017 02:45 )  pH: 7.43  /  pCO2: 36    /  pO2: 83    / HCO3: 24    / Base Excess: -0.1  /  SaO2: 96                CARDIAC MARKERS ( 28 Dec 2017 20:37 )  <.015 ng/mL / x     / 165 U/L / x     / 3.7 ng/mL    ex smoker  retired    lives alone  ambulates with walker  alert  frail  weak      Review of Systems	  sob  dior  depressed  weak      Objective     Physical Examination    psoriasis skin  head at  heart s1s2  lungs dim BS  no wheeze  abd soft  extr edema      Pertinent Lab findings & Imaging      Princess:  NO   Adequate UO     I&O's Detail    28 Dec 2017 07:01  -  29 Dec 2017 07:00  --------------------------------------------------------  IN:    Oral Fluid: 600 mL  Total IN: 600 mL    OUT:  Total OUT: 0 mL    Total NET: 600 mL               Discussed with:     Cultures:	        Radiology

## 2017-12-29 NOTE — PROGRESS NOTE ADULT - PROBLEM SELECTOR PLAN 7
- Resistant HTN: continue home meds of labetalol, hydralazine, amlodipine, HCTZ, isosorbide mononitrate ER with hold parameters  -DASH diet

## 2017-12-30 LAB
ANION GAP SERPL CALC-SCNC: 10 MMOL/L — SIGNIFICANT CHANGE UP (ref 5–17)
BUN SERPL-MCNC: 32 MG/DL — HIGH (ref 7–23)
CALCIUM SERPL-MCNC: 8.6 MG/DL — SIGNIFICANT CHANGE UP (ref 8.5–10.1)
CHLORIDE SERPL-SCNC: 98 MMOL/L — SIGNIFICANT CHANGE UP (ref 96–108)
CO2 SERPL-SCNC: 26 MMOL/L — SIGNIFICANT CHANGE UP (ref 22–31)
CREAT SERPL-MCNC: 1.2 MG/DL — SIGNIFICANT CHANGE UP (ref 0.5–1.3)
GLUCOSE SERPL-MCNC: 143 MG/DL — HIGH (ref 70–99)
HBA1C BLD-MCNC: 6.1 % — HIGH (ref 4–5.6)
HCT VFR BLD CALC: 32.4 % — LOW (ref 39–50)
HGB BLD-MCNC: 10.4 G/DL — LOW (ref 13–17)
MCHC RBC-ENTMCNC: 25.8 PG — LOW (ref 27–34)
MCHC RBC-ENTMCNC: 32.1 GM/DL — SIGNIFICANT CHANGE UP (ref 32–36)
MCV RBC AUTO: 80.2 FL — SIGNIFICANT CHANGE UP (ref 80–100)
PLATELET # BLD AUTO: 263 K/UL — SIGNIFICANT CHANGE UP (ref 150–400)
POTASSIUM SERPL-MCNC: 4.2 MMOL/L — SIGNIFICANT CHANGE UP (ref 3.5–5.3)
POTASSIUM SERPL-SCNC: 4.2 MMOL/L — SIGNIFICANT CHANGE UP (ref 3.5–5.3)
RBC # BLD: 4.05 M/UL — LOW (ref 4.2–5.8)
RBC # FLD: 15.3 % — HIGH (ref 10.3–14.5)
SODIUM SERPL-SCNC: 134 MMOL/L — LOW (ref 135–145)
WBC # BLD: 6.6 K/UL — SIGNIFICANT CHANGE UP (ref 3.8–10.5)
WBC # FLD AUTO: 6.6 K/UL — SIGNIFICANT CHANGE UP (ref 3.8–10.5)

## 2017-12-30 PROCEDURE — 99232 SBSQ HOSP IP/OBS MODERATE 35: CPT

## 2017-12-30 PROCEDURE — 99233 SBSQ HOSP IP/OBS HIGH 50: CPT | Mod: GC

## 2017-12-30 PROCEDURE — 93970 EXTREMITY STUDY: CPT | Mod: 26

## 2017-12-30 RX ADMIN — BUPROPION HYDROCHLORIDE 150 MILLIGRAM(S): 150 TABLET, EXTENDED RELEASE ORAL at 05:05

## 2017-12-30 RX ADMIN — Medication 20 MILLIGRAM(S): at 05:09

## 2017-12-30 RX ADMIN — CEFTRIAXONE 100 GRAM(S): 500 INJECTION, POWDER, FOR SOLUTION INTRAMUSCULAR; INTRAVENOUS at 05:04

## 2017-12-30 RX ADMIN — Medication 150 MILLIGRAM(S): at 18:55

## 2017-12-30 RX ADMIN — AMLODIPINE BESYLATE 10 MILLIGRAM(S): 2.5 TABLET ORAL at 05:05

## 2017-12-30 RX ADMIN — Medication 20 MILLIGRAM(S): at 18:55

## 2017-12-30 RX ADMIN — ENOXAPARIN SODIUM 40 MILLIGRAM(S): 100 INJECTION SUBCUTANEOUS at 11:50

## 2017-12-30 RX ADMIN — Medication 12.5 MILLIGRAM(S): at 05:05

## 2017-12-30 RX ADMIN — Medication 50 MILLIGRAM(S): at 22:46

## 2017-12-30 RX ADMIN — Medication 3 MILLILITER(S): at 08:03

## 2017-12-30 RX ADMIN — Medication 3 MILLILITER(S): at 16:24

## 2017-12-30 RX ADMIN — AZITHROMYCIN 255 MILLIGRAM(S): 500 TABLET, FILM COATED ORAL at 22:46

## 2017-12-30 RX ADMIN — Medication 600 MILLIGRAM(S): at 05:05

## 2017-12-30 RX ADMIN — Medication 81 MILLIGRAM(S): at 11:51

## 2017-12-30 RX ADMIN — Medication 600 MILLIGRAM(S): at 18:55

## 2017-12-30 RX ADMIN — Medication 50 MILLIGRAM(S): at 05:05

## 2017-12-30 RX ADMIN — Medication 200 MILLIGRAM(S): at 05:09

## 2017-12-30 RX ADMIN — Medication 200 MILLIGRAM(S): at 22:46

## 2017-12-30 RX ADMIN — PANTOPRAZOLE SODIUM 40 MILLIGRAM(S): 20 TABLET, DELAYED RELEASE ORAL at 05:13

## 2017-12-30 RX ADMIN — Medication 200 MILLIGRAM(S): at 15:21

## 2017-12-30 RX ADMIN — Medication 150 MILLIGRAM(S): at 11:51

## 2017-12-30 RX ADMIN — Medication 2: at 17:20

## 2017-12-30 RX ADMIN — BUPROPION HYDROCHLORIDE 150 MILLIGRAM(S): 150 TABLET, EXTENDED RELEASE ORAL at 18:55

## 2017-12-30 RX ADMIN — ISOSORBIDE MONONITRATE 60 MILLIGRAM(S): 60 TABLET, EXTENDED RELEASE ORAL at 11:51

## 2017-12-30 RX ADMIN — Medication 50 MILLIGRAM(S): at 15:21

## 2017-12-30 RX ADMIN — Medication 2 MILLIGRAM(S): at 15:21

## 2017-12-30 RX ADMIN — Medication 2 MILLIGRAM(S): at 05:05

## 2017-12-30 RX ADMIN — Medication 2 MILLIGRAM(S): at 22:46

## 2017-12-30 NOTE — PROGRESS NOTE ADULT - PROBLEM SELECTOR PLAN 1
poss LRTI  ct chest reviewed  cx noted  biomarkers noted  HOB elev  cont emp ABX and supportive care  speech and swallow eval pending  overall prognosis guarded  increase activity  pt uses o2 at home

## 2017-12-30 NOTE — PROGRESS NOTE ADULT - SUBJECTIVE AND OBJECTIVE BOX
HPI:  76yo M with PMH of chronic diastolic CHF (normal LVF ), non obstructive CAD (cardiac cath ), interstitial lung disease, HTN, HLD, DMT2 on umet, GERD, BPH, depression, anxiety presents with SOB. Patient states that he has had dyspnea fro the past 1.5 weeks along with a productive cough  with white sputum. Patient went to walk-in clinic today where CXR was performed and showed consolidation in the lung so patient was told to come to ED. Patient uses O2 at home at nighttime but is unsure how much. He admits to weakness, lethargy as well. Denies f/c, n/v, headaches.    In ED, VSS, lactate 1.2, procalcitonin elevated 0.28. CT chest showed interval increase in airspace opacity in the right lower lobe compatible with pneumonia; follow-up to resolution is recommended to exclude underlying neoplasm; again seen is interstitial lung disease. Received azithromycin, rocephin, solumedrol, albuterol treatment. (29 Dec 2017 00:05)      SUBJECTIVE:  Patient is a 77y old  Male who presents with a chief complaint of SOB, s/p fall early sat am per son (29 Dec 2017 14:41)          OBJECTIVE:  Review Of Systems:  Constitutional: [ ] Fever [ ] Chills [ ] Fatigue [ ] Weight change   HEENT: [ ] Blurred vision [ ] Eye Pain [ ] Headache [ ] Runny nose [ ] Sore Throat   Respiratory: [x ] Cough [ ] Wheezing [ ] Shortness of breath  Cardiovascular: [ ] Chest Pain [ ] Palpitations [ ] CISNEROS [ ] PND [ ] Orthopnea  Gastrointestinal: [ ] Abdominal Pain [ ] Diarrhea [ ] Constipation [ ] Hemorrhoids [ ] Nausea [ ] Vomiting  Genitourinary: [ ] Nocturia [ ] Dysuria [ ] Incontinence  Extremities: [ ] Swelling [ ] Joint Pain  Neurologic: [ ] Focal deficit [ ] Paresthesias [ ] Syncope  Lymphatic: [ ] Swelling [ ] Lymphadenopathy   Skin: [ ] Rash [ ] Ecchymoses [ ] Wounds [ ] Lesions  Psychiatry: [ ] Depression [ ] Suicidal/Homicidal Ideation [ ] Anxiety [ ] Sleep Disturbances  [x ] 10 point review of systems is otherwise negative except as mentioned above            [ ]Unable to obtain    Allergy:  Allergies    No Known Allergies    Intolerances        Medications:  MEDICATIONS  (STANDING):  ALBUTerol/ipratropium for Nebulization 3 milliLiter(s) Nebulizer every 8 hours  ALPRAZolam 2 milliGRAM(s) Oral three times a day  amLODIPine   Tablet 10 milliGRAM(s) Oral daily  aspirin enteric coated 81 milliGRAM(s) Oral daily  azithromycin  IVPB 500 milliGRAM(s) IV Intermittent every 24 hours  buPROPion  milliGRAM(s) Oral two times a day  cefTRIAXone   IVPB 1 Gram(s) IV Intermittent every 24 hours  dextrose 5%. 1000 milliLiter(s) (50 mL/Hr) IV Continuous <Continuous>  dextrose 50% Injectable 12.5 Gram(s) IV Push once  dextrose 50% Injectable 25 Gram(s) IV Push once  dextrose 50% Injectable 25 Gram(s) IV Push once  enoxaparin Injectable 40 milliGRAM(s) SubCutaneous daily  guaiFENesin  milliGRAM(s) Oral every 12 hours  hydrALAZINE 50 milliGRAM(s) Oral three times a day  hydrochlorothiazide 12.5 milliGRAM(s) Oral daily  insulin lispro (HumaLOG) corrective regimen sliding scale   SubCutaneous three times a day before meals  isosorbide   mononitrate ER Tablet (IMDUR) 60 milliGRAM(s) Oral daily  labetalol 200 milliGRAM(s) Oral three times a day  pantoprazole    Tablet 40 milliGRAM(s) Oral before breakfast  predniSONE   Tablet 20 milliGRAM(s) Oral two times a day  venlafaxine 150 milliGRAM(s) Oral two times a day with meals    MEDICATIONS  (PRN):  dextrose Gel 1 Dose(s) Oral once PRN Blood Glucose LESS THAN 70 milliGRAM(s)/deciliter  glucagon  Injectable 1 milliGRAM(s) IntraMuscular once PRN Glucose LESS THAN 70 milligrams/deciliter      PMH/PSH/FH/SH: [ ] Unchanged    Vitals:  T(C): 36.5 (17 @ 13:16), Max: 36.9 (17 @ 21:09)  HR: 73 (17 @ 13:16) (61 - 87)  BP: 131/77 (17 @ 13:16) (111/72 - 148/87)  BP(mean): --  RR: 18 (17 @ 13:16) (17 - 18)  SpO2: 95% (17 @ 13:16) (91% - 97%)  Wt(kg): --  Daily     Daily Weight in k.1 (30 Dec 2017 04:56)  I&O's Summary    29 Dec 2017 07:01  -  30 Dec 2017 07:00  --------------------------------------------------------  IN: 800 mL / OUT: 0 mL / NET: 800 mL        Labs:                        10.4   6.6   )-----------( 263      ( 30 Dec 2017 06:55 )             32.4         134<L>  |  98  |  32<H>  ----------------------------<  143<H>  4.2   |  26  |  1.20    Ca    8.6      30 Dec 2017 06:55  Mg     1.8         TPro  6.8  /  Alb  2.6<L>  /  TBili  0.4  /  DBili  x   /  AST  17  /  ALT  14  /  AlkPhos  24<L>      PT/INR - ( 28 Dec 2017 20:37 )   PT: 16.5 sec;   INR: 1.50 ratio         PTT - ( 28 Dec 2017 20:37 )  PTT:33.0 sec  CARDIAC MARKERS ( 28 Dec 2017 20:37 )  <.015 ng/mL / x     / 165 U/L / x     / 3.7 ng/mL                  ECG:  < from: 12 Lead ECG (17 @ 06:05) >  Ventricular Rate 73 BPM    Atrial Rate 73 BPM    P-R Interval 270 ms    QRS Duration 90 ms    Q-T Interval 374 ms    QTC Calculation(Bezet) 412 ms    P Axis 17 degrees    R Axis -30 degrees    T Axis 43 degrees    Diagnosis Line Possible wandering atrial pacemaker  Left axis deviation  Inferior infarct (cited on or before 27-AUG-2017)  Poor R wave progression  Confirmed by ZOE LAU ([id number]) on 2017 12:54:06 PM    < end of copied text >    Echo:    Stress Testing:     Cath:    Imaging:  < from: US Duplex Venous Lower Ext Complete, Bilateral (17 @ 10:27) >  EXAM:  US DPLX LWR EXT VEINS COMPL BI                            PROCEDURE DATE:  2017          INTERPRETATION:  CLINICAL INFORMATION: Leg edema    COMPARISON: None available.    TECHNIQUE: Duplex sonography of the BILATERAL LOWER extremitieswith   color and spectral Doppler, with and without compression.      FINDINGS:    There is normal compressibility of the bilateral common femoral, femoral   and popliteal veins. No calf vein thrombosis is detected.    Doppler examination shows normal spontaneous and phasic flow.    IMPRESSION:     No evidence of bilateral lower extremity deep venous thrombosis.                    ATTILA CLEANING M.D.,ATTENDING RADIOLOGIST  This document has been electronically signed. Dec 30 2017 10:33AM    < end of copied text >  < from: CT Chest No Cont (.28.17 @ 23:46) >  EXAM:  CT CHEST                            PROCEDURE DATE:  2017          INTERPRETATION:  CT of the chest    Indication: Pulmonary fibrosis, worsening shortness of breath, cough for   the past week    Technique: Axial images were obtained from the thoracic inlet through   lung bases without oral or IV contrast.  Reformatted coronal and sagittal   images were submitted.    Comparison: CT of 2017    FINDINGS:    Evaluation of the solid abdominal organs is limited without contrast.    The visualized neck, axilla and subcutaneous tissues are unremarkable.    The tracheobronchial tree is patent centrally.  There is no mediastinal   hematoma.  The main pulmonary artery is dilated, measuring 3.8 cm,   suggesting pulmonary arterial hypertension. There are scattered   mediastinal lymph nodes, measuring up to 1.1 x 2.1 cm in the right   paratracheal region. There is coronary atherosclerosis.    The heart is not enlarged.  There is no pericardial effusion.    Lungs: Again seen isinterlobular septal thickening, groundglass opacity   and bronchiectasis. There is increased patchy airspace opacity in the   right lower lobe, increased compared to the previous CT of 2017.    Pleura: Unremarkable.  Bones: Chronic degenerative changes of the spine.  Subcutaneous tissues: Unremarkable.    IMPRESSION:    Interval increase in airspace opacity in the right lower lobe compatible   with pneumonia. Follow-up to resolution is recommended to exclude   underlying neoplasm.  Again seen is interstitial lung disease.        < end of copied text >    Interpretation of Telemetry:  Not on Tele    Physical Exam:  Appearance: [ ] Normal  [ ] abnormal [x ] NAD [x] obese  Eyes: [ ] PERRL [ ] EOMI  HENT: [x ] Normal [ ] Abnormal oral muscosa [x ]NC/AT  Cardiovascular: [x ] S1 [x ] S2 [ ] RRR [ ] m/r/g [ ]edema [ ] JVP  Procedural Access Site: [ ]  hematoma [ ] tender to palpation [ ] 2+ pulse [ ] bruit [ ] Ecchymosis  Respiratory: [x] Clear to auscultation posteriorly with scattered coarse bs anteriorly  Gastrointestinal: [x ] Soft [ ] tenderness[ ] distension [x ] BS [x] obese abdomen  Musculoskeletal: [ ] clubbing [ ] joint deformity   Neurologic: [x ] Non-focal  Lymphatic: [ ] lymphadenopathy [x] slight b/l LE edema  Psychiatry: [x ] AAOx3  [ ] confused [ ] disoriented [ x] Mood & affect appropriate  Skin: [ ]  rashes [ ] ecchymoses [ ] cyanosis

## 2017-12-30 NOTE — PROGRESS NOTE ADULT - PROBLEM SELECTOR PLAN 3
poss PF, however, ct chest does not support it  pt follows with Dr. Page in the office  cont NEBS and Steroids for now  home 02 dep.   increase activity  supportive care and regimen  would benefit from JOAQUIN eval and CPAP trial  HOB elev  keep sat > 88 pct  prognosis guarded

## 2017-12-30 NOTE — PROGRESS NOTE ADULT - PROBLEM SELECTOR PLAN 1
c./w iv ceftriaxone and zithromax .   -Maintain O2>92%, patient on home O2, unclear how much  -F/u blood cx, urine cx  -Fall precautions  -F/u Am Labs  -Diet: DASH with consistent carbs  Activity: OOB with assistance

## 2017-12-30 NOTE — PROGRESS NOTE ADULT - PROBLEM SELECTOR PLAN 4
- EKG read as atrial fibrillation however looks like AV block. Compared with prior (which showed 1st degree AV Heart block).   No evidence of atrial fibrillation, new EKG is sinus arrythmia and evidence of LAD  Troponins WNL  -Cardio consult  appreciated   Discontinued telemetry.

## 2017-12-30 NOTE — PROGRESS NOTE ADULT - SUBJECTIVE AND OBJECTIVE BOX
Date/Time Patient Seen:  		  Referring MD:   Data Reviewed	       Patient is a 77y old  Male who presents with a chief complaint of SOB, s/p fall early sat am per son (29 Dec 2017 14:41)  in bed  seen and examined  on oxygen  noted to have sleep apnea this am    Subjective/HPI     PAST MEDICAL & SURGICAL HISTORY:  Congestive heart failure  Pulmonary fibrosis  Pulmonary fibrosis  Smoker  BPH with obstruction/lower urinary tract symptoms  Vitamin D deficiency  GERD (gastroesophageal reflux disease)  Diabetes mellitus  Hyperlipidemia  Hypertension  S/P tonsillectomy  No significant past surgical history        Medication list         MEDICATIONS  (STANDING):  ALBUTerol/ipratropium for Nebulization 3 milliLiter(s) Nebulizer every 8 hours  ALPRAZolam 2 milliGRAM(s) Oral three times a day  amLODIPine   Tablet 10 milliGRAM(s) Oral daily  aspirin enteric coated 81 milliGRAM(s) Oral daily  azithromycin  IVPB 500 milliGRAM(s) IV Intermittent every 24 hours  buPROPion  milliGRAM(s) Oral two times a day  cefTRIAXone   IVPB 1 Gram(s) IV Intermittent every 24 hours  dextrose 5%. 1000 milliLiter(s) (50 mL/Hr) IV Continuous <Continuous>  dextrose 50% Injectable 12.5 Gram(s) IV Push once  dextrose 50% Injectable 25 Gram(s) IV Push once  dextrose 50% Injectable 25 Gram(s) IV Push once  enoxaparin Injectable 40 milliGRAM(s) SubCutaneous daily  guaiFENesin  milliGRAM(s) Oral every 12 hours  hydrALAZINE 50 milliGRAM(s) Oral three times a day  hydrochlorothiazide 12.5 milliGRAM(s) Oral daily  insulin lispro (HumaLOG) corrective regimen sliding scale   SubCutaneous three times a day before meals  isosorbide   mononitrate ER Tablet (IMDUR) 60 milliGRAM(s) Oral daily  labetalol 200 milliGRAM(s) Oral three times a day  pantoprazole    Tablet 40 milliGRAM(s) Oral before breakfast  predniSONE   Tablet 20 milliGRAM(s) Oral two times a day  venlafaxine 150 milliGRAM(s) Oral two times a day with meals    MEDICATIONS  (PRN):  dextrose Gel 1 Dose(s) Oral once PRN Blood Glucose LESS THAN 70 milliGRAM(s)/deciliter  glucagon  Injectable 1 milliGRAM(s) IntraMuscular once PRN Glucose LESS THAN 70 milligrams/deciliter         Vitals log        ICU Vital Signs Last 24 Hrs  T(C): 36.6 (30 Dec 2017 04:56), Max: 37.2 (29 Dec 2017 13:24)  T(F): 97.9 (30 Dec 2017 04:56), Max: 98.9 (29 Dec 2017 13:24)  HR: 70 (30 Dec 2017 04:56) (62 - 98)  BP: 148/87 (30 Dec 2017 04:56) (111/72 - 148/87)  BP(mean): --  ABP: --  ABP(mean): --  RR: 18 (30 Dec 2017 04:56) (17 - 18)  SpO2: 95% (30 Dec 2017 04:56) (90% - 97%)           Input and Output:  I&O's Detail    28 Dec 2017 07:01  -  29 Dec 2017 07:00  --------------------------------------------------------  IN:    Oral Fluid: 600 mL  Total IN: 600 mL    OUT:  Total OUT: 0 mL    Total NET: 600 mL          Lab Data                        11.4   7.3   )-----------( 301      ( 29 Dec 2017 11:37 )             35.5     12-29    134<L>  |  98  |  27<H>  ----------------------------<  203<H>  4.6   |  27  |  1.40<H>    Ca    8.9      29 Dec 2017 11:37  Mg     1.8     12-29    TPro  6.8  /  Alb  2.6<L>  /  TBili  0.4  /  DBili  x   /  AST  17  /  ALT  14  /  AlkPhos  24<L>  12-28    ABG - ( 29 Dec 2017 02:45 )  pH: 7.43  /  pCO2: 36    /  pO2: 83    / HCO3: 24    / Base Excess: -0.1  /  SaO2: 96                CARDIAC MARKERS ( 28 Dec 2017 20:37 )  <.015 ng/mL / x     / 165 U/L / x     / 3.7 ng/mL        Review of Systems	      Objective     Physical Examination    obese  head at  heart s1s2  frail  weak  lungs dec BS  abd soft  extr chr changes      Pertinent Lab findings & Imaging      Princess:  NO   Adequate UO     I&O's Detail    28 Dec 2017 07:01  -  29 Dec 2017 07:00  --------------------------------------------------------  IN:    Oral Fluid: 600 mL  Total IN: 600 mL    OUT:  Total OUT: 0 mL    Total NET: 600 mL               Discussed with:     Cultures:	        Radiology

## 2017-12-30 NOTE — PROGRESS NOTE ADULT - PROBLEM SELECTOR PLAN 3
-chronic diastolic CHF (normal LVF 2016)  -stable, currently not volume overloaded.   -Monitor closely for symptoms of volume overload

## 2017-12-30 NOTE — PROGRESS NOTE ADULT - SUBJECTIVE AND OBJECTIVE BOX
Patient is a 77y old  Male who presents with a chief complaint of SOB, s/p fall early sat am per son (29 Dec 2017 14:41)      INTERVAL HPI/OVERNIGHT EVENTS: no acute overnight events . pt is feeling fine . family was at bed side.     MEDICATIONS  (STANDING):  ALBUTerol/ipratropium for Nebulization 3 milliLiter(s) Nebulizer every 8 hours  ALPRAZolam 2 milliGRAM(s) Oral three times a day  amLODIPine   Tablet 10 milliGRAM(s) Oral daily  aspirin enteric coated 81 milliGRAM(s) Oral daily  azithromycin  IVPB 500 milliGRAM(s) IV Intermittent every 24 hours  buPROPion  milliGRAM(s) Oral two times a day  cefTRIAXone   IVPB 1 Gram(s) IV Intermittent every 24 hours  dextrose 5%. 1000 milliLiter(s) (50 mL/Hr) IV Continuous <Continuous>  dextrose 50% Injectable 12.5 Gram(s) IV Push once  dextrose 50% Injectable 25 Gram(s) IV Push once  dextrose 50% Injectable 25 Gram(s) IV Push once  enoxaparin Injectable 40 milliGRAM(s) SubCutaneous daily  guaiFENesin  milliGRAM(s) Oral every 12 hours  hydrALAZINE 50 milliGRAM(s) Oral three times a day  hydrochlorothiazide 12.5 milliGRAM(s) Oral daily  insulin lispro (HumaLOG) corrective regimen sliding scale   SubCutaneous three times a day before meals  isosorbide   mononitrate ER Tablet (IMDUR) 60 milliGRAM(s) Oral daily  labetalol 200 milliGRAM(s) Oral three times a day  pantoprazole    Tablet 40 milliGRAM(s) Oral before breakfast  predniSONE   Tablet 20 milliGRAM(s) Oral two times a day  venlafaxine 150 milliGRAM(s) Oral two times a day with meals    MEDICATIONS  (PRN):  dextrose Gel 1 Dose(s) Oral once PRN Blood Glucose LESS THAN 70 milliGRAM(s)/deciliter  glucagon  Injectable 1 milliGRAM(s) IntraMuscular once PRN Glucose LESS THAN 70 milligrams/deciliter      Allergies    No Known Allergies    Intolerances        REVIEW OF SYSTEMS:  CONSTITUTIONAL: No fever or chills  HEENT:  No headache, no sore throat  RESPIRATORY: No cough, wheezing, or shortness of breath  CARDIOVASCULAR: No chest pain, palpitations, or leg swelling  GASTROINTESTINAL: No nausea, vomiting, or diarrhea  GENITOURINARY: No dysuria, frequency, or hematuria  NEUROLOGICAL: no focal weakness or dizziness  SKIN:  No rashes or lesions   MUSCULOSKELETAL: no myalgias       Vital Signs Last 24 Hrs  T(C): 36.5 (30 Dec 2017 13:16), Max: 36.9 (29 Dec 2017 21:09)  T(F): 97.7 (30 Dec 2017 13:16), Max: 98.4 (29 Dec 2017 21:09)  HR: 60 (30 Dec 2017 16:26) (60 - 87)  BP: 131/77 (30 Dec 2017 13:16) (111/72 - 148/87)  BP(mean): --  RR: 18 (30 Dec 2017 13:16) (17 - 18)  SpO2: 90% (30 Dec 2017 16:26) (90% - 97%)    PHYSICAL EXAM:  GENERAL: NAD  HEENT:  EOMI, mmm  CHEST/LUNG:  CTA b/l, no rales, wheezes, or rhonchi  HEART:  RRR, S1, S2, []murmur  ABDOMEN:  BS+, soft, NT, ND  EXTREMITIES: no edema or calf tenderness  NERVOUS SYSTEM: AA&Ox3    DIET:     Cultures: Culture Results:   No growth to date. ( @ 23:04)  Culture Results:   No growth to date. ( @ 23:04)  Culture Results:   No growth ( @ 22:50)      LABS:                        10.4   6.6   )-----------( 263      ( 30 Dec 2017 06:55 )             32.4     CBC Full  -  ( 30 Dec 2017 06:55 )  WBC Count : 6.6 K/uL  Hemoglobin : 10.4 g/dL  Hematocrit : 32.4 %  Platelet Count - Automated : 263 K/uL  Mean Cell Volume : 80.2 fl  Mean Cell Hemoglobin : 25.8 pg  Mean Cell Hemoglobin Concentration : 32.1 gm/dL  Auto Neutrophil # : x  Auto Lymphocyte # : x  Auto Monocyte # : x  Auto Eosinophil # : x  Auto Basophil # : x  Auto Neutrophil % : x  Auto Lymphocyte % : x  Auto Monocyte % : x  Auto Eosinophil % : x  Auto Basophil % : x    30 Dec 2017 06:55    134    |  98     |  32     ----------------------------<  143    4.2     |  26     |  1.20     Ca    8.6        30 Dec 2017 06:55      PT/INR - ( 28 Dec 2017 20:37 )   PT: 16.5 sec;   INR: 1.50 ratio         PTT - ( 28 Dec 2017 20:37 )  PTT:33.0 sec  Urinalysis Basic - ( 28 Dec 2017 20:37 )    Color: Yellow / Appearance: Clear / S.020 / pH: x  Gluc: x / Ketone: Negative  / Bili: Negative / Urobili: Negative   Blood: x / Protein: 75 mg/dL / Nitrite: Negative   Leuk Esterase: Negative / RBC: Negative /HPF / WBC 0-2   Sq Epi: x / Non Sq Epi: Occasional / Bacteria: Negative      CAPILLARY BLOOD GLUCOSE      POCT Blood Glucose.: 211 mg/dL (30 Dec 2017 17:06)  POCT Blood Glucose.: 148 mg/dL (30 Dec 2017 12:11)  POCT Blood Glucose.: 163 mg/dL (30 Dec 2017 08:26)          RADIOLOGY & ADDITIONAL TESTS:    Personally reviewed.     Consultant(s) Notes Reviewed:  [x] YES  [ ] NO    Care Discussed with [ ] Consultants  [x] Patient  [ ] Family  [x]      [ ] Other; RN  DVT ppx  Advanced directive

## 2017-12-30 NOTE — PROGRESS NOTE ADULT - ASSESSMENT
76yo M with PMH of chronic diastolic CHF (normal LVF 2016), non obstructive CAD (cardiac cath 2015), interstitial lung disease, HTN, HLD, DM, GERD, BPH, depression, anxiety presents with SOB. Admitted to [facility] with remote telemetry for community acquired PNA.

## 2017-12-30 NOTE — PROGRESS NOTE ADULT - ASSESSMENT
71 y/o male PMHx diastolic dysfunction, nonobstructive CAD based on cardiac cath from 12/2015, normal lvef (echo 10/2016), HTN, HLD, DM, GERD, BPH, Depression, Anxiety.  He has longstanding dyspnea for which multiple cardiac explanations have been considered, including CAD and diastolic impairment.  He has been found to have a degree of interstitial lung disease, the significance of which is unclear. Admitted with pna.    -EKG shows wandering atrial pacemaker, 1st degree av block, not a fib. Unchanged from previous EKG from September.   -off tele HR 70-80s per flow sheet  - No clear evidence of acute ischemia, CE slightly elevated likely 2/2 to demand ischemia  - ProBNP slightly elevated. LE slightly edematous b/l, but does not appear to have pulmonary edema  -Cardiac cath 2015: EF 65%, LAD 40% stenosis, circumflex 30% stenosis, RCA 40% stenosis. ECHO October 2016: normal left ventricular function, normal LV and EF  - BP well controlled, continue home BP meds, monitor routine hemodynamics  - Monitor and replete lytes, keep K>4, Mg>2  - Other cardiovascular workup will depend on clinical course.  - All other workup per primary team  - Will follow     Mariza Andersen NP-[facility]  Cardiology

## 2017-12-30 NOTE — PROGRESS NOTE ADULT - PROBLEM SELECTOR PLAN 2
Patient has hx of chronic lung disease, pulmonary fibrosis with element of COPD.  -Consult (Lakisha)  -C/w [place], and  steroids

## 2017-12-30 NOTE — PROGRESS NOTE ADULT - PROBLEM SELECTOR PLAN 2
CHF  may need PRN diuresis  cardio following  I and O  BP control  cvs regimen  replete lytes  old TTE in EMR

## 2017-12-31 LAB
ANION GAP SERPL CALC-SCNC: 9 MMOL/L — SIGNIFICANT CHANGE UP (ref 5–17)
BUN SERPL-MCNC: 34 MG/DL — HIGH (ref 7–23)
CALCIUM SERPL-MCNC: 9 MG/DL — SIGNIFICANT CHANGE UP (ref 8.5–10.1)
CHLORIDE SERPL-SCNC: 98 MMOL/L — SIGNIFICANT CHANGE UP (ref 96–108)
CO2 SERPL-SCNC: 28 MMOL/L — SIGNIFICANT CHANGE UP (ref 22–31)
CREAT SERPL-MCNC: 1.3 MG/DL — SIGNIFICANT CHANGE UP (ref 0.5–1.3)
GLUCOSE SERPL-MCNC: 141 MG/DL — HIGH (ref 70–99)
HCT VFR BLD CALC: 33.3 % — LOW (ref 39–50)
HGB BLD-MCNC: 10.8 G/DL — LOW (ref 13–17)
MCHC RBC-ENTMCNC: 25.8 PG — LOW (ref 27–34)
MCHC RBC-ENTMCNC: 32.4 GM/DL — SIGNIFICANT CHANGE UP (ref 32–36)
MCV RBC AUTO: 79.5 FL — LOW (ref 80–100)
PLATELET # BLD AUTO: 294 K/UL — SIGNIFICANT CHANGE UP (ref 150–400)
POTASSIUM SERPL-MCNC: 4.5 MMOL/L — SIGNIFICANT CHANGE UP (ref 3.5–5.3)
POTASSIUM SERPL-SCNC: 4.5 MMOL/L — SIGNIFICANT CHANGE UP (ref 3.5–5.3)
RBC # BLD: 4.19 M/UL — LOW (ref 4.2–5.8)
RBC # FLD: 14.7 % — HIGH (ref 10.3–14.5)
SODIUM SERPL-SCNC: 135 MMOL/L — SIGNIFICANT CHANGE UP (ref 135–145)
WBC # BLD: 6.4 K/UL — SIGNIFICANT CHANGE UP (ref 3.8–10.5)
WBC # FLD AUTO: 6.4 K/UL — SIGNIFICANT CHANGE UP (ref 3.8–10.5)

## 2017-12-31 PROCEDURE — 99232 SBSQ HOSP IP/OBS MODERATE 35: CPT

## 2017-12-31 PROCEDURE — 99233 SBSQ HOSP IP/OBS HIGH 50: CPT | Mod: GC

## 2017-12-31 RX ADMIN — PANTOPRAZOLE SODIUM 40 MILLIGRAM(S): 20 TABLET, DELAYED RELEASE ORAL at 06:01

## 2017-12-31 RX ADMIN — AMLODIPINE BESYLATE 10 MILLIGRAM(S): 2.5 TABLET ORAL at 06:01

## 2017-12-31 RX ADMIN — Medication 200 MILLIGRAM(S): at 21:19

## 2017-12-31 RX ADMIN — Medication 600 MILLIGRAM(S): at 17:51

## 2017-12-31 RX ADMIN — Medication 2 MILLIGRAM(S): at 06:01

## 2017-12-31 RX ADMIN — Medication 81 MILLIGRAM(S): at 12:32

## 2017-12-31 RX ADMIN — Medication 200 MILLIGRAM(S): at 14:33

## 2017-12-31 RX ADMIN — BUPROPION HYDROCHLORIDE 150 MILLIGRAM(S): 150 TABLET, EXTENDED RELEASE ORAL at 17:51

## 2017-12-31 RX ADMIN — Medication 2: at 07:30

## 2017-12-31 RX ADMIN — Medication 1: at 07:17

## 2017-12-31 RX ADMIN — Medication 20 MILLIGRAM(S): at 06:01

## 2017-12-31 RX ADMIN — ISOSORBIDE MONONITRATE 60 MILLIGRAM(S): 60 TABLET, EXTENDED RELEASE ORAL at 12:33

## 2017-12-31 RX ADMIN — Medication 12.5 MILLIGRAM(S): at 06:01

## 2017-12-31 RX ADMIN — Medication 600 MILLIGRAM(S): at 06:01

## 2017-12-31 RX ADMIN — BUPROPION HYDROCHLORIDE 150 MILLIGRAM(S): 150 TABLET, EXTENDED RELEASE ORAL at 06:01

## 2017-12-31 RX ADMIN — Medication 50 MILLIGRAM(S): at 06:01

## 2017-12-31 RX ADMIN — Medication 200 MILLIGRAM(S): at 06:01

## 2017-12-31 RX ADMIN — Medication 2 MILLIGRAM(S): at 21:19

## 2017-12-31 RX ADMIN — Medication 150 MILLIGRAM(S): at 17:51

## 2017-12-31 RX ADMIN — Medication 20 MILLIGRAM(S): at 17:51

## 2017-12-31 RX ADMIN — Medication 3 MILLILITER(S): at 08:37

## 2017-12-31 RX ADMIN — ENOXAPARIN SODIUM 40 MILLIGRAM(S): 100 INJECTION SUBCUTANEOUS at 12:00

## 2017-12-31 RX ADMIN — Medication 150 MILLIGRAM(S): at 08:37

## 2017-12-31 RX ADMIN — Medication 2 MILLIGRAM(S): at 14:33

## 2017-12-31 RX ADMIN — Medication 50 MILLIGRAM(S): at 14:33

## 2017-12-31 RX ADMIN — Medication 3 MILLILITER(S): at 14:56

## 2017-12-31 RX ADMIN — Medication 50 MILLIGRAM(S): at 21:19

## 2017-12-31 RX ADMIN — CEFTRIAXONE 100 GRAM(S): 500 INJECTION, POWDER, FOR SOLUTION INTRAMUSCULAR; INTRAVENOUS at 06:02

## 2017-12-31 RX ADMIN — AZITHROMYCIN 255 MILLIGRAM(S): 500 TABLET, FILM COATED ORAL at 21:20

## 2017-12-31 NOTE — SWALLOW BEDSIDE ASSESSMENT ADULT - COMMENTS
76yo M withSOB, dx-  PNA PMH of chronic diastolic CHF, CAD (cardiac cath 2015), interstitial lung disease, HTN, HLD,  GERD, BPH, depression, anxiety presents with SOB.   Pt awake. alert, A and 0 x 3  pt c adequate phases of swallowing c no overt s/si of aspiration

## 2017-12-31 NOTE — PROGRESS NOTE ADULT - SUBJECTIVE AND OBJECTIVE BOX
Patient is a 77y old  Male who presents with a chief complaint of SOB, s/p fall early sat am per son (29 Dec 2017 14:41)      INTERVAL HPI/OVERNIGHT EVENTS: no acute overnight events , pt is seen and examined at the bed side , feeling fine.     MEDICATIONS  (STANDING):  ALBUTerol/ipratropium for Nebulization 3 milliLiter(s) Nebulizer every 8 hours  ALPRAZolam 2 milliGRAM(s) Oral three times a day  amLODIPine   Tablet 10 milliGRAM(s) Oral daily  aspirin enteric coated 81 milliGRAM(s) Oral daily  azithromycin  IVPB 500 milliGRAM(s) IV Intermittent every 24 hours  buPROPion  milliGRAM(s) Oral two times a day  cefTRIAXone   IVPB 1 Gram(s) IV Intermittent every 24 hours  dextrose 5%. 1000 milliLiter(s) (50 mL/Hr) IV Continuous <Continuous>  dextrose 50% Injectable 12.5 Gram(s) IV Push once  dextrose 50% Injectable 25 Gram(s) IV Push once  dextrose 50% Injectable 25 Gram(s) IV Push once  enoxaparin Injectable 40 milliGRAM(s) SubCutaneous daily  guaiFENesin  milliGRAM(s) Oral every 12 hours  hydrALAZINE 50 milliGRAM(s) Oral three times a day  hydrochlorothiazide 12.5 milliGRAM(s) Oral daily  insulin lispro (HumaLOG) corrective regimen sliding scale   SubCutaneous three times a day before meals  isosorbide   mononitrate ER Tablet (IMDUR) 60 milliGRAM(s) Oral daily  labetalol 200 milliGRAM(s) Oral three times a day  pantoprazole    Tablet 40 milliGRAM(s) Oral before breakfast  predniSONE   Tablet 20 milliGRAM(s) Oral two times a day  venlafaxine 150 milliGRAM(s) Oral two times a day with meals    MEDICATIONS  (PRN):  dextrose Gel 1 Dose(s) Oral once PRN Blood Glucose LESS THAN 70 milliGRAM(s)/deciliter  glucagon  Injectable 1 milliGRAM(s) IntraMuscular once PRN Glucose LESS THAN 70 milligrams/deciliter      Allergies    No Known Allergies    Intolerances        REVIEW OF SYSTEMS:  CONSTITUTIONAL: No fever or chills  HEENT:  No headache, no sore throat  RESPIRATORY: No cough, wheezing, or shortness of breath  CARDIOVASCULAR: No chest pain, palpitations, or leg swelling  GASTROINTESTINAL: No nausea, vomiting, or diarrhea  GENITOURINARY: No dysuria, frequency, or hematuria  NEUROLOGICAL: no focal weakness or dizziness  SKIN:  No rashes or lesions   MUSCULOSKELETAL: no myalgias       Vital Signs Last 24 Hrs  T(C): 36.8 (31 Dec 2017 12:38), Max: 36.8 (31 Dec 2017 12:38)  T(F): 98.3 (31 Dec 2017 12:38), Max: 98.3 (31 Dec 2017 12:38)  HR: 82 (31 Dec 2017 12:38) (69 - 82)  BP: 162/98 (31 Dec 2017 12:38) (148/90 - 162/98)  BP(mean): --  RR: 18 (31 Dec 2017 12:38) (18 - 19)  SpO2: 94% (31 Dec 2017 12:38) (94% - 95%)    PHYSICAL EXAM:  GENERAL: NAD  HEENT:  EOMI, mmm  CHEST/LUNG:  CTA b/l, no rales, wheezes, or rhonchi  HEART:  RRR, S1, S2, []murmur  ABDOMEN:  BS+, soft, NT, ND  EXTREMITIES: no edema or calf tenderness  NERVOUS SYSTEM: AA&Ox3, no focal neurological deficit.     DIET:     Cultures: Culture Results:   No growth to date. (12-28 @ 23:04)  Culture Results:   No growth to date. (12-28 @ 23:04)  Culture Results:   No growth (12-28 @ 22:50)      LABS:                        10.8   6.4   )-----------( 294      ( 31 Dec 2017 07:39 )             33.3     CBC Full  -  ( 31 Dec 2017 07:39 )  WBC Count : 6.4 K/uL  Hemoglobin : 10.8 g/dL  Hematocrit : 33.3 %  Platelet Count - Automated : 294 K/uL  Mean Cell Volume : 79.5 fl  Mean Cell Hemoglobin : 25.8 pg  Mean Cell Hemoglobin Concentration : 32.4 gm/dL  Auto Neutrophil # : x  Auto Lymphocyte # : x  Auto Monocyte # : x  Auto Eosinophil # : x  Auto Basophil # : x  Auto Neutrophil % : x  Auto Lymphocyte % : x  Auto Monocyte % : x  Auto Eosinophil % : x  Auto Basophil % : x    31 Dec 2017 07:39    135    |  98     |  34     ----------------------------<  141    4.5     |  28     |  1.30     Ca    9.0        31 Dec 2017 07:39          CAPILLARY BLOOD GLUCOSE      POCT Blood Glucose.: 114 mg/dL (31 Dec 2017 17:12)  POCT Blood Glucose.: 153 mg/dL (31 Dec 2017 12:08)  POCT Blood Glucose.: 216 mg/dL (31 Dec 2017 08:25)  POCT Blood Glucose.: 214 mg/dL (30 Dec 2017 21:05)          RADIOLOGY & ADDITIONAL TESTS:    Personally reviewed.     Consultant(s) Notes Reviewed:  [x] YES  [ ] NO    Care Discussed with [ ] Consultants  [x] Patient  [ ] Family  [x]      [ ] Other; RN  DVT ppx  Advanced directive

## 2017-12-31 NOTE — PROGRESS NOTE ADULT - PROBLEM SELECTOR PLAN 1
c./w iv ceftriaxone and zithromax .   -Maintain O2>92%, patient on home O2,   -Fall precautions  -F/u Am Labs  -Diet: DASH with consistent carbs  Activity: OOB with assistance

## 2017-12-31 NOTE — PROGRESS NOTE ADULT - SUBJECTIVE AND OBJECTIVE BOX
Date/Time Patient Seen:  		  Referring MD:   Data Reviewed	       Patient is a 77y old  Male who presents with a chief complaint of SOB, s/p fall early sat am per son (29 Dec 2017 14:41)  in bed  seen and examined  vs and meds reviewed  on ABX      Subjective/HPI     PAST MEDICAL & SURGICAL HISTORY:  Congestive heart failure  Pulmonary fibrosis  Pulmonary fibrosis  Smoker  BPH with obstruction/lower urinary tract symptoms  Vitamin D deficiency  GERD (gastroesophageal reflux disease)  Diabetes mellitus  Hyperlipidemia  Hypertension  S/P tonsillectomy  No significant past surgical history        Medication list         MEDICATIONS  (STANDING):  ALBUTerol/ipratropium for Nebulization 3 milliLiter(s) Nebulizer every 8 hours  ALPRAZolam 2 milliGRAM(s) Oral three times a day  amLODIPine   Tablet 10 milliGRAM(s) Oral daily  aspirin enteric coated 81 milliGRAM(s) Oral daily  azithromycin  IVPB 500 milliGRAM(s) IV Intermittent every 24 hours  buPROPion  milliGRAM(s) Oral two times a day  cefTRIAXone   IVPB 1 Gram(s) IV Intermittent every 24 hours  dextrose 5%. 1000 milliLiter(s) (50 mL/Hr) IV Continuous <Continuous>  dextrose 50% Injectable 12.5 Gram(s) IV Push once  dextrose 50% Injectable 25 Gram(s) IV Push once  dextrose 50% Injectable 25 Gram(s) IV Push once  enoxaparin Injectable 40 milliGRAM(s) SubCutaneous daily  guaiFENesin  milliGRAM(s) Oral every 12 hours  hydrALAZINE 50 milliGRAM(s) Oral three times a day  hydrochlorothiazide 12.5 milliGRAM(s) Oral daily  insulin lispro (HumaLOG) corrective regimen sliding scale   SubCutaneous three times a day before meals  isosorbide   mononitrate ER Tablet (IMDUR) 60 milliGRAM(s) Oral daily  labetalol 200 milliGRAM(s) Oral three times a day  pantoprazole    Tablet 40 milliGRAM(s) Oral before breakfast  predniSONE   Tablet 20 milliGRAM(s) Oral two times a day  venlafaxine 150 milliGRAM(s) Oral two times a day with meals    MEDICATIONS  (PRN):  dextrose Gel 1 Dose(s) Oral once PRN Blood Glucose LESS THAN 70 milliGRAM(s)/deciliter  glucagon  Injectable 1 milliGRAM(s) IntraMuscular once PRN Glucose LESS THAN 70 milligrams/deciliter         Vitals log        ICU Vital Signs Last 24 Hrs  T(C): 36.4 (31 Dec 2017 04:49), Max: 36.7 (30 Dec 2017 20:50)  T(F): 97.5 (31 Dec 2017 04:49), Max: 98.1 (30 Dec 2017 20:50)  HR: 69 (31 Dec 2017 04:49) (60 - 87)  BP: 148/90 (31 Dec 2017 04:49) (131/77 - 153/93)  BP(mean): --  ABP: --  ABP(mean): --  RR: 19 (31 Dec 2017 05:59) (18 - 19)  SpO2: 94% (31 Dec 2017 05:59) (90% - 95%)           Input and Output:  I&O's Detail    29 Dec 2017 07:01  -  30 Dec 2017 07:00  --------------------------------------------------------  IN:    Oral Fluid: 500 mL    Solution: 50 mL    Solution: 250 mL  Total IN: 800 mL    OUT:  Total OUT: 0 mL    Total NET: 800 mL      30 Dec 2017 07:01  -  31 Dec 2017 06:55  --------------------------------------------------------  IN:    Solution: 250 mL  Total IN: 250 mL    OUT:  Total OUT: 0 mL    Total NET: 250 mL          Lab Data                        10.4   6.6   )-----------( 263      ( 30 Dec 2017 06:55 )             32.4     12-30    134<L>  |  98  |  32<H>  ----------------------------<  143<H>  4.2   |  26  |  1.20    Ca    8.6      30 Dec 2017 06:55  Mg     1.8     12-29              Review of Systems	      Objective     Physical Examination    head at  heart s1s2  lungs dec BS  abd soft  neck obese  snoring during sleep      Pertinent Lab findings & Imaging      Princess:  NO   Adequate UO     I&O's Detail    29 Dec 2017 07:01  -  30 Dec 2017 07:00  --------------------------------------------------------  IN:    Oral Fluid: 500 mL    Solution: 50 mL    Solution: 250 mL  Total IN: 800 mL    OUT:  Total OUT: 0 mL    Total NET: 800 mL      30 Dec 2017 07:01  -  31 Dec 2017 06:55  --------------------------------------------------------  IN:    Solution: 250 mL  Total IN: 250 mL    OUT:  Total OUT: 0 mL    Total NET: 250 mL               Discussed with:     Cultures:	        Radiology

## 2017-12-31 NOTE — PROGRESS NOTE ADULT - PROBLEM SELECTOR PLAN 4
- EKG read as atrial fibrillation however looks like AV block. Compared with prior (which showed 1st degree AV Heart block).   No evidence of atrial fibrillation, new EKG is sinus arrythmia and evidence of LAD    -Cardio consult  appreciated   Discontinued telemetry.

## 2017-12-31 NOTE — PROGRESS NOTE ADULT - ASSESSMENT
76yo M with PMH of chronic diastolic CHF (normal LVF 2016), non obstructive CAD (cardiac cath 2015), interstitial lung disease, HTN, HLD, DM, GERD, BPH, depression, anxiety presents with SOB. Admitted to [facility] with remote telemetry for community acquired PNA.

## 2017-12-31 NOTE — PROGRESS NOTE ADULT - PROBLEM SELECTOR PLAN 2
Patient has hx of chronic lung disease, pulmonary fibrosis with element of COPD.  -Pulmonary Consult (Lakisha) appreciated.   -C/w Duonebs, and  steroids

## 2017-12-31 NOTE — PROGRESS NOTE ADULT - SUBJECTIVE AND OBJECTIVE BOX
HPI:  78yo M with PMH of chronic diastolic CHF (normal LVF 2016), non obstructive CAD (cardiac cath 2015), interstitial lung disease, HTN, HLD, DMT2 on Janumet, GERD, BPH, depression, anxiety presents with SOB. Patient states that he has had dyspnea fro the past 1.5 weeks along with a productive cough  with white sputum. Patient went to walk-in clinic today where CXR was performed and showed consolidation in the lung so patient was told to come to ED. Patient uses O2 at home at nighttime but is unsure how much. He admits to weakness, lethargy as well. Denies f/c, n/v, headaches.    In ED, VSS, lactate 1.2, procalcitonin elevated 0.28. CT chest showed interval increase in airspace opacity in the right lower lobe compatible with pneumonia; follow-up to resolution is recommended to exclude underlying neoplasm; again seen is interstitial lung disease. Received azithromycin, rocephin, solumedrol, albuterol treatment. (29 Dec 2017 00:05)      SUBJECTIVE:  Patient is a 77y old  Male who presents with a chief complaint of SOB, s/p fall early sat am per son (29 Dec 2017 14:41)          OBJECTIVE:  Review Of Systems:  Constitutional: [ ] Fever [ ] Chills [ ] Fatigue [ ] Weight change   HEENT: [ ] Blurred vision [ ] Eye Pain [ ] Headache [ ] Runny nose [ ] Sore Throat   Respiratory: [ ] Cough [ ] Wheezing [ ] Shortness of breath  Cardiovascular: [ ] Chest Pain [ ] Palpitations [ ] CISNEROS [ ] PND [ ] Orthopnea  Gastrointestinal: [ ] Abdominal Pain [ ] Diarrhea [ ] Constipation [ ] Hemorrhoids [ ] Nausea [ ] Vomiting  Genitourinary: [ ] Nocturia [ ] Dysuria [ ] Incontinence  Extremities: [ ] Swelling [ ] Joint Pain  Neurologic: [ ] Focal deficit [ ] Paresthesias [ ] Syncope  Lymphatic: [ ] Swelling [ ] Lymphadenopathy   Skin: [ ] Rash [ ] Ecchymoses [ ] Wounds [ ] Lesions  Psychiatry: [ ] Depression [ ] Suicidal/Homicidal Ideation [ ] Anxiety [ ] Sleep Disturbances  [x ] 10 point review of systems is otherwise negative except as mentioned above            [ ]Unable to obtain    Allergy:  Allergies    No Known Allergies    Intolerances        Medications:  MEDICATIONS  (STANDING):  ALBUTerol/ipratropium for Nebulization 3 milliLiter(s) Nebulizer every 8 hours  ALPRAZolam 2 milliGRAM(s) Oral three times a day  amLODIPine   Tablet 10 milliGRAM(s) Oral daily  aspirin enteric coated 81 milliGRAM(s) Oral daily  azithromycin  IVPB 500 milliGRAM(s) IV Intermittent every 24 hours  buPROPion  milliGRAM(s) Oral two times a day  cefTRIAXone   IVPB 1 Gram(s) IV Intermittent every 24 hours  dextrose 5%. 1000 milliLiter(s) (50 mL/Hr) IV Continuous <Continuous>  dextrose 50% Injectable 12.5 Gram(s) IV Push once  dextrose 50% Injectable 25 Gram(s) IV Push once  dextrose 50% Injectable 25 Gram(s) IV Push once  enoxaparin Injectable 40 milliGRAM(s) SubCutaneous daily  guaiFENesin  milliGRAM(s) Oral every 12 hours  hydrALAZINE 50 milliGRAM(s) Oral three times a day  hydrochlorothiazide 12.5 milliGRAM(s) Oral daily  insulin lispro (HumaLOG) corrective regimen sliding scale   SubCutaneous three times a day before meals  isosorbide   mononitrate ER Tablet (IMDUR) 60 milliGRAM(s) Oral daily  labetalol 200 milliGRAM(s) Oral three times a day  pantoprazole    Tablet 40 milliGRAM(s) Oral before breakfast  predniSONE   Tablet 20 milliGRAM(s) Oral two times a day  venlafaxine 150 milliGRAM(s) Oral two times a day with meals    MEDICATIONS  (PRN):  dextrose Gel 1 Dose(s) Oral once PRN Blood Glucose LESS THAN 70 milliGRAM(s)/deciliter  glucagon  Injectable 1 milliGRAM(s) IntraMuscular once PRN Glucose LESS THAN 70 milligrams/deciliter      PMH/PSH/FH/SH: [ ] Unchanged    Vitals:  T(C): 36.8 (12-31-17 @ 12:38), Max: 36.8 (12-31-17 @ 12:38)  HR: 82 (12-31-17 @ 12:38) (60 - 82)  BP: 162/98 (12-31-17 @ 12:38) (148/90 - 162/98)  BP(mean): --  RR: 18 (12-31-17 @ 12:38) (18 - 19)  SpO2: 94% (12-31-17 @ 12:38) (90% - 95%)  Wt(kg): --  Daily     Daily   I&O's Summary    30 Dec 2017 07:01  -  31 Dec 2017 07:00  --------------------------------------------------------  IN: 300 mL / OUT: 0 mL / NET: 300 mL    31 Dec 2017 07:01  -  31 Dec 2017 16:01  --------------------------------------------------------  IN: 0 mL / OUT: 925 mL / NET: -925 mL        Labs:                        10.8   6.4   )-----------( 294      ( 31 Dec 2017 07:39 )             33.3     12-31    135  |  98  |  34<H>  ----------------------------<  141<H>  4.5   |  28  |  1.30    Ca    9.0      31 Dec 2017 07:39                        ECG:    < from: 12 Lead ECG (12.29.17 @ 06:05) >  Ventricular Rate 73 BPM    Atrial Rate 73 BPM    P-R Interval 270 ms    QRS Duration 90 ms    Q-T Interval 374 ms    QTC Calculation(Bezet) 412 ms    P Axis 17 degrees    R Axis -30 degrees    T Axis 43 degrees    Diagnosis Line Possible wandering atrial pacemaker  Left axis deviation  Inferior infarct (cited on or before 27-AUG-2017)  Poor R wave progression  Confirmed by ZOE LAU ([id number]) on 12/29/2017 12:54:06 PM    < end of copied text >  Echo:    Stress Testing:     Cath:    Imaging:    < from: US Duplex Venous Lower Ext Complete, Bilateral (12.30.17 @ 10:27) >  EXAM:  US DPLX LWR EXT VEINS COMPL BI                            PROCEDURE DATE:  12/30/2017          INTERPRETATION:  CLINICAL INFORMATION: Leg edema    COMPARISON: None available.    TECHNIQUE: Duplex sonography of the BILATERAL LOWER extremitieswith   color and spectral Doppler, with and without compression.      FINDINGS:    There is normal compressibility of the bilateral common femoral, femoral   and popliteal veins. No calf vein thrombosis is detected.    Doppler examination shows normal spontaneous and phasic flow.    IMPRESSION:     No evidence of bilateral lower extremity deep venous thrombosis.                    ATTILA CLEANING M.D.,ATTENDING RADIOLOGIST  This document has been electronically signed. Dec 30 2017 10:33AM              < end of copied text >  < from: CT Chest No Cont (12.28.17 @ 23:46) >  EXAM:  CT CHEST                            PROCEDURE DATE:  12/28/2017          INTERPRETATION:  CT of the chest    Indication: Pulmonary fibrosis, worsening shortness of breath, cough for   the past week    Technique: Axial images were obtained from the thoracic inlet through   lung bases without oral or IV contrast.  Reformatted coronal and sagittal   images were submitted.    Comparison: CT of October 9, 2017    FINDINGS:    Evaluation of the solid abdominal organs is limited without contrast.    The visualized neck, axilla and subcutaneous tissues are unremarkable.    The tracheobronchial tree is patent centrally.  There is no mediastinal   hematoma.  The main pulmonary artery is dilated, measuring 3.8 cm,   suggesting pulmonary arterial hypertension. There are scattered   mediastinal lymph nodes, measuring up to 1.1 x 2.1 cm in the right   paratracheal region. There is coronary atherosclerosis.    The heart is not enlarged.  There is no pericardial effusion.    Lungs: Again seen isinterlobular septal thickening, groundglass opacity   and bronchiectasis. There is increased patchy airspace opacity in the   right lower lobe, increased compared to the previous CT of October 9, 2017.    Pleura: Unremarkable.  Bones: Chronic degenerative changes of the spine.  Subcutaneous tissues: Unremarkable.    IMPRESSION:    Interval increase in airspace opacity in the right lower lobe compatible   with pneumonia. Follow-up to resolution is recommended to exclude   underlying neoplasm.  Again seen is interstitial lung disease.                DONNA ESTRADA M.D, ATTENDING RADIOLOGIST  This document has been electronically signed. Dec 29 2017 12:02AM          < end of copied text >    Interpretation of Telemetry:  Not on Tele    Physical Exam:  Appearance: [ ] Normal  [ ] abnormal [x ] NAD [x] obese  Eyes: [ ] PERRL [ ] EOMI  HENT: [x ] Normal [ ] Abnormal oral muscosa [x ]NC/AT  Cardiovascular: [x ] S1 [x ] S2 [x ] RRR [ ] m/r/g [ ]edema [ ] JVP  Procedural Access Site: [ ]  hematoma [ ] tender to palpation [ ] 2+ pulse [ ] bruit [ ] Ecchymosis  Respiratory: [x ] Clear to auscultation posteriorly with anterior rhonchi wheezing clears with cough  Gastrointestinal: [ x] Soft [ ] tenderness[ ] distension [x ] BS [x] obese abdomen  Musculoskeletal: [ ] clubbing [ ] joint deformity   Neurologic: [ x] Non-focal  Lymphatic: [ ] lymphadenopathy  Psychiatry: [x ] AAOx3  [ ] confused [ ] disoriented [ ] Mood & affect appropriate [x] flat affect  Skin: [ ]  rashes [ ] ecchymoses [ ] cyanosis

## 2017-12-31 NOTE — PROGRESS NOTE ADULT - PROBLEM SELECTOR PLAN 3
pulm fibrosis  unclear on the status and full diagnosis  will follow up with Dr. Page in the office  will cont pulmonary regimen for now  nebs, chest pt, mucinex, systemic steroids  pt may have JOAQUIN based on observation of his sleep

## 2017-12-31 NOTE — PROGRESS NOTE ADULT - PROBLEM SELECTOR PLAN 2
LRTI  Ct chest reviewed  emp ABX  monitor vs and HD  keep sat > 88 pct  HOB elev  asp prec  out of bed as tolerated  uses walker at home

## 2018-01-01 DIAGNOSIS — E66.9 OBESITY, UNSPECIFIED: ICD-10-CM

## 2018-01-01 LAB
ANION GAP SERPL CALC-SCNC: 5 MMOL/L — SIGNIFICANT CHANGE UP (ref 5–17)
BUN SERPL-MCNC: 33 MG/DL — HIGH (ref 7–23)
CALCIUM SERPL-MCNC: 9.3 MG/DL — SIGNIFICANT CHANGE UP (ref 8.5–10.1)
CHLORIDE SERPL-SCNC: 98 MMOL/L — SIGNIFICANT CHANGE UP (ref 96–108)
CO2 SERPL-SCNC: 32 MMOL/L — HIGH (ref 22–31)
CREAT SERPL-MCNC: 1.1 MG/DL — SIGNIFICANT CHANGE UP (ref 0.5–1.3)
GLUCOSE SERPL-MCNC: 117 MG/DL — HIGH (ref 70–99)
HCT VFR BLD CALC: 33.6 % — LOW (ref 39–50)
HGB BLD-MCNC: 10.6 G/DL — LOW (ref 13–17)
MCHC RBC-ENTMCNC: 25.1 PG — LOW (ref 27–34)
MCHC RBC-ENTMCNC: 31.7 GM/DL — LOW (ref 32–36)
MCV RBC AUTO: 79.3 FL — LOW (ref 80–100)
PLATELET # BLD AUTO: 268 K/UL — SIGNIFICANT CHANGE UP (ref 150–400)
POTASSIUM SERPL-MCNC: 4.4 MMOL/L — SIGNIFICANT CHANGE UP (ref 3.5–5.3)
POTASSIUM SERPL-SCNC: 4.4 MMOL/L — SIGNIFICANT CHANGE UP (ref 3.5–5.3)
RBC # BLD: 4.24 M/UL — SIGNIFICANT CHANGE UP (ref 4.2–5.8)
RBC # FLD: 15.1 % — HIGH (ref 10.3–14.5)
SODIUM SERPL-SCNC: 135 MMOL/L — SIGNIFICANT CHANGE UP (ref 135–145)
WBC # BLD: 7.1 K/UL — SIGNIFICANT CHANGE UP (ref 3.8–10.5)
WBC # FLD AUTO: 7.1 K/UL — SIGNIFICANT CHANGE UP (ref 3.8–10.5)

## 2018-01-01 PROCEDURE — 99233 SBSQ HOSP IP/OBS HIGH 50: CPT | Mod: GC

## 2018-01-01 PROCEDURE — 71045 X-RAY EXAM CHEST 1 VIEW: CPT | Mod: 26

## 2018-01-01 PROCEDURE — 99233 SBSQ HOSP IP/OBS HIGH 50: CPT

## 2018-01-01 RX ORDER — HYDRALAZINE HCL 50 MG
75 TABLET ORAL THREE TIMES A DAY
Qty: 0 | Refills: 0 | Status: DISCONTINUED | OUTPATIENT
Start: 2018-01-01 | End: 2018-01-04

## 2018-01-01 RX ORDER — FUROSEMIDE 40 MG
20 TABLET ORAL ONCE
Qty: 0 | Refills: 0 | Status: COMPLETED | OUTPATIENT
Start: 2018-01-01 | End: 2018-01-01

## 2018-01-01 RX ADMIN — Medication 3 MILLILITER(S): at 20:45

## 2018-01-01 RX ADMIN — ENOXAPARIN SODIUM 40 MILLIGRAM(S): 100 INJECTION SUBCUTANEOUS at 11:54

## 2018-01-01 RX ADMIN — Medication 150 MILLIGRAM(S): at 08:34

## 2018-01-01 RX ADMIN — AZITHROMYCIN 255 MILLIGRAM(S): 500 TABLET, FILM COATED ORAL at 21:27

## 2018-01-01 RX ADMIN — Medication 20 MILLIGRAM(S): at 05:00

## 2018-01-01 RX ADMIN — Medication 2 MILLIGRAM(S): at 05:00

## 2018-01-01 RX ADMIN — CEFTRIAXONE 100 GRAM(S): 500 INJECTION, POWDER, FOR SOLUTION INTRAMUSCULAR; INTRAVENOUS at 05:00

## 2018-01-01 RX ADMIN — Medication 75 MILLIGRAM(S): at 13:18

## 2018-01-01 RX ADMIN — Medication 3 MILLILITER(S): at 08:31

## 2018-01-01 RX ADMIN — Medication 150 MILLIGRAM(S): at 17:19

## 2018-01-01 RX ADMIN — BUPROPION HYDROCHLORIDE 150 MILLIGRAM(S): 150 TABLET, EXTENDED RELEASE ORAL at 17:19

## 2018-01-01 RX ADMIN — Medication 600 MILLIGRAM(S): at 17:19

## 2018-01-01 RX ADMIN — Medication 200 MILLIGRAM(S): at 21:26

## 2018-01-01 RX ADMIN — Medication 2 MILLIGRAM(S): at 21:26

## 2018-01-01 RX ADMIN — BUPROPION HYDROCHLORIDE 150 MILLIGRAM(S): 150 TABLET, EXTENDED RELEASE ORAL at 05:00

## 2018-01-01 RX ADMIN — Medication 81 MILLIGRAM(S): at 11:54

## 2018-01-01 RX ADMIN — Medication 75 MILLIGRAM(S): at 21:26

## 2018-01-01 RX ADMIN — ISOSORBIDE MONONITRATE 60 MILLIGRAM(S): 60 TABLET, EXTENDED RELEASE ORAL at 11:55

## 2018-01-01 RX ADMIN — Medication 20 MILLIGRAM(S): at 17:19

## 2018-01-01 RX ADMIN — AMLODIPINE BESYLATE 10 MILLIGRAM(S): 2.5 TABLET ORAL at 05:00

## 2018-01-01 RX ADMIN — Medication 600 MILLIGRAM(S): at 05:00

## 2018-01-01 RX ADMIN — Medication 200 MILLIGRAM(S): at 13:18

## 2018-01-01 RX ADMIN — Medication 12.5 MILLIGRAM(S): at 05:00

## 2018-01-01 RX ADMIN — PANTOPRAZOLE SODIUM 40 MILLIGRAM(S): 20 TABLET, DELAYED RELEASE ORAL at 05:00

## 2018-01-01 RX ADMIN — Medication 2 MILLIGRAM(S): at 13:19

## 2018-01-01 RX ADMIN — Medication 50 MILLIGRAM(S): at 05:00

## 2018-01-01 RX ADMIN — Medication 20 MILLIGRAM(S): at 13:19

## 2018-01-01 RX ADMIN — Medication 200 MILLIGRAM(S): at 05:00

## 2018-01-01 NOTE — PROGRESS NOTE ADULT - PROBLEM SELECTOR PLAN 4
- EKG read as atrial fibrillation however looks like AV block. Compared with prior (which showed 1st degree AV Heart block).   No evidence of atrial fibrillation, new EKG is sinus arrythmia and evidence of LAD-Cardio consult  appreciated   Discontinued telemetry.

## 2018-01-01 NOTE — PROGRESS NOTE ADULT - PROBLEM SELECTOR PLAN 1
has hx of cardiac disease  cardio following  will assess with lasix one dose this am, 20mg IVP  discussed with cardio  c/o congestion

## 2018-01-01 NOTE — PROGRESS NOTE ADULT - SUBJECTIVE AND OBJECTIVE BOX
Date/Time Patient Seen:  		  Referring MD:   Data Reviewed	       Patient is a 77y old  Male who presents with a chief complaint of SOB, s/p fall early sat am per son (29 Dec 2017 14:41)  in bed  seen and examined  vs and meds reviewed  complaints of congestion      Subjective/HPI     PAST MEDICAL & SURGICAL HISTORY:  Congestive heart failure  Pulmonary fibrosis  Pulmonary fibrosis  Smoker  BPH with obstruction/lower urinary tract symptoms  Vitamin D deficiency  GERD (gastroesophageal reflux disease)  Diabetes mellitus  Hyperlipidemia  Hypertension  S/P tonsillectomy  No significant past surgical history        Medication list         MEDICATIONS  (STANDING):  ALBUTerol/ipratropium for Nebulization 3 milliLiter(s) Nebulizer every 8 hours  ALPRAZolam 2 milliGRAM(s) Oral three times a day  amLODIPine   Tablet 10 milliGRAM(s) Oral daily  aspirin enteric coated 81 milliGRAM(s) Oral daily  azithromycin  IVPB 500 milliGRAM(s) IV Intermittent every 24 hours  buPROPion  milliGRAM(s) Oral two times a day  cefTRIAXone   IVPB 1 Gram(s) IV Intermittent every 24 hours  dextrose 5%. 1000 milliLiter(s) (50 mL/Hr) IV Continuous <Continuous>  dextrose 50% Injectable 12.5 Gram(s) IV Push once  dextrose 50% Injectable 25 Gram(s) IV Push once  dextrose 50% Injectable 25 Gram(s) IV Push once  enoxaparin Injectable 40 milliGRAM(s) SubCutaneous daily  guaiFENesin  milliGRAM(s) Oral every 12 hours  hydrALAZINE 75 milliGRAM(s) Oral three times a day  hydrochlorothiazide 12.5 milliGRAM(s) Oral daily  insulin lispro (HumaLOG) corrective regimen sliding scale   SubCutaneous three times a day before meals  isosorbide   mononitrate ER Tablet (IMDUR) 60 milliGRAM(s) Oral daily  labetalol 200 milliGRAM(s) Oral three times a day  pantoprazole    Tablet 40 milliGRAM(s) Oral before breakfast  predniSONE   Tablet 20 milliGRAM(s) Oral two times a day  venlafaxine 150 milliGRAM(s) Oral two times a day with meals    MEDICATIONS  (PRN):  dextrose Gel 1 Dose(s) Oral once PRN Blood Glucose LESS THAN 70 milliGRAM(s)/deciliter  glucagon  Injectable 1 milliGRAM(s) IntraMuscular once PRN Glucose LESS THAN 70 milligrams/deciliter         Vitals log        ICU Vital Signs Last 24 Hrs  T(C): 36.5 (01 Jan 2018 04:56), Max: 36.9 (31 Dec 2017 20:44)  T(F): 97.7 (01 Jan 2018 04:56), Max: 98.5 (31 Dec 2017 20:44)  HR: 63 (01 Jan 2018 08:31) (63 - 82)  BP: 161/83 (01 Jan 2018 04:56) (161/83 - 175/88)  BP(mean): --  ABP: --  ABP(mean): --  RR: 18 (01 Jan 2018 04:56) (18 - 18)  SpO2: 98% (01 Jan 2018 08:31) (94% - 98%)           Input and Output:  I&O's Detail    31 Dec 2017 07:01  -  01 Jan 2018 07:00  --------------------------------------------------------  IN:    Solution: 50 mL    Solution: 250 mL  Total IN: 300 mL    OUT:    Voided: 925 mL  Total OUT: 925 mL    Total NET: -625 mL      01 Jan 2018 07:01  -  01 Jan 2018 11:05  --------------------------------------------------------  IN:  Total IN: 0 mL    OUT:    Voided: 700 mL  Total OUT: 700 mL    Total NET: -700 mL          Lab Data                        10.6   7.1   )-----------( 268      ( 01 Jan 2018 07:19 )             33.6     01-01    135  |  98  |  33<H>  ----------------------------<  117<H>  4.4   |  32<H>  |  1.10    Ca    9.3      01 Jan 2018 07:19              Review of Systems	      Objective     Physical Examination  cough  obesity  head at  heart s1s2  lungs dec BS  abd soft and obese        Pertinent Lab findings & Imaging      Princess:  NO   Adequate UO     I&O's Detail    31 Dec 2017 07:01  -  01 Jan 2018 07:00  --------------------------------------------------------  IN:    Solution: 50 mL    Solution: 250 mL  Total IN: 300 mL    OUT:    Voided: 925 mL  Total OUT: 925 mL    Total NET: -625 mL      01 Jan 2018 07:01  -  01 Jan 2018 11:05  --------------------------------------------------------  IN:  Total IN: 0 mL    OUT:    Voided: 700 mL  Total OUT: 700 mL    Total NET: -700 mL               Discussed with:     Cultures:	        Radiology

## 2018-01-01 NOTE — PROGRESS NOTE ADULT - PROBLEM SELECTOR PLAN 3
diagnosis to be determined  follows with Pulm Doc - Dr. Page  follow up as outpatient  cont curr pulm regimen

## 2018-01-01 NOTE — PROGRESS NOTE ADULT - PROBLEM SELECTOR PLAN 1
improving   c./w iv ceftriaxone and zithromax .   -Maintain O2>92%, patient on home O2,   -Fall precautions  Pulmonary follow up appreciated.

## 2018-01-01 NOTE — PROGRESS NOTE ADULT - PROBLEM SELECTOR PLAN 3
-chronic diastolic CHF (normal LVF 2016)  pt received 20 mg iv lasix today   cardioogy follow up appreciated.

## 2018-01-01 NOTE — PROGRESS NOTE ADULT - ASSESSMENT
73 y/o male PMHx diastolic dysfunction, nonobstructive CAD based on cardiac cath from 12/2015, normal lvef (echo 10/2016), HTN, HLD, DM, GERD, BPH, Depression, Anxiety.  He has longstanding dyspnea for which multiple cardiac explanations have been considered, including CAD and diastolic impairment.  He has been found to have a degree of interstitial lung disease, the significance of which is unclear. Admitted with pna.    -EKG shows wandering atrial pacemaker, 1st degree av block, not a fib. Unchanged from previous EKG from September.   -off tele HR 70-80s per flow sheet  - No clear evidence of acute ischemia, CE slightly elevated likely 2/2 to demand ischemia  - Does not appear to have pulmonary edema  -Cardiac cath 2015: EF 65%, LAD 40% stenosis, circumflex 30% stenosis, RCA 40% stenosis. ECHO October 2016: normal left ventricular function, normal LV and EF  - BP a bit high, likely related to steroids.  Increase hydralazine to 75 TID.  Continue the remainder of his antihypertensive medications.  - Monitor and replete lytes, keep K>4, Mg>2  - Other cardiovascular workup will depend on clinical course.  - All other workup per primary team  - Will follow   - No cardiac contraindication to discharge. 73 y/o male PMHx diastolic dysfunction, nonobstructive CAD based on cardiac cath from 12/2015, normal lvef (echo 10/2016), HTN, HLD, DM, GERD, BPH, Depression, Anxiety.  He has longstanding dyspnea for which multiple cardiac explanations have been considered, including CAD and diastolic impairment.  He has been found to have a degree of interstitial lung disease, the significance of which is unclear. Admitted with pna.    -EKG shows wandering atrial pacemaker, 1st degree av block, not a fib. Unchanged from previous EKG from September.   -off tele HR 70-80s per flow sheet  - No clear evidence of acute ischemia, CE slightly elevated likely 2/2 to demand ischemia  - Still congested despite adequate pulmonary treatment. Going to try Lasix 20 IV x 1 after discussion with pulmonary to see if helps symptoms  - Cardiac cath 2015: EF 65%, LAD 40% stenosis, circumflex 30% stenosis, RCA 40% stenosis. ECHO October 2016: normal left ventricular function, normal LV and EF  - BP a bit high, likely related to steroids.  Increase hydralazine to 75 TID.  Continue the remainder of his antihypertensive medications.  - Monitor and replete lytes, keep K>4, Mg>2  - Other cardiovascular workup will depend on clinical course.  - All other workup per primary team  - Will follow

## 2018-01-01 NOTE — PROGRESS NOTE ADULT - PROBLEM SELECTOR PLAN 2
LRTI  on ABX  cxr this am noted  mucinex  cough regimen  NEBS to help bring up secretions  mobilize and increase activity  on steroids and ABX  keep sat > 88 pct  chest pt  prognosis guarded  functional status is poor

## 2018-01-01 NOTE — PROGRESS NOTE ADULT - SUBJECTIVE AND OBJECTIVE BOX
Patient is a 77y old  Male who presents with a chief complaint of SOB, s/p fall early sat am per son (29 Dec 2017 14:41)      INTERVAL HPI/OVERNIGHT EVENTS: no acute overnight events , pt is seen and examined at the bed side. Pt is c/o cough and congestion.     MEDICATIONS  (STANDING):  ALBUTerol/ipratropium for Nebulization 3 milliLiter(s) Nebulizer every 8 hours  ALPRAZolam 2 milliGRAM(s) Oral three times a day  amLODIPine   Tablet 10 milliGRAM(s) Oral daily  aspirin enteric coated 81 milliGRAM(s) Oral daily  azithromycin  IVPB 500 milliGRAM(s) IV Intermittent every 24 hours  buPROPion  milliGRAM(s) Oral two times a day  cefTRIAXone   IVPB 1 Gram(s) IV Intermittent every 24 hours  dextrose 5%. 1000 milliLiter(s) (50 mL/Hr) IV Continuous <Continuous>  dextrose 50% Injectable 12.5 Gram(s) IV Push once  dextrose 50% Injectable 25 Gram(s) IV Push once  dextrose 50% Injectable 25 Gram(s) IV Push once  enoxaparin Injectable 40 milliGRAM(s) SubCutaneous daily  guaiFENesin  milliGRAM(s) Oral every 12 hours  hydrALAZINE 75 milliGRAM(s) Oral three times a day  hydrochlorothiazide 12.5 milliGRAM(s) Oral daily  insulin lispro (HumaLOG) corrective regimen sliding scale   SubCutaneous three times a day before meals  isosorbide   mononitrate ER Tablet (IMDUR) 60 milliGRAM(s) Oral daily  labetalol 200 milliGRAM(s) Oral three times a day  pantoprazole    Tablet 40 milliGRAM(s) Oral before breakfast  predniSONE   Tablet 20 milliGRAM(s) Oral two times a day  venlafaxine 150 milliGRAM(s) Oral two times a day with meals    MEDICATIONS  (PRN):  dextrose Gel 1 Dose(s) Oral once PRN Blood Glucose LESS THAN 70 milliGRAM(s)/deciliter  glucagon  Injectable 1 milliGRAM(s) IntraMuscular once PRN Glucose LESS THAN 70 milligrams/deciliter      Allergies    No Known Allergies    Intolerances        REVIEW OF SYSTEMS:  CONSTITUTIONAL: No fever or chills  HEENT:  No headache, no sore throat  RESPIRATORY: c/o cough and congestion.   CARDIOVASCULAR: No chest pain, palpitations, or leg swelling  GASTROINTESTINAL: No nausea, vomiting, or diarrhea  GENITOURINARY: No dysuria, frequency, or hematuria  NEUROLOGICAL: no focal weakness or dizziness  SKIN:  No rashes or lesions   MUSCULOSKELETAL: no myalgias        Vital Signs Last 24 Hrs  T(C): 37 (01 Jan 2018 14:00), Max: 37 (01 Jan 2018 14:00)  T(F): 98.6 (01 Jan 2018 14:00), Max: 98.6 (01 Jan 2018 14:00)  HR: 87 (01 Jan 2018 14:00) (63 - 87)  BP: 123/74 (01 Jan 2018 14:00) (123/74 - 175/88)  BP(mean): --  RR: 17 (01 Jan 2018 14:00) (17 - 18)  SpO2: 92% (01 Jan 2018 14:00) (92% - 98%)    PHYSICAL EXAM:  GENERAL:  elderly male sitting in the chair not in acute distress.   HEENT:  no raised JVD   CHEST/LUNG:  Decreased B/l breath sound  HEART:  RRR, S1, S2, []murmur  ABDOMEN:  BS+, soft, NT, ND  EXTREMITIES: no edema or calf tenderness  NERVOUS SYSTEM: AA&Ox3, no focal neurology.     DIET:     Cultures: Culture Results:   No growth to date. (12-28 @ 23:04)  Culture Results:   No growth to date. (12-28 @ 23:04)  Culture Results:   No growth (12-28 @ 22:50)      LABS:                        10.6   7.1   )-----------( 268      ( 01 Jan 2018 07:19 )             33.6     CBC Full  -  ( 01 Jan 2018 07:19 )  WBC Count : 7.1 K/uL  Hemoglobin : 10.6 g/dL  Hematocrit : 33.6 %  Platelet Count - Automated : 268 K/uL  Mean Cell Volume : 79.3 fl  Mean Cell Hemoglobin : 25.1 pg  Mean Cell Hemoglobin Concentration : 31.7 gm/dL  Auto Neutrophil # : x  Auto Lymphocyte # : x  Auto Monocyte # : x  Auto Eosinophil # : x  Auto Basophil # : x  Auto Neutrophil % : x  Auto Lymphocyte % : x  Auto Monocyte % : x  Auto Eosinophil % : x  Auto Basophil % : x    01 Jan 2018 07:19    135    |  98     |  33     ----------------------------<  117    4.4     |  32     |  1.10     Ca    9.3        01 Jan 2018 07:19          CAPILLARY BLOOD GLUCOSE      POCT Blood Glucose.: 145 mg/dL (01 Jan 2018 12:07)  POCT Blood Glucose.: 140 mg/dL (01 Jan 2018 08:19)  POCT Blood Glucose.: 147 mg/dL (31 Dec 2017 21:26)  POCT Blood Glucose.: 114 mg/dL (31 Dec 2017 17:12)          RADIOLOGY & ADDITIONAL TESTS:    Personally reviewed.     Consultant(s) Notes Reviewed:  [x] YES  [ ] NO    Care Discussed with [ ] Consultants  [x] Patient  [ ] Family  [x]      [ ] Other; RN  DVT ppx  Advanced directive

## 2018-01-01 NOTE — PROGRESS NOTE ADULT - SUBJECTIVE AND OBJECTIVE BOX
[facility] - Franco Mcallister, Benitez, Josse, Skylar, Robert Motta  Office Number:  [phone]    Patient resting comfortably in bed in NAD.  Laying flat with no respiratory distress.  No complaints of chest pain, increased dyspnea, palpitations, PND, or orthopnea.  He is still coughing, and still feels congested.  He is asking to be discharged.    MEDICATIONS  (STANDING):  ALBUTerol/ipratropium for Nebulization 3 milliLiter(s) Nebulizer every 8 hours  ALPRAZolam 2 milliGRAM(s) Oral three times a day  amLODIPine   Tablet 10 milliGRAM(s) Oral daily  aspirin enteric coated 81 milliGRAM(s) Oral daily  azithromycin  IVPB 500 milliGRAM(s) IV Intermittent every 24 hours  buPROPion  milliGRAM(s) Oral two times a day  cefTRIAXone   IVPB 1 Gram(s) IV Intermittent every 24 hours  dextrose 5%. 1000 milliLiter(s) (50 mL/Hr) IV Continuous <Continuous>  dextrose 50% Injectable 12.5 Gram(s) IV Push once  dextrose 50% Injectable 25 Gram(s) IV Push once  dextrose 50% Injectable 25 Gram(s) IV Push once  enoxaparin Injectable 40 milliGRAM(s) SubCutaneous daily  guaiFENesin  milliGRAM(s) Oral every 12 hours  hydrALAZINE 50 milliGRAM(s) Oral three times a day  hydrochlorothiazide 12.5 milliGRAM(s) Oral daily  insulin lispro (HumaLOG) corrective regimen sliding scale   SubCutaneous three times a day before meals  isosorbide   mononitrate ER Tablet (IMDUR) 60 milliGRAM(s) Oral daily  labetalol 200 milliGRAM(s) Oral three times a day  pantoprazole    Tablet 40 milliGRAM(s) Oral before breakfast  predniSONE   Tablet 20 milliGRAM(s) Oral two times a day  venlafaxine 150 milliGRAM(s) Oral two times a day with meals    MEDICATIONS  (PRN):  dextrose Gel 1 Dose(s) Oral once PRN Blood Glucose LESS THAN 70 milliGRAM(s)/deciliter  glucagon  Injectable 1 milliGRAM(s) IntraMuscular once PRN Glucose LESS THAN 70 milligrams/deciliter      Allergies    No Known Allergies        Vital Signs Last 24 Hrs  T(C): 36.5 (01 Jan 2018 04:56), Max: 36.9 (31 Dec 2017 20:44)  T(F): 97.7 (01 Jan 2018 04:56), Max: 98.5 (31 Dec 2017 20:44)  HR: 63 (01 Jan 2018 04:56) (63 - 82)  BP: 161/83 (01 Jan 2018 04:56) (161/83 - 175/88)  BP(mean): --  RR: 18 (01 Jan 2018 04:56) (18 - 18)  SpO2: 98% (01 Jan 2018 04:56) (94% - 98%)    I&O's Summary    31 Dec 2017 07:01  -  01 Jan 2018 07:00  --------------------------------------------------------  IN: 300 mL / OUT: 925 mL / NET: -625 mL        ON EXAM:    General: NAD, awake and alert, oriented x 3  HEENT: Mucous membranes are moist, anicteric  Lungs: Non-labored, Coarse breath sounds b/l.  Cardiovascular: Regular, S1 and S2, no murmurs, rubs, or gallops  Gastrointestinal: Bowel Sounds present, soft, nontender.   Lymph: No peripheral edema. No lymphadenopathy.  Skin: No rashes or ulcers  Psych:  Mood & affect appropriate    LABS: All Labs Reviewed:                        10.6   7.1   )-----------( 268      ( 01 Jan 2018 07:19 )             33.6                         10.8   6.4   )-----------( 294      ( 31 Dec 2017 07:39 )             33.3                         10.4   6.6   )-----------( 263      ( 30 Dec 2017 06:55 )             32.4     01 Jan 2018 07:19    135    |  98     |  33     ----------------------------<  117    4.4     |  32     |  1.10   31 Dec 2017 07:39    135    |  98     |  34     ----------------------------<  141    4.5     |  28     |  1.30   30 Dec 2017 06:55    134    |  98     |  32     ----------------------------<  143    4.2     |  26     |  1.20     Ca    9.3        01 Jan 2018 07:19  Ca    9.0        31 Dec 2017 07:39  Ca    8.6        30 Dec 2017 06:55  Mg     1.8       29 Dec 2017 11:37            Blood Culture: Organism --  Gram Stain Blood -- Gram Stain --  Specimen Source .Blood Blood  Culture-Blood --    Organism --  Gram Stain Blood -- Gram Stain --  Specimen Source .Urine Clean Catch (Midstream)  Culture-Blood --

## 2018-01-01 NOTE — PROGRESS NOTE ADULT - ASSESSMENT
76yo M with PMH of chronic diastolic CHF (normal LVF 2016), non obstructive CAD (cardiac cath 2015), interstitial lung disease, HTN, HLD, DM, GERD, BPH, depression, anxiety presents with SOB. Admitted to [facility] with remote telemetry for community acquired PNA.

## 2018-01-02 LAB
ANION GAP SERPL CALC-SCNC: 9 MMOL/L — SIGNIFICANT CHANGE UP (ref 5–17)
BUN SERPL-MCNC: 34 MG/DL — HIGH (ref 7–23)
CALCIUM SERPL-MCNC: 9.9 MG/DL — SIGNIFICANT CHANGE UP (ref 8.5–10.1)
CHLORIDE SERPL-SCNC: 93 MMOL/L — LOW (ref 96–108)
CO2 SERPL-SCNC: 32 MMOL/L — HIGH (ref 22–31)
CREAT SERPL-MCNC: 1.4 MG/DL — HIGH (ref 0.5–1.3)
GLUCOSE SERPL-MCNC: 101 MG/DL — HIGH (ref 70–99)
HCT VFR BLD CALC: 39.3 % — SIGNIFICANT CHANGE UP (ref 39–50)
HGB BLD-MCNC: 12 G/DL — LOW (ref 13–17)
LACTATE SERPL-SCNC: 1.7 MMOL/L — SIGNIFICANT CHANGE UP (ref 0.7–2)
MCHC RBC-ENTMCNC: 24.8 PG — LOW (ref 27–34)
MCHC RBC-ENTMCNC: 30.6 GM/DL — LOW (ref 32–36)
MCV RBC AUTO: 81 FL — SIGNIFICANT CHANGE UP (ref 80–100)
PLATELET # BLD AUTO: 310 K/UL — SIGNIFICANT CHANGE UP (ref 150–400)
POTASSIUM SERPL-MCNC: 4.6 MMOL/L — SIGNIFICANT CHANGE UP (ref 3.5–5.3)
POTASSIUM SERPL-SCNC: 4.6 MMOL/L — SIGNIFICANT CHANGE UP (ref 3.5–5.3)
PROCALCITONIN SERPL-MCNC: 0.31 NG/ML — HIGH (ref 0–0.04)
RBC # BLD: 4.85 M/UL — SIGNIFICANT CHANGE UP (ref 4.2–5.8)
RBC # FLD: 15.3 % — HIGH (ref 10.3–14.5)
SODIUM SERPL-SCNC: 134 MMOL/L — LOW (ref 135–145)
WBC # BLD: 8.5 K/UL — SIGNIFICANT CHANGE UP (ref 3.8–10.5)
WBC # FLD AUTO: 8.5 K/UL — SIGNIFICANT CHANGE UP (ref 3.8–10.5)

## 2018-01-02 PROCEDURE — 99233 SBSQ HOSP IP/OBS HIGH 50: CPT

## 2018-01-02 PROCEDURE — 71045 X-RAY EXAM CHEST 1 VIEW: CPT | Mod: 26

## 2018-01-02 PROCEDURE — 99233 SBSQ HOSP IP/OBS HIGH 50: CPT | Mod: GC

## 2018-01-02 RX ORDER — IPRATROPIUM/ALBUTEROL SULFATE 18-103MCG
3 AEROSOL WITH ADAPTER (GRAM) INHALATION ONCE
Qty: 0 | Refills: 0 | Status: COMPLETED | OUTPATIENT
Start: 2018-01-02 | End: 2018-01-02

## 2018-01-02 RX ORDER — ACETAMINOPHEN 500 MG
650 TABLET ORAL ONCE
Qty: 0 | Refills: 0 | Status: COMPLETED | OUTPATIENT
Start: 2018-01-02 | End: 2018-01-02

## 2018-01-02 RX ORDER — FUROSEMIDE 40 MG
20 TABLET ORAL ONCE
Qty: 0 | Refills: 0 | Status: COMPLETED | OUTPATIENT
Start: 2018-01-02 | End: 2018-01-02

## 2018-01-02 RX ORDER — ASENAPINE MALEATE 10 MG/1
10 TABLET SUBLINGUAL AT BEDTIME
Qty: 0 | Refills: 0 | Status: DISCONTINUED | OUTPATIENT
Start: 2018-01-02 | End: 2018-01-06

## 2018-01-02 RX ORDER — ACETAMINOPHEN 500 MG
650 TABLET ORAL EVERY 6 HOURS
Qty: 0 | Refills: 0 | Status: DISCONTINUED | OUTPATIENT
Start: 2018-01-02 | End: 2018-01-06

## 2018-01-02 RX ORDER — ALBUTEROL 90 UG/1
2.5 AEROSOL, METERED ORAL EVERY 6 HOURS
Qty: 0 | Refills: 0 | Status: DISCONTINUED | OUTPATIENT
Start: 2018-01-02 | End: 2018-01-06

## 2018-01-02 RX ORDER — ALBUTEROL 90 UG/1
2.5 AEROSOL, METERED ORAL ONCE
Qty: 0 | Refills: 0 | Status: COMPLETED | OUTPATIENT
Start: 2018-01-02 | End: 2018-01-02

## 2018-01-02 RX ORDER — BUDESONIDE, MICRONIZED 100 %
0.5 POWDER (GRAM) MISCELLANEOUS
Qty: 0 | Refills: 0 | Status: DISCONTINUED | OUTPATIENT
Start: 2018-01-02 | End: 2018-01-06

## 2018-01-02 RX ADMIN — Medication 12.5 MILLIGRAM(S): at 05:26

## 2018-01-02 RX ADMIN — ALBUTEROL 2.5 MILLIGRAM(S): 90 AEROSOL, METERED ORAL at 13:39

## 2018-01-02 RX ADMIN — ALBUTEROL 2.5 MILLIGRAM(S): 90 AEROSOL, METERED ORAL at 20:33

## 2018-01-02 RX ADMIN — Medication 650 MILLIGRAM(S): at 13:27

## 2018-01-02 RX ADMIN — Medication 1: at 12:56

## 2018-01-02 RX ADMIN — AMLODIPINE BESYLATE 10 MILLIGRAM(S): 2.5 TABLET ORAL at 05:25

## 2018-01-02 RX ADMIN — Medication 2 MILLIGRAM(S): at 14:24

## 2018-01-02 RX ADMIN — ISOSORBIDE MONONITRATE 60 MILLIGRAM(S): 60 TABLET, EXTENDED RELEASE ORAL at 12:57

## 2018-01-02 RX ADMIN — Medication 20 MILLIGRAM(S): at 18:00

## 2018-01-02 RX ADMIN — PANTOPRAZOLE SODIUM 40 MILLIGRAM(S): 20 TABLET, DELAYED RELEASE ORAL at 05:26

## 2018-01-02 RX ADMIN — Medication 75 MILLIGRAM(S): at 22:20

## 2018-01-02 RX ADMIN — Medication 200 MILLIGRAM(S): at 22:20

## 2018-01-02 RX ADMIN — Medication 600 MILLIGRAM(S): at 05:25

## 2018-01-02 RX ADMIN — Medication 20 MILLIGRAM(S): at 05:25

## 2018-01-02 RX ADMIN — Medication 2 MILLIGRAM(S): at 22:20

## 2018-01-02 RX ADMIN — BUPROPION HYDROCHLORIDE 150 MILLIGRAM(S): 150 TABLET, EXTENDED RELEASE ORAL at 17:59

## 2018-01-02 RX ADMIN — Medication 81 MILLIGRAM(S): at 12:57

## 2018-01-02 RX ADMIN — ENOXAPARIN SODIUM 40 MILLIGRAM(S): 100 INJECTION SUBCUTANEOUS at 13:00

## 2018-01-02 RX ADMIN — Medication 600 MILLIGRAM(S): at 17:59

## 2018-01-02 RX ADMIN — Medication 75 MILLIGRAM(S): at 05:26

## 2018-01-02 RX ADMIN — ALBUTEROL 2.5 MILLIGRAM(S): 90 AEROSOL, METERED ORAL at 16:28

## 2018-01-02 RX ADMIN — ASENAPINE MALEATE 10 MILLIGRAM(S): 10 TABLET SUBLINGUAL at 22:20

## 2018-01-02 RX ADMIN — Medication 0.5 MILLIGRAM(S): at 20:33

## 2018-01-02 RX ADMIN — Medication 2 MILLIGRAM(S): at 05:39

## 2018-01-02 RX ADMIN — BUPROPION HYDROCHLORIDE 150 MILLIGRAM(S): 150 TABLET, EXTENDED RELEASE ORAL at 05:25

## 2018-01-02 RX ADMIN — ALBUTEROL 2.5 MILLIGRAM(S): 90 AEROSOL, METERED ORAL at 08:33

## 2018-01-02 RX ADMIN — Medication 15 MILLIGRAM(S): at 17:59

## 2018-01-02 RX ADMIN — Medication 75 MILLIGRAM(S): at 13:02

## 2018-01-02 RX ADMIN — CEFTRIAXONE 100 GRAM(S): 500 INJECTION, POWDER, FOR SOLUTION INTRAMUSCULAR; INTRAVENOUS at 05:31

## 2018-01-02 RX ADMIN — Medication 150 MILLIGRAM(S): at 08:48

## 2018-01-02 RX ADMIN — Medication 150 MILLIGRAM(S): at 17:59

## 2018-01-02 RX ADMIN — Medication 650 MILLIGRAM(S): at 12:57

## 2018-01-02 RX ADMIN — Medication 650 MILLIGRAM(S): at 18:32

## 2018-01-02 RX ADMIN — Medication 200 MILLIGRAM(S): at 13:03

## 2018-01-02 RX ADMIN — Medication 0.5 MILLIGRAM(S): at 08:33

## 2018-01-02 RX ADMIN — Medication 200 MILLIGRAM(S): at 05:25

## 2018-01-02 RX ADMIN — Medication 650 MILLIGRAM(S): at 05:30

## 2018-01-02 NOTE — PROGRESS NOTE ADULT - SUBJECTIVE AND OBJECTIVE BOX
HPI:  78yo M with PMH of chronic diastolic CHF (normal LVF 2016), non obstructive CAD (cardiac cath 2015), interstitial lung disease, HTN, HLD, DMT2 on Janumet, GERD, BPH, depression, anxiety presents with SOB. Patient states that he has had dyspnea fro the past 1.5 weeks along with a productive cough  with white sputum. Patient went to walk-in clinic today where CXR was performed and showed consolidation in the lung so patient was told to come to ED.   REVIEW OF SYSTEMS:    CONSTITUTIONAL: No weakness, fevers or chills  EYES/ENT: No visual changes, no throat pain   RESPIRATORY: No cough, wheezing, hemoptysis; No shortness of breath  CARDIOVASCULAR: No chest pain or palpitations  GASTROINTESTINAL: No abdominal, nausea, vomiting, or hematemesis; No diarrhea or constipation. No melena or hematochezia.  GENITOURINARY: No dysuria, frequency or hematuria  NEUROLOGICAL: No dizziness, numbness, or weakness  SKIN: No itching, burning, rashes, or lesions   All other review of systems is negative unless indicated above.    VITAL SIGNS:  Vital Signs Last 24 Hrs  T(C): 37.1 (02 Jan 2018 05:36), Max: 37.5 (01 Jan 2018 20:30)  T(F): 98.7 (02 Jan 2018 05:36), Max: 99.5 (01 Jan 2018 20:30)  HR: 69 (02 Jan 2018 11:42) (68 - 88)  BP: 158/80 (02 Jan 2018 11:42) (123/74 - 162/88)  BP(mean): --  RR: 17 (02 Jan 2018 05:36) (17 - 18)  SpO2: 96% (02 Jan 2018 11:42) (92% - 96%)      PHYSICAL EXAM:     GENERAL: no acute distress  HEENT: NC/AT, EOMI, neck supple, MMM  RESPIRATORY: LCTAB/L, no rhonchi, rales, or wheezing  CARDIOVASCULAR: RRR, no murmurs, gallops, rubs  ABDOMINAL: soft, non-tender, non-distended, positive bowel sounds   EXTREMITIES: no clubbing, cyanosis, or edema  NEUROLOGICAL: alert and oriented x 3, non-focal  SKIN: no rashes or lesions   MUSCULOSKELETAL: no gross joint deformity                          12.0   8.5   )-----------( 310      ( 02 Jan 2018 06:56 )             39.3     01-02    134<L>  |  93<L>  |  34<H>  ----------------------------<  101<H>  4.6   |  32<H>  |  1.40<H>    Ca    9.9      02 Jan 2018 06:56        CAPILLARY BLOOD GLUCOSE      POCT Blood Glucose.: 135 mg/dL (02 Jan 2018 08:05)  POCT Blood Glucose.: 207 mg/dL (01 Jan 2018 22:40)  POCT Blood Glucose.: 116 mg/dL (01 Jan 2018 17:04)      MEDICATIONS  (STANDING):  ALBUTerol    0.083% 2.5 milliGRAM(s) Nebulizer every 6 hours  ALPRAZolam 2 milliGRAM(s) Oral three times a day  amLODIPine   Tablet 10 milliGRAM(s) Oral daily  aspirin enteric coated 81 milliGRAM(s) Oral daily  buDESOnide   0.5 milliGRAM(s) Respule 0.5 milliGRAM(s) Inhalation two times a day  buPROPion  milliGRAM(s) Oral two times a day  cefTRIAXone   IVPB 1 Gram(s) IV Intermittent every 24 hours  dextrose 5%. 1000 milliLiter(s) (50 mL/Hr) IV Continuous <Continuous>  dextrose 50% Injectable 12.5 Gram(s) IV Push once  dextrose 50% Injectable 25 Gram(s) IV Push once  dextrose 50% Injectable 25 Gram(s) IV Push once  enoxaparin Injectable 40 milliGRAM(s) SubCutaneous daily  guaiFENesin  milliGRAM(s) Oral every 12 hours  hydrALAZINE 75 milliGRAM(s) Oral three times a day  hydrochlorothiazide 12.5 milliGRAM(s) Oral daily  insulin lispro (HumaLOG) corrective regimen sliding scale   SubCutaneous three times a day before meals  isosorbide   mononitrate ER Tablet (IMDUR) 60 milliGRAM(s) Oral daily  labetalol 200 milliGRAM(s) Oral three times a day  pantoprazole    Tablet 40 milliGRAM(s) Oral before breakfast  predniSONE   Tablet 15 milliGRAM(s) Oral two times a day  venlafaxine 150 milliGRAM(s) Oral two times a day with meals HPI:  76yo M with PMH of chronic diastolic CHF (normal LVF 2016), non obstructive CAD (cardiac cath 2015), interstitial lung disease, HTN, HLD, DMT2 on Janumet, GERD, BPH, depression, anxiety presented with SOB, admitted with pneumonia.    Improving shortness of breath. Remains weak. Denies chest pain, palpitations. Denies nausea, vomiting, diarrhea.    REVIEW OF SYSTEMS:    CONSTITUTIONAL: No weakness, fevers or chills  EYES/ENT: No visual changes, no throat pain   RESPIRATORY: +cough, wheezing, hemoptysis; +shortness of breath  CARDIOVASCULAR: No chest pain or palpitations  GASTROINTESTINAL: No abdominal pain, nausea, vomiting, or hematemesis; No diarrhea or constipation. No melena or hematochezia.  GENITOURINARY: No dysuria, frequency or hematuria  NEUROLOGICAL: No dizziness, numbness, or weakness  SKIN: No itching, burning, rashes, or lesions   All other review of systems is negative unless indicated above.    VITAL SIGNS:  Vital Signs Last 24 Hrs  T(C): 37.1 (02 Jan 2018 05:36), Max: 37.5 (01 Jan 2018 20:30)  T(F): 98.7 (02 Jan 2018 05:36), Max: 99.5 (01 Jan 2018 20:30)  HR: 69 (02 Jan 2018 11:42) (68 - 88)  BP: 158/80 (02 Jan 2018 11:42) (123/74 - 162/88)  BP(mean): --  RR: 17 (02 Jan 2018 05:36) (17 - 18)  SpO2: 96% (02 Jan 2018 11:42) (92% - 96%)      PHYSICAL EXAM:     GENERAL: no acute distress  HEENT: NC/AT, EOMI, neck supple, MMM  RESPIRATORY: LCTAB/L, no rhonchi, rales, or wheezing  CARDIOVASCULAR: RRR, no murmurs, gallops, rubs  ABDOMINAL: soft, non-tender, non-distended, positive bowel sounds   EXTREMITIES: no clubbing, cyanosis, or edema  NEUROLOGICAL: alert and oriented x 3, non-focal  SKIN: no rashes or lesions   MUSCULOSKELETAL: no gross joint deformity                          12.0   8.5   )-----------( 310      ( 02 Jan 2018 06:56 )             39.3     01-02    134<L>  |  93<L>  |  34<H>  ----------------------------<  101<H>  4.6   |  32<H>  |  1.40<H>    Ca    9.9      02 Jan 2018 06:56        CAPILLARY BLOOD GLUCOSE      POCT Blood Glucose.: 135 mg/dL (02 Jan 2018 08:05)  POCT Blood Glucose.: 207 mg/dL (01 Jan 2018 22:40)  POCT Blood Glucose.: 116 mg/dL (01 Jan 2018 17:04)      MEDICATIONS  (STANDING):  ALBUTerol    0.083% 2.5 milliGRAM(s) Nebulizer every 6 hours  ALPRAZolam 2 milliGRAM(s) Oral three times a day  amLODIPine   Tablet 10 milliGRAM(s) Oral daily  aspirin enteric coated 81 milliGRAM(s) Oral daily  buDESOnide   0.5 milliGRAM(s) Respule 0.5 milliGRAM(s) Inhalation two times a day  buPROPion  milliGRAM(s) Oral two times a day  cefTRIAXone   IVPB 1 Gram(s) IV Intermittent every 24 hours  dextrose 5%. 1000 milliLiter(s) (50 mL/Hr) IV Continuous <Continuous>  dextrose 50% Injectable 12.5 Gram(s) IV Push once  dextrose 50% Injectable 25 Gram(s) IV Push once  dextrose 50% Injectable 25 Gram(s) IV Push once  enoxaparin Injectable 40 milliGRAM(s) SubCutaneous daily  guaiFENesin  milliGRAM(s) Oral every 12 hours  hydrALAZINE 75 milliGRAM(s) Oral three times a day  hydrochlorothiazide 12.5 milliGRAM(s) Oral daily  insulin lispro (HumaLOG) corrective regimen sliding scale   SubCutaneous three times a day before meals  isosorbide   mononitrate ER Tablet (IMDUR) 60 milliGRAM(s) Oral daily  labetalol 200 milliGRAM(s) Oral three times a day  pantoprazole    Tablet 40 milliGRAM(s) Oral before breakfast  predniSONE   Tablet 15 milliGRAM(s) Oral two times a day  venlafaxine 150 milliGRAM(s) Oral two times a day with meals

## 2018-01-02 NOTE — PROGRESS NOTE ADULT - PROBLEM SELECTOR PLAN 5
-Hold home med of Janumet 50 mg, start low dose ISS  monitor FSglucose closely. -Hold home med of Janumet 50 mg, continue low dose ISS  monitor FS glucose closely.

## 2018-01-02 NOTE — PROGRESS NOTE ADULT - PROBLEM SELECTOR PLAN 1
on emp ABX  chest pt  will increase NEBS to QID  will taper Prednisone to 15 mg PO BID  will add Pulmicort BID nebs  keep sat > 88 pct  pt has hx of Pulm Fibrosis, follows with Dr. Page, cont current pulm regimen  overall prognosis guarded  pt needs to mobilize more and spend time out of bed to chair  incentive justyna  s/p lasix x 1 dose, no improvement reported  ct chest reviewed with pt  will follow  keep sat > 88 pct  pt has JOAQUIN, refuses CPAP

## 2018-01-02 NOTE — PROGRESS NOTE ADULT - PROBLEM SELECTOR PLAN 1
improving   - c/w IV ceftriaxone; zithromax course completed  -Maintain O2>92%, patient on home O2,   -Fall precautions  Pulmonary follow up appreciated.

## 2018-01-02 NOTE — PROGRESS NOTE ADULT - PROBLEM SELECTOR PLAN 3
-chronic diastolic CHF (normal LVF 2016)  patient did not want to take lasix   cardiology follow up appreciated. -chronic diastolic CHF (normal LVF 2016)  -Lasix again today.  cardiology follow up appreciated.

## 2018-01-02 NOTE — PROGRESS NOTE ADULT - PROBLEM SELECTOR PLAN 10
DVt ppx: Lovenox  ok to DC  IMPROVE VTE Individual Risk Assessment          RISK                                                          Points  [ 0 ] Previous VTE                                                3  [ 0 ] Thrombophilia                                             2  [ 0 ] Lower limb paralysis                                   2        (unable to hold up >15 seconds)    [ 0 ] Current Cancer                                             2         (within 6 months)  [  0] Immobilization > 24 hrs                              1  [ 0 ] ICU/CCU stay > 24 hours                             1  [ 1 ] Age > 60                                                         1    IMPROVE VTE Score: 1 DVt ppx: Lovenox  IMPROVE VTE Individual Risk Assessment          RISK                                                          Points  [ 0 ] Previous VTE                                                3  [ 0 ] Thrombophilia                                             2  [ 0 ] Lower limb paralysis                                   2        (unable to hold up >15 seconds)    [ 0 ] Current Cancer                                             2         (within 6 months)  [  0] Immobilization > 24 hrs                              1  [ 0 ] ICU/CCU stay > 24 hours                             1  [ 1 ] Age > 60                                                         1    IMPROVE VTE Score: 1

## 2018-01-02 NOTE — PROGRESS NOTE ADULT - ASSESSMENT
71 y/o male PMHx diastolic dysfunction, nonobstructive CAD based on cardiac cath from 12/2015, normal lvef (echo 10/2016), HTN, HLD, DM, GERD, BPH, Depression, Anxiety.  He has longstanding dyspnea for which multiple cardiac explanations have been considered, including CAD and diastolic impairment.  He has been found to have a degree of interstitial lung disease, the significance of which is unclear. Admitted with pna.    -EKG shows wandering atrial pacemaker, 1st degree av block, not a fib. Unchanged from previous EKG from September.   - HR is controlled.   - No clear evidence of acute ischemia, CE slightly elevated likely 2/2 to demand ischemia  - Still congested despite adequate pulmonary treatment and subjectively improved post lasix. I would give another dose of lasix 20mg IV.   - Cardiac cath 2015: EF 65%, LAD 40% stenosis, circumflex 30% stenosis, RCA 40% stenosis. ECHO October 2016: normal left ventricular function, normal LV and EF  - BP a bit high, likely related to steroids.  Increase hydralazine to 100 TID if still high post diuresis.  Continue the remainder of his antihypertensive medications.  - Monitor and replete lytes, keep K>4, Mg>2  - Other cardiovascular workup will depend on clinical course.  - All other workup per primary team  - Will follow

## 2018-01-02 NOTE — PROGRESS NOTE ADULT - SUBJECTIVE AND OBJECTIVE BOX
[facility] -- Franco Mcallister, Josse Marquis Pannella, Patel, Savella  Office # [phone]      Follow Up:  CHF, CAD, dyspnea    Subjective/Observations: Patient seen and examined. Events noted. Resting comfortably in chair. States that feels better after getting dose of lasix yesterday. Still with SOB. No complaints of chest pain,  or palpitations reported.         REVIEW OF SYSTEMS: All other review of systems is negative unless indicated above    PAST MEDICAL & SURGICAL HISTORY:  Congestive heart failure  Pulmonary fibrosis  Pulmonary fibrosis  Smoker  BPH with obstruction/lower urinary tract symptoms  Vitamin D deficiency  GERD (gastroesophageal reflux disease)  Diabetes mellitus  Hyperlipidemia  Hypertension  S/P tonsillectomy      MEDICATIONS  (STANDING):  ALBUTerol    0.083% 2.5 milliGRAM(s) Nebulizer every 6 hours  ALPRAZolam 2 milliGRAM(s) Oral three times a day  amLODIPine   Tablet 10 milliGRAM(s) Oral daily  aspirin enteric coated 81 milliGRAM(s) Oral daily  buDESOnide   0.5 milliGRAM(s) Respule 0.5 milliGRAM(s) Inhalation two times a day  buPROPion  milliGRAM(s) Oral two times a day  cefTRIAXone   IVPB 1 Gram(s) IV Intermittent every 24 hours  dextrose 5%. 1000 milliLiter(s) (50 mL/Hr) IV Continuous <Continuous>  dextrose 50% Injectable 12.5 Gram(s) IV Push once  dextrose 50% Injectable 25 Gram(s) IV Push once  dextrose 50% Injectable 25 Gram(s) IV Push once  enoxaparin Injectable 40 milliGRAM(s) SubCutaneous daily  guaiFENesin  milliGRAM(s) Oral every 12 hours  hydrALAZINE 75 milliGRAM(s) Oral three times a day  hydrochlorothiazide 12.5 milliGRAM(s) Oral daily  insulin lispro (HumaLOG) corrective regimen sliding scale   SubCutaneous three times a day before meals  isosorbide   mononitrate ER Tablet (IMDUR) 60 milliGRAM(s) Oral daily  labetalol 200 milliGRAM(s) Oral three times a day  pantoprazole    Tablet 40 milliGRAM(s) Oral before breakfast  predniSONE   Tablet 15 milliGRAM(s) Oral two times a day  venlafaxine 150 milliGRAM(s) Oral two times a day with meals    MEDICATIONS  (PRN):  acetaminophen   Tablet 650 milliGRAM(s) Oral every 6 hours PRN For Temp greater than 38 C (100.4 F)  dextrose Gel 1 Dose(s) Oral once PRN Blood Glucose LESS THAN 70 milliGRAM(s)/deciliter  glucagon  Injectable 1 milliGRAM(s) IntraMuscular once PRN Glucose LESS THAN 70 milligrams/deciliter      Allergies    No Known Allergies    Intolerances            Vital Signs Last 24 Hrs  T(C): 38.4 (02 Jan 2018 17:42), Max: 38.4 (02 Jan 2018 17:42)  T(F): 101.1 (02 Jan 2018 17:42), Max: 101.1 (02 Jan 2018 17:42)  HR: 85 (02 Jan 2018 17:42) (68 - 95)  BP: 137/78 (02 Jan 2018 17:42) (137/78 - 183/106)  BP(mean): --  RR: 16 (02 Jan 2018 17:42) (16 - 19)  SpO2: 94% (02 Jan 2018 17:42) (93% - 96%)    I&O's Summary    01 Jan 2018 07:01  -  02 Jan 2018 07:00  --------------------------------------------------------  IN: 300 mL / OUT: 1850 mL / NET: -1550 mL    02 Jan 2018 07:01  -  02 Jan 2018 18:15  --------------------------------------------------------  IN: 840 mL / OUT: 1050 mL / NET: -210 mL          PHYSICAL EXAM:     Constitutional: NAD, awake and alert, well-developed  HEENT: Moist Mucous Membranes, Anicteric  Pulmonary: Decreased breath sounds b/l. + diffuse wheeze   Cardiovascular: Regular, S1 and S2, No murmurs, rubs, gallops or clicks  Gastrointestinal: Bowel Sounds present, soft, nontender.   Lymph: No peripheral edema. No lymphadenopathy.  Skin: No visible rashes or ulcers.  Psych:  Mood & affect appropriate    LABS: All Labs Reviewed:                        12.0   8.5   )-----------( 310      ( 02 Jan 2018 06:56 )             39.3                         10.6   7.1   )-----------( 268      ( 01 Jan 2018 07:19 )             33.6                         10.8   6.4   )-----------( 294      ( 31 Dec 2017 07:39 )             33.3     02 Jan 2018 06:56    134    |  93     |  34     ----------------------------<  101    4.6     |  32     |  1.40   01 Jan 2018 07:19    135    |  98     |  33     ----------------------------<  117    4.4     |  32     |  1.10   31 Dec 2017 07:39    135    |  98     |  34     ----------------------------<  141    4.5     |  28     |  1.30     Ca    9.9        02 Jan 2018 06:56  Ca    9.3        01 Jan 2018 07:19  Ca    9.0        31 Dec 2017 07:39

## 2018-01-02 NOTE — PROGRESS NOTE ADULT - PROBLEM SELECTOR PLAN 7
- Resistant HTN: continue home meds of labetalol, hydralazine, amlodipine, HCTZ, isosorbide mononitrate ER with hold parameters  -DASH diet -continue home meds of labetalol, hydralazine, amlodipine, HCTZ, isosorbide mononitrate ER with hold parameters  BP has been labile. Continue to monitor.   -DASH diet

## 2018-01-02 NOTE — PROGRESS NOTE ADULT - SUBJECTIVE AND OBJECTIVE BOX
Date/Time Patient Seen:  		  Referring MD:   Data Reviewed	       Patient is a 77y old  Male who presents with a chief complaint of SOB, s/p fall early sat am per son (29 Dec 2017 14:41)  in bed  seen and examined  vs and meds reviewed      Subjective/HPI     PAST MEDICAL & SURGICAL HISTORY:  Congestive heart failure  Pulmonary fibrosis  Pulmonary fibrosis  Smoker  BPH with obstruction/lower urinary tract symptoms  Vitamin D deficiency  GERD (gastroesophageal reflux disease)  Diabetes mellitus  Hyperlipidemia  Hypertension  S/P tonsillectomy  No significant past surgical history        Medication list         MEDICATIONS  (STANDING):  ALBUTerol    0.083% 2.5 milliGRAM(s) Nebulizer every 6 hours  ALPRAZolam 2 milliGRAM(s) Oral three times a day  amLODIPine   Tablet 10 milliGRAM(s) Oral daily  aspirin enteric coated 81 milliGRAM(s) Oral daily  azithromycin  IVPB 500 milliGRAM(s) IV Intermittent every 24 hours  buDESOnide   0.5 milliGRAM(s) Respule 0.5 milliGRAM(s) Inhalation two times a day  buPROPion  milliGRAM(s) Oral two times a day  cefTRIAXone   IVPB 1 Gram(s) IV Intermittent every 24 hours  dextrose 5%. 1000 milliLiter(s) (50 mL/Hr) IV Continuous <Continuous>  dextrose 50% Injectable 12.5 Gram(s) IV Push once  dextrose 50% Injectable 25 Gram(s) IV Push once  dextrose 50% Injectable 25 Gram(s) IV Push once  enoxaparin Injectable 40 milliGRAM(s) SubCutaneous daily  guaiFENesin  milliGRAM(s) Oral every 12 hours  hydrALAZINE 75 milliGRAM(s) Oral three times a day  hydrochlorothiazide 12.5 milliGRAM(s) Oral daily  insulin lispro (HumaLOG) corrective regimen sliding scale   SubCutaneous three times a day before meals  isosorbide   mononitrate ER Tablet (IMDUR) 60 milliGRAM(s) Oral daily  labetalol 200 milliGRAM(s) Oral three times a day  pantoprazole    Tablet 40 milliGRAM(s) Oral before breakfast  predniSONE   Tablet 15 milliGRAM(s) Oral two times a day  venlafaxine 150 milliGRAM(s) Oral two times a day with meals    MEDICATIONS  (PRN):  dextrose Gel 1 Dose(s) Oral once PRN Blood Glucose LESS THAN 70 milliGRAM(s)/deciliter  glucagon  Injectable 1 milliGRAM(s) IntraMuscular once PRN Glucose LESS THAN 70 milligrams/deciliter         Vitals log        ICU Vital Signs Last 24 Hrs  T(C): 37.1 (02 Jan 2018 05:36), Max: 37.5 (01 Jan 2018 20:30)  T(F): 98.7 (02 Jan 2018 05:36), Max: 99.5 (01 Jan 2018 20:30)  HR: 88 (02 Jan 2018 05:36) (63 - 88)  BP: 162/87 (02 Jan 2018 05:36) (123/74 - 162/88)  BP(mean): --  ABP: --  ABP(mean): --  RR: 17 (02 Jan 2018 05:36) (17 - 18)  SpO2: 96% (02 Jan 2018 05:36) (92% - 98%)           Input and Output:  I&O's Detail    01 Jan 2018 07:01  -  02 Jan 2018 07:00  --------------------------------------------------------  IN:    Solution: 250 mL    Solution: 50 mL  Total IN: 300 mL    OUT:    Voided: 1850 mL  Total OUT: 1850 mL    Total NET: -1550 mL          Lab Data                        10.6   7.1   )-----------( 268      ( 01 Jan 2018 07:19 )             33.6     01-01    135  |  98  |  33<H>  ----------------------------<  117<H>  4.4   |  32<H>  |  1.10    Ca    9.3      01 Jan 2018 07:19              Review of Systems	      Objective     Physical Examination  head at, heart s1s2, lungs dec BS, abd soft, obese, cn grossly int, weak, verbal, alert        Pertinent Lab findings & Imaging      Princess:  NO   Adequate UO     I&O's Detail    01 Jan 2018 07:01  -  02 Jan 2018 07:00  --------------------------------------------------------  IN:    Solution: 250 mL    Solution: 50 mL  Total IN: 300 mL    OUT:    Voided: 1850 mL  Total OUT: 1850 mL    Total NET: -1550 mL               Discussed with:     Cultures:	        Radiology

## 2018-01-02 NOTE — PROGRESS NOTE ADULT - PROBLEM SELECTOR PLAN 8
-Continue home meds of Xanax, venlafaxine, buproprion. Patient will bring Saphris from home -Continue home meds of Xanax, venlafaxine, buproprion.   Patient to take Saphris from home

## 2018-01-02 NOTE — CHART NOTE - NSCHARTNOTEFT_GEN_A_CORE
Received sign out from Day team for new fever. Pt seen and examined at bedside, complaining of SOB, dry cough. Denies CP, palpitations, chills, N/V/D, abdominal pain.     T(C): 38.4 (01-02-18 @ 17:42), Max: 38.4 (01-02-18 @ 17:42)  HR: 85 (01-02-18 @ 17:42) (68 - 95)  BP: 137/78 (01-02-18 @ 17:42) (137/78 - 183/106)  RR: 16 (01-02-18 @ 17:42) (16 - 19)  SpO2: 94% (01-02-18 @ 17:42) (93% - 96%)  Wt(kg): --    Physical Exam:  General: mild SOB on O2  HEENT: normocephalic, atraumatic, PERRLA, extraocular muscles intact bilaterally, moist mucous membranes   Neck: Supple, nontender, no mass  Neurology: A&Ox3, moves all extremities x4, nonfocal, CN II-XII grossly intact, sensation intact, no gait abnormalities   Respiratory: coarse rhonchi B/L, No wheezes, rales  CV: RRR, +S1/S2, no murmurs, rubs or gallops  Abdominal: obese, Soft, nontender, nondistended +BSx4  Extremities: No cyanosis/clubbing/edema, + peripheral pulses  MSK: Normal ROM, no joint erythema or warmth, no joint swelling   Skin: warm, dry, normal color, no rash or abnormal lesions    A/P: 78yo M with PMH of chronic diastolic CHF (normal LVF 2016), non obstructive CAD (cardiac cath 2015), interstitial lung disease, HTN, HLD, DM, GERD, BPH, depression, anxiety presents with SOB. Admitted to [facility] with remote telemetry for community acquired PNA, now presents with new fever  - Will follow up on CXR, lactate, procalc, Urine cultures, blood cultures, RVP  - Duoneb x1 stat now  - tylenol prn for fever >100.4  - Will follow, RN to call if any changes    Balaji Deras  PGY1  [phone] Received sign out from Day team for new fever. Pt seen and examined at bedside, complaining of SOB, dry cough. Denies CP, palpitations, chills, N/V/D, abdominal pain.     T(C): 38.4 (01-02-18 @ 17:42), Max: 38.4 (01-02-18 @ 17:42)  HR: 85 (01-02-18 @ 17:42) (68 - 95)  BP: 137/78 (01-02-18 @ 17:42) (137/78 - 183/106)  RR: 16 (01-02-18 @ 17:42) (16 - 19)  SpO2: 94% (01-02-18 @ 17:42) (93% - 96%)  Wt(kg): --    Physical Exam:  General: mild SOB on O2  HEENT: normocephalic, atraumatic, PERRLA, extraocular muscles intact bilaterally, moist mucous membranes   Neck: Supple, nontender, no mass  Neurology: A&Ox3, moves all extremities x4, nonfocal, CN II-XII grossly intact, sensation intact, no gait abnormalities   Respiratory: coarse rhonchi B/L, No wheezes, rales  CV: RRR, +S1/S2, no murmurs, rubs or gallops  Abdominal: obese, Soft, nontender, nondistended +BSx4  Extremities: No cyanosis/clubbing/edema, + peripheral pulses  MSK: Normal ROM, no joint erythema or warmth, no joint swelling   Skin: warm, dry, normal color, no rash or abnormal lesions    A/P: 76yo M with PMH of chronic diastolic CHF (normal LVF 2016), non obstructive CAD (cardiac cath 2015), interstitial lung disease, HTN, HLD, DM, GERD, BPH, depression, anxiety presents with SOB. Admitted to [facility] with remote telemetry for community acquired PNA, now presents with new fever  - Will follow up on CXR, lactate, procalc, Urine cultures, blood cultures, RVP  - Duoneb x1 stat now  - tylenol prn for fever >100.4  - Plan discussed with House Doc Dr Cleaning  - Will follow, RN to call if any changes    Balaji Deras  PGY1  [phone]

## 2018-01-02 NOTE — GOALS OF CARE CONVERSATION - PERSONAL ADVANCE DIRECTIVE - CONVERSATION DETAILS
met pt A&O at present, confirmed pt hcp from prior hospitalization, placed on chart. pt sons are pt hcp, inquired if pt has had discussions w family of his wishes. pt wants resuscitation, does not want to live on a vent or trach simple lay terms. contact # given. pt remains full code.

## 2018-01-03 DIAGNOSIS — J11.1 INFLUENZA DUE TO UNIDENTIFIED INFLUENZA VIRUS WITH OTHER RESPIRATORY MANIFESTATIONS: ICD-10-CM

## 2018-01-03 LAB
ANION GAP SERPL CALC-SCNC: 10 MMOL/L — SIGNIFICANT CHANGE UP (ref 5–17)
APPEARANCE UR: CLEAR — SIGNIFICANT CHANGE UP
BILIRUB UR-MCNC: NEGATIVE — SIGNIFICANT CHANGE UP
BUN SERPL-MCNC: 38 MG/DL — HIGH (ref 7–23)
CALCIUM SERPL-MCNC: 9.3 MG/DL — SIGNIFICANT CHANGE UP (ref 8.5–10.1)
CHLORIDE SERPL-SCNC: 91 MMOL/L — LOW (ref 96–108)
CO2 SERPL-SCNC: 29 MMOL/L — SIGNIFICANT CHANGE UP (ref 22–31)
COLOR SPEC: YELLOW — SIGNIFICANT CHANGE UP
CREAT SERPL-MCNC: 1.5 MG/DL — HIGH (ref 0.5–1.3)
CULTURE RESULTS: SIGNIFICANT CHANGE UP
CULTURE RESULTS: SIGNIFICANT CHANGE UP
DIFF PNL FLD: NEGATIVE — SIGNIFICANT CHANGE UP
FLUAV H1 2009 PAND RNA SPEC QL NAA+PROBE: DETECTED
GLUCOSE SERPL-MCNC: 124 MG/DL — HIGH (ref 70–99)
GLUCOSE UR QL: NEGATIVE — SIGNIFICANT CHANGE UP
HCT VFR BLD CALC: 36.6 % — LOW (ref 39–50)
HGB BLD-MCNC: 11.8 G/DL — LOW (ref 13–17)
KETONES UR-MCNC: NEGATIVE — SIGNIFICANT CHANGE UP
LEUKOCYTE ESTERASE UR-ACNC: NEGATIVE — SIGNIFICANT CHANGE UP
MCHC RBC-ENTMCNC: 25.4 PG — LOW (ref 27–34)
MCHC RBC-ENTMCNC: 32.2 GM/DL — SIGNIFICANT CHANGE UP (ref 32–36)
MCV RBC AUTO: 78.8 FL — LOW (ref 80–100)
NITRITE UR-MCNC: NEGATIVE — SIGNIFICANT CHANGE UP
PH UR: 5 — SIGNIFICANT CHANGE UP (ref 5–8)
PLATELET # BLD AUTO: 219 K/UL — SIGNIFICANT CHANGE UP (ref 150–400)
POTASSIUM SERPL-MCNC: 4.4 MMOL/L — SIGNIFICANT CHANGE UP (ref 3.5–5.3)
POTASSIUM SERPL-SCNC: 4.4 MMOL/L — SIGNIFICANT CHANGE UP (ref 3.5–5.3)
PROT UR-MCNC: 75 MG/DL
RAPID RVP RESULT: DETECTED
RBC # BLD: 4.64 M/UL — SIGNIFICANT CHANGE UP (ref 4.2–5.8)
RBC # FLD: 15.4 % — HIGH (ref 10.3–14.5)
SODIUM SERPL-SCNC: 130 MMOL/L — LOW (ref 135–145)
SODIUM UR-SCNC: 66 MMOL/L — SIGNIFICANT CHANGE UP
SP GR SPEC: 1.02 — SIGNIFICANT CHANGE UP (ref 1.01–1.02)
SPECIMEN SOURCE: SIGNIFICANT CHANGE UP
SPECIMEN SOURCE: SIGNIFICANT CHANGE UP
UROBILINOGEN FLD QL: NEGATIVE — SIGNIFICANT CHANGE UP
WBC # BLD: 7.2 K/UL — SIGNIFICANT CHANGE UP (ref 3.8–10.5)
WBC # FLD AUTO: 7.2 K/UL — SIGNIFICANT CHANGE UP (ref 3.8–10.5)

## 2018-01-03 PROCEDURE — 76775 US EXAM ABDO BACK WALL LIM: CPT | Mod: 26

## 2018-01-03 PROCEDURE — 99233 SBSQ HOSP IP/OBS HIGH 50: CPT

## 2018-01-03 PROCEDURE — 99233 SBSQ HOSP IP/OBS HIGH 50: CPT | Mod: GC

## 2018-01-03 RX ORDER — LANOLIN ALCOHOL/MO/W.PET/CERES
3 CREAM (GRAM) TOPICAL AT BEDTIME
Qty: 0 | Refills: 0 | Status: DISCONTINUED | OUTPATIENT
Start: 2018-01-03 | End: 2018-01-06

## 2018-01-03 RX ORDER — CEFUROXIME AXETIL 250 MG
500 TABLET ORAL EVERY 12 HOURS
Qty: 0 | Refills: 0 | Status: COMPLETED | OUTPATIENT
Start: 2018-01-04 | End: 2018-01-04

## 2018-01-03 RX ORDER — SODIUM CHLORIDE 9 MG/ML
1 INJECTION INTRAMUSCULAR; INTRAVENOUS; SUBCUTANEOUS ONCE
Qty: 0 | Refills: 0 | Status: COMPLETED | OUTPATIENT
Start: 2018-01-03 | End: 2018-01-03

## 2018-01-03 RX ORDER — ACETAMINOPHEN 500 MG
650 TABLET ORAL ONCE
Qty: 0 | Refills: 0 | Status: COMPLETED | OUTPATIENT
Start: 2018-01-03 | End: 2018-01-03

## 2018-01-03 RX ORDER — ACETAMINOPHEN 500 MG
650 TABLET ORAL EVERY 6 HOURS
Qty: 0 | Refills: 0 | Status: DISCONTINUED | OUTPATIENT
Start: 2018-01-03 | End: 2018-01-06

## 2018-01-03 RX ADMIN — ALBUTEROL 2.5 MILLIGRAM(S): 90 AEROSOL, METERED ORAL at 13:27

## 2018-01-03 RX ADMIN — Medication 75 MILLIGRAM(S): at 17:53

## 2018-01-03 RX ADMIN — ALBUTEROL 2.5 MILLIGRAM(S): 90 AEROSOL, METERED ORAL at 08:37

## 2018-01-03 RX ADMIN — PANTOPRAZOLE SODIUM 40 MILLIGRAM(S): 20 TABLET, DELAYED RELEASE ORAL at 06:48

## 2018-01-03 RX ADMIN — ASENAPINE MALEATE 10 MILLIGRAM(S): 10 TABLET SUBLINGUAL at 22:51

## 2018-01-03 RX ADMIN — Medication 650 MILLIGRAM(S): at 00:06

## 2018-01-03 RX ADMIN — Medication 600 MILLIGRAM(S): at 17:55

## 2018-01-03 RX ADMIN — Medication 0.5 MILLIGRAM(S): at 08:36

## 2018-01-03 RX ADMIN — Medication 75 MILLIGRAM(S): at 05:33

## 2018-01-03 RX ADMIN — ALBUTEROL 2.5 MILLIGRAM(S): 90 AEROSOL, METERED ORAL at 02:59

## 2018-01-03 RX ADMIN — Medication 75 MILLIGRAM(S): at 14:02

## 2018-01-03 RX ADMIN — Medication 15 MILLIGRAM(S): at 05:32

## 2018-01-03 RX ADMIN — AMLODIPINE BESYLATE 10 MILLIGRAM(S): 2.5 TABLET ORAL at 05:33

## 2018-01-03 RX ADMIN — SODIUM CHLORIDE 1 GRAM(S): 9 INJECTION INTRAMUSCULAR; INTRAVENOUS; SUBCUTANEOUS at 14:01

## 2018-01-03 RX ADMIN — Medication 200 MILLIGRAM(S): at 05:33

## 2018-01-03 RX ADMIN — Medication 150 MILLIGRAM(S): at 18:00

## 2018-01-03 RX ADMIN — ALBUTEROL 2.5 MILLIGRAM(S): 90 AEROSOL, METERED ORAL at 20:18

## 2018-01-03 RX ADMIN — Medication 0.5 MILLIGRAM(S): at 20:22

## 2018-01-03 RX ADMIN — Medication 200 MILLIGRAM(S): at 14:03

## 2018-01-03 RX ADMIN — ENOXAPARIN SODIUM 40 MILLIGRAM(S): 100 INJECTION SUBCUTANEOUS at 13:57

## 2018-01-03 RX ADMIN — BUPROPION HYDROCHLORIDE 150 MILLIGRAM(S): 150 TABLET, EXTENDED RELEASE ORAL at 17:54

## 2018-01-03 RX ADMIN — Medication 12.5 MILLIGRAM(S): at 05:33

## 2018-01-03 RX ADMIN — Medication 650 MILLIGRAM(S): at 05:43

## 2018-01-03 RX ADMIN — BUPROPION HYDROCHLORIDE 150 MILLIGRAM(S): 150 TABLET, EXTENDED RELEASE ORAL at 05:33

## 2018-01-03 RX ADMIN — ISOSORBIDE MONONITRATE 60 MILLIGRAM(S): 60 TABLET, EXTENDED RELEASE ORAL at 13:59

## 2018-01-03 RX ADMIN — CEFTRIAXONE 100 GRAM(S): 500 INJECTION, POWDER, FOR SOLUTION INTRAMUSCULAR; INTRAVENOUS at 05:32

## 2018-01-03 RX ADMIN — Medication 2 MILLIGRAM(S): at 05:32

## 2018-01-03 RX ADMIN — Medication 200 MILLIGRAM(S): at 22:51

## 2018-01-03 RX ADMIN — Medication 2 MILLIGRAM(S): at 14:10

## 2018-01-03 RX ADMIN — Medication 81 MILLIGRAM(S): at 13:57

## 2018-01-03 RX ADMIN — Medication 150 MILLIGRAM(S): at 09:01

## 2018-01-03 RX ADMIN — Medication 15 MILLIGRAM(S): at 17:56

## 2018-01-03 RX ADMIN — Medication 650 MILLIGRAM(S): at 14:10

## 2018-01-03 RX ADMIN — Medication 75 MILLIGRAM(S): at 22:51

## 2018-01-03 RX ADMIN — Medication 2 MILLIGRAM(S): at 22:51

## 2018-01-03 RX ADMIN — Medication 600 MILLIGRAM(S): at 05:33

## 2018-01-03 RX ADMIN — Medication 650 MILLIGRAM(S): at 15:20

## 2018-01-03 NOTE — PROGRESS NOTE ADULT - PROBLEM SELECTOR PLAN 5
- EKG read as atrial fibrillation however looks like AV block. Compared with prior (which showed 1st degree AV Heart block).   No evidence of atrial fibrillation, new EKG is sinus arrythmia and evidence of LAD-Cardio consult  appreciated   Discontinued telemetry. - EKG read as atrial fibrillation however looks like AV block. Compared with prior (which showed 1st degree AV Heart block).   No evidence of atrial fibrillation, new EKG is sinus arrythmia and evidence of LAD-Cardio consult  appreciated   Now off telemetry.

## 2018-01-03 NOTE — CONSULT NOTE ADULT - ASSESSMENT
·	Mild Hyponatremia  ·	Likely CKD 3, Prerenal azotemia  ·	Pneumonia, + Flu    Hold HCTZ. Encourage PO intake. Check repeat labs and monitor renal function trend. Encourage PO intake as tolerated.  Avoid nephrotoxic meds as possible. Avoid ACEI, ARB and NSAIDS. Monitor input and output.   Pulmonary follow up. Will follow electrolytes and renal function trend. Avoid excessive PO fluids.   Check uric acid level. Further recommendations pending clinical course. Thank you for the courtesy of this referral. ·	Mild Hyponatremia: SIADH, HCTZ rx.   ·	Likely CKD 3, Prerenal azotemia  ·	Pneumonia, + Flu    Hold HCTZ. Encourage PO intake. Check repeat labs and monitor renal function trend. Encourage PO intake as tolerated.  Avoid nephrotoxic meds as possible. Avoid ACEI, ARB and NSAIDS. Monitor input and output.   Pulmonary follow up. Will follow electrolytes and renal function trend. Avoid excessive PO fluids.   Check uric acid level. Further recommendations pending clinical course. Thank you for the courtesy of this referral.

## 2018-01-03 NOTE — PROGRESS NOTE ADULT - PROBLEM SELECTOR PLAN 3
patient now on Tamiflu patient found to be febrile overnight, fever work up initiated, will trend WBC, will FU blood cultures   patient now on Tamiflu

## 2018-01-03 NOTE — PROGRESS NOTE ADULT - ASSESSMENT
73 y/o male PMHx diastolic dysfunction, nonobstructive CAD based on cardiac cath from 12/2015, normal lvef (echo 10/2016), HTN, HLD, DM, GERD, BPH, Depression, Anxiety.  He has longstanding dyspnea for which multiple cardiac explanations have been considered, including CAD and diastolic impairment.  He has been found to have a degree of interstitial lung disease, the significance of which is unclear. Admitted with pna.    -EKG shows wandering atrial pacemaker, 1st degree av block, not a fib. Unchanged from previous EKG from September.   - HR is controlled.   - No clear evidence of acute ischemia, CE slightly elevated likely 2/2 to demand ischemia  - Hold IV Lasix.  He is now in DEVYN, presumably from diuresis.  I would avoid IVF as well, and let him reequilibrate on his own.     - Cardiac cath 2015: EF 65%, LAD 40% stenosis, circumflex 30% stenosis, RCA 40% stenosis. ECHO October 2016: normal left ventricular function, normal LV and EF  - BP better controlled.  Continue the remainder of his antihypertensive medications.  - Monitor and replete lytes, keep K>4, Mg>2  - Other cardiovascular workup will depend on clinical course.  - All other workup per primary team  - Will follow

## 2018-01-03 NOTE — PROGRESS NOTE ADULT - SUBJECTIVE AND OBJECTIVE BOX
[facility] - Franco Mcallister, Benitez, Josse, Skylar, Robert Motta  Office Number:  [phone]    Patient resting comfortably in bed in NAD.  Laying flat with no respiratory distress.  No complaints of chest pain, increased dyspnea, palpitations, PND, or orthopnea.  He has developed DEVYN, presumably from diuresis.    MEDICATIONS  (STANDING):  ALBUTerol    0.083% 2.5 milliGRAM(s) Nebulizer every 6 hours  ALPRAZolam 2 milliGRAM(s) Oral three times a day  amLODIPine   Tablet 10 milliGRAM(s) Oral daily  asenapine SL 10 milliGRAM(s) SubLingual at bedtime  aspirin enteric coated 81 milliGRAM(s) Oral daily  buDESOnide   0.5 milliGRAM(s) Respule 0.5 milliGRAM(s) Inhalation two times a day  buPROPion  milliGRAM(s) Oral two times a day  dextrose 5%. 1000 milliLiter(s) (50 mL/Hr) IV Continuous <Continuous>  dextrose 50% Injectable 12.5 Gram(s) IV Push once  dextrose 50% Injectable 25 Gram(s) IV Push once  dextrose 50% Injectable 25 Gram(s) IV Push once  enoxaparin Injectable 40 milliGRAM(s) SubCutaneous daily  guaiFENesin  milliGRAM(s) Oral every 12 hours  hydrALAZINE 75 milliGRAM(s) Oral three times a day  insulin lispro (HumaLOG) corrective regimen sliding scale   SubCutaneous three times a day before meals  isosorbide   mononitrate ER Tablet (IMDUR) 60 milliGRAM(s) Oral daily  labetalol 200 milliGRAM(s) Oral three times a day  oseltamivir 75 milliGRAM(s) Oral two times a day  pantoprazole    Tablet 40 milliGRAM(s) Oral before breakfast  predniSONE   Tablet 15 milliGRAM(s) Oral two times a day  sodium chloride 1 Gram(s) Oral once  venlafaxine 150 milliGRAM(s) Oral two times a day with meals    MEDICATIONS  (PRN):  acetaminophen   Tablet 650 milliGRAM(s) Oral every 6 hours PRN For Temp greater than 38 C (100.4 F)  acetaminophen   Tablet. 650 milliGRAM(s) Oral every 6 hours PRN Mild Pain (1 - 3)  dextrose Gel 1 Dose(s) Oral once PRN Blood Glucose LESS THAN 70 milliGRAM(s)/deciliter  glucagon  Injectable 1 milliGRAM(s) IntraMuscular once PRN Glucose LESS THAN 70 milligrams/deciliter      Allergies    No Known Allergies    Intolerances        Vital Signs Last 24 Hrs  T(C): 37.6 (03 Jan 2018 07:40), Max: 38.4 (02 Jan 2018 17:42)  T(F): 99.6 (03 Jan 2018 07:40), Max: 101.1 (02 Jan 2018 17:42)  HR: 82 (03 Jan 2018 08:40) (71 - 101)  BP: 118/73 (03 Jan 2018 07:40) (115/76 - 183/106)  BP(mean): --  RR: 18 (03 Jan 2018 07:40) (16 - 19)  SpO2: 94% (03 Jan 2018 08:40) (93% - 98%)    I&O's Summary    02 Jan 2018 07:01  -  03 Jan 2018 07:00  --------------------------------------------------------  IN: 1790 mL / OUT: 1900 mL / NET: -110 mL        ON EXAM:    General: NAD, awake and alert, oriented x 3  HEENT: Mucous membranes are moist, anicteric  Lungs: Non-labored, breath sounds are clear bilaterally.  Soft b/l wheezes  Cardiovascular: Regular, S1 and S2, no murmurs, rubs, or gallops  Gastrointestinal: Bowel Sounds present, soft, nontender.   Lymph: No peripheral edema. No lymphadenopathy.  Skin: No rashes or ulcers  Psych:  Mood & affect appropriate    LABS: All Labs Reviewed:                        11.8   7.2   )-----------( 219      ( 03 Jan 2018 07:56 )             36.6                         12.0   8.5   )-----------( 310      ( 02 Jan 2018 06:56 )             39.3                         10.6   7.1   )-----------( 268      ( 01 Jan 2018 07:19 )             33.6     03 Jan 2018 07:56    130    |  91     |  38     ----------------------------<  124    4.4     |  29     |  1.50   02 Jan 2018 06:56    134    |  93     |  34     ----------------------------<  101    4.6     |  32     |  1.40   01 Jan 2018 07:19    135    |  98     |  33     ----------------------------<  117    4.4     |  32     |  1.10     Ca    9.3        03 Jan 2018 07:56  Ca    9.9        02 Jan 2018 06:56  Ca    9.3        01 Jan 2018 07:19            Blood Culture:   01-01 @ 07:19  Pro Bnp [id number]

## 2018-01-03 NOTE — PROGRESS NOTE ADULT - ASSESSMENT
76yo M with PMH of chronic diastolic CHF (normal LVF 2016), non obstructive CAD (cardiac cath 2015), interstitial lung disease, HTN, HLD, DM, GERD, BPH, depression, anxiety presents with SOB. Admitted to [facility] with remote telemetry for community acquired PNA. 78yo M with PMH of chronic diastolic CHF (normal LVF 2016), non obstructive CAD (cardiac cath 2015), interstitial lung disease, HTN, HLD, DM, GERD, BPH, depression, anxiety presents with SOB, admitted for community acquired PNA, hospital course complicated by influenza A.

## 2018-01-03 NOTE — PROGRESS NOTE ADULT - PROBLEM SELECTOR PLAN 7
-Currently at baseline with Cr function  -No IVF hydration necessary at this time  -Monitor  BUN/Cr closely .   Avoid nephrotoxins. -Creatinine increased, likely secondary to diuresis? Hold diuresis. Check serum uric acid per renal recs.  -Hold off on fluids for now.  -Monitor  BUN/Cr closely .   Avoid nephrotoxins.

## 2018-01-03 NOTE — PROGRESS NOTE ADULT - SUBJECTIVE AND OBJECTIVE BOX
Date/Time Patient Seen:  		  Referring MD:   Data Reviewed	       Patient is a 77y old  Male who presents with a chief complaint of SOB, s/p fall early sat am per son (29 Dec 2017 14:41)  in bed  seen and examined  vs and meds reviewed  c/o congestion      Subjective/HPI     PAST MEDICAL & SURGICAL HISTORY:  Congestive heart failure  Pulmonary fibrosis  Pulmonary fibrosis  Smoker  BPH with obstruction/lower urinary tract symptoms  Vitamin D deficiency  GERD (gastroesophageal reflux disease)  Diabetes mellitus  Hyperlipidemia  Hypertension  S/P tonsillectomy  No significant past surgical history        Medication list         MEDICATIONS  (STANDING):  ALBUTerol    0.083% 2.5 milliGRAM(s) Nebulizer every 6 hours  ALPRAZolam 2 milliGRAM(s) Oral three times a day  amLODIPine   Tablet 10 milliGRAM(s) Oral daily  asenapine SL 10 milliGRAM(s) SubLingual two times a day  aspirin enteric coated 81 milliGRAM(s) Oral daily  buDESOnide   0.5 milliGRAM(s) Respule 0.5 milliGRAM(s) Inhalation two times a day  buPROPion  milliGRAM(s) Oral two times a day  cefTRIAXone   IVPB 1 Gram(s) IV Intermittent every 24 hours  dextrose 5%. 1000 milliLiter(s) (50 mL/Hr) IV Continuous <Continuous>  dextrose 50% Injectable 12.5 Gram(s) IV Push once  dextrose 50% Injectable 25 Gram(s) IV Push once  dextrose 50% Injectable 25 Gram(s) IV Push once  enoxaparin Injectable 40 milliGRAM(s) SubCutaneous daily  guaiFENesin  milliGRAM(s) Oral every 12 hours  hydrALAZINE 75 milliGRAM(s) Oral three times a day  hydrochlorothiazide 12.5 milliGRAM(s) Oral daily  insulin lispro (HumaLOG) corrective regimen sliding scale   SubCutaneous three times a day before meals  isosorbide   mononitrate ER Tablet (IMDUR) 60 milliGRAM(s) Oral daily  labetalol 200 milliGRAM(s) Oral three times a day  oseltamivir 75 milliGRAM(s) Oral two times a day  pantoprazole    Tablet 40 milliGRAM(s) Oral before breakfast  predniSONE   Tablet 15 milliGRAM(s) Oral two times a day  venlafaxine 150 milliGRAM(s) Oral two times a day with meals    MEDICATIONS  (PRN):  acetaminophen   Tablet 650 milliGRAM(s) Oral every 6 hours PRN For Temp greater than 38 C (100.4 F)  acetaminophen   Tablet. 650 milliGRAM(s) Oral every 6 hours PRN Mild Pain (1 - 3)  dextrose Gel 1 Dose(s) Oral once PRN Blood Glucose LESS THAN 70 milliGRAM(s)/deciliter  glucagon  Injectable 1 milliGRAM(s) IntraMuscular once PRN Glucose LESS THAN 70 milligrams/deciliter         Vitals log        ICU Vital Signs Last 24 Hrs  T(C): 36.8 (03 Jan 2018 05:34), Max: 38.4 (02 Jan 2018 17:42)  T(F): 98.2 (03 Jan 2018 05:34), Max: 101.1 (02 Jan 2018 17:42)  HR: 100 (03 Jan 2018 05:34) (69 - 101)  BP: 181/97 (03 Jan 2018 05:34) (115/76 - 183/106)  BP(mean): --  ABP: --  ABP(mean): --  RR: 18 (03 Jan 2018 05:34) (16 - 19)  SpO2: 98% (03 Jan 2018 05:34) (93% - 98%)           Input and Output:  I&O's Detail    01 Jan 2018 07:01  -  02 Jan 2018 07:00  --------------------------------------------------------  IN:    Solution: 250 mL    Solution: 50 mL  Total IN: 300 mL    OUT:    Voided: 1850 mL  Total OUT: 1850 mL    Total NET: -1550 mL      02 Jan 2018 07:01  -  03 Jan 2018 06:26  --------------------------------------------------------  IN:    Oral Fluid: 1790 mL  Total IN: 1790 mL    OUT:    Voided: 1900 mL  Total OUT: 1900 mL    Total NET: -110 mL          Lab Data                        12.0   8.5   )-----------( 310      ( 02 Jan 2018 06:56 )             39.3     01-02    134<L>  |  93<L>  |  34<H>  ----------------------------<  101<H>  4.6   |  32<H>  |  1.40<H>    Ca    9.9      02 Jan 2018 06:56              Review of Systems	      Objective     Physical Examination    obese  verbal  neck obese  head at  heart s1s2  lungs dec BS  abd soft  cn grossly int      Pertinent Lab findings & Imaging      Princess:  NO   Adequate UO     I&O's Detail    01 Jan 2018 07:01  -  02 Jan 2018 07:00  --------------------------------------------------------  IN:    Solution: 250 mL    Solution: 50 mL  Total IN: 300 mL    OUT:    Voided: 1850 mL  Total OUT: 1850 mL    Total NET: -1550 mL      02 Jan 2018 07:01  -  03 Jan 2018 06:26  --------------------------------------------------------  IN:    Oral Fluid: 1790 mL  Total IN: 1790 mL    OUT:    Voided: 1900 mL  Total OUT: 1900 mL    Total NET: -110 mL               Discussed with:     Cultures:	        Radiology

## 2018-01-03 NOTE — CONSULT NOTE ADULT - SUBJECTIVE AND OBJECTIVE BOX
Patient is a 77y old  Male who presents with a chief complaint of SOB, s/p fall early sat am per son (29 Dec 2017 14:41)    HPI:  76yo M with PMH of chronic diastolic CHF (normal LVF 2016), non obstructive CAD (cardiac cath 2015), interstitial lung disease, HTN, HLD, DMT2 on Janumet, GERD, BPH, depression, anxiety presents with SOB. Patient states that he has had dyspnea fro the past 1.5 weeks along with a productive cough  with white sputum. Patient went to walk-in clinic today where CXR was performed and showed consolidation in the lung so patient was told to come to ED. Patient uses O2 at home at nighttime but is unsure how much. He admits to weakness, lethargy as well. Denies f/c, n/v, headaches.    In ED, VSS, lactate 1.2, procalcitonin elevated 0.28. CT chest showed interval increase in airspace opacity in the right lower lobe compatible with pneumonia; follow-up to resolution is recommended to exclude underlying neoplasm; again seen is interstitial lung disease. Received azithromycin, rocephin, solumedrol, albuterol treatment. (29 Dec 2017 00:05)    Renal consult called for abnormal renal function. Pt states that he feels better today. Denies urinary complaints.       PAST MEDICAL HISTORY:  Congestive heart failure  Pulmonary fibrosis  Pulmonary fibrosis  Smoker  BPH with obstruction/lower urinary tract symptoms  Vitamin D deficiency  GERD (gastroesophageal reflux disease)  Diabetes mellitus  Hyperlipidemia  Hypertension      PAST SURGICAL HISTORY:  S/P tonsillectomy  No significant past surgical history      FAMILY HISTORY:  Family history of heart disease (Grandparent)  Family history of lymphoma      SOCIAL HISTORY: No smoking or alcohol use     Allergies    No Known Allergies    Intolerances      Home Medications:  albuterol 2.5 mg/3 mL (0.083%) inhalation solution: 1 dose(s) inhaled every 8 hours, As Needed (28 Dec 2017 19:26)  amLODIPine 10 mg oral tablet: 1 tab(s) orally once a day (28 Dec 2017 19:26)  aspirin 81 mg oral tablet: 1 tab(s) orally once a day (28 Dec 2017 19:26)  hydrALAZINE 50 mg oral tablet: 1 tab(s) orally 3 times a day (29 Dec 2017 01:27)  hydroCHLOROthiazide 12.5 mg oral tablet: 1 tab(s) orally once a day (29 Dec 2017 02:18)  isosorbide mononitrate 60 mg oral tablet, extended release: 1 tab(s) orally once a day (in the morning) (12 Nov 2017 14:52)  Janumet 50 mg-1000 mg oral tablet: 1 tab(s) orally 2 times a day (29 Dec 2017 01:27)  labetalol 200 mg oral tablet: 1 tab(s) orally 3 times a day (12 Nov 2017 14:52)  Saphris 10 mg sublingual tablet: 1 tab(s) sublingual 2 times a day (12 Nov 2017 14:52)  venlafaxine extended release: 375 milligram(s) orally once a day (12 Nov 2017 14:52)  Wellbutrin  mg/24 hours oral tablet, extended release: 1 tab(s) orally every 24 hours (12 Nov 2017 14:52)  Xanax 2 mg oral tablet: 1 tab(s) orally 3 times a day, As Needed (12 Nov 2017 14:51)    MEDICATIONS  (STANDING):  ALBUTerol    0.083% 2.5 milliGRAM(s) Nebulizer every 6 hours  ALPRAZolam 2 milliGRAM(s) Oral three times a day  amLODIPine   Tablet 10 milliGRAM(s) Oral daily  asenapine SL 10 milliGRAM(s) SubLingual at bedtime  aspirin enteric coated 81 milliGRAM(s) Oral daily  buDESOnide   0.5 milliGRAM(s) Respule 0.5 milliGRAM(s) Inhalation two times a day  buPROPion  milliGRAM(s) Oral two times a day  dextrose 5%. 1000 milliLiter(s) (50 mL/Hr) IV Continuous <Continuous>  dextrose 50% Injectable 12.5 Gram(s) IV Push once  dextrose 50% Injectable 25 Gram(s) IV Push once  dextrose 50% Injectable 25 Gram(s) IV Push once  enoxaparin Injectable 40 milliGRAM(s) SubCutaneous daily  guaiFENesin  milliGRAM(s) Oral every 12 hours  hydrALAZINE 75 milliGRAM(s) Oral three times a day  hydrochlorothiazide 12.5 milliGRAM(s) Oral daily  insulin lispro (HumaLOG) corrective regimen sliding scale   SubCutaneous three times a day before meals  isosorbide   mononitrate ER Tablet (IMDUR) 60 milliGRAM(s) Oral daily  labetalol 200 milliGRAM(s) Oral three times a day  oseltamivir 75 milliGRAM(s) Oral two times a day  pantoprazole    Tablet 40 milliGRAM(s) Oral before breakfast  predniSONE   Tablet 15 milliGRAM(s) Oral two times a day  venlafaxine 150 milliGRAM(s) Oral two times a day with meals    MEDICATIONS  (PRN):  acetaminophen   Tablet 650 milliGRAM(s) Oral every 6 hours PRN For Temp greater than 38 C (100.4 F)  acetaminophen   Tablet. 650 milliGRAM(s) Oral every 6 hours PRN Mild Pain (1 - 3)  dextrose Gel 1 Dose(s) Oral once PRN Blood Glucose LESS THAN 70 milliGRAM(s)/deciliter  glucagon  Injectable 1 milliGRAM(s) IntraMuscular once PRN Glucose LESS THAN 70 milligrams/deciliter      REVIEW OF SYSTEMS:  General: NAD  Respiratory: + SOB  Cardiovascular: No CP or Palpitations	  Gastrointestinal: No nausea, Vomiting. No diarrhea  Genitourinary: No urinary complaints	  Musculoskeletal: + leg swelling, No new rash or lesions		  all other systems negative    T(F): 99.6 (01-03-18 @ 07:40), Max: 101.1 (01-02-18 @ 17:42)  HR: 82 (01-03-18 @ 08:40) (69 - 101)  BP: 118/73 (01-03-18 @ 07:40) (115/76 - 183/106)  RR: 18 (01-03-18 @ 07:40) (16 - 19)  SpO2: 94% (01-03-18 @ 08:40) (93% - 98%)  Wt(kg): --    PHYSICAL EXAM:  General: NAD  Respiratory: b/l air entry  Cardiovascular: S1 S2  Gastrointestinal: soft  Extremities: + edema        01-03    130<L>  |  91<L>  |  38<H>  ----------------------------<  124<H>  4.4   |  29  |  1.50<H>    Ca    9.3      03 Jan 2018 07:56                            11.8   7.2   )-----------( 219      ( 03 Jan 2018 07:56 )             36.6       Potassium, Serum: 4.4 mmol/L (01-03 @ 07:56)  Blood Urea Nitrogen, Serum: 38 mg/dL (01-03 @ 07:56)  Calcium, Total Serum: 9.3 mg/dL (01-03 @ 07:56)  Hemoglobin: 11.8 g/dL (01-03 @ 07:56)      Creatinine, Serum: 1.50 (01-03 @ 07:56)  Creatinine, Serum: 1.40 (01-02 @ 06:56)  Creatinine, Serum: 1.10 (01-01 @ 07:19)          I&O's Detail    02 Jan 2018 07:01  -  03 Jan 2018 07:00  --------------------------------------------------------  IN:    Oral Fluid: 1790 mL  Total IN: 1790 mL    OUT:    Voided: 1900 mL  Total OUT: 1900 mL    Total NET: -110 mL            Culture - Blood (collected 28 Dec 2017 23:04)  Source: .Blood Blood  Final Report (03 Jan 2018 01:01):    No growth at 5 days.    Culture - Blood (collected 28 Dec 2017 23:04)  Source: .Blood Blood  Final Report (03 Jan 2018 01:01):    No growth at 5 days.    Culture - Urine (collected 28 Dec 2017 22:50)  Source: .Urine Clean Catch (Midstream)  Final Report (29 Dec 2017 23:01):    No growth    < from: US Renal (01.03.18 @ 08:21) >  EXAM:  US KIDNEY(S)                            PROCEDURE DATE:  01/03/2018          INTERPRETATION:  RENAL ULTRASOUND    CLINICAL INFORMATION:  Acute kidney injury.    PROCEDURE:  Real-time ultrasound images of the kidneys were performed by   technologist and made available for review.    COMPARISONS: CT scan abdomen and pelvis 8/27/2017.      FINDINGS:      Examination is limited due to patient's body habitus and difficulty   positioning and overlying bowel gas.     The right and left kidneys measure approximately 13 cm and 11 cm,   respectively in length.     The evaluation of the lower pole of the left kidney is particularly   limited on this study..     No hydronephrosis or shadowing intrarenal calcifications are noted.   Renal cysts are noted at the mid and lower pole of the right kidney.     The visualized urinary bladder is unremarkable in appearance.     The ureteral jets are not visualized.         IMPRESSION:      Limited study; no sonographic evidence for hydronephrosis.    < end of copied text >    < from: Xray Chest 1 View AP-PORTABLE IMMEDIATE (01.02.18 @ 18:43) >  EXAM:  XR CHEST PORTABLE IMMED 1V                            PROCEDURE DATE:  01/02/2018          INTERPRETATION:  Chest portable.    Clinical History: Fever.    Comparison: 1/1/2018.  There is arteriosclerotic calcification of the aorta.    SingleAP view submitted.    There is a prominent cardiac silhouette with arteriosclerotic   calcification of the aorta.    Low lung volumes are present.  There are persistent patchy bibasilar airspace opacities, most pronounced   right lower lung zone. These findings may represent pneumonia and/or   atelectasis in the appropriate clinical setting.  No significant pleural effusions are noted.    Impression:    Findings as discussed above.    < end of copied text >

## 2018-01-03 NOTE — PROGRESS NOTE ADULT - PROBLEM SELECTOR PLAN 8
-continue home meds of labetalol, hydralazine, amlodipine, HCTZ, isosorbide mononitrate ER with hold parameters  BP has been labile. Continue to monitor.   -DASH diet -continue home meds of labetalol, hydralazine, amlodipine, isosorbide mononitrate ER with hold parameters  BP has been labile. Continue to monitor.   -DASH diet

## 2018-01-03 NOTE — PROGRESS NOTE ADULT - PROBLEM SELECTOR PLAN 4
-chronic diastolic CHF (normal LVF 2016)  -Lasix 12.5mg daily.  cardiology follow up appreciated. -chronic diastolic CHF (normal LVF 2016)  -Hold lasix for now in light of DEVYN.  -Monitor volume status closely.  cardiology follow up appreciated.

## 2018-01-03 NOTE — PROGRESS NOTE ADULT - PROBLEM SELECTOR PLAN 1
Flu, LRTI, Congestion, poss volume overload  s/p empiric lasix, dosed PRN, as per cardio, I and O, BP control, replete lytes  HOB elev  cont Pulm regimen for Pulm Fibrosis and Chronic Lung disease ex - nebs, pulmicort, chest pt, systemic steroids  on Tamiflu and ABX for Flu and LRTI  overall prognosis guarded  discussed with son  increase time out of bed  keep sat > 88 pct  mobilize as tolerated  tylenol for body aches and or fever  discussed plan of care with pt and nursing

## 2018-01-03 NOTE — PROGRESS NOTE ADULT - SUBJECTIVE AND OBJECTIVE BOX
Patient is a 77y old  Male who presents with a chief complaint of SOB, s/p fall early sat am per son (29 Dec 2017 14:41)   SOB    INTERVAL HPI/OVERNIGHT EVENTS: patient was found to be febrile overnight. Fever work up ordered, + flu. Patient also stated was unable to sleep     MEDICATIONS  (STANDING):  ALBUTerol    0.083% 2.5 milliGRAM(s) Nebulizer every 6 hours  ALPRAZolam 2 milliGRAM(s) Oral three times a day  amLODIPine   Tablet 10 milliGRAM(s) Oral daily  asenapine SL 10 milliGRAM(s) SubLingual at bedtime  aspirin enteric coated 81 milliGRAM(s) Oral daily  buDESOnide   0.5 milliGRAM(s) Respule 0.5 milliGRAM(s) Inhalation two times a day  buPROPion  milliGRAM(s) Oral two times a day  cefTRIAXone   IVPB 1 Gram(s) IV Intermittent every 24 hours  dextrose 5%. 1000 milliLiter(s) (50 mL/Hr) IV Continuous <Continuous>  dextrose 50% Injectable 12.5 Gram(s) IV Push once  dextrose 50% Injectable 25 Gram(s) IV Push once  dextrose 50% Injectable 25 Gram(s) IV Push once  enoxaparin Injectable 40 milliGRAM(s) SubCutaneous daily  guaiFENesin  milliGRAM(s) Oral every 12 hours  hydrALAZINE 75 milliGRAM(s) Oral three times a day  hydrochlorothiazide 12.5 milliGRAM(s) Oral daily  insulin lispro (HumaLOG) corrective regimen sliding scale   SubCutaneous three times a day before meals  isosorbide   mononitrate ER Tablet (IMDUR) 60 milliGRAM(s) Oral daily  labetalol 200 milliGRAM(s) Oral three times a day  oseltamivir 75 milliGRAM(s) Oral two times a day  pantoprazole    Tablet 40 milliGRAM(s) Oral before breakfast  predniSONE   Tablet 15 milliGRAM(s) Oral two times a day  venlafaxine 150 milliGRAM(s) Oral two times a day with meals    MEDICATIONS  (PRN):  acetaminophen   Tablet 650 milliGRAM(s) Oral every 6 hours PRN For Temp greater than 38 C (100.4 F)  acetaminophen   Tablet. 650 milliGRAM(s) Oral every 6 hours PRN Mild Pain (1 - 3)  dextrose Gel 1 Dose(s) Oral once PRN Blood Glucose LESS THAN 70 milliGRAM(s)/deciliter  glucagon  Injectable 1 milliGRAM(s) IntraMuscular once PRN Glucose LESS THAN 70 milligrams/deciliter      Allergies    No Known Allergies    Intolerances        REVIEW OF SYSTEMS:  CONSTITUTIONAL: No fever, weight loss, or fatigue  EYES: No eye pain, visual disturbances, or discharge  ENMT:  No difficulty hearing, tinnitus, vertigo; No sinus or throat pain  NECK: No pain or stiffness  BREASTS: No pain, masses, or nipple discharge  RESPIRATORY: No cough, wheezing, chills or hemoptysis; No shortness of breath  CARDIOVASCULAR: No chest pain, palpitations, dizziness, or leg swelling  GASTROINTESTINAL: No abdominal or epigastric pain. No nausea, vomiting, or hematemesis; No diarrhea or constipation. No melena or hematochezia.  GENITOURINARY: No dysuria, frequency, hematuria, or incontinence  NEUROLOGICAL: No headaches, memory loss, loss of strength, numbness, or tremors  SKIN: No itching, burning, rashes, or lesions   LYMPH NODES: No enlarged glands  ENDOCRINE: No heat or cold intolerance; No hair loss; No polydipsia or polyuria  MUSCULOSKELETAL: No joint pain or swelling; No muscle, back, or extremity pain  PSYCHIATRIC: No depression, anxiety, mood swings, or difficulty sleeping  HEME/LYMPH: No easy bruising, or bleeding gums  ALLERGY AND IMMUNOLOGIC: No hives or eczema    Vital Signs Last 24 Hrs  T(C): 37.6 (03 Jan 2018 07:40), Max: 38.4 (02 Jan 2018 17:42)  T(F): 99.6 (03 Jan 2018 07:40), Max: 101.1 (02 Jan 2018 17:42)  HR: 82 (03 Jan 2018 08:40) (69 - 101)  BP: 118/73 (03 Jan 2018 07:40) (115/76 - 183/106)  BP(mean): --  RR: 18 (03 Jan 2018 07:40) (16 - 19)  SpO2: 94% (03 Jan 2018 08:40) (93% - 98%)    PHYSICAL EXAM:  GENERAL: No acute distress, well-groomed, well-developed  HEAD:  Atraumatic, Normocephalic  EYES: EOMI, PERRL, conjunctiva and sclera clear  ENMT: No tonsillar erythema, exudates, or enlargement; Moist mucous membranes, Good dentition, No lesions  NECK: Supple, No Jugular Venous Distension, Normal thyroid  NERVOUS SYSTEM:  Alert & Oriented X3, Good concentration; Motor Strength 5/5 B/L upper and lower extremities; Deep Tendon Reflexess 2+ intact and symmetric  CHEST/LUNG: Clear to auscultation bilaterally; No rales, rhonchi, wheezing, or rubs  HEART: Regular rate and rhythm; No murmurs, rubs, or gallops  ABDOMEN: Soft, Nontender, Nondistended; Bowel sounds present  EXTREMITIES:  2+ Peripheral Pulses, No clubbing, cyanosis, or edema  LYMPH: No lymphadenopathy noted  SKIN: No rashes or lesions    LABS:                        11.8   7.2   )-----------( 219      ( 03 Jan 2018 07:56 )             36.6     03 Jan 2018 07:56    130    |  91     |  38     ----------------------------<  124    4.4     |  29     |  1.50     Ca    9.3        03 Jan 2018 07:56        CAPILLARY BLOOD GLUCOSE      POCT Blood Glucose.: 122 mg/dL (03 Jan 2018 07:39)  POCT Blood Glucose.: 128 mg/dL (02 Jan 2018 20:50)  POCT Blood Glucose.: 123 mg/dL (02 Jan 2018 17:10)  POCT Blood Glucose.: 170 mg/dL (02 Jan 2018 12:25)    BLOOD CULTURE    RADIOLOGY & ADDITIONAL TESTS:    Imaging Personally Reviewed:  [ ] YES     Consultant(s) Notes Reviewed:      Care Discussed with Consultants/Other Providers: 78yo M with PMH of chronic diastolic CHF (normal LVF 2016), non obstructive CAD (cardiac cath 2015), interstitial lung disease, HTN, HLD, DM, GERD, BPH, depression, anxiety presented with SOB, admitted with pneumonia.    INTERVAL HPI/OVERNIGHT EVENTS: patient was found to be febrile overnight. Fever work up ordered, + flu. Remains to be weak. Patient also stated was unable to sleep. On 4L of NC.  Denies N/V/D    MEDICATIONS  (STANDING):  ALBUTerol    0.083% 2.5 milliGRAM(s) Nebulizer every 6 hours  ALPRAZolam 2 milliGRAM(s) Oral three times a day  amLODIPine   Tablet 10 milliGRAM(s) Oral daily  asenapine SL 10 milliGRAM(s) SubLingual at bedtime  aspirin enteric coated 81 milliGRAM(s) Oral daily  buDESOnide   0.5 milliGRAM(s) Respule 0.5 milliGRAM(s) Inhalation two times a day  buPROPion  milliGRAM(s) Oral two times a day  cefTRIAXone   IVPB 1 Gram(s) IV Intermittent every 24 hours  dextrose 5%. 1000 milliLiter(s) (50 mL/Hr) IV Continuous <Continuous>  dextrose 50% Injectable 12.5 Gram(s) IV Push once  dextrose 50% Injectable 25 Gram(s) IV Push once  dextrose 50% Injectable 25 Gram(s) IV Push once  enoxaparin Injectable 40 milliGRAM(s) SubCutaneous daily  guaiFENesin  milliGRAM(s) Oral every 12 hours  hydrALAZINE 75 milliGRAM(s) Oral three times a day  hydrochlorothiazide 12.5 milliGRAM(s) Oral daily  insulin lispro (HumaLOG) corrective regimen sliding scale   SubCutaneous three times a day before meals  isosorbide   mononitrate ER Tablet (IMDUR) 60 milliGRAM(s) Oral daily  labetalol 200 milliGRAM(s) Oral three times a day  oseltamivir 75 milliGRAM(s) Oral two times a day  pantoprazole    Tablet 40 milliGRAM(s) Oral before breakfast  predniSONE   Tablet 15 milliGRAM(s) Oral two times a day  venlafaxine 150 milliGRAM(s) Oral two times a day with meals    MEDICATIONS  (PRN):  acetaminophen   Tablet 650 milliGRAM(s) Oral every 6 hours PRN For Temp greater than 38 C (100.4 F)  acetaminophen   Tablet. 650 milliGRAM(s) Oral every 6 hours PRN Mild Pain (1 - 3)  dextrose Gel 1 Dose(s) Oral once PRN Blood Glucose LESS THAN 70 milliGRAM(s)/deciliter  glucagon  Injectable 1 milliGRAM(s) IntraMuscular once PRN Glucose LESS THAN 70 milligrams/deciliter      Allergies    No Known Allergies    Intolerances        REVIEW OF SYSTEMS:  CONSTITUTIONAL: No fever, weight loss, or fatigue  EYES: No eye pain, visual disturbances, or discharge  ENMT:  No difficulty hearing, tinnitus, vertigo; No sinus or throat pain  NECK: No pain or stiffness  BREASTS: No pain, masses, or nipple discharge  RESPIRATORY: No cough, wheezing, chills or hemoptysis; No shortness of breath  CARDIOVASCULAR: No chest pain, palpitations, dizziness, or leg swelling  GASTROINTESTINAL: No abdominal or epigastric pain. No nausea, vomiting, or hematemesis; No diarrhea or constipation. No melena or hematochezia.  GENITOURINARY: No dysuria, frequency, hematuria, or incontinence  NEUROLOGICAL: No headaches, memory loss, loss of strength, numbness, or tremors  SKIN: No itching, burning, rashes, or lesions   LYMPH NODES: No enlarged glands  ENDOCRINE: No heat or cold intolerance; No hair loss; No polydipsia or polyuria  MUSCULOSKELETAL: No joint pain or swelling; No muscle, back, or extremity pain  PSYCHIATRIC: No depression, anxiety, mood swings, or difficulty sleeping  HEME/LYMPH: No easy bruising, or bleeding gums  ALLERGY AND IMMUNOLOGIC: No hives or eczema    Vital Signs Last 24 Hrs  T(C): 37.6 (03 Jan 2018 07:40), Max: 38.4 (02 Jan 2018 17:42)  T(F): 99.6 (03 Jan 2018 07:40), Max: 101.1 (02 Jan 2018 17:42)  HR: 82 (03 Jan 2018 08:40) (69 - 101)  BP: 118/73 (03 Jan 2018 07:40) (115/76 - 183/106)  BP(mean): --  RR: 18 (03 Jan 2018 07:40) (16 - 19)  SpO2: 94% (03 Jan 2018 08:40) (93% - 98%)    PHYSICAL EXAM:  GENERAL: No acute distress, well-groomed, well-developed  HEAD:  Atraumatic, Normocephalic  EYES: EOMI, PERRL, conjunctiva and sclera clear  ENMT: No tonsillar erythema, exudates, or enlargement; Moist mucous membranes, Good dentition, No lesions  NECK: Supple, No Jugular Venous Distension, Normal thyroid  NERVOUS SYSTEM:  Alert & Oriented X3, Good concentration; Motor Strength 5/5 B/L upper and lower extremities; Deep Tendon Reflexess 2+ intact and symmetric  CHEST/LUNG: coarse rhonchi BL, no wheezing, or rubs  HEART: Regular rate and rhythm; No murmurs, rubs, or gallops  ABDOMEN: Soft, Nontender, Nondistended; Bowel sounds present  EXTREMITIES:  2+ Peripheral Pulses, No clubbing, cyanosis, or edema  LYMPH: No lymphadenopathy noted  SKIN: No rashes or lesions    LABS:                        11.8   7.2   )-----------( 219      ( 03 Jan 2018 07:56 )             36.6     03 Jan 2018 07:56    130    |  91     |  38     ----------------------------<  124    4.4     |  29     |  1.50     Ca    9.3        03 Jan 2018 07:56        CAPILLARY BLOOD GLUCOSE      POCT Blood Glucose.: 122 mg/dL (03 Jan 2018 07:39)  POCT Blood Glucose.: 128 mg/dL (02 Jan 2018 20:50)  POCT Blood Glucose.: 123 mg/dL (02 Jan 2018 17:10)  POCT Blood Glucose.: 170 mg/dL (02 Jan 2018 12:25)    BLOOD CULTURE    RADIOLOGY & ADDITIONAL TESTS:    Imaging Personally Reviewed:  [ ] YES     Consultant(s) Notes Reviewed:      Care Discussed with Consultants/Other Providers: 78yo M with PMH of chronic diastolic CHF (normal LVF 2016), non obstructive CAD (cardiac cath 2015), interstitial lung disease, HTN, HLD, DM, GERD, BPH, depression, anxiety presented with SOB, admitted with pneumonia.    INTERVAL HPI/OVERNIGHT EVENTS: patient was found to be febrile overnight. Fever work up ordered, + flu. Remains weak. Patient also stated was unable to sleep. On 4L of NC.  Denies N/V/D    MEDICATIONS  (STANDING):  ALBUTerol    0.083% 2.5 milliGRAM(s) Nebulizer every 6 hours  ALPRAZolam 2 milliGRAM(s) Oral three times a day  amLODIPine   Tablet 10 milliGRAM(s) Oral daily  asenapine SL 10 milliGRAM(s) SubLingual at bedtime  aspirin enteric coated 81 milliGRAM(s) Oral daily  buDESOnide   0.5 milliGRAM(s) Respule 0.5 milliGRAM(s) Inhalation two times a day  buPROPion  milliGRAM(s) Oral two times a day  cefTRIAXone   IVPB 1 Gram(s) IV Intermittent every 24 hours  dextrose 5%. 1000 milliLiter(s) (50 mL/Hr) IV Continuous <Continuous>  dextrose 50% Injectable 12.5 Gram(s) IV Push once  dextrose 50% Injectable 25 Gram(s) IV Push once  dextrose 50% Injectable 25 Gram(s) IV Push once  enoxaparin Injectable 40 milliGRAM(s) SubCutaneous daily  guaiFENesin  milliGRAM(s) Oral every 12 hours  hydrALAZINE 75 milliGRAM(s) Oral three times a day  hydrochlorothiazide 12.5 milliGRAM(s) Oral daily  insulin lispro (HumaLOG) corrective regimen sliding scale   SubCutaneous three times a day before meals  isosorbide   mononitrate ER Tablet (IMDUR) 60 milliGRAM(s) Oral daily  labetalol 200 milliGRAM(s) Oral three times a day  oseltamivir 75 milliGRAM(s) Oral two times a day  pantoprazole    Tablet 40 milliGRAM(s) Oral before breakfast  predniSONE   Tablet 15 milliGRAM(s) Oral two times a day  venlafaxine 150 milliGRAM(s) Oral two times a day with meals    MEDICATIONS  (PRN):  acetaminophen   Tablet 650 milliGRAM(s) Oral every 6 hours PRN For Temp greater than 38 C (100.4 F)  acetaminophen   Tablet. 650 milliGRAM(s) Oral every 6 hours PRN Mild Pain (1 - 3)  dextrose Gel 1 Dose(s) Oral once PRN Blood Glucose LESS THAN 70 milliGRAM(s)/deciliter  glucagon  Injectable 1 milliGRAM(s) IntraMuscular once PRN Glucose LESS THAN 70 milligrams/deciliter      Allergies    No Known Allergies    Intolerances        REVIEW OF SYSTEMS:  CONSTITUTIONAL: No fever, weight loss, or fatigue  EYES: No eye pain, visual disturbances, or discharge  ENMT:  No difficulty hearing, tinnitus, vertigo; No sinus or throat pain  NECK: No pain or stiffness  BREASTS: No pain, masses, or nipple discharge  RESPIRATORY: No cough, wheezing, chills or hemoptysis; No shortness of breath  CARDIOVASCULAR: No chest pain, palpitations, dizziness, or leg swelling  GASTROINTESTINAL: No abdominal or epigastric pain. No nausea, vomiting, or hematemesis; No diarrhea or constipation. No melena or hematochezia.  GENITOURINARY: No dysuria, frequency, hematuria, or incontinence  NEUROLOGICAL: No headaches, memory loss, loss of strength, numbness, or tremors  SKIN: No itching, burning, rashes, or lesions   MUSCULOSKELETAL: No joint pain or swelling; No muscle, back, or extremity pain    Vital Signs Last 24 Hrs  T(C): 37.6 (03 Jan 2018 07:40), Max: 38.4 (02 Jan 2018 17:42)  T(F): 99.6 (03 Jan 2018 07:40), Max: 101.1 (02 Jan 2018 17:42)  HR: 82 (03 Jan 2018 08:40) (69 - 101)  BP: 118/73 (03 Jan 2018 07:40) (115/76 - 183/106)  BP(mean): --  RR: 18 (03 Jan 2018 07:40) (16 - 19)  SpO2: 94% (03 Jan 2018 08:40) (93% - 98%)    PHYSICAL EXAM:  GENERAL: No acute distress, well-groomed, well-developed  HEAD:  Atraumatic, Normocephalic  EYES: EOMI, PERRL, conjunctiva and sclera clear  ENMT: No tonsillar erythema, exudates, or enlargement; Moist mucous membranes, Good dentition, No lesions  NECK: Supple, No Jugular Venous Distension, Normal thyroid  NERVOUS SYSTEM:  Alert & Oriented X3, Good concentration; Motor Strength 5/5 B/L upper and lower extremities; Deep Tendon Reflexess 2+ intact and symmetric  CHEST/LUNG:  rhonchi BL, no wheezing, or rubs  HEART: Regular rate and rhythm; No murmurs, rubs, or gallops  ABDOMEN: Soft, Nontender, Nondistended; Bowel sounds present  EXTREMITIES:  2+ Peripheral Pulses, No clubbing, cyanosis, or edema  SKIN: No rashes or lesions    LABS:                        11.8   7.2   )-----------( 219      ( 03 Jan 2018 07:56 )             36.6     03 Jan 2018 07:56    130    |  91     |  38     ----------------------------<  124    4.4     |  29     |  1.50     Ca    9.3        03 Jan 2018 07:56        CAPILLARY BLOOD GLUCOSE      POCT Blood Glucose.: 122 mg/dL (03 Jan 2018 07:39)  POCT Blood Glucose.: 128 mg/dL (02 Jan 2018 20:50)  POCT Blood Glucose.: 123 mg/dL (02 Jan 2018 17:10)  POCT Blood Glucose.: 170 mg/dL (02 Jan 2018 12:25)    BLOOD CULTURE    RADIOLOGY & ADDITIONAL TESTS:    Imaging Personally Reviewed:  [ ] YES     Consultant(s) Notes Reviewed:      Care Discussed with Consultants/Other Providers:

## 2018-01-03 NOTE — PROGRESS NOTE ADULT - PROBLEM SELECTOR PLAN 1
improving   - c/w IV ceftriaxone; zithromax course completed  patient found to be febrile overnight, fever work up initiated, will trend WBC, will FU blood cultures   -Maintain O2>92%, patient on home O2,   -Fall precautions  Pulmonary follow up appreciated. improving   Transition to ceftin to complete course.  -Maintain O2>92%, patient on home O2,   -Fall precautions  Pulmonary follow up appreciated.

## 2018-01-04 DIAGNOSIS — K59.00 CONSTIPATION, UNSPECIFIED: ICD-10-CM

## 2018-01-04 LAB
ANION GAP SERPL CALC-SCNC: 12 MMOL/L — SIGNIFICANT CHANGE UP (ref 5–17)
BUN SERPL-MCNC: 56 MG/DL — HIGH (ref 7–23)
CALCIUM SERPL-MCNC: 9.4 MG/DL — SIGNIFICANT CHANGE UP (ref 8.5–10.1)
CHLORIDE SERPL-SCNC: 92 MMOL/L — LOW (ref 96–108)
CO2 SERPL-SCNC: 27 MMOL/L — SIGNIFICANT CHANGE UP (ref 22–31)
CREAT SERPL-MCNC: 1.8 MG/DL — HIGH (ref 0.5–1.3)
CULTURE RESULTS: NO GROWTH — SIGNIFICANT CHANGE UP
GLUCOSE SERPL-MCNC: 114 MG/DL — HIGH (ref 70–99)
OSMOLALITY UR: 562 MOS/KG — SIGNIFICANT CHANGE UP (ref 50–1200)
POTASSIUM SERPL-MCNC: 4.2 MMOL/L — SIGNIFICANT CHANGE UP (ref 3.5–5.3)
POTASSIUM SERPL-SCNC: 4.2 MMOL/L — SIGNIFICANT CHANGE UP (ref 3.5–5.3)
SODIUM SERPL-SCNC: 131 MMOL/L — LOW (ref 135–145)
SPECIMEN SOURCE: SIGNIFICANT CHANGE UP
URATE SERPL-MCNC: 11.8 MG/DL — HIGH (ref 3.4–8.8)

## 2018-01-04 PROCEDURE — 99233 SBSQ HOSP IP/OBS HIGH 50: CPT

## 2018-01-04 PROCEDURE — 99233 SBSQ HOSP IP/OBS HIGH 50: CPT | Mod: GC

## 2018-01-04 RX ORDER — ALLOPURINOL 300 MG
100 TABLET ORAL DAILY
Qty: 0 | Refills: 0 | Status: DISCONTINUED | OUTPATIENT
Start: 2018-01-04 | End: 2018-01-06

## 2018-01-04 RX ORDER — HYDRALAZINE HCL 50 MG
75 TABLET ORAL THREE TIMES A DAY
Qty: 0 | Refills: 0 | Status: DISCONTINUED | OUTPATIENT
Start: 2018-01-04 | End: 2018-01-06

## 2018-01-04 RX ORDER — SODIUM CHLORIDE 9 MG/ML
2 INJECTION INTRAMUSCULAR; INTRAVENOUS; SUBCUTANEOUS ONCE
Qty: 0 | Refills: 0 | Status: COMPLETED | OUTPATIENT
Start: 2018-01-04 | End: 2018-01-04

## 2018-01-04 RX ORDER — POLYETHYLENE GLYCOL 3350 17 G/17G
17 POWDER, FOR SOLUTION ORAL DAILY
Qty: 0 | Refills: 0 | Status: DISCONTINUED | OUTPATIENT
Start: 2018-01-04 | End: 2018-01-06

## 2018-01-04 RX ORDER — SENNA PLUS 8.6 MG/1
2 TABLET ORAL AT BEDTIME
Qty: 0 | Refills: 0 | Status: DISCONTINUED | OUTPATIENT
Start: 2018-01-04 | End: 2018-01-06

## 2018-01-04 RX ORDER — DOCUSATE SODIUM 100 MG
100 CAPSULE ORAL
Qty: 0 | Refills: 0 | Status: DISCONTINUED | OUTPATIENT
Start: 2018-01-04 | End: 2018-01-06

## 2018-01-04 RX ADMIN — ISOSORBIDE MONONITRATE 60 MILLIGRAM(S): 60 TABLET, EXTENDED RELEASE ORAL at 11:23

## 2018-01-04 RX ADMIN — POLYETHYLENE GLYCOL 3350 17 GRAM(S): 17 POWDER, FOR SOLUTION ORAL at 11:52

## 2018-01-04 RX ADMIN — ASENAPINE MALEATE 10 MILLIGRAM(S): 10 TABLET SUBLINGUAL at 21:50

## 2018-01-04 RX ADMIN — Medication 1: at 13:44

## 2018-01-04 RX ADMIN — ALBUTEROL 2.5 MILLIGRAM(S): 90 AEROSOL, METERED ORAL at 08:09

## 2018-01-04 RX ADMIN — Medication 600 MILLIGRAM(S): at 05:25

## 2018-01-04 RX ADMIN — SENNA PLUS 2 TABLET(S): 8.6 TABLET ORAL at 21:50

## 2018-01-04 RX ADMIN — Medication 600 MILLIGRAM(S): at 17:28

## 2018-01-04 RX ADMIN — AMLODIPINE BESYLATE 10 MILLIGRAM(S): 2.5 TABLET ORAL at 05:25

## 2018-01-04 RX ADMIN — Medication 75 MILLIGRAM(S): at 17:28

## 2018-01-04 RX ADMIN — ALBUTEROL 2.5 MILLIGRAM(S): 90 AEROSOL, METERED ORAL at 13:54

## 2018-01-04 RX ADMIN — Medication 81 MILLIGRAM(S): at 11:23

## 2018-01-04 RX ADMIN — ALBUTEROL 2.5 MILLIGRAM(S): 90 AEROSOL, METERED ORAL at 19:55

## 2018-01-04 RX ADMIN — PANTOPRAZOLE SODIUM 40 MILLIGRAM(S): 20 TABLET, DELAYED RELEASE ORAL at 05:24

## 2018-01-04 RX ADMIN — BUPROPION HYDROCHLORIDE 150 MILLIGRAM(S): 150 TABLET, EXTENDED RELEASE ORAL at 17:28

## 2018-01-04 RX ADMIN — Medication 75 MILLIGRAM(S): at 05:25

## 2018-01-04 RX ADMIN — SODIUM CHLORIDE 2 GRAM(S): 9 INJECTION INTRAMUSCULAR; INTRAVENOUS; SUBCUTANEOUS at 21:50

## 2018-01-04 RX ADMIN — Medication 75 MILLIGRAM(S): at 05:24

## 2018-01-04 RX ADMIN — Medication 200 MILLIGRAM(S): at 13:56

## 2018-01-04 RX ADMIN — Medication 200 MILLIGRAM(S): at 05:25

## 2018-01-04 RX ADMIN — Medication 0.5 MILLIGRAM(S): at 19:57

## 2018-01-04 RX ADMIN — Medication 100 MILLIGRAM(S): at 17:27

## 2018-01-04 RX ADMIN — Medication 15 MILLIGRAM(S): at 17:31

## 2018-01-04 RX ADMIN — Medication 2 MILLIGRAM(S): at 13:56

## 2018-01-04 RX ADMIN — Medication 500 MILLIGRAM(S): at 05:24

## 2018-01-04 RX ADMIN — BUPROPION HYDROCHLORIDE 150 MILLIGRAM(S): 150 TABLET, EXTENDED RELEASE ORAL at 05:25

## 2018-01-04 RX ADMIN — Medication 150 MILLIGRAM(S): at 08:20

## 2018-01-04 RX ADMIN — Medication 75 MILLIGRAM(S): at 13:56

## 2018-01-04 RX ADMIN — ENOXAPARIN SODIUM 40 MILLIGRAM(S): 100 INJECTION SUBCUTANEOUS at 11:23

## 2018-01-04 RX ADMIN — Medication 15 MILLIGRAM(S): at 05:24

## 2018-01-04 RX ADMIN — Medication 150 MILLIGRAM(S): at 17:28

## 2018-01-04 RX ADMIN — Medication 500 MILLIGRAM(S): at 17:28

## 2018-01-04 RX ADMIN — Medication 0.5 MILLIGRAM(S): at 08:09

## 2018-01-04 NOTE — PROGRESS NOTE ADULT - PROBLEM SELECTOR PLAN 4
pulm fibrosis  monitor vs and sat  will need to follow up with Dr. Page for Pulmonary medicine and fibrosis diagnosis follow up  cont systemic steroids, nebs, o2 support  keep sat > 88 pct  increase activity as tolerated  may need to assess for JANELLE

## 2018-01-04 NOTE — PROGRESS NOTE ADULT - PROBLEM SELECTOR PLAN 2
Patient has hx of chronic lung disease, pulmonary fibrosis with element of COPD.  -Pulmonary Consult (Lakisha) appreciated.   -C/w Duonebs, and  steroids will begin on bowel regimen patient now on Tamiflu day 2   blood cultures which are NGTD patient now on Tamiflu day 2   continue supportive care.

## 2018-01-04 NOTE — PROGRESS NOTE ADULT - PROBLEM SELECTOR PLAN 4
-chronic diastolic CHF (normal LVF 2016)  -Hold lasix for now in light of DEVYN.  -Monitor volume status closely.  cardiology follow up appreciated. patient found to be febrile overnight, fever work up initiated, will trend WBC, will FU blood cultures which are NGTD  patient now on Tamiflu

## 2018-01-04 NOTE — PROGRESS NOTE ADULT - SUBJECTIVE AND OBJECTIVE BOX
Patient is a 77y old  Male who presents with a chief complaint of SOB, s/p fall early sat am per son (29 Dec 2017 14:41)   SOB    INTERVAL HPI/OVERNIGHT EVENTS: patient feels mroe comfortable today, breathing better, complaining of constipation. patient slept well, states melatonin helped. denies fever, N,V,D.     MEDICATIONS  (STANDING):  ALBUTerol    0.083% 2.5 milliGRAM(s) Nebulizer every 6 hours  ALPRAZolam 2 milliGRAM(s) Oral three times a day  amLODIPine   Tablet 10 milliGRAM(s) Oral daily  asenapine SL 10 milliGRAM(s) SubLingual at bedtime  aspirin enteric coated 81 milliGRAM(s) Oral daily  buDESOnide   0.5 milliGRAM(s) Respule 0.5 milliGRAM(s) Inhalation two times a day  buPROPion  milliGRAM(s) Oral two times a day  cefuroxime   Tablet 500 milliGRAM(s) Oral every 12 hours  dextrose 5%. 1000 milliLiter(s) (50 mL/Hr) IV Continuous <Continuous>  dextrose 50% Injectable 12.5 Gram(s) IV Push once  dextrose 50% Injectable 25 Gram(s) IV Push once  dextrose 50% Injectable 25 Gram(s) IV Push once  enoxaparin Injectable 40 milliGRAM(s) SubCutaneous daily  guaiFENesin  milliGRAM(s) Oral every 12 hours  hydrALAZINE 75 milliGRAM(s) Oral three times a day  insulin lispro (HumaLOG) corrective regimen sliding scale   SubCutaneous three times a day before meals  isosorbide   mononitrate ER Tablet (IMDUR) 60 milliGRAM(s) Oral daily  labetalol 200 milliGRAM(s) Oral three times a day  melatonin 3 milliGRAM(s) Oral at bedtime  oseltamivir 75 milliGRAM(s) Oral two times a day  pantoprazole    Tablet 40 milliGRAM(s) Oral before breakfast  predniSONE   Tablet 15 milliGRAM(s) Oral two times a day  venlafaxine 150 milliGRAM(s) Oral two times a day with meals    MEDICATIONS  (PRN):  acetaminophen   Tablet 650 milliGRAM(s) Oral every 6 hours PRN For Temp greater than 38 C (100.4 F)  acetaminophen   Tablet. 650 milliGRAM(s) Oral every 6 hours PRN Mild Pain (1 - 3)  dextrose Gel 1 Dose(s) Oral once PRN Blood Glucose LESS THAN 70 milliGRAM(s)/deciliter  glucagon  Injectable 1 milliGRAM(s) IntraMuscular once PRN Glucose LESS THAN 70 milligrams/deciliter      Allergies    No Known Allergies    Intolerances        REVIEW OF SYSTEMS:  CONSTITUTIONAL: No fever, weight loss, or fatigue. rested   EYES: No eye pain, visual disturbances, or discharge  ENMT:  No difficulty hearing, tinnitus, vertigo; No sinus or throat pain  NECK: No pain or stiffness  BREASTS: No pain, masses, or nipple discharge  RESPIRATORY: No cough, wheezing, chills or hemoptysis; No shortness of breath  CARDIOVASCULAR: No chest pain, palpitations, dizziness, or leg swelling  GASTROINTESTINAL: + constipation, No abdominal or epigastric pain. No nausea, vomiting, or hematemesis; No diarrhea. No melena or hematochezia.  GENITOURINARY: No dysuria, frequency, hematuria, or incontinence  NEUROLOGICAL: No headaches, memory loss, loss of strength, numbness, or tremors  SKIN: No itching, burning, rashes, or lesions   LYMPH NODES: No enlarged glands  ENDOCRINE: No heat or cold intolerance; No hair loss; No polydipsia or polyuria  MUSCULOSKELETAL: No joint pain or swelling; No muscle, back, or extremity pain  PSYCHIATRIC: No depression, anxiety, mood swings, or difficulty sleeping  HEME/LYMPH: No easy bruising, or bleeding gums  ALLERGY AND IMMUNOLOGIC: No hives or eczema    Vital Signs Last 24 Hrs  T(C): 36.9 (04 Jan 2018 05:00), Max: 36.9 (03 Jan 2018 21:44)  T(F): 98.4 (04 Jan 2018 05:00), Max: 98.4 (03 Jan 2018 21:44)  HR: 72 (04 Jan 2018 08:10) (69 - 91)  BP: 110/72 (04 Jan 2018 05:00) (110/72 - 146/90)  BP(mean): --  RR: 18 (04 Jan 2018 05:00) (17 - 18)  SpO2: 94% (04 Jan 2018 08:10) (93% - 95%)    PHYSICAL EXAM:  GENERAL: No acute distress, well-groomed, well-developed  HEAD:  Atraumatic, Normocephalic  EYES: EOMI, PERRL, conjunctiva and sclera clear  ENMT: No tonsillar erythema, exudates, or enlargement; Moist mucous membranes, Good dentition, No lesions  NECK: Supple, No Jugular Venous Distension, Normal thyroid  NERVOUS SYSTEM:  Alert & Oriented X3, Good concentration; Motor Strength 5/5 B/L upper and lower extremities; Deep Tendon Reflexess 2+ intact and symmetric  CHEST/LUNG: Clear to auscultation bilaterally; No rales, rhonchi, wheezing, or rubs  HEART: Regular rate and rhythm; No murmurs, rubs, or gallops  ABDOMEN: Soft, Nontender, Nondistended; Bowel sounds present  EXTREMITIES:  2+ Peripheral Pulses, No clubbing, cyanosis, or edema  LYMPH: No lymphadenopathy noted  SKIN: No rashes or lesions    LABS:    04 Jan 2018 06:52    131    |  92     |  56     ----------------------------<  114    4.2     |  27     |  1.80     Ca    9.4        04 Jan 2018 06:52        CAPILLARY BLOOD GLUCOSE      POCT Blood Glucose.: 139 mg/dL (04 Jan 2018 08:27)  POCT Blood Glucose.: 137 mg/dL (03 Jan 2018 23:45)  POCT Blood Glucose.: 150 mg/dL (03 Jan 2018 16:31)  POCT Blood Glucose.: 148 mg/dL (03 Jan 2018 12:19)    BLOOD CULTURE  01-02 @ 23:25   No growth to date.  --  --    RADIOLOGY & ADDITIONAL TESTS:    Imaging Personally Reviewed:  [ ] YES     Consultant(s) Notes Reviewed:      Care Discussed with Consultants/Other Providers: Patient is a 77y old  Male who presents with a chief complaint of SOB, s/p fall early sat am per son (29 Dec 2017 14:41)   SOB    INTERVAL HPI/OVERNIGHT EVENTS: patient feels mroe comfortable today, breathing better, complaining of constipation. patient slept well, states melatonin helped. denies fever, N,V,D.     MEDICATIONS  (STANDING):  ALBUTerol    0.083% 2.5 milliGRAM(s) Nebulizer every 6 hours  ALPRAZolam 2 milliGRAM(s) Oral three times a day  amLODIPine   Tablet 10 milliGRAM(s) Oral daily  asenapine SL 10 milliGRAM(s) SubLingual at bedtime  aspirin enteric coated 81 milliGRAM(s) Oral daily  buDESOnide   0.5 milliGRAM(s) Respule 0.5 milliGRAM(s) Inhalation two times a day  buPROPion  milliGRAM(s) Oral two times a day  cefuroxime   Tablet 500 milliGRAM(s) Oral every 12 hours  dextrose 5%. 1000 milliLiter(s) (50 mL/Hr) IV Continuous <Continuous>  dextrose 50% Injectable 12.5 Gram(s) IV Push once  dextrose 50% Injectable 25 Gram(s) IV Push once  dextrose 50% Injectable 25 Gram(s) IV Push once  enoxaparin Injectable 40 milliGRAM(s) SubCutaneous daily  guaiFENesin  milliGRAM(s) Oral every 12 hours  hydrALAZINE 75 milliGRAM(s) Oral three times a day  insulin lispro (HumaLOG) corrective regimen sliding scale   SubCutaneous three times a day before meals  isosorbide   mononitrate ER Tablet (IMDUR) 60 milliGRAM(s) Oral daily  labetalol 200 milliGRAM(s) Oral three times a day  melatonin 3 milliGRAM(s) Oral at bedtime  oseltamivir 75 milliGRAM(s) Oral two times a day  pantoprazole    Tablet 40 milliGRAM(s) Oral before breakfast  predniSONE   Tablet 15 milliGRAM(s) Oral two times a day  venlafaxine 150 milliGRAM(s) Oral two times a day with meals    MEDICATIONS  (PRN):  acetaminophen   Tablet 650 milliGRAM(s) Oral every 6 hours PRN For Temp greater than 38 C (100.4 F)  acetaminophen   Tablet. 650 milliGRAM(s) Oral every 6 hours PRN Mild Pain (1 - 3)  dextrose Gel 1 Dose(s) Oral once PRN Blood Glucose LESS THAN 70 milliGRAM(s)/deciliter  glucagon  Injectable 1 milliGRAM(s) IntraMuscular once PRN Glucose LESS THAN 70 milligrams/deciliter      Allergies    No Known Allergies    Intolerances        REVIEW OF SYSTEMS:  CONSTITUTIONAL: No fever, weight loss, or fatigue. rested   EYES: No eye pain, visual disturbances, or discharge  ENMT:  No difficulty hearing, tinnitus, vertigo; No sinus or throat pain  NECK: No pain or stiffness  RESPIRATORY: No cough, wheezing, chills or hemoptysis; No shortness of breath  CARDIOVASCULAR: No chest pain, palpitations, dizziness, or leg swelling  GASTROINTESTINAL: + constipation, No abdominal or epigastric pain. No nausea, vomiting, or hematemesis; No diarrhea. No melena or hematochezia.  GENITOURINARY: No dysuria, frequency, hematuria, or incontinence  NEUROLOGICAL: No headaches, memory loss, loss of strength, numbness, or tremors  SKIN: No itching, burning, rashes, or lesions   MUSCULOSKELETAL: No joint pain or swelling; No muscle, back, or extremity pain    Vital Signs Last 24 Hrs  T(C): 36.9 (04 Jan 2018 05:00), Max: 36.9 (03 Jan 2018 21:44)  T(F): 98.4 (04 Jan 2018 05:00), Max: 98.4 (03 Jan 2018 21:44)  HR: 72 (04 Jan 2018 08:10) (69 - 91)  BP: 110/72 (04 Jan 2018 05:00) (110/72 - 146/90)  BP(mean): --  RR: 18 (04 Jan 2018 05:00) (17 - 18)  SpO2: 94% (04 Jan 2018 08:10) (93% - 95%)    PHYSICAL EXAM:  GENERAL: No acute distress, well-groomed, well-developed  HEAD:  Atraumatic, Normocephalic  EYES: EOMI, PERRL, conjunctiva and sclera clear  ENMT: No tonsillar erythema, exudates, or enlargement; Moist mucous membranes, Good dentition, No lesions  NECK: Supple, No Jugular Venous Distension, Normal thyroid  NERVOUS SYSTEM:  Alert & Oriented X3, Good concentration; Motor Strength 5/5 B/L upper and lower extremities; Deep Tendon Reflexess 2+ intact and symmetric  CHEST/LUNG: Clear to auscultation bilaterally; No rales, rhonchi, wheezing, or rubs  HEART: Regular rate and rhythm; No murmurs, rubs, or gallops  ABDOMEN: Soft, Nontender, Nondistended; Bowel sounds present  EXTREMITIES:  2+ Peripheral Pulses, No clubbing, cyanosis, or edema  SKIN: No rashes or lesions    LABS:    04 Jan 2018 06:52    131    |  92     |  56     ----------------------------<  114    4.2     |  27     |  1.80     Ca    9.4        04 Jan 2018 06:52        CAPILLARY BLOOD GLUCOSE      POCT Blood Glucose.: 139 mg/dL (04 Jan 2018 08:27)  POCT Blood Glucose.: 137 mg/dL (03 Jan 2018 23:45)  POCT Blood Glucose.: 150 mg/dL (03 Jan 2018 16:31)  POCT Blood Glucose.: 148 mg/dL (03 Jan 2018 12:19)    BLOOD CULTURE  01-02 @ 23:25   No growth to date.  --  --    RADIOLOGY & ADDITIONAL TESTS:    Imaging Personally Reviewed:  [ ] YES     Consultant(s) Notes Reviewed:      Care Discussed with Consultants/Other Providers:

## 2018-01-04 NOTE — PROGRESS NOTE ADULT - PROBLEM SELECTOR PLAN 7
nephro follow up appreciated. recommend to hold HCTZ   -Creatinine increased, likely secondary to diuresis? Hold diuresis. Check serum uric acid per renal recs.  -Hold off on fluids for now.  -Monitor  BUN/Cr closely .   Avoid nephrotoxins.

## 2018-01-04 NOTE — PROGRESS NOTE ADULT - SUBJECTIVE AND OBJECTIVE BOX
Date/Time Patient Seen:  		  Referring MD:   Data Reviewed	       Patient is a 77y old  Male who presents with a chief complaint of SOB, s/p fall early sat am per son (29 Dec 2017 14:41)    in bed  seen and examined   vs and meds reviewed      Subjective/HPI     PAST MEDICAL & SURGICAL HISTORY:  Congestive heart failure  Pulmonary fibrosis  Pulmonary fibrosis  Smoker  BPH with obstruction/lower urinary tract symptoms  Vitamin D deficiency  GERD (gastroesophageal reflux disease)  Diabetes mellitus  Hyperlipidemia  Hypertension  S/P tonsillectomy  No significant past surgical history        Medication list         MEDICATIONS  (STANDING):  ALBUTerol    0.083% 2.5 milliGRAM(s) Nebulizer every 6 hours  ALPRAZolam 2 milliGRAM(s) Oral three times a day  amLODIPine   Tablet 10 milliGRAM(s) Oral daily  asenapine SL 10 milliGRAM(s) SubLingual at bedtime  aspirin enteric coated 81 milliGRAM(s) Oral daily  buDESOnide   0.5 milliGRAM(s) Respule 0.5 milliGRAM(s) Inhalation two times a day  buPROPion  milliGRAM(s) Oral two times a day  cefuroxime   Tablet 500 milliGRAM(s) Oral every 12 hours  dextrose 5%. 1000 milliLiter(s) (50 mL/Hr) IV Continuous <Continuous>  dextrose 50% Injectable 12.5 Gram(s) IV Push once  dextrose 50% Injectable 25 Gram(s) IV Push once  dextrose 50% Injectable 25 Gram(s) IV Push once  enoxaparin Injectable 40 milliGRAM(s) SubCutaneous daily  guaiFENesin  milliGRAM(s) Oral every 12 hours  hydrALAZINE 75 milliGRAM(s) Oral three times a day  insulin lispro (HumaLOG) corrective regimen sliding scale   SubCutaneous three times a day before meals  isosorbide   mononitrate ER Tablet (IMDUR) 60 milliGRAM(s) Oral daily  labetalol 200 milliGRAM(s) Oral three times a day  melatonin 3 milliGRAM(s) Oral at bedtime  oseltamivir 75 milliGRAM(s) Oral two times a day  pantoprazole    Tablet 40 milliGRAM(s) Oral before breakfast  predniSONE   Tablet 15 milliGRAM(s) Oral two times a day  venlafaxine 150 milliGRAM(s) Oral two times a day with meals    MEDICATIONS  (PRN):  acetaminophen   Tablet 650 milliGRAM(s) Oral every 6 hours PRN For Temp greater than 38 C (100.4 F)  acetaminophen   Tablet. 650 milliGRAM(s) Oral every 6 hours PRN Mild Pain (1 - 3)  dextrose Gel 1 Dose(s) Oral once PRN Blood Glucose LESS THAN 70 milliGRAM(s)/deciliter  glucagon  Injectable 1 milliGRAM(s) IntraMuscular once PRN Glucose LESS THAN 70 milligrams/deciliter         Vitals log        ICU Vital Signs Last 24 Hrs  T(C): 36.9 (04 Jan 2018 05:00), Max: 37.6 (03 Jan 2018 07:40)  T(F): 98.4 (04 Jan 2018 05:00), Max: 99.6 (03 Jan 2018 07:40)  HR: 69 (04 Jan 2018 05:00) (69 - 91)  BP: 110/72 (04 Jan 2018 05:00) (110/72 - 146/90)  BP(mean): --  ABP: --  ABP(mean): --  RR: 18 (04 Jan 2018 05:00) (17 - 18)  SpO2: 94% (04 Jan 2018 05:00) (93% - 95%)           Input and Output:  I&O's Detail    02 Jan 2018 07:01  -  03 Jan 2018 07:00  --------------------------------------------------------  IN:    Oral Fluid: 1790 mL  Total IN: 1790 mL    OUT:    Voided: 1900 mL  Total OUT: 1900 mL    Total NET: -110 mL      03 Jan 2018 07:01  -  04 Jan 2018 06:54  --------------------------------------------------------  IN:    Oral Fluid: 840 mL  Total IN: 840 mL    OUT:    Voided: 1150 mL  Total OUT: 1150 mL    Total NET: -310 mL          Lab Data                        11.8   7.2   )-----------( 219      ( 03 Jan 2018 07:56 )             36.6     01-03    130<L>  |  91<L>  |  38<H>  ----------------------------<  124<H>  4.4   |  29  |  1.50<H>    Ca    9.3      03 Jan 2018 07:56              Review of Systems	      Objective     Physical Examination    head at  heart s1s2  lungs dec BS  abd soft      Pertinent Lab findings & Imaging      Princess:  NO   Adequate UO     I&O's Detail    02 Jan 2018 07:01  -  03 Jan 2018 07:00  --------------------------------------------------------  IN:    Oral Fluid: 1790 mL  Total IN: 1790 mL    OUT:    Voided: 1900 mL  Total OUT: 1900 mL    Total NET: -110 mL      03 Jan 2018 07:01  -  04 Jan 2018 06:54  --------------------------------------------------------  IN:    Oral Fluid: 840 mL  Total IN: 840 mL    OUT:    Voided: 1150 mL  Total OUT: 1150 mL    Total NET: -310 mL               Discussed with:     Cultures:	        Radiology

## 2018-01-04 NOTE — PROGRESS NOTE ADULT - ASSESSMENT
·	Hyponatremia: SIADH, HCTZ rx.   ·	Likely CKD 3, Prerenal azotemia  ·	Pneumonia, + Flu    Hold HCTZ. Allopurinol added. Encourage PO intake. Check repeat labs and monitor renal function trend.   Avoid nephrotoxic meds as possible. Avoid ACEI, ARB and NSAIDS. Monitor input and output.   Pulmonary follow up. Will follow electrolytes and renal function trend.

## 2018-01-04 NOTE — PROGRESS NOTE ADULT - PROBLEM SELECTOR PLAN 1
LRTI  FLU  poss PNA  on ABX  on Tamiflu  Mucinex  chest pt  nebs  on systemic steroids  monitor vs and sat and HD

## 2018-01-04 NOTE — PROGRESS NOTE ADULT - PROBLEM SELECTOR PLAN 1
improving   Transition to ceftin to complete course.  -Maintain O2>92%, patient on home O2,   -Fall precautions  Pulmonary follow up appreciated.  chest pt  mucinex improving   8 day Antibiotic course completed   -Maintain O2>92%, patient on home O2,   -Fall precautions  Pulmonary follow up appreciated.  chest pt  mucinex

## 2018-01-04 NOTE — PROGRESS NOTE ADULT - SUBJECTIVE AND OBJECTIVE BOX
Follow up: Patient resting comfortably in bed in NAD.  Laying flat with no respiratory distress.  No complaints of chest pain, increased dyspnea, palpitations, PND, or orthopnea.  He has developed DEVYN, presumably from diuresis.    HPI:  78yo M with PMH of chronic diastolic CHF (normal LVF 2016), non obstructive CAD (cardiac cath 2015), interstitial lung disease, HTN, HLD, DMT2 on Janumet, GERD, BPH, depression, anxiety presents with SOB. Patient states that he has had dyspnea fro the past 1.5 weeks along with a productive cough  with white sputum. Patient went to walk-in clinic today where CXR was performed and showed consolidation in the lung so patient was told to come to ED. Patient uses O2 at home at nighttime but is unsure how much. He admits to weakness, lethargy as well. Denies f/c, n/v, headaches.    In ED, VSS, lactate 1.2, procalcitonin elevated 0.28. CT chest showed interval increase in airspace opacity in the right lower lobe compatible with pneumonia; follow-up to resolution is recommended to exclude underlying neoplasm; again seen is interstitial lung disease. Received azithromycin, rocephin, solumedrol, albuterol treatment. (29 Dec 2017 00:05)      PAST MEDICAL & SURGICAL HISTORY:  Congestive heart failure  Pulmonary fibrosis  Pulmonary fibrosis  Smoker  BPH with obstruction/lower urinary tract symptoms  Vitamin D deficiency  GERD (gastroesophageal reflux disease)  Diabetes mellitus  Hyperlipidemia  Hypertension  S/P tonsillectomy      MEDICATIONS  (STANDING):  ALBUTerol    0.083% 2.5 milliGRAM(s) Nebulizer every 6 hours  ALPRAZolam 2 milliGRAM(s) Oral three times a day  amLODIPine   Tablet 10 milliGRAM(s) Oral daily  asenapine SL 10 milliGRAM(s) SubLingual at bedtime  aspirin enteric coated 81 milliGRAM(s) Oral daily  buDESOnide   0.5 milliGRAM(s) Respule 0.5 milliGRAM(s) Inhalation two times a day  buPROPion  milliGRAM(s) Oral two times a day  cefuroxime   Tablet 500 milliGRAM(s) Oral every 12 hours  dextrose 5%. 1000 milliLiter(s) (50 mL/Hr) IV Continuous <Continuous>  dextrose 50% Injectable 12.5 Gram(s) IV Push once  dextrose 50% Injectable 25 Gram(s) IV Push once  dextrose 50% Injectable 25 Gram(s) IV Push once  docusate sodium 100 milliGRAM(s) Oral two times a day  enoxaparin Injectable 40 milliGRAM(s) SubCutaneous daily  guaiFENesin  milliGRAM(s) Oral every 12 hours  hydrALAZINE 75 milliGRAM(s) Oral three times a day  insulin lispro (HumaLOG) corrective regimen sliding scale   SubCutaneous three times a day before meals  isosorbide   mononitrate ER Tablet (IMDUR) 60 milliGRAM(s) Oral daily  labetalol 200 milliGRAM(s) Oral three times a day  melatonin 3 milliGRAM(s) Oral at bedtime  oseltamivir 75 milliGRAM(s) Oral two times a day  pantoprazole    Tablet 40 milliGRAM(s) Oral before breakfast  polyethylene glycol 3350 17 Gram(s) Oral daily  predniSONE   Tablet 15 milliGRAM(s) Oral two times a day  senna 2 Tablet(s) Oral at bedtime  venlafaxine 150 milliGRAM(s) Oral two times a day with meals    MEDICATIONS  (PRN):  acetaminophen   Tablet 650 milliGRAM(s) Oral every 6 hours PRN For Temp greater than 38 C (100.4 F)  acetaminophen   Tablet. 650 milliGRAM(s) Oral every 6 hours PRN Mild Pain (1 - 3)  dextrose Gel 1 Dose(s) Oral once PRN Blood Glucose LESS THAN 70 milliGRAM(s)/deciliter  glucagon  Injectable 1 milliGRAM(s) IntraMuscular once PRN Glucose LESS THAN 70 milligrams/deciliter      REVIEW OF SYSTEMS:    CONSTITUTIONAL: No weakness, fevers or chills  EYES: No visual changes, No diplopia  ENMT: No throat pain , No exudate  NECK: No pain or stiffness  RESPIRATORY: No cough, wheezing, hemoptysis; No shortness of breath  CARDIOVASCULAR: No chest pain or palpitations  GASTROINTESTINAL: No abdominal pain. No nausea, vomiting, or hematemesis; No diarrhea or constipation. No melena or hematochezia.  GENITOURINARY: No dysuria, frequency or hematuria  NEUROLOGICAL: No numbness or weakness  SKIN: No itching or rash  All other review of systems is negative unless indicated above    Vital Signs Last 24 Hrs  T(C): 36.9 (04 Jan 2018 13:54), Max: 36.9 (03 Jan 2018 21:44)  T(F): 98.5 (04 Jan 2018 13:54), Max: 98.5 (04 Jan 2018 13:54)  HR: 76 (04 Jan 2018 14:07) (69 - 90)  BP: 113/72 (04 Jan 2018 13:54) (110/72 - 138/89)  BP(mean): --  RR: 17 (04 Jan 2018 13:54) (17 - 18)  SpO2: 91% (04 Jan 2018 13:54) (91% - 95%)    I&O's Summary    03 Jan 2018 07:01  -  04 Jan 2018 07:00  --------------------------------------------------------  IN: 840 mL / OUT: 1150 mL / NET: -310 mL        PHYSICAL EXAM:    General: NAD, awake and alert, oriented x 3  HEENT: Mucous membranes are moist, anicteric  Lungs: Non-labored, breath sounds are clear bilaterally.  Soft b/l wheezes  Cardiovascular: Regular, S1 and S2, no murmurs, rubs, or gallops  Gastrointestinal: Bowel Sounds present, soft, nontender.   Lymph: No peripheral edema. No lymphadenopathy.  Skin: No rashes or ulcers  Psych:  Mood & affect appropriate                          11.8   7.2   )-----------( 219      ( 03 Jan 2018 07:56 )             36.6     CBC Full  -  ( 03 Jan 2018 07:56 )  WBC Count : 7.2 K/uL  Hemoglobin : 11.8 g/dL  Hematocrit : 36.6 %  Platelet Count - Automated : 219 K/uL  Mean Cell Volume : 78.8 fl  Mean Cell Hemoglobin : 25.4 pg  Mean Cell Hemoglobin Concentration : 32.2 gm/dL  Auto Neutrophil # : x  Auto Lymphocyte # : x  Auto Monocyte # : x  Auto Eosinophil # : x  Auto Basophil # : x  Auto Neutrophil % : x  Auto Lymphocyte % : x  Auto Monocyte % : x  Auto Eosinophil % : x  Auto Basophil % : x    01-04    131<L>  |  92<L>  |  56<H>  ----------------------------<  114<H>  4.2   |  27  |  1.80<H>    Ca    9.4      04 Jan 2018 06:52 Follow up: Patient resting comfortably in bed in NAD.  Laying flat with no respiratory distress.  No complaints of chest pain, increased dyspnea, palpitations, PND, or orthopnea.  He has developed DEVYN, presumably from diuresis.    HPI:  76yo M with PMH of chronic diastolic CHF (normal LVF 2016), non obstructive CAD (cardiac cath 2015), interstitial lung disease, HTN, HLD, DMT2 on Janumet, GERD, BPH, depression, anxiety presents with SOB. Patient states that he has had dyspnea fro the past 1.5 weeks along with a productive cough  with white sputum. Patient went to walk-in clinic today where CXR was performed and showed consolidation in the lung so patient was told to come to ED. Patient uses O2 at home at nighttime but is unsure how much. He admits to weakness, lethargy as well. Denies f/c, n/v, headaches.    In ED, VSS, lactate 1.2, procalcitonin elevated 0.28. CT chest showed interval increase in airspace opacity in the right lower lobe compatible with pneumonia; follow-up to resolution is recommended to exclude underlying neoplasm; again seen is interstitial lung disease. Received azithromycin, rocephin, solumedrol, albuterol treatment. (29 Dec 2017 00:05)      PAST MEDICAL & SURGICAL HISTORY:  Congestive heart failure  Pulmonary fibrosis  Pulmonary fibrosis  Smoker  BPH with obstruction/lower urinary tract symptoms  Vitamin D deficiency  GERD (gastroesophageal reflux disease)  Diabetes mellitus  Hyperlipidemia  Hypertension  S/P tonsillectomy      MEDICATIONS  (STANDING):  ALBUTerol    0.083% 2.5 milliGRAM(s) Nebulizer every 6 hours  ALPRAZolam 2 milliGRAM(s) Oral three times a day  amLODIPine   Tablet 10 milliGRAM(s) Oral daily  asenapine SL 10 milliGRAM(s) SubLingual at bedtime  aspirin enteric coated 81 milliGRAM(s) Oral daily  buDESOnide   0.5 milliGRAM(s) Respule 0.5 milliGRAM(s) Inhalation two times a day  buPROPion  milliGRAM(s) Oral two times a day  cefuroxime   Tablet 500 milliGRAM(s) Oral every 12 hours  dextrose 5%. 1000 milliLiter(s) (50 mL/Hr) IV Continuous <Continuous>  dextrose 50% Injectable 12.5 Gram(s) IV Push once  dextrose 50% Injectable 25 Gram(s) IV Push once  dextrose 50% Injectable 25 Gram(s) IV Push once  docusate sodium 100 milliGRAM(s) Oral two times a day  enoxaparin Injectable 40 milliGRAM(s) SubCutaneous daily  guaiFENesin  milliGRAM(s) Oral every 12 hours  hydrALAZINE 75 milliGRAM(s) Oral three times a day  insulin lispro (HumaLOG) corrective regimen sliding scale   SubCutaneous three times a day before meals  isosorbide   mononitrate ER Tablet (IMDUR) 60 milliGRAM(s) Oral daily  labetalol 200 milliGRAM(s) Oral three times a day  melatonin 3 milliGRAM(s) Oral at bedtime  oseltamivir 75 milliGRAM(s) Oral two times a day  pantoprazole    Tablet 40 milliGRAM(s) Oral before breakfast  polyethylene glycol 3350 17 Gram(s) Oral daily  predniSONE   Tablet 15 milliGRAM(s) Oral two times a day  senna 2 Tablet(s) Oral at bedtime  venlafaxine 150 milliGRAM(s) Oral two times a day with meals    MEDICATIONS  (PRN):  acetaminophen   Tablet 650 milliGRAM(s) Oral every 6 hours PRN For Temp greater than 38 C (100.4 F)  acetaminophen   Tablet. 650 milliGRAM(s) Oral every 6 hours PRN Mild Pain (1 - 3)  dextrose Gel 1 Dose(s) Oral once PRN Blood Glucose LESS THAN 70 milliGRAM(s)/deciliter  glucagon  Injectable 1 milliGRAM(s) IntraMuscular once PRN Glucose LESS THAN 70 milligrams/deciliter      REVIEW OF SYSTEMS:    CONSTITUTIONAL: No weakness, fevers or chills  EYES: No visual changes, No diplopia  ENMT: No throat pain , No exudate  NECK: No pain or stiffness  RESPIRATORY: No cough, wheezing, hemoptysis; No shortness of breath  CARDIOVASCULAR: No chest pain or palpitations  GASTROINTESTINAL: No abdominal pain. No nausea, vomiting, or hematemesis; No diarrhea or constipation. No melena or hematochezia.  GENITOURINARY: No dysuria, frequency or hematuria  NEUROLOGICAL: No numbness or weakness  SKIN: No itching or rash  All other review of systems is negative unless indicated above    Vital Signs Last 24 Hrs  T(C): 36.9 (04 Jan 2018 13:54), Max: 36.9 (03 Jan 2018 21:44)  T(F): 98.5 (04 Jan 2018 13:54), Max: 98.5 (04 Jan 2018 13:54)  HR: 76 (04 Jan 2018 14:07) (69 - 90)  BP: 113/72 (04 Jan 2018 13:54) (110/72 - 138/89)  BP(mean): --  RR: 17 (04 Jan 2018 13:54) (17 - 18)  SpO2: 91% (04 Jan 2018 13:54) (91% - 95%)    I&O's Summary    03 Jan 2018 07:01  -  04 Jan 2018 07:00  --------------------------------------------------------  IN: 840 mL / OUT: 1150 mL / NET: -310 mL        PHYSICAL EXAM:    General: NAD, awake and alert, oriented x 3  HEENT: Mucous membranes are moist, anicteric  Lungs: Non-labored, breath sounds are clear bilaterally.  Soft b/l wheezes  Cardiovascular: Regular, S1 and S2, no murmurs, rubs, or gallops  Gastrointestinal: Bowel Sounds present, soft, nontender.   Lymph: No peripheral edema. No lymphadenopathy.  Skin: No rashes or ulcers  Psych:  Mood & affect appropriate                          11.8   7.2   )-----------( 219      ( 03 Jan 2018 07:56 )             36.6     CBC Full  -  ( 03 Jan 2018 07:56 )  WBC Count : 7.2 K/uL  Hemoglobin : 11.8 g/dL  Hematocrit : 36.6 %  Platelet Count - Automated : 219 K/uL  Mean Cell Volume : 78.8 fl  Mean Cell Hemoglobin : 25.4 pg  Mean Cell Hemoglobin Concentration : 32.2 gm/dL  Auto Neutrophil # : x  Auto Lymphocyte # : x  Auto Monocyte # : x  Auto Eosinophil # : x  Auto Basophil # : x  Auto Neutrophil % : x  Auto Lymphocyte % : x  Auto Monocyte % : x  Auto Eosinophil % : x  Auto Basophil % : x    01-04    131<L>  |  92<L>  |  56<H>  ----------------------------<  114<H>  4.2   |  27  |  1.80<H>    Ca    9.4      04 Jan 2018 06:52      < from: 12 Lead ECG (12.29.17 @ 06:05) >    Ventricular Rate 73 BPM    Atrial Rate 73 BPM    P-R Interval 270 ms    QRS Duration 90 ms    Q-T Interval 374 ms    QTC Calculation(Bezet) 412 ms    P Axis 17 degrees    R Axis -30 degrees    T Axis 43 degrees    Diagnosis Line Possible wandering atrial pacemaker  Left axis deviation  Inferior infarct (cited on or before 27-AUG-2017)  Poor R wave progression  Confirmed by ZOE LAU ([id number]) on 12/29/2017 12:54:06 PM    < end of copied text >  < from: Xray Chest 1 View AP-PORTABLE IMMEDIATE (01.02.18 @ 18:43) >    EXAM:  XR CHEST PORTABLE IMMED 1V                            PROCEDURE DATE:  01/02/2018          INTERPRETATION:  Chest portable.    Clinical History: Fever.    Comparison: 1/1/2018.  There is arteriosclerotic calcification of the aorta.    SingleAP view submitted.    There is a prominent cardiac silhouette with arteriosclerotic   calcification of the aorta.    Low lung volumes are present.  There are persistent patchy bibasilar airspace opacities, most pronounced   right lower lung zone. These findings may represent pneumonia and/or   atelectasis in the appropriate clinical setting.  No significant pleural effusions are noted.    Impression:    Findings as discussed above.                      MICHELINE GREENFIELD M.D., ATTENDING RADIOLOGIST  This document has been electronically signed. Dav  3 2018  9:28AM                < end of copied text >

## 2018-01-04 NOTE — PROGRESS NOTE ADULT - ASSESSMENT
76yo M with PMH of chronic diastolic CHF (normal LVF 2016), non obstructive CAD (cardiac cath 2015), interstitial lung disease, HTN, HLD, DM, GERD, BPH, depression, anxiety presents with SOB, admitted for community acquired PNA, hospital course complicated by influenza A.

## 2018-01-04 NOTE — PROGRESS NOTE ADULT - PROBLEM SELECTOR PLAN 2
CHF  Cardio following  I and O  monitor labs and lytes  replete as needed  lasix on hold as per cardio

## 2018-01-04 NOTE — PROGRESS NOTE ADULT - PROBLEM SELECTOR PLAN 5
- EKG read as atrial fibrillation however looks like AV block. Compared with prior (which showed 1st degree AV Heart block).   No evidence of atrial fibrillation, new EKG is sinus arrythmia and evidence of LAD-Cardio consult  appreciated   Now off telemetry. -chronic diastolic CHF (normal LVF 2016)  -Hold lasix for now in light of DEVYN.  -Monitor volume status closely.  cardiology follow up appreciated. will begin on bowel regimen

## 2018-01-04 NOTE — PROGRESS NOTE ADULT - SUBJECTIVE AND OBJECTIVE BOX
Patient is a 77y old  Male who presents with a chief complaint of SOB, s/p fall early sat am per son (29 Dec 2017 14:41)      Patient seen in follow up for DEVYN, hyponatremia. No new complaints.    PAST MEDICAL HISTORY:  Congestive heart failure  Pulmonary fibrosis  Pulmonary fibrosis  Smoker  BPH with obstruction/lower urinary tract symptoms  Vitamin D deficiency  GERD (gastroesophageal reflux disease)  Diabetes mellitus  Hyperlipidemia  Hypertension    MEDICATIONS  (STANDING):  ALBUTerol    0.083% 2.5 milliGRAM(s) Nebulizer every 6 hours  allopurinol 100 milliGRAM(s) Oral daily  ALPRAZolam 2 milliGRAM(s) Oral three times a day  amLODIPine   Tablet 10 milliGRAM(s) Oral daily  asenapine SL 10 milliGRAM(s) SubLingual at bedtime  aspirin enteric coated 81 milliGRAM(s) Oral daily  buDESOnide   0.5 milliGRAM(s) Respule 0.5 milliGRAM(s) Inhalation two times a day  buPROPion  milliGRAM(s) Oral two times a day  dextrose 5%. 1000 milliLiter(s) (50 mL/Hr) IV Continuous <Continuous>  dextrose 50% Injectable 12.5 Gram(s) IV Push once  dextrose 50% Injectable 25 Gram(s) IV Push once  dextrose 50% Injectable 25 Gram(s) IV Push once  docusate sodium 100 milliGRAM(s) Oral two times a day  enoxaparin Injectable 40 milliGRAM(s) SubCutaneous daily  guaiFENesin  milliGRAM(s) Oral every 12 hours  hydrALAZINE 75 milliGRAM(s) Oral three times a day  insulin lispro (HumaLOG) corrective regimen sliding scale   SubCutaneous three times a day before meals  isosorbide   mononitrate ER Tablet (IMDUR) 60 milliGRAM(s) Oral daily  labetalol 200 milliGRAM(s) Oral three times a day  melatonin 3 milliGRAM(s) Oral at bedtime  oseltamivir 75 milliGRAM(s) Oral two times a day  pantoprazole    Tablet 40 milliGRAM(s) Oral before breakfast  polyethylene glycol 3350 17 Gram(s) Oral daily  predniSONE   Tablet 15 milliGRAM(s) Oral two times a day  senna 2 Tablet(s) Oral at bedtime  venlafaxine 150 milliGRAM(s) Oral two times a day with meals    MEDICATIONS  (PRN):  acetaminophen   Tablet 650 milliGRAM(s) Oral every 6 hours PRN For Temp greater than 38 C (100.4 F)  acetaminophen   Tablet. 650 milliGRAM(s) Oral every 6 hours PRN Mild Pain (1 - 3)  dextrose Gel 1 Dose(s) Oral once PRN Blood Glucose LESS THAN 70 milliGRAM(s)/deciliter  glucagon  Injectable 1 milliGRAM(s) IntraMuscular once PRN Glucose LESS THAN 70 milligrams/deciliter    T(C): 36.9 (18 @ 13:54), Max: 37.6 (18 @ 07:40)  HR: 76 (18 @ 14:07) (69 - 91)  BP: 113/72 (18 @ 13:54) (110/72 - 146/90)  RR: 17 (18 @ 13:54) (17 - 18)  SpO2: 91% (18 @ 13:54) (91% - 95%)  Wt(kg): --  I&O's Detail    2018 07:01  -  2018 07:00  --------------------------------------------------------  IN:    Oral Fluid: 840 mL  Total IN: 840 mL    OUT:    Voided: 1150 mL  Total OUT: 1150 mL    Total NET: -310 mL          PHYSICAL EXAM:  General: NAD  Respiratory: b/l air entry  Cardiovascular: S1 S2  Gastrointestinal: soft  Extremities:  edema                          11.8   7.2   )-----------( 219      ( 2018 07:56 )             36.6     01-    131<L>  |  92<L>  |  56<H>  ----------------------------<  114<H>  4.2   |  27  |  1.80<H>    Ca    9.4      2018 06:52            Urinalysis Basic - ( 2018 14:18 )    Color: Yellow / Appearance: Clear / S.020 / pH: x  Gluc: x / Ketone: Negative  / Bili: Negative / Urobili: Negative   Blood: x / Protein: 75 mg/dL / Nitrite: Negative   Leuk Esterase: Negative / RBC: Negative /HPF / WBC 0-2   Sq Epi: x / Non Sq Epi: Occasional / Bacteria: Occasional        Sodium, Serum: 131 ( @ 06:52)  Sodium, Serum: 130 ( @ 07:56)  Sodium, Serum: 134 ( @ 06:56)  Sodium, Serum: 135 (:19)    Creatinine, Serum: 1.80 ( @ 06:52)  Creatinine, Serum: 1.50 ( @ 07:56)  Creatinine, Serum: 1.40 ( @ 06:56)  Creatinine, Serum: 1.10 ( @ 07:19)    Potassium, Serum: 4.2 ( @ 06:52)  Potassium, Serum: 4.4 ( @ 07:56)  Potassium, Serum: 4.6 ( @ 06:56)  Potassium, Serum: 4.4 ( 07:19)    Hemoglobin: 11.8 ( @ 07:56)  Hemoglobin: 12.0 ( @ 06:56)  Hemoglobin: 10.6 ( @ 07:19)

## 2018-01-04 NOTE — PROGRESS NOTE ADULT - PROBLEM SELECTOR PLAN 3
patient found to be febrile overnight, fever work up initiated, will trend WBC, will FU blood cultures which are NGTD  patient now on Tamiflu Patient has hx of chronic lung disease, pulmonary fibrosis with element of COPD.  -Pulmonary Consult (Lakisha) appreciated.   -C/w Duonebs, and  steroids

## 2018-01-04 NOTE — PROGRESS NOTE ADULT - ASSESSMENT
73 y/o male PMHx diastolic dysfunction, nonobstructive CAD based on cardiac cath from 12/2015, normal lvef (echo 10/2016), HTN, HLD, DM, GERD, BPH, Depression, Anxiety.  He has longstanding dyspnea for which multiple cardiac explanations have been considered, including CAD and diastolic impairment.  He has been found to have a degree of interstitial lung disease, the significance of which is unclear. Admitted with pna.    -EKG shows wandering atrial pacemaker, 1st degree av block, not a fib. Unchanged from previous EKG from September.   - HR is controlled.   - No clear evidence of acute ischemia, CE slightly elevated likely 2/2 to demand ischemia  - Hold IV Lasix.  He is now in DEVYN, presumably from diuresis.  I would avoid IVF as well, and let him reequilibrate on his own.     - Cardiac cath 2015: EF 65%, LAD 40% stenosis, circumflex 30% stenosis, RCA 40% stenosis. ECHO October 2016: normal left ventricular function, normal LV and EF  - BP better controlled.  Continue the remainder of his antihypertensive medications.  - Monitor and replete lytes, keep K>4, Mg>2  - Other cardiovascular workup will depend on clinical course.  - All other workup per primary team  - Will follow 71 y/o male PMHx diastolic dysfunction, nonobstructive CAD based on cardiac cath from 12/2015, normal lvef (echo 10/2016), HTN, HLD, DM, GERD, BPH, Depression, Anxiety.  He has longstanding dyspnea for which multiple cardiac explanations have been considered, including CAD and diastolic impairment.  He has been found to have a degree of interstitial lung disease, the significance of which is unclear. Admitted with pna.    -EKG shows wandering atrial pacemaker, 1st degree av block, not a fib. Unchanged from previous EKG from September.   - HR is controlled.   - DVT prophylaxis  - cont ASA  - cont amlod/hydral  - No clear evidence of acute ischemia, CE slightly elevated likely 2/2 to demand ischemia  - Hold IV Lasix.  He is now in DEVYN, presumably from diuresis.  I would avoid IVF as well, and let him reequilibrate on his own.     - Cardiac cath 2015: EF 65%, LAD 40% stenosis, circumflex 30% stenosis, RCA 40% stenosis. ECHO October 2016: normal left ventricular function, normal LV and EF  - BP better controlled.  Continue the remainder of his antihypertensive medications.  - Monitor and replete lytes, keep K>4, Mg>2  - Other cardiovascular workup will depend on clinical course.  - All other workup per primary team  - Will follow

## 2018-01-04 NOTE — PROGRESS NOTE ADULT - PROBLEM SELECTOR PLAN 8
-continue home meds of labetalol, hydralazine, amlodipine, isosorbide mononitrate ER with hold parameters  BP has been labile. Continue to monitor.   -DASH diet

## 2018-01-05 LAB
ANION GAP SERPL CALC-SCNC: 10 MMOL/L — SIGNIFICANT CHANGE UP (ref 5–17)
BUN SERPL-MCNC: 63 MG/DL — HIGH (ref 7–23)
CALCIUM SERPL-MCNC: 9.2 MG/DL — SIGNIFICANT CHANGE UP (ref 8.5–10.1)
CHLORIDE SERPL-SCNC: 96 MMOL/L — SIGNIFICANT CHANGE UP (ref 96–108)
CO2 SERPL-SCNC: 28 MMOL/L — SIGNIFICANT CHANGE UP (ref 22–31)
CREAT SERPL-MCNC: 1.9 MG/DL — HIGH (ref 0.5–1.3)
GLUCOSE SERPL-MCNC: 123 MG/DL — HIGH (ref 70–99)
HCT VFR BLD CALC: 39.5 % — SIGNIFICANT CHANGE UP (ref 39–50)
HGB BLD-MCNC: 12.9 G/DL — LOW (ref 13–17)
MCHC RBC-ENTMCNC: 25.9 PG — LOW (ref 27–34)
MCHC RBC-ENTMCNC: 32.7 GM/DL — SIGNIFICANT CHANGE UP (ref 32–36)
MCV RBC AUTO: 79.2 FL — LOW (ref 80–100)
PLATELET # BLD AUTO: 249 K/UL — SIGNIFICANT CHANGE UP (ref 150–400)
POTASSIUM SERPL-MCNC: 4.4 MMOL/L — SIGNIFICANT CHANGE UP (ref 3.5–5.3)
POTASSIUM SERPL-SCNC: 4.4 MMOL/L — SIGNIFICANT CHANGE UP (ref 3.5–5.3)
RBC # BLD: 4.99 M/UL — SIGNIFICANT CHANGE UP (ref 4.2–5.8)
RBC # FLD: 15.1 % — HIGH (ref 10.3–14.5)
SODIUM SERPL-SCNC: 134 MMOL/L — LOW (ref 135–145)
WBC # BLD: 8.2 K/UL — SIGNIFICANT CHANGE UP (ref 3.8–10.5)
WBC # FLD AUTO: 8.2 K/UL — SIGNIFICANT CHANGE UP (ref 3.8–10.5)

## 2018-01-05 PROCEDURE — 99233 SBSQ HOSP IP/OBS HIGH 50: CPT

## 2018-01-05 PROCEDURE — 99233 SBSQ HOSP IP/OBS HIGH 50: CPT | Mod: GC

## 2018-01-05 RX ADMIN — AMLODIPINE BESYLATE 10 MILLIGRAM(S): 2.5 TABLET ORAL at 07:01

## 2018-01-05 RX ADMIN — Medication 100 MILLIGRAM(S): at 06:51

## 2018-01-05 RX ADMIN — ENOXAPARIN SODIUM 40 MILLIGRAM(S): 100 INJECTION SUBCUTANEOUS at 12:00

## 2018-01-05 RX ADMIN — Medication 200 MILLIGRAM(S): at 14:00

## 2018-01-05 RX ADMIN — Medication 150 MILLIGRAM(S): at 18:56

## 2018-01-05 RX ADMIN — Medication 75 MILLIGRAM(S): at 14:00

## 2018-01-05 RX ADMIN — Medication 1: at 16:30

## 2018-01-05 RX ADMIN — Medication 2 MILLIGRAM(S): at 07:01

## 2018-01-05 RX ADMIN — ALBUTEROL 2.5 MILLIGRAM(S): 90 AEROSOL, METERED ORAL at 08:33

## 2018-01-05 RX ADMIN — PANTOPRAZOLE SODIUM 40 MILLIGRAM(S): 20 TABLET, DELAYED RELEASE ORAL at 06:51

## 2018-01-05 RX ADMIN — Medication 2 MILLIGRAM(S): at 21:37

## 2018-01-05 RX ADMIN — Medication 81 MILLIGRAM(S): at 12:18

## 2018-01-05 RX ADMIN — Medication 15 MILLIGRAM(S): at 06:51

## 2018-01-05 RX ADMIN — Medication 600 MILLIGRAM(S): at 18:56

## 2018-01-05 RX ADMIN — Medication 75 MILLIGRAM(S): at 21:37

## 2018-01-05 RX ADMIN — Medication 150 MILLIGRAM(S): at 08:42

## 2018-01-05 RX ADMIN — SENNA PLUS 2 TABLET(S): 8.6 TABLET ORAL at 21:36

## 2018-01-05 RX ADMIN — Medication 0.5 MILLIGRAM(S): at 19:32

## 2018-01-05 RX ADMIN — BUPROPION HYDROCHLORIDE 150 MILLIGRAM(S): 150 TABLET, EXTENDED RELEASE ORAL at 06:52

## 2018-01-05 RX ADMIN — Medication 75 MILLIGRAM(S): at 06:51

## 2018-01-05 RX ADMIN — ISOSORBIDE MONONITRATE 60 MILLIGRAM(S): 60 TABLET, EXTENDED RELEASE ORAL at 12:18

## 2018-01-05 RX ADMIN — Medication 100 MILLIGRAM(S): at 18:56

## 2018-01-05 RX ADMIN — Medication 75 MILLIGRAM(S): at 18:56

## 2018-01-05 RX ADMIN — Medication 200 MILLIGRAM(S): at 21:37

## 2018-01-05 RX ADMIN — Medication 600 MILLIGRAM(S): at 06:51

## 2018-01-05 RX ADMIN — ALBUTEROL 2.5 MILLIGRAM(S): 90 AEROSOL, METERED ORAL at 13:31

## 2018-01-05 RX ADMIN — Medication 1: at 11:30

## 2018-01-05 RX ADMIN — ALBUTEROL 2.5 MILLIGRAM(S): 90 AEROSOL, METERED ORAL at 19:32

## 2018-01-05 RX ADMIN — Medication 3 MILLIGRAM(S): at 21:36

## 2018-01-05 RX ADMIN — Medication 200 MILLIGRAM(S): at 06:52

## 2018-01-05 RX ADMIN — POLYETHYLENE GLYCOL 3350 17 GRAM(S): 17 POWDER, FOR SOLUTION ORAL at 12:18

## 2018-01-05 RX ADMIN — ASENAPINE MALEATE 10 MILLIGRAM(S): 10 TABLET SUBLINGUAL at 21:37

## 2018-01-05 RX ADMIN — Medication 2 MILLIGRAM(S): at 14:00

## 2018-01-05 RX ADMIN — Medication 0.5 MILLIGRAM(S): at 08:33

## 2018-01-05 RX ADMIN — Medication 100 MILLIGRAM(S): at 12:18

## 2018-01-05 RX ADMIN — BUPROPION HYDROCHLORIDE 150 MILLIGRAM(S): 150 TABLET, EXTENDED RELEASE ORAL at 18:55

## 2018-01-05 RX ADMIN — Medication 20 MILLIGRAM(S): at 12:19

## 2018-01-05 NOTE — DIETITIAN INITIAL EVALUATION ADULT. - PROBLEM SELECTOR PLAN 1
-Admit to [facility] w/ remote telemetry for community acquired PNA. Currently no leukocytosis with left shift or fevers however clinically patient is hypoxic and imaging showed consolidation in RLL, representing PNA with abnormal PCT.   -Maintain O2>92%, patient on home O2, unclear how much  -Continue Rocephin, Zithromax  -Consult (Lakisha)  -F/u blood cx, urine cx  -Fall precautions  -F/u Am Labs  -Diet: DASH with consistent carbs  Activity: OOB with assistance

## 2018-01-05 NOTE — PROGRESS NOTE ADULT - SUBJECTIVE AND OBJECTIVE BOX
Patient is a 77y old  Male who presents with a chief complaint of SOB, s/p fall early sat am per son (29 Dec 2017 14:41)      Patient seen in follow up for DEVYN, hyponatremia. No new complaints. Pt wants to be discharged. creatinine trending up.     PAST MEDICAL HISTORY:  Congestive heart failure  Pulmonary fibrosis  Pulmonary fibrosis  Smoker  BPH with obstruction/lower urinary tract symptoms  Vitamin D deficiency  GERD (gastroesophageal reflux disease)  Diabetes mellitus  Hyperlipidemia  Hypertension    MEDICATIONS  (STANDING):  ALBUTerol    0.083% 2.5 milliGRAM(s) Nebulizer every 6 hours  allopurinol 100 milliGRAM(s) Oral daily  ALPRAZolam 2 milliGRAM(s) Oral three times a day  amLODIPine   Tablet 10 milliGRAM(s) Oral daily  asenapine SL 10 milliGRAM(s) SubLingual at bedtime  aspirin enteric coated 81 milliGRAM(s) Oral daily  buDESOnide   0.5 milliGRAM(s) Respule 0.5 milliGRAM(s) Inhalation two times a day  buPROPion  milliGRAM(s) Oral two times a day  dextrose 5%. 1000 milliLiter(s) (50 mL/Hr) IV Continuous <Continuous>  dextrose 50% Injectable 12.5 Gram(s) IV Push once  dextrose 50% Injectable 25 Gram(s) IV Push once  dextrose 50% Injectable 25 Gram(s) IV Push once  docusate sodium 100 milliGRAM(s) Oral two times a day  enoxaparin Injectable 40 milliGRAM(s) SubCutaneous daily  guaiFENesin  milliGRAM(s) Oral every 12 hours  hydrALAZINE 75 milliGRAM(s) Oral three times a day  insulin lispro (HumaLOG) corrective regimen sliding scale   SubCutaneous three times a day before meals  isosorbide   mononitrate ER Tablet (IMDUR) 60 milliGRAM(s) Oral daily  labetalol 200 milliGRAM(s) Oral three times a day  melatonin 3 milliGRAM(s) Oral at bedtime  oseltamivir 75 milliGRAM(s) Oral two times a day  pantoprazole    Tablet 40 milliGRAM(s) Oral before breakfast  polyethylene glycol 3350 17 Gram(s) Oral daily  predniSONE   Tablet 20 milliGRAM(s) Oral daily  senna 2 Tablet(s) Oral at bedtime  venlafaxine 150 milliGRAM(s) Oral two times a day with meals    MEDICATIONS  (PRN):  acetaminophen   Tablet 650 milliGRAM(s) Oral every 6 hours PRN For Temp greater than 38 C (100.4 F)  acetaminophen   Tablet. 650 milliGRAM(s) Oral every 6 hours PRN Mild Pain (1 - 3)  dextrose Gel 1 Dose(s) Oral once PRN Blood Glucose LESS THAN 70 milliGRAM(s)/deciliter  glucagon  Injectable 1 milliGRAM(s) IntraMuscular once PRN Glucose LESS THAN 70 milligrams/deciliter    T(C): 36.8 (01-05-18 @ 13:47), Max: 36.9 (01-03-18 @ 21:44)  HR: 71 (01-05-18 @ 13:47) (64 - 90)  BP: 139/70 (01-05-18 @ 13:47) (102/68 - 139/70)  RR: 18 (01-05-18 @ 13:47)  SpO2: 82% (01-05-18 @ 15:57)  Wt(kg): --  I&O's Detail        PHYSICAL EXAM:  General: NAD  Respiratory: b/l air entry  Cardiovascular: S1 S2  Gastrointestinal: soft  Extremities:  edema         LABORATORY:                        12.9   8.2   )-----------( 249      ( 05 Jan 2018 07:38 )             39.5     01-05    134<L>  |  96  |  63<H>  ----------------------------<  123<H>  4.4   |  28  |  1.90<H>    Ca    9.2      05 Jan 2018 07:38      Sodium, Serum: 134 mmol/L (01-05 @ 07:38)  Sodium, Serum: 131 mmol/L (01-04 @ 06:52)    Potassium, Serum: 4.4 mmol/L (01-05 @ 07:38)  Potassium, Serum: 4.2 mmol/L (01-04 @ 06:52)    Hemoglobin: 12.9 g/dL (01-05 @ 07:38)  Hemoglobin: 11.8 g/dL (01-03 @ 07:56)    Creatinine, Serum 1.90 (01-05 @ 07:38)  Creatinine, Serum 1.80 (01-04 @ 06:52)  Creatinine, Serum 1.50 (01-03 @ 07:56)

## 2018-01-05 NOTE — PROGRESS NOTE ADULT - PROBLEM SELECTOR PLAN 3
Flu  Tamiflu  Isolation precs.   increase activity  supportive care and regimen  chest pt  mucinex and pulmicort and nebs

## 2018-01-05 NOTE — PROGRESS NOTE ADULT - SUBJECTIVE AND OBJECTIVE BOX
[facility] [place] [facility]    Franco Mcallister, Benitez, Josse, Skylar, Kalia, Robert      [phone]    CHIEF COMPLAINT: Patient is a 77y old  Male who presents with a chief complaint of SOB, s/p fall early sat am per son (29 Dec 2017 14:41)      Follow Up: nonobstr cad, hfpef, pna, lee    Interim history: The patient reports no new symptoms.  Denies chest discomfort.  No abdominal pain.  No new neurologic symptoms.  Feels better overall      MEDICATIONS  (STANDING):  ALBUTerol    0.083% 2.5 milliGRAM(s) Nebulizer every 6 hours  allopurinol 100 milliGRAM(s) Oral daily  ALPRAZolam 2 milliGRAM(s) Oral three times a day  amLODIPine   Tablet 10 milliGRAM(s) Oral daily  asenapine SL 10 milliGRAM(s) SubLingual at bedtime  aspirin enteric coated 81 milliGRAM(s) Oral daily  buDESOnide   0.5 milliGRAM(s) Respule 0.5 milliGRAM(s) Inhalation two times a day  buPROPion  milliGRAM(s) Oral two times a day  dextrose 5%. 1000 milliLiter(s) (50 mL/Hr) IV Continuous <Continuous>  dextrose 50% Injectable 12.5 Gram(s) IV Push once  dextrose 50% Injectable 25 Gram(s) IV Push once  dextrose 50% Injectable 25 Gram(s) IV Push once  docusate sodium 100 milliGRAM(s) Oral two times a day  enoxaparin Injectable 40 milliGRAM(s) SubCutaneous daily  guaiFENesin  milliGRAM(s) Oral every 12 hours  hydrALAZINE 75 milliGRAM(s) Oral three times a day  insulin lispro (HumaLOG) corrective regimen sliding scale   SubCutaneous three times a day before meals  isosorbide   mononitrate ER Tablet (IMDUR) 60 milliGRAM(s) Oral daily  labetalol 200 milliGRAM(s) Oral three times a day  melatonin 3 milliGRAM(s) Oral at bedtime  oseltamivir 75 milliGRAM(s) Oral two times a day  pantoprazole    Tablet 40 milliGRAM(s) Oral before breakfast  polyethylene glycol 3350 17 Gram(s) Oral daily  predniSONE   Tablet 20 milliGRAM(s) Oral daily  senna 2 Tablet(s) Oral at bedtime  venlafaxine 150 milliGRAM(s) Oral two times a day with meals    MEDICATIONS  (PRN):  acetaminophen   Tablet 650 milliGRAM(s) Oral every 6 hours PRN For Temp greater than 38 C (100.4 F)  acetaminophen   Tablet. 650 milliGRAM(s) Oral every 6 hours PRN Mild Pain (1 - 3)  dextrose Gel 1 Dose(s) Oral once PRN Blood Glucose LESS THAN 70 milliGRAM(s)/deciliter  glucagon  Injectable 1 milliGRAM(s) IntraMuscular once PRN Glucose LESS THAN 70 milligrams/deciliter      REVIEW OF SYSTEMS:  eye, ent, GI, , allergic, dermatologic, musculoskeletal and neurologic are negative except as described above    Vital Signs Last 24 Hrs  T(C): 36.8 (05 Jan 2018 13:47), Max: 36.9 (04 Jan 2018 21:33)  T(F): 98.3 (05 Jan 2018 13:47), Max: 98.4 (04 Jan 2018 21:33)  HR: 71 (05 Jan 2018 13:47) (64 - 76)  BP: 139/70 (05 Jan 2018 13:47) (102/68 - 139/70)  BP(mean): --  RR: 18 (05 Jan 2018 13:47) (18 - 18)  SpO2: 95% (05 Jan 2018 13:47) (94% - 95%)    I&O's Summary      Telemetry past 24h:    PHYSICAL EXAM:    Constitutional: well-nourished, well-developed, NAD   HEENT:  MMM, sclerae anicteric, conjunctivae clear, no oral cyanosis.  Pulmonary: Non-labored, rhonchi river  Cardiovascular: Regular, S1 and S2.  No murmur.  No rubs, gallops or clicks  Gastrointestinal: Bowel Sounds present, soft, nontender.   Lymph: No peripheral edema.   Neurological: Alert, no focal deficits  Skin: No rashes.  Psych:  Mood & affect appropriate    LABS: All Labs Reviewed:                        12.9   8.2   )-----------( 249      ( 05 Jan 2018 07:38 )             39.5                         11.8   7.2   )-----------( 219      ( 03 Jan 2018 07:56 )             36.6     05 Jan 2018 07:38    134    |  96     |  63     ----------------------------<  123    4.4     |  28     |  1.90   04 Jan 2018 06:52    131    |  92     |  56     ----------------------------<  114    4.2     |  27     |  1.80   03 Jan 2018 07:56    130    |  91     |  38     ----------------------------<  124    4.4     |  29     |  1.50     Ca    9.2        05 Jan 2018 07:38  Ca    9.4        04 Jan 2018 06:52  Ca    9.3        03 Jan 2018 07:56            Blood Culture: Organism --  Gram Stain Blood -- Gram Stain --  Specimen Source .Urine Clean Catch (Midstream)  Culture-Blood --    Organism --  Gram Stain Blood -- Gram Stain --  Specimen Source .Blood Blood-Peripheral  Culture-Blood --            RADIOLOGY:    EKG:    Echo:

## 2018-01-05 NOTE — PROGRESS NOTE ADULT - ATTENDING COMMENTS
pt needs more PT session.
I personally conducted a physical examination of the patient. I personally gathered the patient's history. I edited the above listed findings which were prepared by the listed resident physician. I personally discussed the plan of care with the patient. The questions and concerns were addressed to the best of my ability. The patient is in agreement with the listed treatment plan.
Likely discharge tomorrow if creatinine stabilizes.
Patient refusing to go to [facility] when discussed this AM, per PT is requiring significant assistance, and will likely not be able to manage at home.

## 2018-01-05 NOTE — PROGRESS NOTE ADULT - PROBLEM SELECTOR PLAN 3
Patient has hx of chronic lung disease, pulmonary fibrosis with element of COPD.  -Pulmonary Consult (Lakisha) appreciated.   -C/w Duonebs, and  steroids Patient has hx of chronic lung disease, pulmonary fibrosis with element of COPD.  -Pulmonary Consult (Lakisha) appreciated.   -C/w Duonebs, and steroids

## 2018-01-05 NOTE — PROGRESS NOTE ADULT - PROBLEM SELECTOR PROBLEM 7
Hypertension
Renal insufficiency
Hypertension

## 2018-01-05 NOTE — PROGRESS NOTE ADULT - ASSESSMENT
·	Hyponatremia: SIADH, HCTZ rx.   ·	Likely CKD 3, Prerenal azotemia  ·	Pneumonia, + Flu    creatinine trending up. On taoering steroids. Gentle IV hydration. If renal function remains stable can be discharged tomorrow.   Encourage PO intake. Check repeat labs and monitor renal function trend. Avoid nephrotoxic meds as possible.   Avoid ACEI, ARB and NSAIDS. Monitor input and output. Pulmonary follow up. Will follow electrolytes and renal function trend.   D/w pt's son at bedside. Needs outpt follow up.

## 2018-01-05 NOTE — PROGRESS NOTE ADULT - PROBLEM SELECTOR PLAN 1
LRTI  on Tamiflu  monitor vs and sat  on isolation precs  Mucinex and Pulm Fibrosis management  keep sat > 88 pct  increase time out of bed

## 2018-01-05 NOTE — PROGRESS NOTE ADULT - ASSESSMENT
71 y/o male PMHx diastolic dysfunction, nonobstructive CAD based on cardiac cath from 12/2015, normal lvef (echo 10/2016), HTN, HLD, DM, GERD, BPH, Depression, Anxiety.  He has longstanding dyspnea for which multiple cardiac explanations have been considered, including CAD and diastolic impairment.  He has been found to have a degree of interstitial lung disease, the significance of which is unclear. Admitted with pna.    -EKG shows wandering atrial pacemaker, 1st degree av block, not a fib. Unchanged from previous EKG from September.   - HR is controlled.   - DVT prophylaxis  - cont ASA  - cont amlod/hydral  - No clear evidence of acute ischemia, CE slightly elevated likely 2/2 to demand ischemia  - Hold IV Lasix.  He is now in DEVYN, presumably from diuresis.  I would avoid IVF as well, and let him reequilibrate on his own.  - creatinine hopefully has peaked and hopefully will drop tomorrow  - avoid nephrotoxic agents     - cont treatment of pna/flu A  - Cardiac cath 2015: EF 65%, LAD 40% stenosis, circumflex 30% stenosis, RCA 40% stenosis. ECHO October 2016: normal left ventricular function, normal LV and EF  - BP better controlled.  Continue the remainder of his antihypertensive medications.  - Monitor and replete lytes, keep K>4, Mg>2  - Other cardiovascular workup will depend on clinical course.  - All other workup per primary team  - Will follow

## 2018-01-05 NOTE — PROGRESS NOTE ADULT - NSHPATTENDINGPLANDISCUSS_GEN_ALL_CORE
RN , Cardiology
RN , Patient.
RN , Pulmonary and Patient.
pt, RN, residency team
renal, patient, son.
patient, renal, cardio, son via telephone.

## 2018-01-05 NOTE — PROGRESS NOTE ADULT - PROBLEM SELECTOR PLAN 6
monitor for volume overload  monitor for dyspnea  multi organ involvement, renal, cardiac and pulmonary   prognosis guarded
-Currently at baseline with Cr function  -No IVF hydration necessary at this time  -F/u Am BUN/Cr
-Currently at baseline with Cr function  -No IVF hydration necessary at this time  -F/u Am BUN/Cr
-Currently at baseline with Cr function  -No IVF hydration necessary at this time  -Monitor  BUN/Cr closely .   Avoid nephrotoxins.
-Currently at baseline with Cr function  -No IVF hydration necessary at this time  -Monitor  BUN/Cr closely .   Avoid nephrotoxins.
-Hold home med of Janumet 50 mg, continue low dose ISS  monitor FS glucose closely.
-Currently at baseline with Cr function  -No IVF hydration necessary at this time  -Monitor  BUN/Cr closely .   Avoid nephrotoxins.

## 2018-01-05 NOTE — DIETITIAN INITIAL EVALUATION ADULT. - PROBLEM SELECTOR PLAN 4
- EKG read as atrial fibrillation however looks like AV block. Compared with prior (which showed 1st degree AV Heart block). R/o new onset atrial fibrillation. Troponins WNL  -Cardio consult (Esvin)

## 2018-01-05 NOTE — PROGRESS NOTE ADULT - PROBLEM SELECTOR PLAN 1
improving   8 day Antibiotic course completed   -Maintain O2>92%, patient on home O2  -Fall precautions  Pulmonary follow up appreciated.  chest pt  mucinex

## 2018-01-05 NOTE — PROGRESS NOTE ADULT - PROBLEM SELECTOR PROBLEM 6
Congestive heart failure
Diabetes mellitus
Renal insufficiency
Diabetes mellitus
Diabetes mellitus
Renal insufficiency

## 2018-01-05 NOTE — PROGRESS NOTE ADULT - ASSESSMENT
78yo M with PMH of chronic diastolic CHF (normal LVF 2016), non obstructive CAD (cardiac cath 2015), interstitial lung disease, HTN, HLD, DM, GERD, BPH, depression, anxiety presents with SOB, admitted for community acquired PNA, hospital course complicated by influenza A.

## 2018-01-05 NOTE — PROGRESS NOTE ADULT - SUBJECTIVE AND OBJECTIVE BOX
Date/Time Patient Seen:  		  Referring MD:   Data Reviewed	       Patient is a 77y old  Male who presents with a chief complaint of SOB, s/p fall early sat am per son (29 Dec 2017 14:41)    in bed  seen and examined  vs and meds reviewed  on Tamiflu  on isolation precs    Subjective/HPI     PAST MEDICAL & SURGICAL HISTORY:  Congestive heart failure  Pulmonary fibrosis  Pulmonary fibrosis  Smoker  BPH with obstruction/lower urinary tract symptoms  Vitamin D deficiency  GERD (gastroesophageal reflux disease)  Diabetes mellitus  Hyperlipidemia  Hypertension  S/P tonsillectomy  No significant past surgical history        Medication list         MEDICATIONS  (STANDING):  ALBUTerol    0.083% 2.5 milliGRAM(s) Nebulizer every 6 hours  allopurinol 100 milliGRAM(s) Oral daily  ALPRAZolam 2 milliGRAM(s) Oral three times a day  amLODIPine   Tablet 10 milliGRAM(s) Oral daily  asenapine SL 10 milliGRAM(s) SubLingual at bedtime  aspirin enteric coated 81 milliGRAM(s) Oral daily  buDESOnide   0.5 milliGRAM(s) Respule 0.5 milliGRAM(s) Inhalation two times a day  buPROPion  milliGRAM(s) Oral two times a day  dextrose 5%. 1000 milliLiter(s) (50 mL/Hr) IV Continuous <Continuous>  dextrose 50% Injectable 12.5 Gram(s) IV Push once  dextrose 50% Injectable 25 Gram(s) IV Push once  dextrose 50% Injectable 25 Gram(s) IV Push once  docusate sodium 100 milliGRAM(s) Oral two times a day  enoxaparin Injectable 40 milliGRAM(s) SubCutaneous daily  guaiFENesin  milliGRAM(s) Oral every 12 hours  hydrALAZINE 75 milliGRAM(s) Oral three times a day  insulin lispro (HumaLOG) corrective regimen sliding scale   SubCutaneous three times a day before meals  isosorbide   mononitrate ER Tablet (IMDUR) 60 milliGRAM(s) Oral daily  labetalol 200 milliGRAM(s) Oral three times a day  melatonin 3 milliGRAM(s) Oral at bedtime  oseltamivir 75 milliGRAM(s) Oral two times a day  pantoprazole    Tablet 40 milliGRAM(s) Oral before breakfast  polyethylene glycol 3350 17 Gram(s) Oral daily  predniSONE   Tablet 20 milliGRAM(s) Oral daily  senna 2 Tablet(s) Oral at bedtime  venlafaxine 150 milliGRAM(s) Oral two times a day with meals    MEDICATIONS  (PRN):  acetaminophen   Tablet 650 milliGRAM(s) Oral every 6 hours PRN For Temp greater than 38 C (100.4 F)  acetaminophen   Tablet. 650 milliGRAM(s) Oral every 6 hours PRN Mild Pain (1 - 3)  dextrose Gel 1 Dose(s) Oral once PRN Blood Glucose LESS THAN 70 milliGRAM(s)/deciliter  glucagon  Injectable 1 milliGRAM(s) IntraMuscular once PRN Glucose LESS THAN 70 milligrams/deciliter         Vitals log        ICU Vital Signs Last 24 Hrs  T(C): 36.3 (05 Jan 2018 05:20), Max: 36.9 (04 Jan 2018 13:54)  T(F): 97.3 (05 Jan 2018 05:20), Max: 98.5 (04 Jan 2018 13:54)  HR: 69 (05 Jan 2018 05:20) (64 - 78)  BP: 134/88 (05 Jan 2018 05:20) (102/68 - 134/88)  BP(mean): --  ABP: --  ABP(mean): --  RR: 18 (05 Jan 2018 05:20) (17 - 18)  SpO2: 95% (05 Jan 2018 05:20) (91% - 95%)           Input and Output:  I&O's Detail      Lab Data    01-04    131<L>  |  92<L>  |  56<H>  ----------------------------<  114<H>  4.2   |  27  |  1.80<H>    Ca    9.4      04 Jan 2018 06:52              Review of Systems	      Objective     Physical Examination    head at  heart s1s2  lungs dec BS  abd soft  obese  neck obese  weak      Pertinent Lab findings & Imaging      Sánchez:  NO   Adequate UO     I&O's Detail           Discussed with:     Cultures:	        Radiology

## 2018-01-05 NOTE — PROGRESS NOTE ADULT - PROBLEM SELECTOR PLAN 4
pulm fibrosis  uncertain about dx  ct chest reviewed  NEBS  Pulmicort  Mucinex  HOB elev, asp prec, GERD tx, PPI  oral hygiene  increase activity  PT  may need JANELLE

## 2018-01-05 NOTE — PROGRESS NOTE ADULT - PROBLEM SELECTOR PLAN 4
-chronic diastolic CHF (normal LVF 2016)  -Hold lasix for now in light of DEVYN.  -Monitor volume status closely.  cardiology follow up appreciated.

## 2018-01-05 NOTE — PROGRESS NOTE ADULT - SUBJECTIVE AND OBJECTIVE BOX
Patient is a 77y old  Male who presents with a chief complaint of SOB, s/p fall early sat am per son (29 Dec 2017 14:41)      OVERNIGHT EVENTS: patient has no complaints, would like to go home. denies N/V/D    T(C): 36.3 (18 @ 05:20), Max: 36.9 (18 @ 13:54)  HR: 69 (18 @ 08:36) (64 - 78)  BP: 134/88 (18 @ 05:20) (102/68 - 134/88)  RR: 18 (18 @ 05:20) (17 - 18)  SpO2: 94% (18 @ 08:36) (91% - 95%)  Wt(kg): --  I&O's Summary      Review of System:    REVIEW OF SYSTEMS  Constitutional: No fever, chills, fatigue  Neuro: No headache, numbness, weakness  Resp: No cough, wheezing, shortness of breath  CVS: No chest pain, palpitations, leg swelling  GI: No abdominal pain, nausea, vomiting, diarrhea   : No dysuria, frequency, incontinence  Skin: No itching, burning, rashes, or lesions   Msk: No joint pain or swelling  Psych: No depression, anxiety, mood swings      PHYSICAL EXAM:  GENERAL: NAD, well-groomed, well-developed  HEAD:  Atraumatic, Normocephalic  EYES: EOMI, PERRLA, conjunctiva and sclera clear  ENMT:  Moist mucous membranes  NECK: Supple, No JVD, Normal thyroid  HEART: Regular rate and rhythm; No murmurs, rubs, or gallops  RESPIRATORY: CTA B/L, No wheezing/ rhonchi  ABDOMEN: + constipation, Soft, Nontender, Nondistended; Bowel sounds present  NEUROLOGY: A&Ox3, nonfocal, CN II-XII grossly intact, sensation intact  EXTREMITIES:  2+ Peripheral Pulses, No clubbing, cyanosis, or edema  SKIN: warm, dry, normal color, no rash or abnormal lesions        LABS:                        12.9   8.2   )-----------( 249      ( 2018 07:38 )             39.5         134<L>  |  96  |  63<H>  ----------------------------<  123<H>  4.4   |  28  |  1.90<H>    Ca    9.2      2018 07:38        Urinalysis Basic - ( 2018 14:18 )    Color: Yellow / Appearance: Clear / S.020 / pH: x  Gluc: x / Ketone: Negative  / Bili: Negative / Urobili: Negative   Blood: x / Protein: 75 mg/dL / Nitrite: Negative   Leuk Esterase: Negative / RBC: Negative /HPF / WBC 0-2   Sq Epi: x / Non Sq Epi: Occasional / Bacteria: Occasional      CAPILLARY BLOOD GLUCOSE      POCT Blood Glucose.: 195 mg/dL (2018 12:03)  POCT Blood Glucose.: 116 mg/dL (2018 08:26)  POCT Blood Glucose.: 181 mg/dL (2018 22:15)  POCT Blood Glucose.: 116 mg/dL (2018 17:16)       @ 18:20   No growth  --  --   @ 23:25   No growth to date.  --  --          Diet:    RADIOLOGY & ADDITIONAL TESTS:    Imaging Personally Reviewed:  [ ] YES  [ ] NO    Consultant(s) Notes Reviewed:  [ ] YES  [ ] NO    MEDICATIONS  (STANDING):  ALBUTerol    0.083% 2.5 milliGRAM(s) Nebulizer every 6 hours  allopurinol 100 milliGRAM(s) Oral daily  ALPRAZolam 2 milliGRAM(s) Oral three times a day  amLODIPine   Tablet 10 milliGRAM(s) Oral daily  asenapine SL 10 milliGRAM(s) SubLingual at bedtime  aspirin enteric coated 81 milliGRAM(s) Oral daily  buDESOnide   0.5 milliGRAM(s) Respule 0.5 milliGRAM(s) Inhalation two times a day  buPROPion  milliGRAM(s) Oral two times a day  dextrose 5%. 1000 milliLiter(s) (50 mL/Hr) IV Continuous <Continuous>  dextrose 50% Injectable 12.5 Gram(s) IV Push once  dextrose 50% Injectable 25 Gram(s) IV Push once  dextrose 50% Injectable 25 Gram(s) IV Push once  docusate sodium 100 milliGRAM(s) Oral two times a day  enoxaparin Injectable 40 milliGRAM(s) SubCutaneous daily  guaiFENesin  milliGRAM(s) Oral every 12 hours  hydrALAZINE 75 milliGRAM(s) Oral three times a day  insulin lispro (HumaLOG) corrective regimen sliding scale   SubCutaneous three times a day before meals  isosorbide   mononitrate ER Tablet (IMDUR) 60 milliGRAM(s) Oral daily  labetalol 200 milliGRAM(s) Oral three times a day  melatonin 3 milliGRAM(s) Oral at bedtime  oseltamivir 75 milliGRAM(s) Oral two times a day  pantoprazole    Tablet 40 milliGRAM(s) Oral before breakfast  polyethylene glycol 3350 17 Gram(s) Oral daily  predniSONE   Tablet 20 milliGRAM(s) Oral daily  senna 2 Tablet(s) Oral at bedtime  venlafaxine 150 milliGRAM(s) Oral two times a day with meals    MEDICATIONS  (PRN):  acetaminophen   Tablet 650 milliGRAM(s) Oral every 6 hours PRN For Temp greater than 38 C (100.4 F)  acetaminophen   Tablet. 650 milliGRAM(s) Oral every 6 hours PRN Mild Pain (1 - 3)  dextrose Gel 1 Dose(s) Oral once PRN Blood Glucose LESS THAN 70 milliGRAM(s)/deciliter  glucagon  Injectable 1 milliGRAM(s) IntraMuscular once PRN Glucose LESS THAN 70 milligrams/deciliter        Disposition:    DVT Prophylaxis:  Subcu Heparin [  ]     LMWH [x ]     Coumadin [ ]    Xaeralto [ ]    Eliquis [ ]   Venodyne pumps [ ]    Discussed with Patient [x ]     Family [ ]          [ ]   RN[ ]      [ ] Patient is a 77y old  Male who presents with a chief complaint of SOB, s/p fall early sat am per son (29 Dec 2017 14:41)      OVERNIGHT EVENTS: patient has no complaints, would like to go home. denies N/V/D    T(C): 36.3 (18 @ 05:20), Max: 36.9 (18 @ 13:54)  HR: 69 (18 @ 08:36) (64 - 78)  BP: 134/88 (18 @ 05:20) (102/68 - 134/88)  RR: 18 (18 @ 05:20) (17 - 18)  SpO2: 94% (18 @ 08:36) (91% - 95%)  Wt(kg): --  I&O's Summary      Review of System:    REVIEW OF SYSTEMS  Constitutional: No fever, chills, fatigue  Neuro: No headache, numbness, weakness  Resp: No cough, wheezing, +shortness of breath  CVS: No chest pain, palpitations, leg swelling  GI: No abdominal pain, nausea, vomiting, diarrhea   : No dysuria, frequency, incontinence  Skin: No itching, burning, rashes, or lesions   Msk: No joint pain or back pain.  Psych: No depression, anxiety, mood swings      PHYSICAL EXAM:  GENERAL: NAD, well-groomed, well-developed  HEAD:  Atraumatic, Normocephalic  EYES: EOMI, PERRLA, conjunctiva and sclera clear  ENMT:  Moist mucous membranes  NECK: Supple, No JVD, Normal thyroid  HEART: Regular rate and rhythm; No murmurs, rubs, or gallops  RESPIRATORY: Coarse breath sounds bilaterally.   ABDOMEN: +constipation, Soft, Nontender, Nondistended; Bowel sounds present  NEUROLOGY: A&Ox3, nonfocal, CN II-XII grossly intact, sensation intact  EXTREMITIES:  2+ Peripheral Pulses, No clubbing, cyanosis, or edema  SKIN: warm, dry, normal color, no rash or abnormal lesions        LABS:                        12.9   8.2   )-----------( 249      ( 2018 07:38 )             39.5         134<L>  |  96  |  63<H>  ----------------------------<  123<H>  4.4   |  28  |  1.90<H>    Ca    9.2      2018 07:38        Urinalysis Basic - ( 2018 14:18 )    Color: Yellow / Appearance: Clear / S.020 / pH: x  Gluc: x / Ketone: Negative  / Bili: Negative / Urobili: Negative   Blood: x / Protein: 75 mg/dL / Nitrite: Negative   Leuk Esterase: Negative / RBC: Negative /HPF / WBC 0-2   Sq Epi: x / Non Sq Epi: Occasional / Bacteria: Occasional      CAPILLARY BLOOD GLUCOSE      POCT Blood Glucose.: 195 mg/dL (2018 12:03)  POCT Blood Glucose.: 116 mg/dL (2018 08:26)  POCT Blood Glucose.: 181 mg/dL (2018 22:15)  POCT Blood Glucose.: 116 mg/dL (2018 17:16)       @ 18:20   No growth  --  --   @ 23:25   No growth to date.  --  --          Diet:    RADIOLOGY & ADDITIONAL TESTS:    Imaging Personally Reviewed:  [ ] YES  [ ] NO    Consultant(s) Notes Reviewed:  [ ] YES  [ ] NO    MEDICATIONS  (STANDING):  ALBUTerol    0.083% 2.5 milliGRAM(s) Nebulizer every 6 hours  allopurinol 100 milliGRAM(s) Oral daily  ALPRAZolam 2 milliGRAM(s) Oral three times a day  amLODIPine   Tablet 10 milliGRAM(s) Oral daily  asenapine SL 10 milliGRAM(s) SubLingual at bedtime  aspirin enteric coated 81 milliGRAM(s) Oral daily  buDESOnide   0.5 milliGRAM(s) Respule 0.5 milliGRAM(s) Inhalation two times a day  buPROPion  milliGRAM(s) Oral two times a day  dextrose 5%. 1000 milliLiter(s) (50 mL/Hr) IV Continuous <Continuous>  dextrose 50% Injectable 12.5 Gram(s) IV Push once  dextrose 50% Injectable 25 Gram(s) IV Push once  dextrose 50% Injectable 25 Gram(s) IV Push once  docusate sodium 100 milliGRAM(s) Oral two times a day  enoxaparin Injectable 40 milliGRAM(s) SubCutaneous daily  guaiFENesin  milliGRAM(s) Oral every 12 hours  hydrALAZINE 75 milliGRAM(s) Oral three times a day  insulin lispro (HumaLOG) corrective regimen sliding scale   SubCutaneous three times a day before meals  isosorbide   mononitrate ER Tablet (IMDUR) 60 milliGRAM(s) Oral daily  labetalol 200 milliGRAM(s) Oral three times a day  melatonin 3 milliGRAM(s) Oral at bedtime  oseltamivir 75 milliGRAM(s) Oral two times a day  pantoprazole    Tablet 40 milliGRAM(s) Oral before breakfast  polyethylene glycol 3350 17 Gram(s) Oral daily  predniSONE   Tablet 20 milliGRAM(s) Oral daily  senna 2 Tablet(s) Oral at bedtime  venlafaxine 150 milliGRAM(s) Oral two times a day with meals    MEDICATIONS  (PRN):  acetaminophen   Tablet 650 milliGRAM(s) Oral every 6 hours PRN For Temp greater than 38 C (100.4 F)  acetaminophen   Tablet. 650 milliGRAM(s) Oral every 6 hours PRN Mild Pain (1 - 3)  dextrose Gel 1 Dose(s) Oral once PRN Blood Glucose LESS THAN 70 milliGRAM(s)/deciliter  glucagon  Injectable 1 milliGRAM(s) IntraMuscular once PRN Glucose LESS THAN 70 milligrams/deciliter        Disposition:    DVT Prophylaxis:  Subcu Heparin [  ]     LMWH [x ]     Coumadin [ ]    Xaralto [ ]    Eliquis [ ]   Venodyne pumps [ ]    Discussed with Patient [x ]     Family [ ]          [ ]   RN[ ]      [ ]

## 2018-01-05 NOTE — DIETITIAN INITIAL EVALUATION ADULT. - OTHER INFO
patient reports lives with nursing assistant at home who helps with meals. eats well. some possible wt loss noted states usual wt 240# at admit 235# speech rx regular diet .

## 2018-01-05 NOTE — PROGRESS NOTE ADULT - PROBLEM SELECTOR PLAN 9
-Continue home meds of Xanax, venlafaxine, buproprion.   Patient to take Saphris from home once a day
-Stable
-Continue home meds of Xanax, venlafaxine, buproprion.   Patient to take Saphris from home once a day
-Stable
-Continue home meds of Xanax, venlafaxine, buproprion.   Patient to take Saphris from home once a day

## 2018-01-05 NOTE — PROGRESS NOTE ADULT - PROBLEM SELECTOR PLAN 7
nephro follow up appreciated. swill start allopurinol. recommend to hold HCTZ   -Creatinine increased, likely secondary to diuresis? Hold diuresis. Check serum uric acid per renal recs.  -Hold off on fluids for now.  -Monitor  BUN/Cr closely .   Avoid nephrotoxins. nephro follow up appreciated. Start allopurinol. recommend to hold HCTZ   -Creatinine increased, likely secondary to diuresis? Hold diuresis. On IV fluids per Renal.  -Hold off on fluids for now.  -Monitor  BUN/Cr closely .   Avoid nephrotoxins.

## 2018-01-05 NOTE — PROGRESS NOTE ADULT - PROBLEM SELECTOR PROBLEM 9
BPH with obstruction/lower urinary tract symptoms
Depression

## 2018-01-06 VITALS
RESPIRATION RATE: 18 BRPM | HEART RATE: 67 BPM | OXYGEN SATURATION: 96 % | SYSTOLIC BLOOD PRESSURE: 137 MMHG | TEMPERATURE: 98 F | DIASTOLIC BLOOD PRESSURE: 93 MMHG

## 2018-01-06 LAB
ANION GAP SERPL CALC-SCNC: 9 MMOL/L — SIGNIFICANT CHANGE UP (ref 5–17)
BUN SERPL-MCNC: 61 MG/DL — HIGH (ref 7–23)
CALCIUM SERPL-MCNC: 9 MG/DL — SIGNIFICANT CHANGE UP (ref 8.5–10.1)
CHLORIDE SERPL-SCNC: 97 MMOL/L — SIGNIFICANT CHANGE UP (ref 96–108)
CO2 SERPL-SCNC: 26 MMOL/L — SIGNIFICANT CHANGE UP (ref 22–31)
CREAT SERPL-MCNC: 1.5 MG/DL — HIGH (ref 0.5–1.3)
GLUCOSE SERPL-MCNC: 96 MG/DL — SIGNIFICANT CHANGE UP (ref 70–99)
HCT VFR BLD CALC: 36.4 % — LOW (ref 39–50)
HGB BLD-MCNC: 11.5 G/DL — LOW (ref 13–17)
MCHC RBC-ENTMCNC: 25 PG — LOW (ref 27–34)
MCHC RBC-ENTMCNC: 31.6 GM/DL — LOW (ref 32–36)
MCV RBC AUTO: 79 FL — LOW (ref 80–100)
PLATELET # BLD AUTO: 168 K/UL — SIGNIFICANT CHANGE UP (ref 150–400)
POTASSIUM SERPL-MCNC: 4.4 MMOL/L — SIGNIFICANT CHANGE UP (ref 3.5–5.3)
POTASSIUM SERPL-SCNC: 4.4 MMOL/L — SIGNIFICANT CHANGE UP (ref 3.5–5.3)
RBC # BLD: 4.61 M/UL — SIGNIFICANT CHANGE UP (ref 4.2–5.8)
RBC # FLD: 15.5 % — HIGH (ref 10.3–14.5)
SODIUM SERPL-SCNC: 132 MMOL/L — LOW (ref 135–145)
WBC # BLD: 9 K/UL — SIGNIFICANT CHANGE UP (ref 3.8–10.5)
WBC # FLD AUTO: 9 K/UL — SIGNIFICANT CHANGE UP (ref 3.8–10.5)

## 2018-01-06 PROCEDURE — 84300 ASSAY OF URINE SODIUM: CPT

## 2018-01-06 PROCEDURE — 83880 ASSAY OF NATRIURETIC PEPTIDE: CPT

## 2018-01-06 PROCEDURE — 71250 CT THORAX DX C-: CPT

## 2018-01-06 PROCEDURE — 99232 SBSQ HOSP IP/OBS MODERATE 35: CPT

## 2018-01-06 PROCEDURE — 99221 1ST HOSP IP/OBS SF/LOW 40: CPT

## 2018-01-06 PROCEDURE — 97530 THERAPEUTIC ACTIVITIES: CPT

## 2018-01-06 PROCEDURE — 82150 ASSAY OF AMYLASE: CPT

## 2018-01-06 PROCEDURE — 94664 DEMO&/EVAL PT USE INHALER: CPT

## 2018-01-06 PROCEDURE — 87581 M.PNEUMON DNA AMP PROBE: CPT

## 2018-01-06 PROCEDURE — 93005 ELECTROCARDIOGRAM TRACING: CPT

## 2018-01-06 PROCEDURE — 82550 ASSAY OF CK (CPK): CPT

## 2018-01-06 PROCEDURE — 99285 EMERGENCY DEPT VISIT HI MDM: CPT | Mod: 25

## 2018-01-06 PROCEDURE — 83036 HEMOGLOBIN GLYCOSYLATED A1C: CPT

## 2018-01-06 PROCEDURE — 84145 PROCALCITONIN (PCT): CPT

## 2018-01-06 PROCEDURE — 87633 RESP VIRUS 12-25 TARGETS: CPT

## 2018-01-06 PROCEDURE — 97116 GAIT TRAINING THERAPY: CPT

## 2018-01-06 PROCEDURE — 83690 ASSAY OF LIPASE: CPT

## 2018-01-06 PROCEDURE — 96365 THER/PROPH/DIAG IV INF INIT: CPT

## 2018-01-06 PROCEDURE — 83735 ASSAY OF MAGNESIUM: CPT

## 2018-01-06 PROCEDURE — 94760 N-INVAS EAR/PLS OXIMETRY 1: CPT

## 2018-01-06 PROCEDURE — 87086 URINE CULTURE/COLONY COUNT: CPT

## 2018-01-06 PROCEDURE — 97110 THERAPEUTIC EXERCISES: CPT

## 2018-01-06 PROCEDURE — 83935 ASSAY OF URINE OSMOLALITY: CPT

## 2018-01-06 PROCEDURE — 85027 COMPLETE CBC AUTOMATED: CPT

## 2018-01-06 PROCEDURE — 80048 BASIC METABOLIC PNL TOTAL CA: CPT

## 2018-01-06 PROCEDURE — 84550 ASSAY OF BLOOD/URIC ACID: CPT

## 2018-01-06 PROCEDURE — 99239 HOSP IP/OBS DSCHRG MGMT >30: CPT

## 2018-01-06 PROCEDURE — 76775 US EXAM ABDO BACK WALL LIM: CPT

## 2018-01-06 PROCEDURE — 85730 THROMBOPLASTIN TIME PARTIAL: CPT

## 2018-01-06 PROCEDURE — 82962 GLUCOSE BLOOD TEST: CPT

## 2018-01-06 PROCEDURE — 97162 PT EVAL MOD COMPLEX 30 MIN: CPT

## 2018-01-06 PROCEDURE — 82553 CREATINE MB FRACTION: CPT

## 2018-01-06 PROCEDURE — 83605 ASSAY OF LACTIC ACID: CPT

## 2018-01-06 PROCEDURE — 71045 X-RAY EXAM CHEST 1 VIEW: CPT

## 2018-01-06 PROCEDURE — 87486 CHLMYD PNEUM DNA AMP PROBE: CPT

## 2018-01-06 PROCEDURE — 87798 DETECT AGENT NOS DNA AMP: CPT

## 2018-01-06 PROCEDURE — 87040 BLOOD CULTURE FOR BACTERIA: CPT

## 2018-01-06 PROCEDURE — 82803 BLOOD GASES ANY COMBINATION: CPT

## 2018-01-06 PROCEDURE — 81001 URINALYSIS AUTO W/SCOPE: CPT

## 2018-01-06 PROCEDURE — 80053 COMPREHEN METABOLIC PANEL: CPT

## 2018-01-06 PROCEDURE — 93970 EXTREMITY STUDY: CPT

## 2018-01-06 PROCEDURE — 84484 ASSAY OF TROPONIN QUANT: CPT

## 2018-01-06 PROCEDURE — 85610 PROTHROMBIN TIME: CPT

## 2018-01-06 PROCEDURE — 94640 AIRWAY INHALATION TREATMENT: CPT

## 2018-01-06 RX ORDER — PANTOPRAZOLE SODIUM 20 MG/1
1 TABLET, DELAYED RELEASE ORAL
Qty: 30 | Refills: 0
Start: 2018-01-06 | End: 2018-02-04

## 2018-01-06 RX ORDER — ALLOPURINOL 300 MG
1 TABLET ORAL
Qty: 30 | Refills: 0
Start: 2018-01-06 | End: 2018-02-04

## 2018-01-06 RX ORDER — POLYETHYLENE GLYCOL 3350 17 G/17G
17 POWDER, FOR SOLUTION ORAL
Qty: 0 | Refills: 0 | DISCHARGE
Start: 2018-01-06

## 2018-01-06 RX ORDER — HYDRALAZINE HCL 50 MG
3 TABLET ORAL
Qty: 270 | Refills: 0
Start: 2018-01-06 | End: 2018-02-04

## 2018-01-06 RX ORDER — ASENAPINE MALEATE 10 MG/1
1 TABLET SUBLINGUAL
Qty: 0 | Refills: 0 | COMMUNITY

## 2018-01-06 RX ORDER — SITAGLIPTIN AND METFORMIN HYDROCHLORIDE 500; 50 MG/1; MG/1
1 TABLET, FILM COATED ORAL
Qty: 0 | Refills: 0 | COMMUNITY

## 2018-01-06 RX ORDER — BUDESONIDE, MICRONIZED 100 %
0.5 POWDER (GRAM) MISCELLANEOUS
Qty: 30 | Refills: 0
Start: 2018-01-06 | End: 2018-02-04

## 2018-01-06 RX ORDER — SENNA PLUS 8.6 MG/1
2 TABLET ORAL
Qty: 0 | Refills: 0 | DISCHARGE
Start: 2018-01-06

## 2018-01-06 RX ORDER — DOCUSATE SODIUM 100 MG
1 CAPSULE ORAL
Qty: 0 | Refills: 0 | DISCHARGE
Start: 2018-01-06

## 2018-01-06 RX ADMIN — Medication 150 MILLIGRAM(S): at 08:40

## 2018-01-06 RX ADMIN — Medication 75 MILLIGRAM(S): at 06:49

## 2018-01-06 RX ADMIN — ALBUTEROL 2.5 MILLIGRAM(S): 90 AEROSOL, METERED ORAL at 08:08

## 2018-01-06 RX ADMIN — ENOXAPARIN SODIUM 40 MILLIGRAM(S): 100 INJECTION SUBCUTANEOUS at 11:44

## 2018-01-06 RX ADMIN — Medication 81 MILLIGRAM(S): at 11:44

## 2018-01-06 RX ADMIN — ISOSORBIDE MONONITRATE 60 MILLIGRAM(S): 60 TABLET, EXTENDED RELEASE ORAL at 11:45

## 2018-01-06 RX ADMIN — Medication 100 MILLIGRAM(S): at 06:49

## 2018-01-06 RX ADMIN — AMLODIPINE BESYLATE 10 MILLIGRAM(S): 2.5 TABLET ORAL at 06:49

## 2018-01-06 RX ADMIN — Medication 20 MILLIGRAM(S): at 06:49

## 2018-01-06 RX ADMIN — Medication 100 MILLIGRAM(S): at 11:45

## 2018-01-06 RX ADMIN — BUPROPION HYDROCHLORIDE 150 MILLIGRAM(S): 150 TABLET, EXTENDED RELEASE ORAL at 06:50

## 2018-01-06 RX ADMIN — Medication 600 MILLIGRAM(S): at 06:50

## 2018-01-06 RX ADMIN — PANTOPRAZOLE SODIUM 40 MILLIGRAM(S): 20 TABLET, DELAYED RELEASE ORAL at 06:49

## 2018-01-06 RX ADMIN — Medication 0.5 MILLIGRAM(S): at 08:07

## 2018-01-06 RX ADMIN — Medication 200 MILLIGRAM(S): at 06:49

## 2018-01-06 NOTE — PROGRESS NOTE ADULT - ASSESSMENT
73 y/o male PMHx diastolic dysfunction, nonobstructive CAD based on cardiac cath from 12/2015, normal lvef (echo 10/2016), HTN, HLD, DM, GERD, BPH, Depression, Anxiety.  He has longstanding dyspnea for which multiple cardiac explanations have been considered, including CAD and diastolic impairment.  He has been found to have a degree of interstitial lung disease, the significance of which is unclear. Admitted with pna.    -EKG shows wandering atrial pacemaker, 1st degree av block, not a fib. Unchanged from previous EKG from September.   - HR is controlled.   - DVT prophylaxis  - cont ASA  - cont amlod/hydral  - No clear evidence of acute ischemia, CE slightly elevated likely 2/2 to demand ischemia  - lee improved   - cont treatment of pna/flu A  - Cardiac cath 2015: EF 65%, LAD 40% stenosis, circumflex 30% stenosis, RCA 40% stenosis. ECHO October 2016: normal left ventricular function, normal LV and EF  - BP better controlled.  Continue the remainder of his antihypertensive medications.  - Monitor and replete lytes, keep K>4, Mg>2  - Other cardiovascular workup will depend on clinical course.  - All other workup and dc planning per primary team

## 2018-01-06 NOTE — PROGRESS NOTE ADULT - PROBLEM SELECTOR PROBLEM 1
Congestive heart failure
Lower resp. tract infection
Congestive heart failure
Lower resp. tract infection
Pneumonia

## 2018-01-06 NOTE — PROGRESS NOTE ADULT - PROVIDER SPECIALTY LIST ADULT
Cardiology
Hospitalist
Nephrology
Nephrology
Pulmonology
Cardiology
Pulmonology
Pulmonology

## 2018-01-06 NOTE — PROGRESS NOTE ADULT - SUBJECTIVE AND OBJECTIVE BOX
[facility] [place] [facility]    Franco Mcallister, Benitez, Josse, Skylar, Kalia, Robert      [phone]    CHIEF COMPLAINT: Patient is a 77y old  Male who presents with a chief complaint of SOB, s/p fall early sat am per son (29 Dec 2017 14:41)      Follow Up: hfpef, lee    Interim history: lee improved. The patient reports no new symptoms.  Denies chest discomfort and shortness of breath.  No abdominal pain.  No new neurologic symptoms.        MEDICATIONS  (STANDING):  ALBUTerol    0.083% 2.5 milliGRAM(s) Nebulizer every 6 hours  allopurinol 100 milliGRAM(s) Oral daily  amLODIPine   Tablet 10 milliGRAM(s) Oral daily  asenapine SL 10 milliGRAM(s) SubLingual at bedtime  aspirin enteric coated 81 milliGRAM(s) Oral daily  buDESOnide   0.5 milliGRAM(s) Respule 0.5 milliGRAM(s) Inhalation two times a day  buPROPion  milliGRAM(s) Oral two times a day  dextrose 5%. 1000 milliLiter(s) (50 mL/Hr) IV Continuous <Continuous>  dextrose 50% Injectable 12.5 Gram(s) IV Push once  dextrose 50% Injectable 25 Gram(s) IV Push once  dextrose 50% Injectable 25 Gram(s) IV Push once  docusate sodium 100 milliGRAM(s) Oral two times a day  enoxaparin Injectable 40 milliGRAM(s) SubCutaneous daily  guaiFENesin  milliGRAM(s) Oral every 12 hours  hydrALAZINE 75 milliGRAM(s) Oral three times a day  insulin lispro (HumaLOG) corrective regimen sliding scale   SubCutaneous three times a day before meals  isosorbide   mononitrate ER Tablet (IMDUR) 60 milliGRAM(s) Oral daily  labetalol 200 milliGRAM(s) Oral three times a day  melatonin 3 milliGRAM(s) Oral at bedtime  oseltamivir 75 milliGRAM(s) Oral two times a day  pantoprazole    Tablet 40 milliGRAM(s) Oral before breakfast  polyethylene glycol 3350 17 Gram(s) Oral daily  predniSONE   Tablet 20 milliGRAM(s) Oral daily  senna 2 Tablet(s) Oral at bedtime  venlafaxine 150 milliGRAM(s) Oral two times a day with meals    MEDICATIONS  (PRN):  acetaminophen   Tablet 650 milliGRAM(s) Oral every 6 hours PRN For Temp greater than 38 C (100.4 F)  acetaminophen   Tablet. 650 milliGRAM(s) Oral every 6 hours PRN Mild Pain (1 - 3)  dextrose Gel 1 Dose(s) Oral once PRN Blood Glucose LESS THAN 70 milliGRAM(s)/deciliter  glucagon  Injectable 1 milliGRAM(s) IntraMuscular once PRN Glucose LESS THAN 70 milligrams/deciliter      REVIEW OF SYSTEMS:  eye, ent, GI, , allergic, dermatologic, musculoskeletal and neurologic are negative except as described above    Vital Signs Last 24 Hrs  T(C): 36.4 (06 Jan 2018 05:38), Max: 37 (05 Jan 2018 22:12)  T(F): 97.5 (06 Jan 2018 05:38), Max: 98.6 (05 Jan 2018 22:12)  HR: 67 (06 Jan 2018 05:38) (67 - 80)  BP: 137/93 (06 Jan 2018 05:38) (137/93 - 138/87)  BP(mean): --  RR: 18 (06 Jan 2018 05:38) (18 - 18)  SpO2: 96% (06 Jan 2018 05:38) (82% - 96%)    I&O's Summary    05 Jan 2018 07:01  -  06 Jan 2018 07:00  --------------------------------------------------------  IN: 240 mL / OUT: 600 mL / NET: -360 mL        Telemetry past 24h:    PHYSICAL EXAM:    Constitutional: well-nourished, well-developed, NAD   HEENT:  MMM, sclerae anicteric, conjunctivae clear, no oral cyanosis.  Pulmonary: Non-labored, breath sounds are clear bilaterally, No wheezing, rales or rhonchi  Cardiovascular: Regular, S1 and S2.  No murmur.  No rubs, gallops or clicks  Gastrointestinal: Bowel Sounds present, soft, nontender.   Lymph: No peripheral edema.   Neurological: Alert, no focal deficits  Skin: No rashes.  Psych:  Mood & affect appropriate    LABS: All Labs Reviewed:                        11.5   9.0   )-----------( 168      ( 06 Jan 2018 07:29 )             36.4                         12.9   8.2   )-----------( 249      ( 05 Jan 2018 07:38 )             39.5     06 Jan 2018 07:29    132    |  97     |  61     ----------------------------<  96     4.4     |  26     |  1.50   05 Jan 2018 07:38    134    |  96     |  63     ----------------------------<  123    4.4     |  28     |  1.90   04 Jan 2018 06:52    131    |  92     |  56     ----------------------------<  114    4.2     |  27     |  1.80     Ca    9.0        06 Jan 2018 07:29  Ca    9.2        05 Jan 2018 07:38  Ca    9.4        04 Jan 2018 06:52            Blood Culture: Organism --  Gram Stain Blood -- Gram Stain --  Specimen Source .Urine Clean Catch (Midstream)  Culture-Blood --    Organism --  Gram Stain Blood -- Gram Stain --  Specimen Source .Blood Blood-Peripheral  Culture-Blood --            RADIOLOGY:    EKG:    Echo:

## 2018-01-06 NOTE — PROGRESS NOTE ADULT - SUBJECTIVE AND OBJECTIVE BOX
Date/Time Patient Seen:  		  Referring MD:   Data Reviewed	       Patient is a 77y old  Male who presents with a chief complaint of SOB, s/p fall early sat am per son (29 Dec 2017 14:41)  in bed  seen and examined  vs and meds reviewed  on o2      Subjective/HPI     PAST MEDICAL & SURGICAL HISTORY:  Congestive heart failure  Pulmonary fibrosis  Pulmonary fibrosis  Smoker  BPH with obstruction/lower urinary tract symptoms  Vitamin D deficiency  GERD (gastroesophageal reflux disease)  Diabetes mellitus  Hyperlipidemia  Hypertension  S/P tonsillectomy  No significant past surgical history        Medication list         MEDICATIONS  (STANDING):  ALBUTerol    0.083% 2.5 milliGRAM(s) Nebulizer every 6 hours  allopurinol 100 milliGRAM(s) Oral daily  amLODIPine   Tablet 10 milliGRAM(s) Oral daily  asenapine SL 10 milliGRAM(s) SubLingual at bedtime  aspirin enteric coated 81 milliGRAM(s) Oral daily  buDESOnide   0.5 milliGRAM(s) Respule 0.5 milliGRAM(s) Inhalation two times a day  buPROPion  milliGRAM(s) Oral two times a day  dextrose 5%. 1000 milliLiter(s) (50 mL/Hr) IV Continuous <Continuous>  dextrose 50% Injectable 12.5 Gram(s) IV Push once  dextrose 50% Injectable 25 Gram(s) IV Push once  dextrose 50% Injectable 25 Gram(s) IV Push once  docusate sodium 100 milliGRAM(s) Oral two times a day  enoxaparin Injectable 40 milliGRAM(s) SubCutaneous daily  guaiFENesin  milliGRAM(s) Oral every 12 hours  hydrALAZINE 75 milliGRAM(s) Oral three times a day  insulin lispro (HumaLOG) corrective regimen sliding scale   SubCutaneous three times a day before meals  isosorbide   mononitrate ER Tablet (IMDUR) 60 milliGRAM(s) Oral daily  labetalol 200 milliGRAM(s) Oral three times a day  melatonin 3 milliGRAM(s) Oral at bedtime  oseltamivir 75 milliGRAM(s) Oral two times a day  pantoprazole    Tablet 40 milliGRAM(s) Oral before breakfast  polyethylene glycol 3350 17 Gram(s) Oral daily  predniSONE   Tablet 20 milliGRAM(s) Oral daily  senna 2 Tablet(s) Oral at bedtime  venlafaxine 150 milliGRAM(s) Oral two times a day with meals    MEDICATIONS  (PRN):  acetaminophen   Tablet 650 milliGRAM(s) Oral every 6 hours PRN For Temp greater than 38 C (100.4 F)  acetaminophen   Tablet. 650 milliGRAM(s) Oral every 6 hours PRN Mild Pain (1 - 3)  dextrose Gel 1 Dose(s) Oral once PRN Blood Glucose LESS THAN 70 milliGRAM(s)/deciliter  glucagon  Injectable 1 milliGRAM(s) IntraMuscular once PRN Glucose LESS THAN 70 milligrams/deciliter         Vitals log        ICU Vital Signs Last 24 Hrs  T(C): 36.4 (06 Jan 2018 05:38), Max: 37 (05 Jan 2018 22:12)  T(F): 97.5 (06 Jan 2018 05:38), Max: 98.6 (05 Jan 2018 22:12)  HR: 67 (06 Jan 2018 05:38) (67 - 80)  BP: 137/93 (06 Jan 2018 05:38) (137/93 - 139/70)  BP(mean): --  ABP: --  ABP(mean): --  RR: 18 (06 Jan 2018 05:38) (18 - 18)  SpO2: 96% (06 Jan 2018 05:38) (82% - 96%)           Input and Output:  I&O's Detail    05 Jan 2018 07:01  -  06 Jan 2018 06:32  --------------------------------------------------------  IN:    Oral Fluid: 240 mL  Total IN: 240 mL    OUT:    Voided: 400 mL  Total OUT: 400 mL    Total NET: -160 mL          Lab Data                        12.9   8.2   )-----------( 249      ( 05 Jan 2018 07:38 )             39.5     01-05    134<L>  |  96  |  63<H>  ----------------------------<  123<H>  4.4   |  28  |  1.90<H>    Ca    9.2      05 Jan 2018 07:38              Review of Systems	      Objective     Physical Examination    head at  heart s1s2  lungs dec BS  abd soft      Pertinent Lab findings & Imaging      Princess:  NO   Adequate UO     I&O's Detail    05 Jan 2018 07:01  -  06 Jan 2018 06:32  --------------------------------------------------------  IN:    Oral Fluid: 240 mL  Total IN: 240 mL    OUT:    Voided: 400 mL  Total OUT: 400 mL    Total NET: -160 mL               Discussed with:     Cultures:	        Radiology

## 2018-01-06 NOTE — PROGRESS NOTE ADULT - PROBLEM SELECTOR PLAN 4
will follow up with Dr. Page as outpatient  steroids  nebs  pulmicort  systemic steroids  o2 support  prognosis guarded  discussed with son

## 2018-01-06 NOTE — PROGRESS NOTE ADULT - PROBLEM SELECTOR PLAN 1
supportive measures  on Tamiflu for Flu  keep sat > 88 pct  oral and skin care  increase activity  planned for poss JANELLE  chest pt  pulm fibrosis regimen, nebs, pulmicort, systemic steroids, o2 support  CM notes reviewed

## 2018-01-12 PROBLEM — J84.10 PULMONARY FIBROSIS, UNSPECIFIED: Chronic | Status: ACTIVE | Noted: 2017-12-28

## 2018-01-12 PROBLEM — I50.9 HEART FAILURE, UNSPECIFIED: Chronic | Status: ACTIVE | Noted: 2017-12-28

## 2018-01-15 ENCOUNTER — LABORATORY RESULT (OUTPATIENT)
Age: 78
End: 2018-01-15

## 2018-01-15 ENCOUNTER — APPOINTMENT (OUTPATIENT)
Dept: INTERNAL MEDICINE | Facility: CLINIC | Age: 78
End: 2018-01-15
Payer: MEDICARE

## 2018-01-15 VITALS
RESPIRATION RATE: 16 BRPM | DIASTOLIC BLOOD PRESSURE: 90 MMHG | BODY MASS INDEX: 36.82 KG/M2 | OXYGEN SATURATION: 95 % | HEIGHT: 65 IN | TEMPERATURE: 98.1 F | WEIGHT: 221 LBS | SYSTOLIC BLOOD PRESSURE: 140 MMHG | HEART RATE: 78 BPM

## 2018-01-15 VITALS — DIASTOLIC BLOOD PRESSURE: 84 MMHG | SYSTOLIC BLOOD PRESSURE: 136 MMHG

## 2018-01-15 PROCEDURE — 99214 OFFICE O/P EST MOD 30 MIN: CPT

## 2018-01-15 RX ORDER — OSELTAMIVIR PHOSPHATE 75 MG/1
75 CAPSULE ORAL
Qty: 3 | Refills: 0 | Status: DISCONTINUED | COMMUNITY
Start: 2018-01-06

## 2018-01-15 RX ORDER — PEAK FLOW METER
EACH MISCELLANEOUS
Qty: 1 | Refills: 0 | Status: ACTIVE | COMMUNITY
Start: 2018-01-06

## 2018-01-15 RX ORDER — BUDESONIDE 0.5 MG/2ML
0.5 INHALANT ORAL
Qty: 60 | Refills: 0 | Status: ACTIVE | COMMUNITY
Start: 2018-01-06

## 2018-01-15 RX ORDER — ALBUTEROL SULFATE 90 UG/1
108 (90 BASE) AEROSOL, METERED RESPIRATORY (INHALATION)
Qty: 34 | Refills: 0 | Status: ACTIVE | COMMUNITY
Start: 2017-09-29

## 2018-01-16 LAB
ALBUMIN SERPL ELPH-MCNC: 3.8 G/DL
ALP BLD-CCNC: 31 U/L
ALT SERPL-CCNC: 27 U/L
ANION GAP SERPL CALC-SCNC: 17 MMOL/L
AST SERPL-CCNC: 25 U/L
BASOPHILS # BLD AUTO: 0 K/UL
BASOPHILS NFR BLD AUTO: 0 %
BILIRUB SERPL-MCNC: 0.2 MG/DL
BUN SERPL-MCNC: 26 MG/DL
CALCIUM SERPL-MCNC: 10.7 MG/DL
CHLORIDE SERPL-SCNC: 102 MMOL/L
CHOLEST SERPL-MCNC: 134 MG/DL
CHOLEST/HDLC SERPL: 3.4 RATIO
CK SERPL-CCNC: 88 U/L
CO2 SERPL-SCNC: 23 MMOL/L
CREAT SERPL-MCNC: 1.25 MG/DL
EOSINOPHIL # BLD AUTO: 0.14 K/UL
EOSINOPHIL NFR BLD AUTO: 1.8
GLUCOSE SERPL-MCNC: 101 MG/DL
HBA1C MFR BLD HPLC: 6.3 %
HCT VFR BLD CALC: 32.6 %
HDLC SERPL-MCNC: 39 MG/DL
HGB BLD-MCNC: 10.2 G/DL
LDLC SERPL CALC-MCNC: 50 MG/DL
LYMPHOCYTES # BLD AUTO: 1.23 K/UL
LYMPHOCYTES NFR BLD AUTO: 15.3 %
MAN DIFF?: NORMAL
MCHC RBC-ENTMCNC: 25.6 PG
MCHC RBC-ENTMCNC: 31.3 GM/DL
MCV RBC AUTO: 81.9 FL
MONOCYTES # BLD AUTO: 1.15 K/UL
MONOCYTES NFR BLD AUTO: 14.4 %
NEUTROPHILS # BLD AUTO: 4.77 K/UL
NEUTROPHILS NFR BLD AUTO: 55 %
PLATELET # BLD AUTO: 239 K/UL
POTASSIUM SERPL-SCNC: 4.9 MMOL/L
PROT SERPL-MCNC: 6.7 G/DL
RBC # BLD: 3.98 M/UL
RBC # FLD: 17.7 %
SODIUM SERPL-SCNC: 142 MMOL/L
TRIGL SERPL-MCNC: 226 MG/DL
WBC # FLD AUTO: 8.02 K/UL

## 2018-01-22 ENCOUNTER — RX RENEWAL (OUTPATIENT)
Age: 78
End: 2018-01-22

## 2018-01-29 ENCOUNTER — MEDICATION RENEWAL (OUTPATIENT)
Age: 78
End: 2018-01-29

## 2018-01-30 ENCOUNTER — APPOINTMENT (OUTPATIENT)
Dept: CARDIOLOGY | Facility: CLINIC | Age: 78
End: 2018-01-30
Payer: MEDICARE

## 2018-01-30 VITALS
HEIGHT: 65 IN | WEIGHT: 223.5 LBS | DIASTOLIC BLOOD PRESSURE: 76 MMHG | BODY MASS INDEX: 37.24 KG/M2 | HEART RATE: 84 BPM | SYSTOLIC BLOOD PRESSURE: 138 MMHG | OXYGEN SATURATION: 89 %

## 2018-01-30 DIAGNOSIS — J84.9 INTERSTITIAL PULMONARY DISEASE, UNSPECIFIED: ICD-10-CM

## 2018-01-30 PROCEDURE — 99215 OFFICE O/P EST HI 40 MIN: CPT

## 2018-02-13 ENCOUNTER — RX RENEWAL (OUTPATIENT)
Age: 78
End: 2018-02-13

## 2018-02-28 ENCOUNTER — MEDICATION RENEWAL (OUTPATIENT)
Age: 78
End: 2018-02-28

## 2018-02-28 RX ORDER — TOBRAMYCIN 3 MG/ML
0.3 SOLUTION/ DROPS OPHTHALMIC EVERY 4 HOURS
Qty: 5 | Refills: 0 | Status: ACTIVE | COMMUNITY
Start: 2018-02-05 | End: 1900-01-01

## 2018-03-05 ENCOUNTER — RX RENEWAL (OUTPATIENT)
Age: 78
End: 2018-03-05

## 2018-04-06 ENCOUNTER — RX RENEWAL (OUTPATIENT)
Age: 78
End: 2018-04-06

## 2018-04-18 ENCOUNTER — MEDICATION RENEWAL (OUTPATIENT)
Age: 78
End: 2018-04-18

## 2018-04-20 ENCOUNTER — RX RENEWAL (OUTPATIENT)
Age: 78
End: 2018-04-20

## 2018-04-21 ENCOUNTER — MEDICATION RENEWAL (OUTPATIENT)
Age: 78
End: 2018-04-21

## 2018-05-07 ENCOUNTER — APPOINTMENT (OUTPATIENT)
Dept: CT IMAGING | Facility: CLINIC | Age: 78
End: 2018-05-07
Payer: MEDICARE

## 2018-05-07 ENCOUNTER — OUTPATIENT (OUTPATIENT)
Dept: OUTPATIENT SERVICES | Facility: HOSPITAL | Age: 78
LOS: 1 days | End: 2018-05-07
Payer: MEDICARE

## 2018-05-07 DIAGNOSIS — Z90.89 ACQUIRED ABSENCE OF OTHER ORGANS: Chronic | ICD-10-CM

## 2018-05-07 DIAGNOSIS — Z00.8 ENCOUNTER FOR OTHER GENERAL EXAMINATION: ICD-10-CM

## 2018-05-07 PROCEDURE — 71250 CT THORAX DX C-: CPT

## 2018-05-07 PROCEDURE — 71250 CT THORAX DX C-: CPT | Mod: 26

## 2018-05-14 ENCOUNTER — RX RENEWAL (OUTPATIENT)
Age: 78
End: 2018-05-14

## 2018-05-16 ENCOUNTER — RX RENEWAL (OUTPATIENT)
Age: 78
End: 2018-05-16

## 2018-05-16 ENCOUNTER — MEDICATION RENEWAL (OUTPATIENT)
Age: 78
End: 2018-05-16

## 2018-05-16 RX ORDER — GEMFIBROZIL 600 MG/1
600 TABLET, FILM COATED ORAL TWICE DAILY
Qty: 60 | Refills: 1 | Status: DISCONTINUED | COMMUNITY
Start: 2018-05-16 | End: 2018-05-16

## 2018-07-06 ENCOUNTER — RX RENEWAL (OUTPATIENT)
Age: 78
End: 2018-07-06

## 2018-07-11 ENCOUNTER — RX RENEWAL (OUTPATIENT)
Age: 78
End: 2018-07-11

## 2018-07-16 ENCOUNTER — MEDICATION RENEWAL (OUTPATIENT)
Age: 78
End: 2018-07-16

## 2018-07-27 ENCOUNTER — RX RENEWAL (OUTPATIENT)
Age: 78
End: 2018-07-27

## 2018-08-10 NOTE — SWALLOW BEDSIDE ASSESSMENT ADULT - SWALLOW EVAL: DIAGNOSIS
adequate phases of swallowing, no overt si/sx of aspiration Complex Repair And Single Advancement Flap Text: The defect edges were debeveled with a #15 scalpel blade.  The primary defect was closed partially with a complex linear closure.  Given the location of the remaining defect, shape of the defect and the proximity to free margins a single advancement flap was deemed most appropriate for complete closure of the defect.  Using a sterile surgical marker, an appropriate advancement flap was drawn incorporating the defect and placing the expected incisions within the relaxed skin tension lines where possible.    The area thus outlined was incised deep to adipose tissue with a #15 scalpel blade.  The skin margins were undermined to an appropriate distance in all directions utilizing iris scissors.

## 2018-08-29 ENCOUNTER — RX RENEWAL (OUTPATIENT)
Age: 78
End: 2018-08-29

## 2018-09-24 ENCOUNTER — RX RENEWAL (OUTPATIENT)
Age: 78
End: 2018-09-24

## 2018-10-16 ENCOUNTER — MEDICATION RENEWAL (OUTPATIENT)
Age: 78
End: 2018-10-16

## 2018-10-17 ENCOUNTER — RX RENEWAL (OUTPATIENT)
Age: 78
End: 2018-10-17

## 2018-10-19 ENCOUNTER — RX RENEWAL (OUTPATIENT)
Age: 78
End: 2018-10-19

## 2018-10-31 ENCOUNTER — APPOINTMENT (OUTPATIENT)
Dept: INTERNAL MEDICINE | Facility: CLINIC | Age: 78
End: 2018-10-31
Payer: MEDICARE

## 2018-10-31 VITALS — SYSTOLIC BLOOD PRESSURE: 108 MMHG | DIASTOLIC BLOOD PRESSURE: 64 MMHG

## 2018-10-31 VITALS
OXYGEN SATURATION: 90 % | SYSTOLIC BLOOD PRESSURE: 110 MMHG | HEART RATE: 84 BPM | RESPIRATION RATE: 14 BRPM | DIASTOLIC BLOOD PRESSURE: 60 MMHG | TEMPERATURE: 98.2 F | HEIGHT: 65 IN

## 2018-10-31 DIAGNOSIS — Z12.5 ENCOUNTER FOR SCREENING FOR MALIGNANT NEOPLASM OF PROSTATE: ICD-10-CM

## 2018-10-31 PROCEDURE — 36415 COLL VENOUS BLD VENIPUNCTURE: CPT

## 2018-10-31 PROCEDURE — 99214 OFFICE O/P EST MOD 30 MIN: CPT | Mod: 25

## 2018-10-31 RX ORDER — HYDROCHLOROTHIAZIDE 12.5 MG/1
12.5 TABLET ORAL
Qty: 90 | Refills: 0 | Status: DISCONTINUED | COMMUNITY
Start: 2017-09-05 | End: 2018-10-31

## 2018-10-31 NOTE — PHYSICAL EXAM

## 2018-11-04 PROBLEM — Z12.5 SCREENING PSA (PROSTATE SPECIFIC ANTIGEN): Status: ACTIVE | Noted: 2018-11-04

## 2018-11-04 LAB
25(OH)D3 SERPL-MCNC: 35.8 NG/ML
ALBUMIN SERPL ELPH-MCNC: 4.4 G/DL
ALP BLD-CCNC: 24 U/L
ALT SERPL-CCNC: 18 U/L
ANION GAP SERPL CALC-SCNC: 16 MMOL/L
AST SERPL-CCNC: 24 U/L
BASOPHILS # BLD AUTO: 0.01 K/UL
BASOPHILS NFR BLD AUTO: 0.1 %
BILIRUB SERPL-MCNC: 0.3 MG/DL
BUN SERPL-MCNC: 42 MG/DL
CALCIUM SERPL-MCNC: 10.4 MG/DL
CHLORIDE SERPL-SCNC: 96 MMOL/L
CHOLEST SERPL-MCNC: 140 MG/DL
CHOLEST/HDLC SERPL: 4.5 RATIO
CK SERPL-CCNC: 119 U/L
CO2 SERPL-SCNC: 27 MMOL/L
CREAT SERPL-MCNC: 2.08 MG/DL
EOSINOPHIL # BLD AUTO: 0.04 K/UL
EOSINOPHIL NFR BLD AUTO: 0.5 %
GLUCOSE SERPL-MCNC: 139 MG/DL
HBA1C MFR BLD HPLC: 6.4 %
HCT VFR BLD CALC: 37 %
HDLC SERPL-MCNC: 31 MG/DL
HGB BLD-MCNC: 11.3 G/DL
IMM GRANULOCYTES NFR BLD AUTO: 3.2 %
LDLC SERPL CALC-MCNC: 53 MG/DL
LYMPHOCYTES # BLD AUTO: 1.94 K/UL
LYMPHOCYTES NFR BLD AUTO: 25.5 %
MAN DIFF?: NORMAL
MCHC RBC-ENTMCNC: 26.1 PG
MCHC RBC-ENTMCNC: 30.5 GM/DL
MCV RBC AUTO: 85.5 FL
MONOCYTES # BLD AUTO: 0.9 K/UL
MONOCYTES NFR BLD AUTO: 11.8 %
NEUTROPHILS # BLD AUTO: 4.48 K/UL
NEUTROPHILS NFR BLD AUTO: 58.9 %
PLATELET # BLD AUTO: 254 K/UL
POTASSIUM SERPL-SCNC: 5.2 MMOL/L
PROT SERPL-MCNC: 6.9 G/DL
RBC # BLD: 4.33 M/UL
RBC # FLD: 16.4 %
SODIUM SERPL-SCNC: 139 MMOL/L
TRIGL SERPL-MCNC: 280 MG/DL
TSH SERPL-ACNC: 3.87 UIU/ML
WBC # FLD AUTO: 7.61 K/UL

## 2018-11-07 LAB — PSA SERPL-MCNC: 0.82 NG/ML

## 2018-11-14 ENCOUNTER — APPOINTMENT (OUTPATIENT)
Dept: CARDIOLOGY | Facility: CLINIC | Age: 78
End: 2018-11-14

## 2018-11-28 ENCOUNTER — RX RENEWAL (OUTPATIENT)
Age: 78
End: 2018-11-28

## 2018-12-19 ENCOUNTER — RX RENEWAL (OUTPATIENT)
Age: 78
End: 2018-12-19

## 2018-12-20 NOTE — PHYSICAL EXAM
[General Appearance - Well Developed] : well developed [Normal Appearance] : normal appearance [Well Groomed] : well groomed [General Appearance - Well Nourished] : well nourished [No Deformities] : no deformities [General Appearance - In No Acute Distress] : no acute distress [Normal Conjunctiva] : the conjunctiva exhibited no abnormalities [Normal Oral Mucosa] : normal oral mucosa [Normal Jugular Venous A Waves Present] : normal jugular venous A waves present [Normal Jugular Venous V Waves Present] : normal jugular venous V waves present [No Jugular Venous Kern A Waves] : no jugular venous kern A waves [Respiration, Rhythm And Depth] : normal respiratory rhythm and effort [Exaggerated Use Of Accessory Muscles For Inspiration] : no accessory muscle use [Auscultation Breath Sounds / Voice Sounds] : lungs were clear to auscultation bilaterally [Bowel Sounds] : normal bowel sounds [Abdomen Soft] : soft [Abdomen Tenderness] : non-tender [Abnormal Walk] : normal gait [Nail Clubbing] : no clubbing of the fingernails [Cyanosis, Localized] : no localized cyanosis [Petechial Hemorrhages (___cm)] : no petechial hemorrhages [Skin Color & Pigmentation] : normal skin color and pigmentation [Skin Turgor] : normal skin turgor [] : no rash [Oriented To Time, Place, And Person] : oriented to person, place, and time [Impaired Insight] : insight and judgment were intact [No Anxiety] : not feeling anxious [Not Palpable] : not palpable [Normal Rate] : normal [Normal S1] : normal S1 [Normal S2] : normal S2 [Distant] : the heart sounds were distant [No Murmur] : no murmurs heard [2+] : left 2+ [1+] : left 1+ [No Abnormalities] : the abdominal aorta was not enlarged and no bruit was heard [___ +] : bilateral [unfilled]U+ pitting edema to the ankles [FreeTextEntry1] : Diffuse wheezing [S3] : no S3 [S4] : no S4 [Right Carotid Bruit] : no bruit heard over the right carotid [Left Carotid Bruit] : no bruit heard over the left carotid [Right Femoral Bruit] : no bruit heard over the right femoral artery [Left Femoral Bruit] : no bruit heard over the left femoral artery

## 2018-12-20 NOTE — REVIEW OF SYSTEMS
[Recent Weight Gain (___ Lbs)] : recent [unfilled] ~Ulb weight gain [Eyeglasses] : currently wearing eyeglasses [Loss Of Hearing] : hearing loss [Dyspnea on exertion] : dyspnea during exertion [Chest  Pressure] : chest pressure [Leg Claudication] : intermittent leg claudication [Nocturia] : nocturia [Initiating Urination Req. Strain] : initiating urination requires straining [Depression] : depression [Negative] : Genitourinary [Fever] : no fever [Headache] : no headache [Chills] : no chills [Feeling Fatigued] : not feeling fatigued [Blurry Vision] : no blurred vision [Seeing Double (Diplopia)] : no diplopia [Earache] : no earache [Sore Throat] : no sore throat [Sinus Pressure] : no sinus pressure [Lower Ext Edema] : no extremity edema [Palpitations] : no palpitations [Joint Pain] : no joint pain [Skin: A Rash] : no rash: [Dizziness] : no dizziness [Anxiety] : no anxiety [Excessive Thirst] : no polydipsia [Easy Bleeding] : no tendency for easy bleeding [Easy Bruising] : no tendency for easy bruising [FreeTextEntry2] : consipated
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
## 2018-12-20 NOTE — REASON FOR VISIT
[Initial Evaluation] : an initial evaluation of [Chest Pain] : chest pain [Dyspnea] : dyspnea [Hyperlipidemia] : hyperlipidemia [Hypertension] : hypertension [FreeTextEntry1] : Diabetes
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
## 2018-12-24 NOTE — PATIENT PROFILE ADULT. - DO YOU FOLLOW
Please Approve or Refuse.   Send to Pharmacy per Pt's Request:      Next Visit Date:  3/21/2019   Last Visit Date: 12/18/2018    Hemoglobin A1C (%)   Date Value   12/18/2018 6.5   09/18/2018 7.0             ( goal A1C is < 7)   BP Readings from Last 3 Encounters:   12/18/18 124/84   12/14/18 (!) 134/97   12/14/18 (!) 144/88          (goal 120/80)  BUN   Date Value Ref Range Status   11/30/2018 11 6 - 20 mg/dL Final     CREATININE   Date Value Ref Range Status   11/30/2018 0.93 (H) 0.50 - 0.90 mg/dL Final     Potassium   Date Value Ref Range Status   11/30/2018 4.0 3.7 - 5.3 mmol/L Final not applicable

## 2019-01-10 ENCOUNTER — MEDICATION RENEWAL (OUTPATIENT)
Age: 79
End: 2019-01-10

## 2019-01-19 ENCOUNTER — RX RENEWAL (OUTPATIENT)
Age: 79
End: 2019-01-19

## 2019-02-10 ENCOUNTER — FORM ENCOUNTER (OUTPATIENT)
Age: 79
End: 2019-02-10

## 2019-02-11 ENCOUNTER — APPOINTMENT (OUTPATIENT)
Dept: RADIOLOGY | Facility: CLINIC | Age: 79
End: 2019-02-11
Payer: MEDICARE

## 2019-02-11 ENCOUNTER — APPOINTMENT (OUTPATIENT)
Dept: INTERNAL MEDICINE | Facility: CLINIC | Age: 79
End: 2019-02-11
Payer: MEDICARE

## 2019-02-11 ENCOUNTER — OUTPATIENT (OUTPATIENT)
Dept: OUTPATIENT SERVICES | Facility: HOSPITAL | Age: 79
LOS: 1 days | End: 2019-02-11
Payer: MEDICARE

## 2019-02-11 VITALS
RESPIRATION RATE: 14 BRPM | OXYGEN SATURATION: 91 % | WEIGHT: 223 LBS | HEART RATE: 91 BPM | DIASTOLIC BLOOD PRESSURE: 78 MMHG | HEIGHT: 65 IN | TEMPERATURE: 99.1 F | SYSTOLIC BLOOD PRESSURE: 110 MMHG | BODY MASS INDEX: 37.15 KG/M2

## 2019-02-11 DIAGNOSIS — R05 COUGH: ICD-10-CM

## 2019-02-11 DIAGNOSIS — Z90.89 ACQUIRED ABSENCE OF OTHER ORGANS: Chronic | ICD-10-CM

## 2019-02-11 PROCEDURE — 71046 X-RAY EXAM CHEST 2 VIEWS: CPT | Mod: 26

## 2019-02-11 PROCEDURE — 71046 X-RAY EXAM CHEST 2 VIEWS: CPT

## 2019-02-11 PROCEDURE — 99214 OFFICE O/P EST MOD 30 MIN: CPT

## 2019-02-11 NOTE — PHYSICAL EXAM
[No Acute Distress] : no acute distress [Well Nourished] : well nourished [Well Developed] : well developed [Well-Appearing] : well-appearing [Normal Sclera/Conjunctiva] : normal sclera/conjunctiva [PERRL] : pupils equal round and reactive to light [EOMI] : extraocular movements intact [Normal Outer Ear/Nose] : the outer ears and nose were normal in appearance [Normal Oropharynx] : the oropharynx was normal [No JVD] : no jugular venous distention [Supple] : supple [No Lymphadenopathy] : no lymphadenopathy [Thyroid Normal, No Nodules] : the thyroid was normal and there were no nodules present [No Respiratory Distress] : no respiratory distress  [No Accessory Muscle Use] : no accessory muscle use [Scattered Wheezes] : scattered wheezing was heard [Normal Rate] : normal rate  [Regular Rhythm] : with a regular rhythm [Normal S1, S2] : normal S1 and S2 [No Murmur] : no murmur heard [No Carotid Bruits] : no carotid bruits [No Abdominal Bruit] : a ~M bruit was not heard ~T in the abdomen [No Varicosities] : no varicosities [Pedal Pulses Present] : the pedal pulses are present [No Edema] : there was no peripheral edema [No Extremity Clubbing/Cyanosis] : no extremity clubbing/cyanosis [No Palpable Aorta] : no palpable aorta [Soft] : abdomen soft [Non Tender] : non-tender [Non-distended] : non-distended [No Masses] : no abdominal mass palpated [No HSM] : no HSM [Normal Bowel Sounds] : normal bowel sounds [Normal Posterior Cervical Nodes] : no posterior cervical lymphadenopathy [Normal Anterior Cervical Nodes] : no anterior cervical lymphadenopathy [No CVA Tenderness] : no CVA  tenderness [No Spinal Tenderness] : no spinal tenderness [No Joint Swelling] : no joint swelling [Grossly Normal Strength/Tone] : grossly normal strength/tone [No Rash] : no rash [No Focal Deficits] : no focal deficits [Deep Tendon Reflexes (DTR)] : deep tendon reflexes were 2+ and symmetric [Speech Grossly Normal] : speech grossly normal [Memory Grossly Normal] : memory grossly normal [Normal Affect] : the affect was normal [Alert and Oriented x3] : oriented to person, place, and time [Normal Mood] : the mood was normal [Normal Insight/Judgement] : insight and judgment were intact [de-identified] : wheelchair

## 2019-02-11 NOTE — HISTORY OF PRESENT ILLNESS
[FreeTextEntry1] : Cough for three days \par some wheezing \par no fevers or chills\par glucose good at home

## 2019-02-20 ENCOUNTER — APPOINTMENT (OUTPATIENT)
Dept: INTERNAL MEDICINE | Facility: CLINIC | Age: 79
End: 2019-02-20
Payer: MEDICARE

## 2019-02-20 VITALS — DIASTOLIC BLOOD PRESSURE: 80 MMHG | OXYGEN SATURATION: 95 % | SYSTOLIC BLOOD PRESSURE: 140 MMHG

## 2019-02-20 VITALS
OXYGEN SATURATION: 93 % | SYSTOLIC BLOOD PRESSURE: 150 MMHG | TEMPERATURE: 97.6 F | BODY MASS INDEX: 36.61 KG/M2 | WEIGHT: 220 LBS | DIASTOLIC BLOOD PRESSURE: 90 MMHG | HEART RATE: 92 BPM | RESPIRATION RATE: 16 BRPM

## 2019-02-20 PROCEDURE — 99214 OFFICE O/P EST MOD 30 MIN: CPT

## 2019-02-20 RX ORDER — BLOOD SUGAR DIAGNOSTIC
STRIP MISCELLANEOUS
Qty: 200 | Refills: 1 | Status: ACTIVE | COMMUNITY
Start: 2019-02-20 | End: 1900-01-01

## 2019-02-20 RX ORDER — BLOOD-GLUCOSE METER
W/DEVICE EACH MISCELLANEOUS
Qty: 1 | Refills: 0 | Status: ACTIVE | COMMUNITY
Start: 2019-02-20 | End: 1900-01-01

## 2019-02-20 NOTE — PHYSICAL EXAM
[No Acute Distress] : no acute distress [Well Nourished] : well nourished [Well Developed] : well developed [Well-Appearing] : well-appearing [Normal Voice/Communication] : normal voice/communication [Normal Sclera/Conjunctiva] : normal sclera/conjunctiva [PERRL] : pupils equal round and reactive to light [EOMI] : extraocular movements intact [Normal Outer Ear/Nose] : the outer ears and nose were normal in appearance [Normal Oropharynx] : the oropharynx was normal [No JVD] : no jugular venous distention [Supple] : supple [No Lymphadenopathy] : no lymphadenopathy [Thyroid Normal, No Nodules] : the thyroid was normal and there were no nodules present [No Respiratory Distress] : no respiratory distress  [No Accessory Muscle Use] : no accessory muscle use [Normal Rate] : normal rate  [Regular Rhythm] : with a regular rhythm [Normal S1, S2] : normal S1 and S2 [No Carotid Bruits] : no carotid bruits [No Abdominal Bruit] : a ~M bruit was not heard ~T in the abdomen [No Varicosities] : no varicosities [Pedal Pulses Present] : the pedal pulses are present [No Edema] : there was no peripheral edema [No Extremity Clubbing/Cyanosis] : no extremity clubbing/cyanosis [No Palpable Aorta] : no palpable aorta [Soft] : abdomen soft [Non Tender] : non-tender [Non-distended] : non-distended [No Masses] : no abdominal mass palpated [No HSM] : no HSM [Normal Bowel Sounds] : normal bowel sounds [Normal Posterior Cervical Nodes] : no posterior cervical lymphadenopathy [Normal Anterior Cervical Nodes] : no anterior cervical lymphadenopathy [No CVA Tenderness] : no CVA  tenderness [No Spinal Tenderness] : no spinal tenderness [No Joint Swelling] : no joint swelling [Grossly Normal Strength/Tone] : grossly normal strength/tone [No Rash] : no rash [Normal Gait] : normal gait [Coordination Grossly Intact] : coordination grossly intact [No Focal Deficits] : no focal deficits [Deep Tendon Reflexes (DTR)] : deep tendon reflexes were 2+ and symmetric [Speech Grossly Normal] : speech grossly normal [Memory Grossly Normal] : memory grossly normal [Normal Affect] : the affect was normal [Alert and Oriented x3] : oriented to person, place, and time [Normal Mood] : the mood was normal [Normal Insight/Judgement] : insight and judgment were intact [de-identified] : clearer

## 2019-02-24 ENCOUNTER — FORM ENCOUNTER (OUTPATIENT)
Age: 79
End: 2019-02-24

## 2019-02-25 ENCOUNTER — OUTPATIENT (OUTPATIENT)
Dept: OUTPATIENT SERVICES | Facility: HOSPITAL | Age: 79
LOS: 1 days | End: 2019-02-25
Payer: MEDICARE

## 2019-02-25 ENCOUNTER — APPOINTMENT (OUTPATIENT)
Dept: RADIOLOGY | Facility: CLINIC | Age: 79
End: 2019-02-25
Payer: MEDICARE

## 2019-02-25 DIAGNOSIS — J18.9 PNEUMONIA, UNSPECIFIED ORGANISM: ICD-10-CM

## 2019-02-25 DIAGNOSIS — Z90.89 ACQUIRED ABSENCE OF OTHER ORGANS: Chronic | ICD-10-CM

## 2019-02-25 PROCEDURE — 71046 X-RAY EXAM CHEST 2 VIEWS: CPT | Mod: 26

## 2019-02-25 PROCEDURE — 71046 X-RAY EXAM CHEST 2 VIEWS: CPT

## 2019-03-12 ENCOUNTER — MEDICATION RENEWAL (OUTPATIENT)
Age: 79
End: 2019-03-12

## 2019-04-01 ENCOUNTER — MEDICATION RENEWAL (OUTPATIENT)
Age: 79
End: 2019-04-01

## 2019-04-22 ENCOUNTER — RX RENEWAL (OUTPATIENT)
Age: 79
End: 2019-04-22

## 2019-04-23 ENCOUNTER — MEDICATION RENEWAL (OUTPATIENT)
Age: 79
End: 2019-04-23

## 2019-04-25 ENCOUNTER — MEDICATION RENEWAL (OUTPATIENT)
Age: 79
End: 2019-04-25

## 2019-05-01 ENCOUNTER — MEDICATION RENEWAL (OUTPATIENT)
Age: 79
End: 2019-05-01

## 2019-05-10 ENCOUNTER — RX RENEWAL (OUTPATIENT)
Age: 79
End: 2019-05-10

## 2019-05-20 ENCOUNTER — APPOINTMENT (OUTPATIENT)
Dept: INTERNAL MEDICINE | Facility: CLINIC | Age: 79
End: 2019-05-20
Payer: COMMERCIAL

## 2019-05-20 VITALS
OXYGEN SATURATION: 91 % | RESPIRATION RATE: 14 BRPM | TEMPERATURE: 98 F | HEART RATE: 73 BPM | DIASTOLIC BLOOD PRESSURE: 70 MMHG | SYSTOLIC BLOOD PRESSURE: 120 MMHG | HEIGHT: 65 IN

## 2019-05-20 PROCEDURE — 36415 COLL VENOUS BLD VENIPUNCTURE: CPT

## 2019-05-20 PROCEDURE — 99214 OFFICE O/P EST MOD 30 MIN: CPT | Mod: 25

## 2019-05-20 RX ORDER — VENLAFAXINE HYDROCHLORIDE 75 MG/1
75 CAPSULE, EXTENDED RELEASE ORAL
Qty: 90 | Refills: 1 | Status: ACTIVE | COMMUNITY
Start: 2017-11-08 | End: 1900-01-01

## 2019-05-20 NOTE — PHYSICAL EXAM

## 2019-05-20 NOTE — HEALTH RISK ASSESSMENT
[No falls in past year] : Patient reported no falls in the past year [0] : 2) Feeling down, depressed, or hopeless: Not at all (0) [] : No stated

## 2019-05-20 NOTE — COUNSELING
[Healthy eating counseling provided] : healthy eating [Low Fat Diet] : Low fat diet [Decrease Portions] : Decrease food portions

## 2019-05-21 LAB
25(OH)D3 SERPL-MCNC: 32.8 NG/ML
ALBUMIN SERPL ELPH-MCNC: 4.6 G/DL
ALP BLD-CCNC: 29 U/L
ALT SERPL-CCNC: 18 U/L
ANION GAP SERPL CALC-SCNC: 15 MMOL/L
AST SERPL-CCNC: 24 U/L
BASOPHILS # BLD AUTO: 0.03 K/UL
BASOPHILS NFR BLD AUTO: 0.4 %
BILIRUB SERPL-MCNC: 0.2 MG/DL
BUN SERPL-MCNC: 35 MG/DL
CALCIUM SERPL-MCNC: 10.3 MG/DL
CHLORIDE SERPL-SCNC: 100 MMOL/L
CHOLEST SERPL-MCNC: 133 MG/DL
CHOLEST/HDLC SERPL: 4.6 RATIO
CK SERPL-CCNC: 67 U/L
CO2 SERPL-SCNC: 25 MMOL/L
CREAT SERPL-MCNC: 1.89 MG/DL
EOSINOPHIL # BLD AUTO: 0.03 K/UL
EOSINOPHIL NFR BLD AUTO: 0.4 %
ESTIMATED AVERAGE GLUCOSE: 126 MG/DL
GLUCOSE SERPL-MCNC: 148 MG/DL
HBA1C MFR BLD HPLC: 6 %
HCT VFR BLD CALC: 39.9 %
HDLC SERPL-MCNC: 29 MG/DL
HGB BLD-MCNC: 12 G/DL
IMM GRANULOCYTES NFR BLD AUTO: 3.6 %
LDLC SERPL CALC-MCNC: 27 MG/DL
LYMPHOCYTES # BLD AUTO: 1.83 K/UL
LYMPHOCYTES NFR BLD AUTO: 24.1 %
MAN DIFF?: NORMAL
MCHC RBC-ENTMCNC: 27.6 PG
MCHC RBC-ENTMCNC: 30.1 GM/DL
MCV RBC AUTO: 91.9 FL
MONOCYTES # BLD AUTO: 0.79 K/UL
MONOCYTES NFR BLD AUTO: 10.4 %
NEUTROPHILS # BLD AUTO: 4.64 K/UL
NEUTROPHILS NFR BLD AUTO: 61.1 %
PLATELET # BLD AUTO: 199 K/UL
POTASSIUM SERPL-SCNC: 5.1 MMOL/L
PROT SERPL-MCNC: 6.9 G/DL
RBC # BLD: 4.34 M/UL
RBC # FLD: 15.6 %
SODIUM SERPL-SCNC: 140 MMOL/L
TRIGL SERPL-MCNC: 386 MG/DL
TSH SERPL-ACNC: 2.88 UIU/ML
WBC # FLD AUTO: 7.59 K/UL

## 2019-07-11 ENCOUNTER — EMERGENCY (EMERGENCY)
Facility: HOSPITAL | Age: 79
LOS: 1 days | Discharge: HOME | End: 2019-07-11
Attending: EMERGENCY MEDICINE | Admitting: EMERGENCY MEDICINE
Payer: MEDICARE

## 2019-07-11 VITALS
OXYGEN SATURATION: 94 % | TEMPERATURE: 98 F | SYSTOLIC BLOOD PRESSURE: 145 MMHG | HEIGHT: 69 IN | HEART RATE: 108 BPM | RESPIRATION RATE: 18 BRPM | WEIGHT: 220.02 LBS | DIASTOLIC BLOOD PRESSURE: 88 MMHG

## 2019-07-11 VITALS
SYSTOLIC BLOOD PRESSURE: 164 MMHG | RESPIRATION RATE: 16 BRPM | OXYGEN SATURATION: 96 % | HEART RATE: 100 BPM | DIASTOLIC BLOOD PRESSURE: 88 MMHG | TEMPERATURE: 98 F

## 2019-07-11 DIAGNOSIS — Z90.89 ACQUIRED ABSENCE OF OTHER ORGANS: Chronic | ICD-10-CM

## 2019-07-11 LAB
ALBUMIN SERPL ELPH-MCNC: 3.6 G/DL — SIGNIFICANT CHANGE UP (ref 3.3–5)
ALP SERPL-CCNC: 42 U/L — SIGNIFICANT CHANGE UP (ref 40–120)
ALT FLD-CCNC: 26 U/L — SIGNIFICANT CHANGE UP (ref 12–78)
ANION GAP SERPL CALC-SCNC: 13 MMOL/L — SIGNIFICANT CHANGE UP (ref 5–17)
AST SERPL-CCNC: 31 U/L — SIGNIFICANT CHANGE UP (ref 15–37)
BILIRUB SERPL-MCNC: 0.3 MG/DL — SIGNIFICANT CHANGE UP (ref 0.2–1.2)
BUN SERPL-MCNC: 37 MG/DL — HIGH (ref 7–23)
CALCIUM SERPL-MCNC: 9.1 MG/DL — SIGNIFICANT CHANGE UP (ref 8.5–10.1)
CHLORIDE SERPL-SCNC: 100 MMOL/L — SIGNIFICANT CHANGE UP (ref 96–108)
CK SERPL-CCNC: 540 U/L — HIGH (ref 26–308)
CO2 SERPL-SCNC: 22 MMOL/L — SIGNIFICANT CHANGE UP (ref 22–31)
CREAT SERPL-MCNC: 2.5 MG/DL — HIGH (ref 0.5–1.3)
GLUCOSE SERPL-MCNC: 229 MG/DL — HIGH (ref 70–99)
HCT VFR BLD CALC: 38.5 % — LOW (ref 39–50)
HGB BLD-MCNC: 12.6 G/DL — LOW (ref 13–17)
MCHC RBC-ENTMCNC: 27.9 PG — SIGNIFICANT CHANGE UP (ref 27–34)
MCHC RBC-ENTMCNC: 32.7 GM/DL — SIGNIFICANT CHANGE UP (ref 32–36)
MCV RBC AUTO: 85.2 FL — SIGNIFICANT CHANGE UP (ref 80–100)
NRBC # BLD: 0 /100 WBCS — SIGNIFICANT CHANGE UP (ref 0–0)
PLATELET # BLD AUTO: 227 K/UL — SIGNIFICANT CHANGE UP (ref 150–400)
POTASSIUM SERPL-MCNC: 4.6 MMOL/L — SIGNIFICANT CHANGE UP (ref 3.5–5.3)
POTASSIUM SERPL-SCNC: 4.6 MMOL/L — SIGNIFICANT CHANGE UP (ref 3.5–5.3)
PROT SERPL-MCNC: 7.2 G/DL — SIGNIFICANT CHANGE UP (ref 6–8.3)
RBC # BLD: 4.52 M/UL — SIGNIFICANT CHANGE UP (ref 4.2–5.8)
RBC # FLD: 14.6 % — HIGH (ref 10.3–14.5)
SODIUM SERPL-SCNC: 135 MMOL/L — SIGNIFICANT CHANGE UP (ref 135–145)
TROPONIN I SERPL-MCNC: <.015 NG/ML — SIGNIFICANT CHANGE UP (ref 0.01–0.04)
WBC # BLD: 10.83 K/UL — HIGH (ref 3.8–10.5)
WBC # FLD AUTO: 10.83 K/UL — HIGH (ref 3.8–10.5)

## 2019-07-11 PROCEDURE — 70450 CT HEAD/BRAIN W/O DYE: CPT | Mod: 26

## 2019-07-11 PROCEDURE — 82550 ASSAY OF CK (CPK): CPT

## 2019-07-11 PROCEDURE — 99283 EMERGENCY DEPT VISIT LOW MDM: CPT

## 2019-07-11 PROCEDURE — 80053 COMPREHEN METABOLIC PANEL: CPT

## 2019-07-11 PROCEDURE — 70486 CT MAXILLOFACIAL W/O DYE: CPT | Mod: 26

## 2019-07-11 PROCEDURE — 70486 CT MAXILLOFACIAL W/O DYE: CPT

## 2019-07-11 PROCEDURE — 72125 CT NECK SPINE W/O DYE: CPT

## 2019-07-11 PROCEDURE — 84484 ASSAY OF TROPONIN QUANT: CPT

## 2019-07-11 PROCEDURE — 72125 CT NECK SPINE W/O DYE: CPT | Mod: 26

## 2019-07-11 PROCEDURE — 36415 COLL VENOUS BLD VENIPUNCTURE: CPT

## 2019-07-11 PROCEDURE — 70450 CT HEAD/BRAIN W/O DYE: CPT

## 2019-07-11 PROCEDURE — 85027 COMPLETE CBC AUTOMATED: CPT

## 2019-07-11 PROCEDURE — 99284 EMERGENCY DEPT VISIT MOD MDM: CPT | Mod: 25
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
## 2019-07-11 NOTE — ED PROVIDER NOTE - OBJECTIVE STATEMENT
78  male presents s/p fall off commode into bathtub this evening , states he hit his head,  denies loc, states he tried to get himself out of the tub, scraped his face.      states he feels well.  son at bedside.  patient denies chest, abdominal, neck or back pain,  states he pressed his life alert button.  former smoker quit 2 years ago.  takes asa daily. utd on tetanus.    PMD Dr Bush    PMH:   BPH with obstruction/lower urinary tract symptoms    Congestive heart failure    Diabetes mellitus    GERD (gastroesophageal reflux disease)    Hyperlipidemia    Hypertension    Pulmonary fibrosis    Pulmonary fibrosis    Smoker    Vitamin D deficiency

## 2019-07-11 NOTE — ED PROVIDER NOTE - PROGRESS NOTE DETAILS
patient resting comfortably, requesting discharge, son at bedside, informed cr slightly more elevated at 2.5, advised follow up with pmd, ct scans no acute findings, follow up with pmd, any concerns return to ED

## 2019-07-11 NOTE — ED PROVIDER NOTE - CARE PLAN
Principal Discharge DX:	Fall, initial encounter  Secondary Diagnosis:	Injury of head, initial encounter  Secondary Diagnosis:	Facial abrasion, initial encounter

## 2019-07-11 NOTE — ED PROVIDER NOTE - CHPI ED SYMPTOMS NEG
no weakness/no loss of consciousness/no nausea/no blurred vision/no confusion/no syncope/no seizure/no vomiting/no dizziness/no change in level of consciousness

## 2019-07-11 NOTE — ED ADULT NURSE NOTE - OBJECTIVE STATEMENT
Patient received to the ED a&ox4, s/p fall. Patient reports that while he was getting off of his raised toilet seat, he fell over and his face hit the bath tub. Denies LOC and patient remembers everything that happened. Left facial abrasion present.

## 2019-07-11 NOTE — ED ADULT TRIAGE NOTE - CHIEF COMPLAINT QUOTE
fell while getting up from the toilet into the shower, wound under the left eye, denies any LOC. patient on home oxygen

## 2019-07-11 NOTE — ED ADULT NURSE NOTE - NSIMPLEMENTINTERV_GEN_ALL_ED
Implemented All Fall Risk Interventions:  [place] to call system. Call bell, personal items and telephone within reach. Instruct patient to call for assistance. Room bathroom lighting operational. Non-slip footwear when patient is off stretcher. Physically safe environment: no spills, clutter or unnecessary equipment. Stretcher in lowest position, wheels locked, appropriate side rails in place. Provide visual cue, wrist band, yellow gown, etc. Monitor gait and stability. Monitor for mental status changes and reorient to person, place, and time. Review medications for side effects contributing to fall risk. Reinforce activity limits and safety measures with patient and family.

## 2019-07-16 ENCOUNTER — APPOINTMENT (OUTPATIENT)
Dept: INTERNAL MEDICINE | Facility: CLINIC | Age: 79
End: 2019-07-16

## 2019-07-23 ENCOUNTER — RX RENEWAL (OUTPATIENT)
Age: 79
End: 2019-07-23

## 2019-08-13 NOTE — PATIENT PROFILE ADULT. - NSTOBACCOQUITATTEMPT_GEN_A_CORE_SD
Carac Counseling:  I discussed with the patient the risks of Carac including but not limited to erythema, scaling, itching, weeping, crusting, and pain. none

## 2019-09-16 ENCOUNTER — RX RENEWAL (OUTPATIENT)
Age: 79
End: 2019-09-16

## 2019-10-02 ENCOUNTER — LABORATORY RESULT (OUTPATIENT)
Age: 79
End: 2019-10-02

## 2019-10-02 ENCOUNTER — APPOINTMENT (OUTPATIENT)
Dept: PULMONOLOGY | Facility: CLINIC | Age: 79
End: 2019-10-02
Payer: COMMERCIAL

## 2019-10-02 VITALS
BODY MASS INDEX: 36.65 KG/M2 | DIASTOLIC BLOOD PRESSURE: 91 MMHG | HEIGHT: 65 IN | OXYGEN SATURATION: 96 % | WEIGHT: 220 LBS | SYSTOLIC BLOOD PRESSURE: 166 MMHG | HEART RATE: 79 BPM

## 2019-10-02 PROCEDURE — 36415 COLL VENOUS BLD VENIPUNCTURE: CPT

## 2019-10-02 PROCEDURE — 71045 X-RAY EXAM CHEST 1 VIEW: CPT

## 2019-10-02 PROCEDURE — 99214 OFFICE O/P EST MOD 30 MIN: CPT | Mod: 25

## 2019-10-02 RX ORDER — CEFUROXIME AXETIL 250 MG/1
250 TABLET ORAL
Qty: 20 | Refills: 0 | Status: COMPLETED | COMMUNITY
Start: 2019-02-11 | End: 2019-10-02

## 2019-10-02 RX ORDER — UMECLIDINIUM 62.5 UG/1
62.5 AEROSOL, POWDER ORAL
Qty: 30 | Refills: 0 | Status: DISCONTINUED | COMMUNITY
Start: 2017-09-15 | End: 2019-10-02

## 2019-10-02 RX ORDER — DOXYCYCLINE 100 MG/1
100 TABLET, FILM COATED ORAL TWICE DAILY
Qty: 20 | Refills: 0 | Status: DISCONTINUED | COMMUNITY
Start: 2019-02-11 | End: 2019-10-02

## 2019-10-02 RX ORDER — VENLAFAXINE HCL 37.5 MG
37.5 TABLET ORAL
Refills: 0 | Status: ACTIVE | COMMUNITY

## 2019-10-02 RX ORDER — BUPROPION HYDROCHLORIDE 300 MG/1
300 TABLET, EXTENDED RELEASE ORAL
Refills: 0 | Status: ACTIVE | COMMUNITY

## 2019-10-02 RX ORDER — ALBUTEROL SULFATE 2.5 MG/3ML
(2.5 MG/3ML) SOLUTION RESPIRATORY (INHALATION)
Qty: 75 | Refills: 0 | Status: DISCONTINUED | COMMUNITY
Start: 2018-01-06 | End: 2019-10-02

## 2019-10-02 RX ORDER — PREDNISONE 10 MG/1
10 TABLET ORAL
Qty: 5 | Refills: 0 | Status: DISCONTINUED | COMMUNITY
Start: 2018-01-06 | End: 2019-10-02

## 2019-10-02 RX ORDER — PREDNISONE 20 MG/1
20 TABLET ORAL
Qty: 5 | Refills: 0 | Status: DISCONTINUED | COMMUNITY
Start: 2019-02-11 | End: 2019-10-02

## 2019-10-02 RX ORDER — FLUTICASONE FUROATE AND VILANTEROL TRIFENATATE 100; 25 UG/1; UG/1
100-25 POWDER RESPIRATORY (INHALATION)
Qty: 60 | Refills: 0 | Status: DISCONTINUED | COMMUNITY
Start: 2017-09-15 | End: 2019-10-02

## 2019-10-03 LAB
ALBUMIN SERPL ELPH-MCNC: 4.2 G/DL
ALP BLD-CCNC: 33 U/L
ALT SERPL-CCNC: 21 U/L
ANION GAP SERPL CALC-SCNC: 15 MMOL/L
AST SERPL-CCNC: 24 U/L
BASOPHILS # BLD AUTO: 0 K/UL
BASOPHILS NFR BLD AUTO: 0 %
BILIRUB SERPL-MCNC: 0.2 MG/DL
BUN SERPL-MCNC: 41 MG/DL
CALCIUM SERPL-MCNC: 9.4 MG/DL
CHLORIDE SERPL-SCNC: 98 MMOL/L
CO2 SERPL-SCNC: 24 MMOL/L
CREAT SERPL-MCNC: 1.96 MG/DL
EOSINOPHIL # BLD AUTO: 0.05 K/UL
EOSINOPHIL NFR BLD AUTO: 0.8 %
GLUCOSE SERPL-MCNC: 205 MG/DL
HCT VFR BLD CALC: 31.9 %
HGB BLD-MCNC: 9.7 G/DL
LYMPHOCYTES # BLD AUTO: 0.66 K/UL
LYMPHOCYTES NFR BLD AUTO: 11.5 %
MAN DIFF?: NORMAL
MCHC RBC-ENTMCNC: 28 PG
MCHC RBC-ENTMCNC: 30.4 GM/DL
MCV RBC AUTO: 92.2 FL
MONOCYTES # BLD AUTO: 0.38 K/UL
MONOCYTES NFR BLD AUTO: 6.6 %
NEUTROPHILS # BLD AUTO: 4.47 K/UL
NEUTROPHILS NFR BLD AUTO: 77 %
NT-PROBNP SERPL-MCNC: 841 PG/ML
PLATELET # BLD AUTO: 161 K/UL
POTASSIUM SERPL-SCNC: 4.9 MMOL/L
PROCALCITONIN SERPL-MCNC: 0.28 NG/ML
PROT SERPL-MCNC: 6.1 G/DL
RBC # BLD: 3.46 M/UL
RBC # FLD: 15.3 %
SODIUM SERPL-SCNC: 137 MMOL/L
WBC # FLD AUTO: 5.74 K/UL

## 2019-10-03 NOTE — PHYSICAL EXAM
[Normal Conjunctiva] : the conjunctiva exhibited no abnormalities [Eyelids - No Xanthelasma] : the eyelids demonstrated no xanthelasmas [Normal Oropharynx] : normal oropharynx [Neck Appearance] : the appearance of the neck was normal [Neck Cervical Mass (___cm)] : no neck mass was observed [Jugular Venous Distention Increased] : there was no jugular-venous distention [Thyroid Diffuse Enlargement] : the thyroid was not enlarged [Thyroid Nodule] : there were no palpable thyroid nodules [FreeTextEntry1] : distant heart sounds [Auscultation Breath Sounds / Voice Sounds] : lungs were clear to auscultation bilaterally [Abdomen Soft] : soft [Abdomen Tenderness] : non-tender [Abdomen Mass (___ Cm)] : no abdominal mass palpated [Abnormal Walk] : normal gait [Gait - Sufficient For Exercise Testing] : the gait was sufficient for exercise testing [3+ Pitting] : 3+  pitting  [Skin Color & Pigmentation] : normal skin color and pigmentation [] : no rash [No Venous Stasis] : no venous stasis [Skin Lesions] : no skin lesions [No Skin Ulcers] : no skin ulcer [No Xanthoma] : no  xanthoma was observed [Deep Tendon Reflexes (DTR)] : deep tendon reflexes were 2+ and symmetric [Sensation] : the sensory exam was normal to light touch and pinprick [No Focal Deficits] : no focal deficits [Oriented To Time, Place, And Person] : oriented to person, place, and time [Impaired Insight] : insight and judgment were intact [Affect] : the affect was normal

## 2019-10-03 NOTE — REASON FOR VISIT
[Follow-Up] : a follow-up visit [Cough] : cough [Abnormal Chest X-Ray] : abnormal Chest X-Ray [ILD] : ILD

## 2019-10-03 NOTE — ADDENDUM
[FreeTextEntry1] : 10/3- Labs reviewed and spoke to patient\par Feels much better with increase in diuretic\par Hold off on antibiotics for now\par TO make f/u appt with cardiologist

## 2019-10-03 NOTE — PROCEDURE
[FreeTextEntry1] : CXR- RUL and LLL infiltrates, RUL increased from prior\par New consolidation left base

## 2019-10-03 NOTE — HISTORY OF PRESENT ILLNESS
[FreeTextEntry1] : PRIOR HX 2018\par Dequan Torres returns for followup.  He has a history of diastolic heart failure, interstitial lung disease, and pneumonia.  He was hospitalized in late 2017 for pneumonia.  He continues to be short of breath with minimal exertion.  Per his son, he is using supplemental oxygen 24/7.  He does not complain of cough or congestion.  He continues with inhaler.\par \par Physical Examination:  Reveals a chronically ill male in no acute distress.  O2 saturation 89% on room air, blood pressure 98/65.  Chest revealed inspiratory crackles at both bases.\par \par Pulmonary functions were performed.  Spirometry was mildly worse than at the last visit.  Diffusing capacity was supernormal; however, the effort appeared consistent.\par \par CT scan of the chest was performed.  A right lower lobe infiltrate is somewhat less prominent.  There are ground-glass opacities peripherally, most prominently in the right upper lobe.  These appear little changed.\par \par Impression:  Mr. Torres has a combination of chronic diastolic heart failure, ILD, and prior history of pneumonia.\par \par CURRENT: \par \par \par Hx of COPD, ILD, CHF\par Here for increased SOB and cough\par wet cough, unable to clear secretions\par Using 02 75% of the time\par Also complaining of increased edema

## 2019-10-03 NOTE — ASSESSMENT
[FreeTextEntry1] : CXR abnormal suggesting pneumonia but overall picture appears more consistent with CHF than infection. \par Suggest diuretic trial and labs

## 2019-10-03 NOTE — REVIEW OF SYSTEMS
[Fatigue] : fatigue [Recent Wt Gain (___ Lbs)] : recent [unfilled] ~Ulb weight gain [Sinus Problems] : sinus problems [Dry Mouth] : dry mouth [Cough] : cough [Dyspnea] : dyspnea [Edema] : ~T edema was present [Negative] : Sleep Disorder

## 2019-10-08 ENCOUNTER — RX RENEWAL (OUTPATIENT)
Age: 79
End: 2019-10-08

## 2019-10-10 ENCOUNTER — APPOINTMENT (OUTPATIENT)
Dept: CARDIOLOGY | Facility: CLINIC | Age: 79
End: 2019-10-10
Payer: COMMERCIAL

## 2019-10-10 ENCOUNTER — NON-APPOINTMENT (OUTPATIENT)
Age: 79
End: 2019-10-10

## 2019-10-10 ENCOUNTER — APPOINTMENT (OUTPATIENT)
Dept: INTERNAL MEDICINE | Facility: CLINIC | Age: 79
End: 2019-10-10
Payer: COMMERCIAL

## 2019-10-10 VITALS — SYSTOLIC BLOOD PRESSURE: 156 MMHG | HEART RATE: 65 BPM | OXYGEN SATURATION: 95 % | DIASTOLIC BLOOD PRESSURE: 81 MMHG

## 2019-10-10 VITALS
SYSTOLIC BLOOD PRESSURE: 122 MMHG | WEIGHT: 220 LBS | DIASTOLIC BLOOD PRESSURE: 76 MMHG | OXYGEN SATURATION: 95 % | TEMPERATURE: 97.9 F | HEART RATE: 64 BPM | BODY MASS INDEX: 36.65 KG/M2 | HEIGHT: 65 IN | RESPIRATION RATE: 14 BRPM

## 2019-10-10 DIAGNOSIS — Z86.2 PERSONAL HISTORY OF DISEASES OF THE BLOOD AND BLOOD-FORMING ORGANS AND CERTAIN DISORDERS INVOLVING THE IMMUNE MECHANISM: ICD-10-CM

## 2019-10-10 DIAGNOSIS — I10 ESSENTIAL (PRIMARY) HYPERTENSION: ICD-10-CM

## 2019-10-10 DIAGNOSIS — I51.9 HEART DISEASE, UNSPECIFIED: ICD-10-CM

## 2019-10-10 DIAGNOSIS — K21.9 GASTRO-ESOPHAGEAL REFLUX DISEASE W/OUT ESOPHAGITIS: ICD-10-CM

## 2019-10-10 PROCEDURE — 99214 OFFICE O/P EST MOD 30 MIN: CPT

## 2019-10-10 PROCEDURE — 93000 ELECTROCARDIOGRAM COMPLETE: CPT

## 2019-10-10 PROCEDURE — 99215 OFFICE O/P EST HI 40 MIN: CPT
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
## 2019-10-10 NOTE — PHYSICAL EXAM
[General Appearance - Well Developed] : well developed [Normal Appearance] : normal appearance [Well Groomed] : well groomed [General Appearance - Well Nourished] : well nourished [No Deformities] : no deformities [General Appearance - In No Acute Distress] : no acute distress [Normal Conjunctiva] : the conjunctiva exhibited no abnormalities [Normal Oral Mucosa] : normal oral mucosa [Normal Jugular Venous A Waves Present] : normal jugular venous A waves present [Normal Jugular Venous V Waves Present] : normal jugular venous V waves present [No Jugular Venous Kern A Waves] : no jugular venous kern A waves [Respiration, Rhythm And Depth] : normal respiratory rhythm and effort [Exaggerated Use Of Accessory Muscles For Inspiration] : no accessory muscle use [Auscultation Breath Sounds / Voice Sounds] : lungs were clear to auscultation bilaterally [Bowel Sounds] : normal bowel sounds [Abdomen Soft] : soft [Abdomen Tenderness] : non-tender [Abnormal Walk] : normal gait [Nail Clubbing] : no clubbing of the fingernails [Cyanosis, Localized] : no localized cyanosis [Petechial Hemorrhages (___cm)] : no petechial hemorrhages [Skin Color & Pigmentation] : normal skin color and pigmentation [Skin Turgor] : normal skin turgor [] : no rash [Oriented To Time, Place, And Person] : oriented to person, place, and time [Impaired Insight] : insight and judgment were intact [No Anxiety] : not feeling anxious [Not Palpable] : not palpable [Normal Rate] : normal [Normal S1] : normal S1 [Normal S2] : normal S2 [Distant] : the heart sounds were distant [No Murmur] : no murmurs heard [2+] : left 2+ [1+] : left 1+ [No Abnormalities] : the abdominal aorta was not enlarged and no bruit was heard [___ +] : bilateral [unfilled]U+ pitting edema to the ankles [FreeTextEntry1] : Diffuse wheezing [S3] : no S3 [S4] : no S4 [Left Carotid Bruit] : no bruit heard over the left carotid [Right Carotid Bruit] : no bruit heard over the right carotid [Right Femoral Bruit] : no bruit heard over the right femoral artery [Left Femoral Bruit] : no bruit heard over the left femoral artery

## 2019-10-10 NOTE — PHYSICAL EXAM
[No Acute Distress] : no acute distress [Well Nourished] : well nourished [Well Developed] : well developed [Well-Appearing] : well-appearing [Normal Sclera/Conjunctiva] : normal sclera/conjunctiva [PERRL] : pupils equal round and reactive to light [EOMI] : extraocular movements intact [Normal Outer Ear/Nose] : the outer ears and nose were normal in appearance [Normal Oropharynx] : the oropharynx was normal [No JVD] : no jugular venous distention [No Lymphadenopathy] : no lymphadenopathy [Supple] : supple [Thyroid Normal, No Nodules] : the thyroid was normal and there were no nodules present [No Respiratory Distress] : no respiratory distress  [No Accessory Muscle Use] : no accessory muscle use [Normal Rate] : normal rate  [Decreased Breath Sounds] : breath sounds were decreased diffusely [Regular Rhythm] : with a regular rhythm [Normal S1, S2] : normal S1 and S2 [No Murmur] : no murmur heard [No Carotid Bruits] : no carotid bruits [No Abdominal Bruit] : a ~M bruit was not heard ~T in the abdomen [No Varicosities] : no varicosities [Pedal Pulses Present] : the pedal pulses are present [No Palpable Aorta] : no palpable aorta [No Extremity Clubbing/Cyanosis] : no extremity clubbing/cyanosis [Soft] : abdomen soft [Non Tender] : non-tender [Non-distended] : non-distended [No HSM] : no HSM [No Masses] : no abdominal mass palpated [Normal Bowel Sounds] : normal bowel sounds [Normal Posterior Cervical Nodes] : no posterior cervical lymphadenopathy [No CVA Tenderness] : no CVA  tenderness [Normal Anterior Cervical Nodes] : no anterior cervical lymphadenopathy [No Spinal Tenderness] : no spinal tenderness [No Joint Swelling] : no joint swelling [Grossly Normal Strength/Tone] : grossly normal strength/tone [No Rash] : no rash [Coordination Grossly Intact] : coordination grossly intact [No Focal Deficits] : no focal deficits [Normal Gait] : normal gait [Deep Tendon Reflexes (DTR)] : deep tendon reflexes were 2+ and symmetric [Normal Affect] : the affect was normal [Normal Insight/Judgement] : insight and judgment were intact [de-identified] : 2 plus ptting edema

## 2019-10-10 NOTE — REVIEW OF SYSTEMS
[Recent Weight Gain (___ Lbs)] : recent [unfilled] ~Ulb weight gain [Eyeglasses] : currently wearing eyeglasses [Loss Of Hearing] : hearing loss [Dyspnea on exertion] : dyspnea during exertion [Chest  Pressure] : chest pressure [Leg Claudication] : intermittent leg claudication [Nocturia] : nocturia [Initiating Urination Req. Strain] : initiating urination requires straining [Depression] : depression [Negative] : Genitourinary [Fever] : no fever [Chills] : no chills [Headache] : no headache [Feeling Fatigued] : not feeling fatigued [Blurry Vision] : no blurred vision [Earache] : no earache [Seeing Double (Diplopia)] : no diplopia [Sinus Pressure] : no sinus pressure [Sore Throat] : no sore throat [Lower Ext Edema] : no extremity edema [Palpitations] : no palpitations [Joint Pain] : no joint pain [Skin: A Rash] : no rash: [Anxiety] : no anxiety [Dizziness] : no dizziness [Easy Bruising] : no tendency for easy bruising [Excessive Thirst] : no polydipsia [Easy Bleeding] : no tendency for easy bleeding [FreeTextEntry2] : consipated

## 2019-10-10 NOTE — HISTORY OF PRESENT ILLNESS
[FreeTextEntry1] : Saw Dr. Mcallister of Cardiology found to be anemic\par no blood per rectum \par to get further blood work

## 2019-10-10 NOTE — HEALTH RISK ASSESSMENT
[No] : In the past 12 months have you used drugs other than those required for medical reasons? No [One fall no injury in past year] : Patient reported one fall in the past year without injury [0] : 2) Feeling down, depressed, or hopeless: Not at all (0) [] : No

## 2019-10-10 NOTE — DISCUSSION/SUMMARY
[FreeTextEntry1] : The patient is volume overloaded and needs the extra diuretic. We discussed the importance of a low salt diet. Also need to watch his kidney function.\par \par He'll get blood work to evaluate the anemia. We'll make an appointment to see you. He will continue use his oxygen as needed.\par \par I would see the patient again in one week. He'll schedule a repeat echo. This was all discussed with the patient and his son in detail and I answered their questions

## 2019-10-14 LAB
ANION GAP SERPL CALC-SCNC: 18 MMOL/L
BASOPHILS # BLD AUTO: 0.01 K/UL
BASOPHILS NFR BLD AUTO: 0.1 %
BUN SERPL-MCNC: 43 MG/DL
CALCIUM SERPL-MCNC: 9.6 MG/DL
CHLORIDE SERPL-SCNC: 96 MMOL/L
CO2 SERPL-SCNC: 22 MMOL/L
CREAT SERPL-MCNC: 1.86 MG/DL
EOSINOPHIL # BLD AUTO: 0.03 K/UL
EOSINOPHIL NFR BLD AUTO: 0.4 %
FERRITIN SERPL-MCNC: 371 NG/ML
FOLATE SERPL-MCNC: 13.2 NG/ML
GLUCOSE SERPL-MCNC: 155 MG/DL
HCT VFR BLD CALC: 34.7 %
HGB BLD-MCNC: 10.3 G/DL
IMM GRANULOCYTES NFR BLD AUTO: 4.3 %
IRON SATN MFR SERPL: 15 %
IRON SERPL-MCNC: 60 UG/DL
LYMPHOCYTES # BLD AUTO: 1.32 K/UL
LYMPHOCYTES NFR BLD AUTO: 18.3 %
MAN DIFF?: NORMAL
MCHC RBC-ENTMCNC: 27.7 PG
MCHC RBC-ENTMCNC: 29.7 GM/DL
MCV RBC AUTO: 93.3 FL
MONOCYTES # BLD AUTO: 0.74 K/UL
MONOCYTES NFR BLD AUTO: 10.3 %
NEUTROPHILS # BLD AUTO: 4.8 K/UL
NEUTROPHILS NFR BLD AUTO: 66.6 %
PLATELET # BLD AUTO: 186 K/UL
POTASSIUM SERPL-SCNC: 5.5 MMOL/L
RBC # BLD: 3.72 M/UL
RBC # FLD: 15.1 %
SODIUM SERPL-SCNC: 136 MMOL/L
TIBC SERPL-MCNC: 400 UG/DL
UIBC SERPL-MCNC: 340 UG/DL
VIT B12 SERPL-MCNC: 450 PG/ML
WBC # FLD AUTO: 7.21 K/UL

## 2019-10-15 ENCOUNTER — RX RENEWAL (OUTPATIENT)
Age: 79
End: 2019-10-15

## 2019-10-17 LAB — HEMOCCULT STL QL IA: NEGATIVE

## 2019-10-21 ENCOUNTER — APPOINTMENT (OUTPATIENT)
Dept: CARDIOLOGY | Facility: CLINIC | Age: 79
End: 2019-10-21
Payer: COMMERCIAL

## 2019-10-21 PROCEDURE — 93306 TTE W/DOPPLER COMPLETE: CPT

## 2019-10-24 ENCOUNTER — APPOINTMENT (OUTPATIENT)
Dept: CARDIOLOGY | Facility: CLINIC | Age: 79
End: 2019-10-24
Payer: COMMERCIAL

## 2019-10-24 ENCOUNTER — RX RENEWAL (OUTPATIENT)
Age: 79
End: 2019-10-24

## 2019-10-24 VITALS
DIASTOLIC BLOOD PRESSURE: 71 MMHG | HEIGHT: 65 IN | BODY MASS INDEX: 36.65 KG/M2 | SYSTOLIC BLOOD PRESSURE: 117 MMHG | OXYGEN SATURATION: 92 % | HEART RATE: 60 BPM | WEIGHT: 220 LBS

## 2019-10-24 DIAGNOSIS — I25.10 ATHEROSCLEROTIC HEART DISEASE OF NATIVE CORONARY ARTERY W/OUT ANGINA PECTORIS: ICD-10-CM

## 2019-10-24 DIAGNOSIS — I10 ESSENTIAL (PRIMARY) HYPERTENSION: ICD-10-CM

## 2019-10-24 DIAGNOSIS — D64.9 ANEMIA, UNSPECIFIED: ICD-10-CM

## 2019-10-24 DIAGNOSIS — E78.5 HYPERLIPIDEMIA, UNSPECIFIED: ICD-10-CM

## 2019-10-24 PROCEDURE — 99214 OFFICE O/P EST MOD 30 MIN: CPT

## 2019-10-24 NOTE — REVIEW OF SYSTEMS
[Recent Weight Gain (___ Lbs)] : recent [unfilled] ~Ulb weight gain [Eyeglasses] : currently wearing eyeglasses [Loss Of Hearing] : hearing loss [Dyspnea on exertion] : dyspnea during exertion [Chest  Pressure] : chest pressure [Leg Claudication] : intermittent leg claudication [Initiating Urination Req. Strain] : initiating urination requires straining [Nocturia] : nocturia [Depression] : depression [Negative] : Genitourinary [Fever] : no fever [Headache] : no headache [Chills] : no chills [Feeling Fatigued] : not feeling fatigued [Blurry Vision] : no blurred vision [Seeing Double (Diplopia)] : no diplopia [Earache] : no earache [Sore Throat] : no sore throat [Sinus Pressure] : no sinus pressure [Lower Ext Edema] : no extremity edema [Palpitations] : no palpitations [Joint Pain] : no joint pain [Skin: A Rash] : no rash: [Dizziness] : no dizziness [Anxiety] : no anxiety [Excessive Thirst] : no polydipsia [Easy Bleeding] : no tendency for easy bleeding [Easy Bruising] : no tendency for easy bruising [FreeTextEntry2] : consipated

## 2019-10-24 NOTE — PHYSICAL EXAM
[Well Groomed] : well groomed [General Appearance - Well Developed] : well developed [Normal Appearance] : normal appearance [No Deformities] : no deformities [General Appearance - Well Nourished] : well nourished [Normal Conjunctiva] : the conjunctiva exhibited no abnormalities [General Appearance - In No Acute Distress] : no acute distress [Normal Oral Mucosa] : normal oral mucosa [Normal Jugular Venous A Waves Present] : normal jugular venous A waves present [No Jugular Venous Kern A Waves] : no jugular venous kern A waves [Normal Jugular Venous V Waves Present] : normal jugular venous V waves present [Exaggerated Use Of Accessory Muscles For Inspiration] : no accessory muscle use [Respiration, Rhythm And Depth] : normal respiratory rhythm and effort [Auscultation Breath Sounds / Voice Sounds] : lungs were clear to auscultation bilaterally [Bowel Sounds] : normal bowel sounds [Abdomen Soft] : soft [Abdomen Tenderness] : non-tender [Abnormal Walk] : normal gait [Nail Clubbing] : no clubbing of the fingernails [Cyanosis, Localized] : no localized cyanosis [Petechial Hemorrhages (___cm)] : no petechial hemorrhages [Skin Color & Pigmentation] : normal skin color and pigmentation [Oriented To Time, Place, And Person] : oriented to person, place, and time [] : no rash [Skin Turgor] : normal skin turgor [Impaired Insight] : insight and judgment were intact [No Anxiety] : not feeling anxious [Not Palpable] : not palpable [Normal S2] : normal S2 [Normal Rate] : normal [Normal S1] : normal S1 [Distant] : the heart sounds were distant [2+] : Carotid: right 2+ [No Murmur] : no murmurs heard [No Abnormalities] : the abdominal aorta was not enlarged and no bruit was heard [1+] : left 1+ [___ +] : bilateral [unfilled]U+ pitting edema to the ankles [FreeTextEntry1] : Diffuse wheezing [S3] : no S3 [S4] : no S4 [Right Carotid Bruit] : no bruit heard over the right carotid [Left Carotid Bruit] : no bruit heard over the left carotid [Right Femoral Bruit] : no bruit heard over the right femoral artery [Left Femoral Bruit] : no bruit heard over the left femoral artery
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
## 2019-10-24 NOTE — DISCUSSION/SUMMARY
[FreeTextEntry1] : The patient is feeling less short of breath. Lungs still have diffuse wheezing. He has 1-2+ peripheral edema.\par \par With his low pressure heart rate being so good on going to cut back on the labetalol from 300 mg, to 200 mg twice a day. This may be contributing to his shortness of breath. Assuming he has no problems I will see him in 2 weeks. We will try to taper him down on the labetalol and possibly replace this with the beta selective beta blocker.

## 2019-11-04 ENCOUNTER — RX RENEWAL (OUTPATIENT)
Age: 79
End: 2019-11-04

## 2019-11-12 ENCOUNTER — MEDICATION RENEWAL (OUTPATIENT)
Age: 79
End: 2019-11-12

## 2019-11-23 ENCOUNTER — INPATIENT (INPATIENT)
Facility: HOSPITAL | Age: 79
LOS: 5 days | Discharge: HOME HEALTH | DRG: 871 | End: 2019-11-29
Attending: INTERNAL MEDICINE | Admitting: STUDENT IN AN ORGANIZED HEALTH CARE EDUCATION/TRAINING PROGRAM
Payer: MEDICARE

## 2019-11-23 VITALS
OXYGEN SATURATION: 100 % | TEMPERATURE: 98 F | SYSTOLIC BLOOD PRESSURE: 146 MMHG | RESPIRATION RATE: 20 BRPM | HEIGHT: 69 IN | WEIGHT: 235.01 LBS | HEART RATE: 76 BPM | DIASTOLIC BLOOD PRESSURE: 88 MMHG

## 2019-11-23 DIAGNOSIS — Z90.89 ACQUIRED ABSENCE OF OTHER ORGANS: Chronic | ICD-10-CM

## 2019-11-23 LAB
ALBUMIN SERPL ELPH-MCNC: 3.1 G/DL — LOW (ref 3.3–5)
ALP SERPL-CCNC: 33 U/L — LOW (ref 40–120)
ALT FLD-CCNC: 28 U/L — SIGNIFICANT CHANGE UP (ref 12–78)
ANION GAP SERPL CALC-SCNC: 9 MMOL/L — SIGNIFICANT CHANGE UP (ref 5–17)
APPEARANCE UR: ABNORMAL
AST SERPL-CCNC: 35 U/L — SIGNIFICANT CHANGE UP (ref 15–37)
BACTERIA # UR AUTO: ABNORMAL
BILIRUB SERPL-MCNC: 0.7 MG/DL — SIGNIFICANT CHANGE UP (ref 0.2–1.2)
BILIRUB UR-MCNC: NEGATIVE — SIGNIFICANT CHANGE UP
BUN SERPL-MCNC: 38 MG/DL — HIGH (ref 7–23)
CALCIUM SERPL-MCNC: 8.3 MG/DL — LOW (ref 8.5–10.1)
CHLORIDE SERPL-SCNC: 96 MMOL/L — SIGNIFICANT CHANGE UP (ref 96–108)
CO2 SERPL-SCNC: 25 MMOL/L — SIGNIFICANT CHANGE UP (ref 22–31)
COLOR SPEC: YELLOW — SIGNIFICANT CHANGE UP
COMMENT - URINE: SIGNIFICANT CHANGE UP
CREAT SERPL-MCNC: 2.2 MG/DL — HIGH (ref 0.5–1.3)
DIFF PNL FLD: ABNORMAL
EPI CELLS # UR: SIGNIFICANT CHANGE UP
GLUCOSE SERPL-MCNC: 112 MG/DL — HIGH (ref 70–99)
GLUCOSE UR QL: NEGATIVE — SIGNIFICANT CHANGE UP
KETONES UR-MCNC: NEGATIVE — SIGNIFICANT CHANGE UP
LACTATE SERPL-SCNC: 1.9 MMOL/L — SIGNIFICANT CHANGE UP (ref 0.7–2)
LEUKOCYTE ESTERASE UR-ACNC: ABNORMAL
NITRITE UR-MCNC: NEGATIVE — SIGNIFICANT CHANGE UP
PH UR: 5 — SIGNIFICANT CHANGE UP (ref 5–8)
POTASSIUM SERPL-MCNC: 5 MMOL/L — SIGNIFICANT CHANGE UP (ref 3.5–5.3)
POTASSIUM SERPL-SCNC: 5 MMOL/L — SIGNIFICANT CHANGE UP (ref 3.5–5.3)
PROT SERPL-MCNC: 6.6 G/DL — SIGNIFICANT CHANGE UP (ref 6–8.3)
PROT UR-MCNC: 75 MG/DL
RBC CASTS # UR COMP ASSIST: SIGNIFICANT CHANGE UP /HPF (ref 0–4)
SODIUM SERPL-SCNC: 130 MMOL/L — LOW (ref 135–145)
SP GR SPEC: 1 — LOW (ref 1.01–1.02)
UROBILINOGEN FLD QL: NEGATIVE — SIGNIFICANT CHANGE UP
WBC UR QL: ABNORMAL

## 2019-11-23 PROCEDURE — 93010 ELECTROCARDIOGRAM REPORT: CPT

## 2019-11-23 PROCEDURE — 71045 X-RAY EXAM CHEST 1 VIEW: CPT | Mod: 26

## 2019-11-23 RX ORDER — SODIUM CHLORIDE 9 MG/ML
2200 INJECTION INTRAMUSCULAR; INTRAVENOUS; SUBCUTANEOUS ONCE
Refills: 0 | Status: COMPLETED | OUTPATIENT
Start: 2019-11-23 | End: 2019-11-23

## 2019-11-23 RX ORDER — ALBUTEROL 90 UG/1
2.5 AEROSOL, METERED ORAL ONCE
Refills: 0 | Status: COMPLETED | OUTPATIENT
Start: 2019-11-23 | End: 2019-11-23

## 2019-11-23 RX ORDER — IPRATROPIUM/ALBUTEROL SULFATE 18-103MCG
3 AEROSOL WITH ADAPTER (GRAM) INHALATION ONCE
Refills: 0 | Status: COMPLETED | OUTPATIENT
Start: 2019-11-23 | End: 2019-11-23

## 2019-11-23 RX ORDER — AZITHROMYCIN 500 MG/1
500 TABLET, FILM COATED ORAL ONCE
Refills: 0 | Status: COMPLETED | OUTPATIENT
Start: 2019-11-23 | End: 2019-11-23

## 2019-11-23 RX ORDER — CEFTRIAXONE 500 MG/1
1000 INJECTION, POWDER, FOR SOLUTION INTRAMUSCULAR; INTRAVENOUS ONCE
Refills: 0 | Status: COMPLETED | OUTPATIENT
Start: 2019-11-23 | End: 2019-11-23

## 2019-11-23 RX ORDER — BUDESONIDE, MICRONIZED 100 %
0.5 POWDER (GRAM) MISCELLANEOUS ONCE
Refills: 0 | Status: COMPLETED | OUTPATIENT
Start: 2019-11-23 | End: 2019-11-23

## 2019-11-23 RX ORDER — ACETAMINOPHEN 500 MG
650 TABLET ORAL ONCE
Refills: 0 | Status: COMPLETED | OUTPATIENT
Start: 2019-11-23 | End: 2019-11-23

## 2019-11-23 RX ADMIN — CEFTRIAXONE 100 MILLIGRAM(S): 500 INJECTION, POWDER, FOR SOLUTION INTRAMUSCULAR; INTRAVENOUS at 23:23

## 2019-11-23 RX ADMIN — SODIUM CHLORIDE 2200 MILLILITER(S): 9 INJECTION INTRAMUSCULAR; INTRAVENOUS; SUBCUTANEOUS at 23:24

## 2019-11-23 RX ADMIN — Medication 650 MILLIGRAM(S): at 23:23

## 2019-11-23 RX ADMIN — Medication 3 MILLILITER(S): at 23:49

## 2019-11-23 NOTE — ED ADULT NURSE NOTE - OBJECTIVE STATEMENT
patient came in ED from home BIBA with shortness of breath X few days. febrile 102.8F rectally. awake, cooperative and oriented x 3. as per EMS, patient uses O2 concentrator and today O2 sat is only 86%. labored respiration noted. + rales and wheezing bilateral lung field. denies chest pain. abdomen nondistended, nontender. + bilateral leg swelling noted.

## 2019-11-23 NOTE — ED ADULT NURSE NOTE - ED STAT RN HANDOFF DETAILS
hand off report given Thelma JONES for continuation of care. Son came to visit the patient at the bedside.

## 2019-11-23 NOTE — ED ADULT TRIAGE NOTE - CHIEF COMPLAINT QUOTE
Pt. brought to ED via EMS from home for weakness, shortness of breath and fever. Per EMS pt. O2 sat 85% on 3L via concentrator. Pt. placed on 10L O2 via NRB by EMS. Pt. brought to ED via EMS from home for weakness, shortness of breath and fever. Per EMS pt. O2 sat 85% on 3L via concentrator. Pt. placed on 10L O2 via NRB by EMS. IVL #18 in place.

## 2019-11-23 NOTE — ED ADULT NURSE NOTE - NSIMPLEMENTINTERV_GEN_ALL_ED
Implemented All Fall with Harm Risk Interventions:  [place] to call system. Call bell, personal items and telephone within reach. Instruct patient to call for assistance. Room bathroom lighting operational. Non-slip footwear when patient is off stretcher. Physically safe environment: no spills, clutter or unnecessary equipment. Stretcher in lowest position, wheels locked, appropriate side rails in place. Provide visual cue, wrist band, yellow gown, etc. Monitor gait and stability. Monitor for mental status changes and reorient to person, place, and time. Review medications for side effects contributing to fall risk. Reinforce activity limits and safety measures with patient and family. Provide visual clues: red socks.

## 2019-11-23 NOTE — ED ADULT NURSE NOTE - CHIEF COMPLAINT QUOTE
Pt. brought to ED via EMS from home for weakness, shortness of breath and fever. Per EMS pt. O2 sat 85% on 3L via concentrator. Pt. placed on 10L O2 via NRB by EMS.

## 2019-11-23 NOTE — ED PROVIDER NOTE - PROGRESS NOTE DETAILS
All results were explained to patient and/or family and a copy of all available results given. case armando Yu

## 2019-11-24 DIAGNOSIS — F41.9 ANXIETY DISORDER, UNSPECIFIED: ICD-10-CM

## 2019-11-24 DIAGNOSIS — Z29.9 ENCOUNTER FOR PROPHYLACTIC MEASURES, UNSPECIFIED: ICD-10-CM

## 2019-11-24 DIAGNOSIS — N17.9 ACUTE KIDNEY FAILURE, UNSPECIFIED: ICD-10-CM

## 2019-11-24 DIAGNOSIS — E87.1 HYPO-OSMOLALITY AND HYPONATREMIA: ICD-10-CM

## 2019-11-24 DIAGNOSIS — R06.02 SHORTNESS OF BREATH: ICD-10-CM

## 2019-11-24 DIAGNOSIS — A41.9 SEPSIS, UNSPECIFIED ORGANISM: ICD-10-CM

## 2019-11-24 DIAGNOSIS — I10 ESSENTIAL (PRIMARY) HYPERTENSION: ICD-10-CM

## 2019-11-24 DIAGNOSIS — N39.0 URINARY TRACT INFECTION, SITE NOT SPECIFIED: ICD-10-CM

## 2019-11-24 DIAGNOSIS — J18.9 PNEUMONIA, UNSPECIFIED ORGANISM: ICD-10-CM

## 2019-11-24 DIAGNOSIS — E78.5 HYPERLIPIDEMIA, UNSPECIFIED: ICD-10-CM

## 2019-11-24 DIAGNOSIS — E11.9 TYPE 2 DIABETES MELLITUS WITHOUT COMPLICATIONS: ICD-10-CM

## 2019-11-24 DIAGNOSIS — K21.9 GASTRO-ESOPHAGEAL REFLUX DISEASE WITHOUT ESOPHAGITIS: ICD-10-CM

## 2019-11-24 DIAGNOSIS — I50.9 HEART FAILURE, UNSPECIFIED: ICD-10-CM

## 2019-11-24 LAB
ANION GAP SERPL CALC-SCNC: 8 MMOL/L — SIGNIFICANT CHANGE UP (ref 5–17)
APTT BLD: 21.6 SEC — LOW (ref 28.5–37)
BASE EXCESS BLDA CALC-SCNC: -0.7 MMOL/L — SIGNIFICANT CHANGE UP (ref -2–2)
BASOPHILS # BLD AUTO: 0 K/UL — SIGNIFICANT CHANGE UP (ref 0–0.2)
BASOPHILS NFR BLD AUTO: 0 % — SIGNIFICANT CHANGE UP (ref 0–2)
BLOOD GAS COMMENTS ARTERIAL: SIGNIFICANT CHANGE UP
BLOOD GAS COMMENTS ARTERIAL: SIGNIFICANT CHANGE UP
BUN SERPL-MCNC: 38 MG/DL — HIGH (ref 7–23)
CALCIUM SERPL-MCNC: 8.5 MG/DL — SIGNIFICANT CHANGE UP (ref 8.5–10.1)
CHLORIDE SERPL-SCNC: 99 MMOL/L — SIGNIFICANT CHANGE UP (ref 96–108)
CO2 SERPL-SCNC: 26 MMOL/L — SIGNIFICANT CHANGE UP (ref 22–31)
CREAT SERPL-MCNC: 2.3 MG/DL — HIGH (ref 0.5–1.3)
EOSINOPHIL # BLD AUTO: 0 K/UL — SIGNIFICANT CHANGE UP (ref 0–0.5)
EOSINOPHIL NFR BLD AUTO: 0 % — SIGNIFICANT CHANGE UP (ref 0–6)
FLU A RESULT: SIGNIFICANT CHANGE UP
FLU A RESULT: SIGNIFICANT CHANGE UP
FLUAV AG NPH QL: SIGNIFICANT CHANGE UP
FLUBV AG NPH QL: SIGNIFICANT CHANGE UP
GLUCOSE SERPL-MCNC: 201 MG/DL — HIGH (ref 70–99)
HCO3 BLDA-SCNC: 24 MMOL/L — SIGNIFICANT CHANGE UP (ref 23–27)
HCT VFR BLD CALC: 30 % — LOW (ref 39–50)
HCT VFR BLD CALC: 32.7 % — LOW (ref 39–50)
HGB BLD-MCNC: 10.8 G/DL — LOW (ref 13–17)
HGB BLD-MCNC: 9.9 G/DL — LOW (ref 13–17)
HOROWITZ INDEX BLDA+IHG-RTO: SIGNIFICANT CHANGE UP
INR BLD: 1.4 RATIO — HIGH (ref 0.88–1.16)
LYMPHOCYTES # BLD AUTO: 1.79 K/UL — SIGNIFICANT CHANGE UP (ref 1–3.3)
LYMPHOCYTES # BLD AUTO: 10 % — LOW (ref 13–44)
MANUAL SMEAR VERIFICATION: YES — SIGNIFICANT CHANGE UP
MCHC RBC-ENTMCNC: 28.3 PG — SIGNIFICANT CHANGE UP (ref 27–34)
MCHC RBC-ENTMCNC: 28.4 PG — SIGNIFICANT CHANGE UP (ref 27–34)
MCHC RBC-ENTMCNC: 33 GM/DL — SIGNIFICANT CHANGE UP (ref 32–36)
MCHC RBC-ENTMCNC: 33 GM/DL — SIGNIFICANT CHANGE UP (ref 32–36)
MCV RBC AUTO: 85.6 FL — SIGNIFICANT CHANGE UP (ref 80–100)
MCV RBC AUTO: 86 FL — SIGNIFICANT CHANGE UP (ref 80–100)
MONOCYTES # BLD AUTO: 1.79 K/UL — HIGH (ref 0–0.9)
MONOCYTES NFR BLD AUTO: 10 % — SIGNIFICANT CHANGE UP (ref 2–14)
NEUTROPHILS # BLD AUTO: 13.99 K/UL — HIGH (ref 1.8–7.4)
NEUTROPHILS NFR BLD AUTO: 77 % — SIGNIFICANT CHANGE UP (ref 43–77)
NEUTS BAND # BLD: 1 % — SIGNIFICANT CHANGE UP (ref 0–8)
NRBC # BLD: 0 /100 WBCS — SIGNIFICANT CHANGE UP (ref 0–0)
NRBC # BLD: 0 — SIGNIFICANT CHANGE UP
NRBC # BLD: SIGNIFICANT CHANGE UP /100 WBCS (ref 0–0)
NT-PROBNP SERPL-SCNC: 3487 PG/ML — HIGH (ref 0–450)
PCO2 BLDA: 36 MMHG — SIGNIFICANT CHANGE UP (ref 32–46)
PH BLDA: 7.42 — SIGNIFICANT CHANGE UP (ref 7.35–7.45)
PLAT MORPH BLD: NORMAL — SIGNIFICANT CHANGE UP
PLATELET # BLD AUTO: 132 K/UL — LOW (ref 150–400)
PLATELET # BLD AUTO: 136 K/UL — LOW (ref 150–400)
PO2 BLDA: 99 MMHG — SIGNIFICANT CHANGE UP (ref 74–108)
POTASSIUM SERPL-MCNC: 4.9 MMOL/L — SIGNIFICANT CHANGE UP (ref 3.5–5.3)
POTASSIUM SERPL-SCNC: 4.9 MMOL/L — SIGNIFICANT CHANGE UP (ref 3.5–5.3)
PROCALCITONIN SERPL-MCNC: 1.17 NG/ML — HIGH (ref 0–0.04)
PROTHROM AB SERPL-ACNC: 16.1 SEC — HIGH (ref 10–12.9)
RBC # BLD: 3.49 M/UL — LOW (ref 4.2–5.8)
RBC # BLD: 3.82 M/UL — LOW (ref 4.2–5.8)
RBC # FLD: 14.9 % — HIGH (ref 10.3–14.5)
RBC # FLD: 14.9 % — HIGH (ref 10.3–14.5)
RBC BLD AUTO: SIGNIFICANT CHANGE UP
RSV RESULT: SIGNIFICANT CHANGE UP
RSV RNA RESP QL NAA+PROBE: SIGNIFICANT CHANGE UP
SAO2 % BLDA: 98 % — HIGH (ref 92–96)
SODIUM SERPL-SCNC: 133 MMOL/L — LOW (ref 135–145)
VARIANT LYMPHS # BLD: 2 % — SIGNIFICANT CHANGE UP (ref 0–6)
WBC # BLD: 17.24 K/UL — HIGH (ref 3.8–10.5)
WBC # BLD: 17.94 K/UL — HIGH (ref 3.8–10.5)
WBC # FLD AUTO: 17.24 K/UL — HIGH (ref 3.8–10.5)
WBC # FLD AUTO: 17.94 K/UL — HIGH (ref 3.8–10.5)

## 2019-11-24 PROCEDURE — 99223 1ST HOSP IP/OBS HIGH 75: CPT | Mod: GC,AI

## 2019-11-24 PROCEDURE — 99291 CRITICAL CARE FIRST HOUR: CPT

## 2019-11-24 PROCEDURE — 99223 1ST HOSP IP/OBS HIGH 75: CPT

## 2019-11-24 PROCEDURE — 12345: CPT | Mod: NC

## 2019-11-24 PROCEDURE — 71250 CT THORAX DX C-: CPT | Mod: 26

## 2019-11-24 RX ORDER — IPRATROPIUM/ALBUTEROL SULFATE 18-103MCG
3 AEROSOL WITH ADAPTER (GRAM) INHALATION EVERY 6 HOURS
Refills: 0 | Status: DISCONTINUED | OUTPATIENT
Start: 2019-11-24 | End: 2019-11-29

## 2019-11-24 RX ORDER — BUPROPION HYDROCHLORIDE 150 MG/1
300 TABLET, EXTENDED RELEASE ORAL DAILY
Refills: 0 | Status: DISCONTINUED | OUTPATIENT
Start: 2019-11-24 | End: 2019-11-29

## 2019-11-24 RX ORDER — AZITHROMYCIN 500 MG/1
500 TABLET, FILM COATED ORAL DAILY
Refills: 0 | Status: COMPLETED | OUTPATIENT
Start: 2019-11-24 | End: 2019-11-27

## 2019-11-24 RX ORDER — VENLAFAXINE HCL 75 MG
375 CAPSULE, EXT RELEASE 24 HR ORAL
Qty: 0 | Refills: 0 | DISCHARGE

## 2019-11-24 RX ORDER — ASPIRIN/CALCIUM CARB/MAGNESIUM 324 MG
1 TABLET ORAL
Qty: 0 | Refills: 0 | DISCHARGE

## 2019-11-24 RX ORDER — ASENAPINE MALEATE 10 MG/1
10 TABLET SUBLINGUAL
Refills: 0 | Status: DISCONTINUED | OUTPATIENT
Start: 2019-11-24 | End: 2019-11-29

## 2019-11-24 RX ORDER — LABETALOL HCL 100 MG
1 TABLET ORAL
Qty: 0 | Refills: 0 | DISCHARGE

## 2019-11-24 RX ORDER — LABETALOL HCL 100 MG
200 TABLET ORAL
Refills: 0 | Status: DISCONTINUED | OUTPATIENT
Start: 2019-11-24 | End: 2019-11-29

## 2019-11-24 RX ORDER — ACETAMINOPHEN 500 MG
650 TABLET ORAL EVERY 6 HOURS
Refills: 0 | Status: DISCONTINUED | OUTPATIENT
Start: 2019-11-24 | End: 2019-11-29

## 2019-11-24 RX ORDER — FUROSEMIDE 40 MG
40 TABLET ORAL DAILY
Refills: 0 | Status: DISCONTINUED | OUTPATIENT
Start: 2019-11-24 | End: 2019-11-29

## 2019-11-24 RX ORDER — ISOSORBIDE MONONITRATE 60 MG/1
60 TABLET, EXTENDED RELEASE ORAL DAILY
Refills: 0 | Status: DISCONTINUED | OUTPATIENT
Start: 2019-11-24 | End: 2019-11-29

## 2019-11-24 RX ORDER — PANTOPRAZOLE SODIUM 20 MG/1
40 TABLET, DELAYED RELEASE ORAL
Refills: 0 | Status: DISCONTINUED | OUTPATIENT
Start: 2019-11-24 | End: 2019-11-29

## 2019-11-24 RX ORDER — CEFTRIAXONE 500 MG/1
1000 INJECTION, POWDER, FOR SOLUTION INTRAMUSCULAR; INTRAVENOUS EVERY 24 HOURS
Refills: 0 | Status: DISCONTINUED | OUTPATIENT
Start: 2019-11-24 | End: 2019-11-29

## 2019-11-24 RX ORDER — ALPRAZOLAM 0.25 MG
2 TABLET ORAL THREE TIMES A DAY
Refills: 0 | Status: DISCONTINUED | OUTPATIENT
Start: 2019-11-24 | End: 2019-11-29

## 2019-11-24 RX ORDER — TAMSULOSIN HYDROCHLORIDE 0.4 MG/1
0.4 CAPSULE ORAL AT BEDTIME
Refills: 0 | Status: DISCONTINUED | OUTPATIENT
Start: 2019-11-24 | End: 2019-11-29

## 2019-11-24 RX ORDER — VENLAFAXINE HCL 75 MG
150 CAPSULE, EXT RELEASE 24 HR ORAL
Refills: 0 | Status: DISCONTINUED | OUTPATIENT
Start: 2019-11-24 | End: 2019-11-29

## 2019-11-24 RX ORDER — ENOXAPARIN SODIUM 100 MG/ML
30 INJECTION SUBCUTANEOUS DAILY
Refills: 0 | Status: DISCONTINUED | OUTPATIENT
Start: 2019-11-24 | End: 2019-11-27

## 2019-11-24 RX ORDER — FUROSEMIDE 40 MG
40 TABLET ORAL ONCE
Refills: 0 | Status: COMPLETED | OUTPATIENT
Start: 2019-11-24 | End: 2019-11-24

## 2019-11-24 RX ORDER — AMLODIPINE BESYLATE 2.5 MG/1
10 TABLET ORAL DAILY
Refills: 0 | Status: DISCONTINUED | OUTPATIENT
Start: 2019-11-24 | End: 2019-11-29

## 2019-11-24 RX ORDER — HYDRALAZINE HCL 50 MG
25 TABLET ORAL THREE TIMES A DAY
Refills: 0 | Status: DISCONTINUED | OUTPATIENT
Start: 2019-11-24 | End: 2019-11-29

## 2019-11-24 RX ORDER — ALLOPURINOL 300 MG
100 TABLET ORAL DAILY
Refills: 0 | Status: DISCONTINUED | OUTPATIENT
Start: 2019-11-24 | End: 2019-11-29

## 2019-11-24 RX ADMIN — Medication 150 MILLIGRAM(S): at 05:55

## 2019-11-24 RX ADMIN — AZITHROMYCIN 255 MILLIGRAM(S): 500 TABLET, FILM COATED ORAL at 00:30

## 2019-11-24 RX ADMIN — Medication 150 MILLIGRAM(S): at 17:23

## 2019-11-24 RX ADMIN — SODIUM CHLORIDE 2200 MILLILITER(S): 9 INJECTION INTRAMUSCULAR; INTRAVENOUS; SUBCUTANEOUS at 01:02

## 2019-11-24 RX ADMIN — AZITHROMYCIN 500 MILLIGRAM(S): 500 TABLET, FILM COATED ORAL at 01:13

## 2019-11-24 RX ADMIN — TAMSULOSIN HYDROCHLORIDE 0.4 MILLIGRAM(S): 0.4 CAPSULE ORAL at 22:12

## 2019-11-24 RX ADMIN — Medication 40 MILLIGRAM(S): at 04:16

## 2019-11-24 RX ADMIN — ALBUTEROL 2.5 MILLIGRAM(S): 90 AEROSOL, METERED ORAL at 00:29

## 2019-11-24 RX ADMIN — Medication 2 MILLIGRAM(S): at 22:22

## 2019-11-24 RX ADMIN — Medication 100 MILLIGRAM(S): at 13:10

## 2019-11-24 RX ADMIN — ENOXAPARIN SODIUM 30 MILLIGRAM(S): 100 INJECTION SUBCUTANEOUS at 13:10

## 2019-11-24 RX ADMIN — AZITHROMYCIN 500 MILLIGRAM(S): 500 TABLET, FILM COATED ORAL at 13:10

## 2019-11-24 RX ADMIN — ISOSORBIDE MONONITRATE 60 MILLIGRAM(S): 60 TABLET, EXTENDED RELEASE ORAL at 13:10

## 2019-11-24 RX ADMIN — Medication 40 MILLIGRAM(S): at 05:54

## 2019-11-24 RX ADMIN — CEFTRIAXONE 1000 MILLIGRAM(S): 500 INJECTION, POWDER, FOR SOLUTION INTRAMUSCULAR; INTRAVENOUS at 00:30

## 2019-11-24 RX ADMIN — Medication 200 MILLIGRAM(S): at 17:23

## 2019-11-24 RX ADMIN — Medication 2 MILLIGRAM(S): at 05:56

## 2019-11-24 RX ADMIN — Medication 25 MILLIGRAM(S): at 13:10

## 2019-11-24 RX ADMIN — BUPROPION HYDROCHLORIDE 300 MILLIGRAM(S): 150 TABLET, EXTENDED RELEASE ORAL at 13:10

## 2019-11-24 RX ADMIN — Medication 3 MILLILITER(S): at 13:46

## 2019-11-24 RX ADMIN — PANTOPRAZOLE SODIUM 40 MILLIGRAM(S): 20 TABLET, DELAYED RELEASE ORAL at 06:15

## 2019-11-24 RX ADMIN — AMLODIPINE BESYLATE 10 MILLIGRAM(S): 2.5 TABLET ORAL at 06:01

## 2019-11-24 RX ADMIN — Medication 3 MILLILITER(S): at 19:55

## 2019-11-24 RX ADMIN — Medication 3 MILLILITER(S): at 06:34

## 2019-11-24 RX ADMIN — Medication 0.5 MILLIGRAM(S): at 02:03

## 2019-11-24 RX ADMIN — Medication 25 MILLIGRAM(S): at 22:12

## 2019-11-24 RX ADMIN — Medication 650 MILLIGRAM(S): at 00:28

## 2019-11-24 RX ADMIN — Medication 25 MILLIGRAM(S): at 05:51

## 2019-11-24 RX ADMIN — Medication 200 MILLIGRAM(S): at 05:53

## 2019-11-24 RX ADMIN — Medication 125 MILLIGRAM(S): at 00:29

## 2019-11-24 RX ADMIN — Medication 2 MILLIGRAM(S): at 13:10

## 2019-11-24 NOTE — H&P ADULT - ASSESSMENT
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

## 2019-11-24 NOTE — H&P ADULT - PROBLEM SELECTOR PLAN 5
PT with increased Cr. from baseline (2.2 from 1.5) per chart review. Likely secondary to hypoperfusion/hypovolemia   Will hold off on fluids for now. S/p 2.2 L NS in ED  Continue to trend renal indices, avoid nephrotoxic agents PT with increased Cr.- unclear baseline- 1.5 back in 2018-  per chart review. Likely secondary to hypoperfusion/hypovolemia   Will hold off on fluids for now. S/p 2.2 L NS in ED  Continue to trend renal indices, avoid nephrotoxic agents

## 2019-11-24 NOTE — PROGRESS NOTE ADULT - SUBJECTIVE AND OBJECTIVE BOX
CHIEF COMPLAINT/INTERVAL HISTORY:  Pt. seen and evaluated for acute hypoxic respiratory failure and sepsis 2/2 pneumonia.  Pt. is awake and responsive.  Reports some improvement with his breathing.  Tolerating IV antibiotics and steroids.   +fever yesterday.      REVIEW OF SYSTEMS:  So far afebrile today.  Denies having any chest pain or abdominal pain.     Vital Signs Last 24 Hrs  T(C): 36.7 (2019 08:11), Max: 39.3 (2019 23:07)  T(F): 98 (2019 08:11), Max: 102.7 (2019 23:07)  HR: 74 (2019 08:11) (74 - 80)  BP: 121/66 (2019 08:11) (114/64 - 151/86)  BP(mean): --  RR: 24 (2019 08:11) (20 - 28)  SpO2: 98% (2019 06:45) (93% - 100%)    PHYSICAL EXAM:  GENERAL: NAD  HEENT: EOMI, hearing normal, conjunctiva and sclera clear  Chest: mild crackles at bases, no wheezing  CV: S1S2, RRR,   GI: soft, +BS, NT/ND  Musculoskeletal: 1+ LE edema  Psychiatric: affect nL, mood nL  Skin: warm and dry    LABS:                        9.9    17.24 )-----------( 132      ( 2019 08:37 )             30.0     11    133<L>  |  99  |  38<H>  ----------------------------<  201<H>  4.9   |  26  |  2.30<H>    Ca    8.5      2019 08:37    TPro  6.6  /  Alb  3.1<L>  /  TBili  0.7  /  DBili  x   /  AST  35  /  ALT  28  /  AlkPhos  33<L>  1123    PT/INR - ( 2019 23:23 )   PT: 16.1 sec;   INR: 1.40 ratio         PTT - ( 2019 23:23 )  PTT:21.6 sec  Urinalysis Basic - ( 2019 23:23 )    Color: Yellow / Appearance: Slightly Turbid / S.005 / pH: x  Gluc: x / Ketone: Negative  / Bili: Negative / Urobili: Negative   Blood: x / Protein: 75 mg/dL / Nitrite: Negative   Leuk Esterase: Moderate / RBC: 0-2 /HPF / WBC 11-25   Sq Epi: x / Non Sq Epi: Few / Bacteria: Moderate        Assessment and Plan:  -Acute hypoxic respiratory failure and sepsis 2/2 pneumonia and COPD exacerbation:  continue Azithromycin and Ceftriaxone.  Continue solu-medrol 40mg IV daily, Duoneb tx Q6h, and O2 support.  Obtain CT chest.  Check blood cx.  Pulmonary consult.   -Acute on chronic diastolic CHF exacerbation:  s/p Lasix 40mg IV once in ED.  Continue Lasix 40mg PO daily, hydralazine 25mg PO TID, Labetalol 200mg PO BID, and Imdur 60mg PO daily.  Check echo.  Cardiology consult  -UTI:  Continue IV Ceftriaxone.  Check urine cx.   -Hyponatremia:  improving.  Will continue to monitor.    -Acute kidney injury:  Will continue to monitor while on diuretic.  Check kidney and bladder US.   Nephrology consult  -Anxiety d/o:  continue Xanax 2mg PO TID and Effexor XR 150mg PO BID  -HTN:  continue Norvasc 10mg PO daily, Hydralazine 25mg PO TID, and Labetalol 200mg PO BID  -GERD:  continue Protonix 40mg PO daily  -BPH:  continue Flomax 0.4mg PO QHS  -VTE ppx: Lovenox 30mg SQ daily

## 2019-11-24 NOTE — H&P ADULT - NSICDXPASTMEDICALHX_GEN_ALL_CORE_FT
PAST MEDICAL HISTORY:  BPH with obstruction/lower urinary tract symptoms     Congestive heart failure     Diabetes mellitus     GERD (gastroesophageal reflux disease)     Hyperlipidemia     Hypertension     Pulmonary fibrosis     Pulmonary fibrosis     Smoker     Vitamin D deficiency PAST MEDICAL HISTORY:  BPH with obstruction/lower urinary tract symptoms     Congestive heart failure     COPD, severe     Diabetes mellitus     GERD (gastroesophageal reflux disease)     Hyperlipidemia     Hypertension     Pulmonary fibrosis     Pulmonary fibrosis     Smoker     Vitamin D deficiency

## 2019-11-24 NOTE — H&P ADULT - HISTORY OF PRESENT ILLNESS
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      In ED Pt received one dose rocephin, one dose azithromycin, 125 mg IV solumedrol x1, duonebs x1  Vitals: T 102.7, HR 78, RR 22, /63, SpO2 93  Labs: WBC 17.94, Hb 10.8, Na 130, Cr 2.2 (baseline around 1.4), procal 1.17, proBNP 3487, lactate 1.9  EKG  UA: slightly turbid, moderate leuk esterase, small blood, moderate bacteria   Imaging  CXR: increased congestion compared to prior CXR in feb 2019, no consolidation appreciated, official read pending 78yo M with PMH of chronic diastolic CHF (normal LVF 2016), non obstructive CAD (cardiac cath 2015), BPH, interstitial lung disease, COPD (on 4L home O2), HTN, HLD, 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 Hx obtained from son and patient. Per son, pt was in his usual state of health until this morning, when his home health aide noticed worsened SOB and weakness. She had difficulty moving him and lifting him from his chair. son states that the pt chronically has SOB and difficulty moving, but that it has worsened today. He has a chronic cough from COPD/Interstitial lung disease. Son notes no change in the cough or increased sputum production since his sx began. Pt notes increased urinary frequency over the past few weeks, but denies dysuria or  hematuria. Pt was afebrile at home on son's home thermometer. Pt complains that he is thirsty and wants water. Pt denies CP, palpitations, NVD, melena, hematochezia, numbness, tingling.       In ED Pt received one dose rocephin, one dose azithromycin, 125 mg IV solumedrol x1, duonebs x1, 2.2L NS bolus  Vitals: T 102.7, HR 78, RR 22, /63, SpO2 93  Labs: WBC 17.94, Hb 10.8, Na 130, Cr 2.2 (baseline around 1.4), procal 1.17, proBNP 3487, lactate 1.9  EKG sinus rhythm w first degree AV block, no acute ischemic changes noted   UA: slightly turbid, moderate leuk esterase, small blood, moderate bacteria   Imaging  CXR: increased congestion compared to prior CXR in feb 2019, no consolidation appreciated, official read pending

## 2019-11-24 NOTE — H&P ADULT - NSHPPHYSICALEXAM_GEN_ALL_CORE
T(C): 39.3 (11-23-19 @ 23:07), Max: 39.3 (11-23-19 @ 23:07)  HR: 78 (11-24-19 @ 00:32) (76 - 80)  BP: 122/63 (11-24-19 @ 00:32) (122/63 - 146/88)  RR: 22 (11-24-19 @ 00:32) (20 - 22)  SpO2: 93% (11-24-19 @ 00:32) (93% - 100%)    GENERAL: patient appears well, no acute distress, appropriate  EYES: sclera clear, no exudates, PERRLA  ENMT: oropharynx clear without erythema, no exudates, moist mucous membranes  NECK: supple, soft, no thyromegaly noted  LUNGS:  clear to auscultation,  no rales, wheezing or rhonchi appreciated  HEART: S1/S2, regular rate and rhythm, no murmurs noted, no lower extremity edema, pulses  GASTROINTESTINAL: abdomen is soft, nontender, nondistended, normoactive bowel sounds, no palpable masses  INTEGUMENT: good skin turgor, warm, dry and intact  MUSCULOSKELETAL: no clubbing or cyanosis, no obvious deformity  NEUROLOGIC: awake, alert, oriented x3, strength no obvious sensory deficits  PSYCHIATRIC: mood is good, affect is congruent, linear and logical thought process  HEME/LYMPH:  no obvious ecchymosis or petechiae T(C): 39.3 (11-23-19 @ 23:07), Max: 39.3 (11-23-19 @ 23:07)  HR: 78 (11-24-19 @ 00:32) (76 - 80)  BP: 122/63 (11-24-19 @ 00:32) (122/63 - 146/88)  RR: 22 (11-24-19 @ 00:32) (20 - 22)  SpO2: 93% (11-24-19 @ 00:32) (93% - 100%)    GENERAL: patient with labored breathing, AAOx2  EYES: sclera clear, no exudates, PERRLA  ENMT: oropharynx clear without erythema, no exudates, dry mucous membranes  NECK: supple, soft, no thyromegaly noted  LUNGS:  markedly rhonchorous breath sounds on auscultation b/l   HEART: S1/S2, regular rate and rhythm, no murmurs noted, 2-3+ pitting edema  GASTROINTESTINAL: abdomen is soft, nontender, mildly distended, normoactive bowel sounds, no palpable masses  INTEGUMENT: good skin turgor, warm, dry and intact  MUSCULOSKELETAL: no clubbing or cyanosis, no obvious deformity  NEUROLOGIC: awake, alert, oriented x2, no obvious sensory deficits  PSYCHIATRIC: mood is good, affect is congruent, linear and logical thought process

## 2019-11-24 NOTE — H&P ADULT - NSICDXFAMILYHX_GEN_ALL_CORE_FT
FAMILY HISTORY:  Family history of lymphoma    Grandparent  Still living? No  Family history of heart disease, Age at diagnosis: Age Unknown

## 2019-11-24 NOTE — ED ADULT NURSE REASSESSMENT NOTE - NS ED NURSE REASSESS COMMENT FT1
0115 Pt with difficulty breathing, lung fields with coarse breath sounds. On O2 3 liters via nasal cannula & in progress. Cardiopulmonary monitor on O2 Sat 96%. Abdomen distended, pedal edema. Close monitoring maintained. safety/fall precautions observed.
0130  Pt awake & oriented to all stimuli. Pt talking but in broken sentences, son present at bedside. questions & concerns answered.
0205 Pulmicort nebulizer tx in progress as prescribed. Continuous close monitoring maintained.
0300 Pt awake & alert, provided hospital bed for comfort,  assisted by charge nurse & tech.  Safety precautions maintained. Comfort care done, all needs met. Vital signs continued closely, O2 sat 98%
0416 Pt still has difficulty breathing with use of accessory muscles .Lasix 40 mg ivp administered as prescribed. Monitored for effects. Continued to monitor as per protocol.
0500 Vital signs charted O2 Sat 96%. Noted pt urinated very soaked & heavy pads. Am care provided, with assistance, cleaned & made comfortable.
0720 Hand off report to day RN Ms Pittman, pt resting, remained on all monitors. Safety measures maintained.
[id number] Duoneb started & in progress. Pt still has coarse breath sounds, charge RN present & aware, obtained order for high Flow O2, Respiratory Team came & applied humidified High Flow O2 Vapotherm therapy machine. Explanation provided by Respiratory Therapist to pt & RN reinforce to patient,  need for this therapy. Pt closely monitored.
[id number] Pt awake & requested water. provided same & AM meds administered, pt able to swallow meds without difficulty.
Pt received in report alert and oriented and resting in bed. Pt maintained on high O2 on Vapotherm high O2. Pt stable and admitted and awaiting monitored bed. Nursing care ongoing and safety maintained.

## 2019-11-24 NOTE — H&P ADULT - PROBLEM SELECTOR PLAN 2
Pt w significantly rhonchorus breath sounds on auscultation- CXR shows increased congestion compared to prior CXR in feb 2019.   BNP 3487 on admission  1 dose IV lasix 40 pt presenting with acute hypoxic respiratory failure - 2/2 pna with component of decompensated heart failure. last known echo 2016    BNP 3487 on admission  hold off on further IVF   check repeat echo  give lasix 40 IV x 1. resume lasix 40 po QD tomorrow

## 2019-11-24 NOTE — CONSULT NOTE ADULT - SUBJECTIVE AND OBJECTIVE BOX
[facility] - Franco Mcallister, Benitez, Josse, Skylar, Robert Motta  Office Number: [phone]    Initial Consult Note    CHIEF COMPLAINT: Patient is a 79y old  Male who presents with a chief complaint of fever       HPI:  76yo M with PMH of chronic diastolic CHF (normal LVF 2016), non obstructive CAD (cardiac cath 2015), BPH, interstitial lung disease, COPD (on 4L home O2), HTN, HLD, 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 Hx obtained from son and patient. Per son, pt was in his usual state of health until this morning, when his home health aide noticed worsened SOB and weakness. She had difficulty moving him and lifting him from his chair. son states that the pt chronically has SOB and difficulty moving, but that it has worsened today. He has a chronic cough from COPD/Interstitial lung disease. Son notes no change in the cough or increased sputum production since his sx began. Pt notes increased urinary frequency over the past few weeks, but denies dysuria or  hematuria. Pt was afebrile at home on son's home thermometer. Pt complains that he is thirsty and wants water. Pt denies CP, palpitations, NVD, melena, hematochezia, numbness, tingling.       In ED Pt received one dose rocephin, one dose azithromycin, 125 mg IV solumedrol x1, duonebs x1, 2.2L NS bolus  Vitals: T 102.7, HR 78, RR 22, /63, SpO2 93  Labs: WBC 17.94, Hb 10.8, Na 130, Cr 2.2 (baseline around 1.4), procal 1.17, proBNP 3487, lactate 1.9  EKG sinus rhythm w first degree AV block, no acute ischemic changes noted   UA: slightly turbid, moderate leuk esterase, small blood, moderate bacteria   Imaging  CXR: increased congestion compared to prior CXR in feb 2019, no consolidation appreciated, official read pending     Patient had a fever yesterday. He reports that he came in primarily with weakness. He has been coughing.  No chest pain or PND.  He has increased LLE edema.  He reports that he is improving with abx, but he also got IV Lasix in the ED.      PAST MEDICAL & SURGICAL HISTORY:  COPD, severe  Congestive heart failure  Pulmonary fibrosis  Pulmonary fibrosis  Smoker  BPH with obstruction/lower urinary tract symptoms  Vitamin D deficiency  GERD (gastroesophageal reflux disease)  Diabetes mellitus  Hyperlipidemia  Hypertension  S/P tonsillectomy      SOCIAL HISTORY:  No tobacco, ethanol, or drug abuse.    FAMILY HISTORY:  Family history of heart disease (Grandparent)  Family history of lymphoma    No family history of acute MI or sudden cardiac death.    MEDICATIONS  (STANDING):  albuterol/ipratropium for Nebulization 3 milliLiter(s) Nebulizer every 6 hours  allopurinol 100 milliGRAM(s) Oral daily  ALPRAZolam 2 milliGRAM(s) Oral three times a day  amLODIPine   Tablet 10 milliGRAM(s) Oral daily  asenapine SL 10 milliGRAM(s) SubLingual <User Schedule>  azithromycin   Tablet 500 milliGRAM(s) Oral daily  buPROPion XL . 300 milliGRAM(s) Oral daily  cefTRIAXone   IVPB 1000 milliGRAM(s) IV Intermittent every 24 hours  enoxaparin Injectable 30 milliGRAM(s) SubCutaneous daily  furosemide    Tablet 40 milliGRAM(s) Oral daily  hydrALAZINE 25 milliGRAM(s) Oral three times a day  isosorbide   mononitrate ER Tablet (IMDUR) 60 milliGRAM(s) Oral daily  labetalol 200 milliGRAM(s) Oral two times a day  methylPREDNISolone sodium succinate Injectable 40 milliGRAM(s) IV Push daily  pantoprazole    Tablet 40 milliGRAM(s) Oral before breakfast  tamsulosin 0.4 milliGRAM(s) Oral at bedtime  venlafaxine XR. 150 milliGRAM(s) Oral two times a day    MEDICATIONS  (PRN):  acetaminophen   Tablet .. 650 milliGRAM(s) Oral every 6 hours PRN Temp greater or equal to 38C (100.4F)      Allergies    No Known Allergies             REVIEW OF SYSTEMS:       All other review of systems is negative unless indicated above    VITAL SIGNS:   Vital Signs Last 24 Hrs  T(C): 36.7 (24 Nov 2019 13:12), Max: 39.3 (23 Nov 2019 23:07)  T(F): 98.1 (24 Nov 2019 13:12), Max: 102.7 (23 Nov 2019 23:07)  HR: 76 (24 Nov 2019 13:12) (74 - 80)  BP: 142/77 (24 Nov 2019 13:12) (114/64 - 151/86)  BP(mean): --  RR: 22 (24 Nov 2019 13:12) (20 - 28)  SpO2: 95% (24 Nov 2019 13:12) (93% - 100%)    I&O's Summary      On Exam:    Constitutional: NAD, alert and oriented x 3  Lungs:  Non-labored, breath sounds are clear bilaterally.  Coarse bs b/l  Cardiovascular: RRR.  S1 and S2 positive.  No murmurs, rubs, gallops or clicks  Gastrointestinal: Bowel Sounds present, soft, nontender.  Obese  Lymph: No peripheral edema. No cervical lymphadenopathy.  LLE edema  Neurological: Alert, no focal deficits  Skin: No rashes or ulcers   Psych:  Mood & affect appropriate.    LABS: All Labs Reviewed:                        9.9    17.24 )-----------( 132      ( 24 Nov 2019 08:37 )             30.0                         10.8   17.94 )-----------( 136      ( 23 Nov 2019 23:23 )             32.7     24 Nov 2019 08:37    133    |  99     |  38     ----------------------------<  201    4.9     |  26     |  2.30   23 Nov 2019 23:23    130    |  96     |  38     ----------------------------<  112    5.0     |  25     |  2.20     Ca    8.5        24 Nov 2019 08:37  Ca    8.3        23 Nov 2019 23:23    TPro  6.6    /  Alb  3.1    /  TBili  0.7    /  DBili  x      /  AST  35     /  ALT  28     /  AlkPhos  33     23 Nov 2019 23:23    PT/INR - ( 23 Nov 2019 23:23 )   PT: 16.1 sec;   INR: 1.40 ratio         PTT - ( 23 Nov 2019 23:23 )  PTT:21.6 sec      Blood Culture:   11-24 @ 00:10  Pro Bnp 3487        RADIOLOGY:  < from: Xray Chest 1 View-PORTABLE IMMEDIATE (11.23.19 @ 23:41) >    EXAM:  XR CHEST PORTABLE IMMED 1V                            PROCEDURE DATE:  11/23/2019          INTERPRETATION:  HISTORY: ; ; sob;  TECHNIQUE: Portable frontal view of the chest, 1 view.  COMPARISON: 2/25/2019.  FINDINGS:     HEART:  Enlarged  LUNGS: Bilateral patchy infiltrates.    OSSEOUS STRUCTURES:: degenerative changes    IMPRESSION:     Cardiomegaly.    Bilateral patchy infiltrates. Common differential includes pulmonary   edema versus pneumonia.                CAROL GALEANO M.D., ATTENDING RADIOLOGIST  This document has been electronically signed. Nov 24 2019  8:08AM        < end of copied text >      EKG: nSR.  First degree AVB.  PACs.

## 2019-11-24 NOTE — CONSULT NOTE ADULT - SUBJECTIVE AND OBJECTIVE BOX
Patient is a 79y old  Male who presents with a chief complaint of fever (2019 13:38)    HPI:  76yo M with PMH of chronic diastolic CHF (normal LVF ), non obstructive CAD (cardiac cath ), BPH, interstitial lung disease, COPD (on 4L home O2), HTN, HLD, 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 Hx obtained from son and patient. Per son, pt was in his usual state of health until this morning, when his home health aide noticed worsened SOB and weakness. She had difficulty moving him and lifting him from his chair. son states that the pt chronically has SOB and difficulty moving, but that it has worsened today. He has a chronic cough from COPD/Interstitial lung disease. Son notes no change in the cough or increased sputum production since his sx began. Pt notes increased urinary frequency over the past few weeks, but denies dysuria or  hematuria. Pt was afebrile at home on son's home thermometer. Pt complains that he is thirsty and wants water. Pt denies CP, palpitations, NVD, melena, hematochezia, numbness, tingling.       In ED Pt received one dose rocephin, one dose azithromycin, 125 mg IV solumedrol x1, duonebs x1, 2.2L NS bolus  Vitals: T 102.7, HR 78, RR 22, /63, SpO2 93  Labs: WBC 17.94, Hb 10.8, Na 130, Cr 2.2 (baseline around 1.4), procal 1.17, proBNP 3487, lactate 1.9  EKG sinus rhythm w first degree AV block, no acute ischemic changes noted   UA: slightly turbid, moderate leuk esterase, small blood, moderate bacteria   Imaging  CXR: increased congestion compared to prior CXR in 2019, no consolidation appreciated, official read pending (2019 00:49)    Renal consult called for chronic kidney disease stage 4. History obtained from chart and patient.       PAST MEDICAL HISTORY:  COPD, severe  Congestive heart failure  Pulmonary fibrosis  Pulmonary fibrosis  Smoker  BPH with obstruction/lower urinary tract symptoms  Vitamin D deficiency  GERD (gastroesophageal reflux disease)  Diabetes mellitus  Hyperlipidemia  Hypertension      PAST SURGICAL HISTORY:  S/P tonsillectomy  No significant past surgical history      FAMILY HISTORY:  Family history of heart disease (Grandparent)  Family history of lymphoma      SOCIAL HISTORY: No smoking or alcohol use     Allergies    No Known Allergies    Intolerances      Home Medications:  amLODIPine 10 mg oral tablet: 1 tab(s) orally once a day (2019 02:)  isosorbide mononitrate 60 mg oral tablet, extended release: 1 tab(s) orally once a day (in the morning) (2019 02:)  labetalol 200 mg oral tablet: 1 tab(s) orally 2 times a day (2019 02:)  Lasix 40 mg oral tablet: 1 tab(s) orally once a day (2019 02:)  Saphris 10 mg sublingual tablet: 1 tab(s) sublingual once a day (at bedtime) (2019 02:)  tamsulosin 0.4 mg oral capsule: 1 cap(s) orally once a day (2019 02:)  venlafaxine extended release: 150 milligram(s) orally 2 times a day (2019 02:)  Wellbutrin  mg/24 hours oral tablet, extended release: 1 tab(s) orally every 24 hours (2019 02:)  Xanax 2 mg oral tablet: 1 tab(s) orally 3 times a day, As Needed (2019 02:)    MEDICATIONS  (STANDING):  albuterol/ipratropium for Nebulization 3 milliLiter(s) Nebulizer every 6 hours  allopurinol 100 milliGRAM(s) Oral daily  ALPRAZolam 2 milliGRAM(s) Oral three times a day  amLODIPine   Tablet 10 milliGRAM(s) Oral daily  asenapine SL 10 milliGRAM(s) SubLingual <User Schedule>  azithromycin   Tablet 500 milliGRAM(s) Oral daily  buPROPion XL . 300 milliGRAM(s) Oral daily  cefTRIAXone   IVPB 1000 milliGRAM(s) IV Intermittent every 24 hours  enoxaparin Injectable 30 milliGRAM(s) SubCutaneous daily  furosemide    Tablet 40 milliGRAM(s) Oral daily  hydrALAZINE 25 milliGRAM(s) Oral three times a day  isosorbide   mononitrate ER Tablet (IMDUR) 60 milliGRAM(s) Oral daily  labetalol 200 milliGRAM(s) Oral two times a day  methylPREDNISolone sodium succinate Injectable 40 milliGRAM(s) IV Push daily  pantoprazole    Tablet 40 milliGRAM(s) Oral before breakfast  tamsulosin 0.4 milliGRAM(s) Oral at bedtime  venlafaxine XR. 150 milliGRAM(s) Oral two times a day    MEDICATIONS  (PRN):  acetaminophen   Tablet .. 650 milliGRAM(s) Oral every 6 hours PRN Temp greater or equal to 38C (100.4F)      REVIEW OF SYSTEMS:  General: weak  Respiratory: + SOB  Cardiovascular: No CP or Palpitations	  Gastrointestinal: No nausea, Vomiting. No diarrhea  Genitourinary: No urinary complaints	  Musculoskeletal: No new rash or lesions	  all other systems negative    T(F): 98.1 (19 @ 13:12), Max: 102.7 (19 @ 23:07)  HR: 74 (19 @ 13:48) (74 - 80)  BP: 142/77 (19 @ 13:12) (114/64 - 151/86)  RR: 22 (19 @ 13:12) (20 - 28)  SpO2: 94% (19 @ 13:48) (93% - 100%)  Wt(kg): --    PHYSICAL EXAM:  General: NAD  Respiratory: b/l air entry  Cardiovascular: S1 S2  Gastrointestinal: soft  Extremities: edema            133<L>  |  99  |  38<H>  ----------------------------<  201<H>  4.9   |  26  |  2.30<H>    Ca    8.5      2019 08:37    TPro  6.6  /  Alb  3.1<L>  /  TBili  0.7  /  DBili  x   /  AST  35  /  ALT  28  /  AlkPhos  33<L>                            9.9    17.24 )-----------( 132      ( 2019 08:37 )             30.0       Hemoglobin: 9.9 g/dL ( @ 08:37)  Hematocrit: 30.0 % ( @ 08:37)  Potassium, Serum: 4.9 mmol/L ( @ 08:37)  Blood Urea Nitrogen, Serum: 38 mg/dL ( @ 08:37)      Creatinine, Serum: 2.30 ( @ 08:37)  Creatinine, Serum: 2.20 ( @ 23:23)      Urinalysis Basic - ( 2019 23:23 )    Color: Yellow / Appearance: Slightly Turbid / S.005 / pH: x  Gluc: x / Ketone: Negative  / Bili: Negative / Urobili: Negative   Blood: x / Protein: 75 mg/dL / Nitrite: Negative   Leuk Esterase: Moderate / RBC: 0-2 /HPF / WBC 11-25   Sq Epi: x / Non Sq Epi: Few / Bacteria: Moderate      LIVER FUNCTIONS - ( 2019 23:23 )  Alb: 3.1 g/dL / Pro: 6.6 g/dL / ALK PHOS: 33 U/L / ALT: 28 U/L / AST: 35 U/L / GGT: x                     ABG - ( 2019 03:39 )  pH, Arterial: 7.42  pH, Blood: x     /  pCO2: 36    /  pO2: 99    / HCO3: 24    / Base Excess: -0.7  /  SaO2: 98          < from: Xray Chest 1 View-PORTABLE IMMEDIATE (19 @ 23:41) >    EXAM:  XR CHEST PORTABLE IMMED 1V                            PROCEDURE DATE:  2019          INTERPRETATION:  HISTORY: ; ; sob;  TECHNIQUE: Portable frontal view of the chest, 1 view.  COMPARISON: 2019.  FINDINGS:     HEART:  Enlarged  LUNGS: Bilateral patchy infiltrates.    OSSEOUS STRUCTURES:: degenerative changes    IMPRESSION:     Cardiomegaly.    Bilateral patchy infiltrates. Common differential includes pulmonary   edema versus pneumonia.        CAROL GALEANO M.D., ATTENDING RADIOLOGIST  This document has been electronically signed. 2019  8:08AM                < end of copied text >

## 2019-11-24 NOTE — H&P ADULT - PROBLEM SELECTOR PLAN 7
/63 on admission  Restarted on home dose amlodipine, hydralazine, IMDUR, labetalol for BP management

## 2019-11-24 NOTE — H&P ADULT - PROBLEM SELECTOR PLAN 6
Chronic, stable  protonix ppx Pt w history of anxiety, controlled on xanax and venlafaxine  At risk for withdrawal as pt takes high dose. Pt w history of anxiety, controlled on xanax and venlafaxine  At risk for withdrawal as pt takes high dose.  continue xanax 2mg TID with holds for lethargy or sedation

## 2019-11-24 NOTE — CONSULT NOTE ADULT - ASSESSMENT
76yo M with PMH of chronic diastolic CHF (normal LVF 2016), non obstructive CAD (cardiac cath 2015), BPH, interstitial lung disease, COPD (on 4L home O2), HTN, HLD, 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     - His symptoms seem more pulmonary/infectious in etiology.  He had fever, and is improving with abx, steroids, and nebs  - He got IV Lasix yesterday.  I would continue PO Lasix for now.  Will assess daily and dose IV Lasix if needed  - Continue with supplemental oxygen  - NO need to repeat echocardiogram  Can be done as outpatient  - Continue amlodipine, hydralazine, Imdur, and labetalol as written  - Monitor and replete lytes  - No evidence of acute ischemia  - No evidence of uncontrolled arrhythmia  - To follow while admitted.

## 2019-11-24 NOTE — H&P ADULT - NSHPLABSRESULTS_GEN_ALL_CORE
10.8   17.94 )-----------( 136      ( 2019 23:23 )             32.7     2019 23:23    130    |  96     |  38     ----------------------------<  112    5.0     |  25     |  2.20     Ca    8.3        2019 23:23    TPro  6.6    /  Alb  3.1    /  TBili  0.7    /  DBili  x      /  AST  35     /  ALT  28     /  AlkPhos  33     2019 23:23    LIVER FUNCTIONS - ( 2019 23:23 )  Alb: 3.1 g/dL / Pro: 6.6 g/dL / ALK PHOS: 33 U/L / ALT: 28 U/L / AST: 35 U/L / GGT: x           PT/INR - ( 2019 23:23 )   PT: 16.1 sec;   INR: 1.40 ratio         PTT - ( 2019 23:23 )  PTT:21.6 sec  CAPILLARY BLOOD GLUCOSE            Urinalysis Basic - ( 2019 23:23 )    Color: Yellow / Appearance: Slightly Turbid / S.005 / pH: x  Gluc: x / Ketone: Negative  / Bili: Negative / Urobili: Negative   Blood: x / Protein: 75 mg/dL / Nitrite: Negative   Leuk Esterase: Moderate / RBC: 0-2 /HPF / WBC 11-25   Sq Epi: x / Non Sq Epi: Few / Bacteria: Moderate

## 2019-11-24 NOTE — H&P ADULT - PROBLEM SELECTOR PLAN 3
UA shows moderate leuk esterase, mod bacteria, small blood, slightly turbid. Pt without urinary symptoms at this time. UA shows moderate leuk esterase, mod bacteria, small blood, slightly turbid. Pt with complaint of frequency- attributed to BPH - denies hematuria, dysuria.   on rocephin for pna  f/u culture

## 2019-11-24 NOTE — H&P ADULT - NSHPSOCIALHISTORY_GEN_ALL_CORE
Lives with home health aide  Mostly bed bound  former smoker, 60 pack years, quit 2 years ago  No alcohol or drug use

## 2019-11-24 NOTE — H&P ADULT - PROBLEM SELECTOR PLAN 4
Pt w Na 130 on admission, likely dilutional and secondary to increased free water intake  S/p 2.2L NS in ED  Will continue to trend Pt w Na 130 on admission, likely 2/2 poor po intake and increased free water intake  S/p 2.2L NS in ED  f.u am bmp

## 2019-11-24 NOTE — H&P ADULT - PROBLEM SELECTOR PLAN 1
Admit to medicine  Pt febrile on admission (T102.7) with significant leukocytosis (17.94). Likely secondary to CAP vs. CHF exacerbation vs COPD exacerbation  Duonebs PRN q6 for SOB  S/p 1 dose rocephin 1 dose azithromycin in ED. Will start pt on --- for pna coverage  Will hold off on IV fluids for now. Pt seems fluid overloaded on examination  IV lasix 40  Will follow up AM CBC, BMP Admit to tele  Pt febrile on admission (T102.7) with significant leukocytosis (17.94). Likely secondary to CAP vs. CHF exacerbation vs COPD exacerbation  Duonebs PRN q6 for SOB  S/p 1 dose rocephin 1 dose azithromycin in ED. Will start pt on --- for pna coverage  Will hold off on IV fluids for now. Pt seems fluid overloaded on examination  IV lasix 40  Will follow up AM CBC, BMP Pt febrile on admission (T102.7) with significant leukocytosis (17.94).   acute hypoxic respiratory failure, likley multifactorial- sepsis 2/2 pna with component of copd exacerbation and hypervolemia   check stat ABG  blood cultures drawn , f/u results  procalcitonin elevated. RSV/Flu Neg.  initial lactate 1.9. s/p 2L NS. no further IVF due to hypervolemia/h/o CHF  S/p 1 dose rocephin 1 dose azithromycin in ED. continue  s/p solumedrol 125mg x 1, continue with solumedrol 40 QD  duonebs q 6 hours  check urine legionella, urine strep   am cbc, cmp  follow up AM CBC, BMP  O2 support via NC. maintain sat >88%  consult Pulm Dr. Banuelos

## 2019-11-24 NOTE — CONSULT NOTE ADULT - SUBJECTIVE AND OBJECTIVE BOX
BONG HERNANDEZ [facility] P [phone]   1940 DOA 2019 DR EULALIA AVILA   ALLERGY      nka   CONTACT      Dequan BAUER [phone] macho BAUER          Initial evaluation/Pulmonary Critical Care consultation requested on   2019  by Dr Carreno   from Dr [place]/Dr Victoria    Patient examined chart reviewed    HOSPITAL ADMISSION   PATIENT CAME  FROM (if information available)      PATIENT BONG HERNANDEZ [facility] P [phone]   1940 DOA 2019 DR EULALIA AVILA                        PT DESCRIPTION       This section was excerpted from ER md note but was independently verified by me    · Chief Complaint: The patient is a 79y Male complaining of shortness of breath.  · Unable to Obtain: Urgent need for Intervention  · HPI Objective Statement: sob x 3 days.  no chest pain.  pmd- L Rachelle.    PAST MEDICAL/SURGICAL/FAMILY/SOCIAL HISTORY:    Past Medical History:  BPH with obstruction/lower urinary tract symptoms    Congestive heart failure    Diabetes mellitus    GERD (gastroesophageal reflux disease)    Hyperlipidemia    Hypertension    Pulmonary fibrosis    Pulmonary fibrosis    Smoker    Vitamin D deficiency.     Past Surgical History:  S/P tonsillectomy.     Tobacco Usage:  · Tobacco Usage Former smoker    ALLERGIES AND HOME MEDICATIONS:   Allergies:        Allergies:  No Known Allergies:     Home Medications:   * Patient Currently Takes Medications as of 2018 09:11 documented in Structured Notes  · predniSONE 10 mg oral tablet: 2 tabs daily x 1 day, then  1 tab daily x 3 days, then STOP.  · oseltamivir 75 mg oral capsule: 1 cap(s) orally 2 times a day  · hydrALAZINE 25 mg oral tablet: 3 tab(s) orally 3 times a day   · budesonide 0.5 mg/2 mL inhalation suspension: 0.5 milligram(s) inhaled 2 times a day   · Protonix 40 mg oral delayed release tablet: 1 tab(s) orally once a day (before a meal)  · senna oral tablet: 2 tab(s) orally once a day (at bedtime)  · polyethylene glycol 3350 oral powder for reconstitution: 17 gram(s) orally once a day  · docusate sodium 100 mg oral capsule: 1 cap(s) orally 2 times a day  · guaiFENesin 600 mg oral tablet, extended release: 1 tab(s) orally every 12 hours, As Needed  · allopurinol 100 mg oral tablet: 1 tab(s) orally once a day  · albuterol 2.5 mg/3 mL (0.083%) inhalation solution: 1 dose(s) inhaled every 8 hours, As Needed  · amLODIPine 10 mg oral tablet: 1 tab(s) orally once a day  · venlafaxine extended release: 375 milligram(s) orally once a day  · labetalol 200 mg oral tablet: 1 tab(s) orally 3 times a day  · Xanax 2 mg oral tablet: 1 tab(s) orally 3 times a day, As Needed  · isosorbide mononitrate 60 mg oral tablet, extended release: 1 tab(s) orally once a day (in the morning)  · Wellbutrin  mg/24 hours oral tablet, extended release: 1 tab(s) orally every 24 hours  · aspirin 81 mg oral tablet: 1 tab(s) orally once a day  · Saphris 10 mg sublingual tablet: 1 tab(s) sublingual once a day (at bedtime)    REVIEW OF SYSTEMS:    Review of Systems:  · UNABLE TO OBTAIN: Severe Illness/Injury    VITAL SIGNS( Pullset):    ,,ED ADULT Flow Sheet:    2019 22:16  · Temp (F): 97.9  · Temp (C) Temp (C): 36.6  · Temp site Temp Site: oral  · Heart Rate Heart Rate (beats/min): 76  · BP Systolic Systolic: 146  · BP Diastolic Diastolic (mm Hg): 88  · Respiration Rate (breaths/min) Respiration Rate (breaths/min): 20  · SpO2 (%) SpO2 (%): 100  · O2 delivery Patient On: supplemental O2  · Oxygen Therapy Flow (L/min) Oxygen Flow (L/min): 10  · Oxygen Therapy: Delivery Method Delivery Method: nonrebreather mask  · How was the weight captured? Weight Type/Method: stated  · Dosing Weight (KILOGRAMS) Dosing Weight (KILOGRAMS): 106.6  · Dosing Weight  (POUNDS) Dosing Weight (POUNDS): 235  · Height type Height Type: stated  · Height (FEET) Height (FEET): 5  · Height (INCHES) Height (INCHES): 9  · Height (CENTIMETERS) Height (CENTIMETERS): 175.26  · BSA (m2): 2.21  · BMI (kG/m2) BMI (kG/m2): 34.7  · Ideal Body Weight(kg) Ideal Body Weight(kg): 71  · Presence of Pain: denies pain/discomfort  · Pain Rating (0-10): Rest: 0  · Pain Rating (0-10): Activity: 0  · Peripheral IV Insertion Date: 2019  · Inserted by: EMS  · Peripheral IV Location: Left:; basilic vein  · Device Device: Angiocath  · Peripheral IV Gauge/Length: 18 gauge  · IV Device Number of Insertion Attempts: 1  · IV Device Patient Tolerance: Insertion: tolerated well  · IV Device Dressing/Securement: dressing dry and intact; transparent semipermeable dressing applied; catheter securement device utilized  · SpO2 (%) SpO2 (%): 100  · O2 delivery Patient On: supplemental O2  · Oxygen Therapy: Delivery Method Delivery Method: nonrebreather mask  · Preferred Language to Address Healthcare Preferred Language to Address Healthcare: English    22:19  · Presence of Pain: denies pain/discomfort  · Pain Rating (0-10): Rest: 0  · Pain Rating (0-10): Activity: 0  · Preferred Language to Address Healthcare Preferred Language to Address Healthcare: English    22:40  · Specimen Drawn: Blood; Blood Culture(s); CBC; Lactate; set 1  · Specimen Drawn Date/Time: 2019 22:40  · Method: Peripheral IV  · Location: right metacarpal vein (top of hand)  · Attempts: 1    23:07  · Temp (F): 102.7  · Temp (C) Temp (C): 39.3  · Temp site Temp Site: rectal  · Heart Rate Heart Rate (beats/min): 80  · Heart Rate Method Method: cardiac monitor  · BP Systolic Systolic: 138  · BP Diastolic Diastolic (mm Hg): 65  · Respiration Rate (breaths/min) Respiration Rate (breaths/min): 20  · SpO2 (%) SpO2 (%): 96  · O2 delivery Patient On: supplemental O2  · Oxygen Therapy Flow (L/min) Oxygen Flow (L/min): 2  · Oxygen Therapy: Delivery Method Delivery Method: nasal cannula  · Peripheral IV Insertion Date: 2019  · Inserted by: RN  · Peripheral IV Location: Right:; metacarpal vein  · Device Device: Angiocath  · Peripheral IV Gauge/Length: 20 gauge  · IV Device Number of Insertion Attempts: 1  · IV Device Patient Tolerance: Insertion: tolerated well  · IV Device Dressing/Securement: dressing dry and intact; transparent semipermeable dressing applied; catheter securement device utilized  · Specimen Drawn: Blood Culture(s); set 2  · Specimen Drawn Date/Time: 2019 23:10    PHYSICAL EXAM:   · CONSTITUTIONAL: Well appearing, well nourished, awake, alert, oriented to person, place, time/situation and in no apparent distress.  · ENMT: Airway patent, Nasal mucosa clear. Mouth with normal mucosa. Throat has no vesicles, no oropharyngeal exudates and uvula is midline.  · EYES: Clear bilaterally, pupils equal, round and reactive to light.  · CARDIAC: Normal rate, regular rhythm.  Heart sounds S1, S2.  No murmurs, rubs or gallops.  · RESPIRATORY: Breath sounds clear and equal bilaterally.  · GASTROINTESTINAL: Abdomen soft, non-tender, no guarding.  · MUSCULOSKELETAL: Spine appears normal, range of motion is not limited, no muscle or joint tenderness  · NEUROLOGICAL: Alert and oriented, no focal deficits, no motor or sensory deficits.  · SKIN: Skin normal color for race, warm, dry and intact. No evidence of rash.  · PSYCHIATRIC: Alert and oriented to person, place, time/situation. normal mood and affect. no apparent risk to self or others.    VITALS/LABS       2019 afeb 73 117/72   2019 W 17.2 Hb 9.9 Plt 132 Na 133 K 4.9 CO2 26 Cr 2.3   2019 CT ch  cw 2018  1) New peripheral density lung bases r larger dense pneu or masses both larger   2) prominent pretrach ln   2019 CXR bl patchy infiltr       PATIENT BONG HERNANDEZ [facility] P [phone]   1940                PATIENT DATA   ALLERGY nka   ADVANCED DIRECTIVE       WT    106 k            BMI  34  SMOKING HX     2019 Quit smoking 3 y 1 ppd   HOME O2/DME     2019 No home O2  No home neb                                                                                                                                  HEAD OF BED ELEVATION Yes          DVT PROPHYLAXIS  AC               lvnx 30 911/)   APA             PTA AC/APA    EKG  2019 ekg sr  ECHO  BNP 2019 bnp 3487      CXR     2019 CXR bl patchy infiltr                  CT      2019 CT ch  cw 2018  1) New peripheral density lung bases r larger dense pneu or masses both larger   2) prominent pretrach ln   STRESS ULCER PROPHYLAXIS protonix 40 ()   GAS EXCHANGE 2019 4L 742/36/99   INFECTION PPLX  Cr 2019 Cr 2.3   Hb 2019 Hb 9.9   W   2019 W 17.2  MICRO     2019 Flu ab n RSV n    PC 2019 pc 1.1   ABIO Rocephin ()     Azithro ()                                             DYSPHAGIA EVAL                                           DIET dash ()       PATIENT DESCRIPTION        CC           weakness sob  fever                  ER VS      146/88 76 20 afeb   ER MX                              HPI       2019 79 m former smoker PMH pulm fibrosis CHF D def dm Htn Hld  was admitted with fever sob leukocytosis hypoxemia basal pneumonia mild normocytic anemia and possible chf    Pulm consulted 2019                                 PMH       As above                          HOME MEDS                           hydralazine 25.3 oseltamivir pulmicort protonix aamlodipine labetalol 200.3 xanax wellbutrin 300 asa 81 saphris 10

## 2019-11-24 NOTE — CONSULT NOTE ADULT - ASSESSMENT
PULMONARY/CRITICAL CARE ASSESSMENT/RECOMMENDATIONS                      RO DYSPHAGIA (Basal pneumonia)   MASS PL BASD Will need fu as outpt with pet scan and possible bx See me in office  in 2 w   AC HYPOXEMIC RESP FAILURE 11/24/2019 4L 742/36/99  Check v duplex (11/29)   PNEUMONIA  LEUKOCYTOSIS 11/24/2019 W 17.2  MICROBIO 11/24/2019 Flu ab n RSV n   PROCALCITONINEMIA 11/24/2019 pc 1.1  ABIO Rocephin (11/24)     Azithro (11/24)    A/R Checkl leg strep pn fu cultures  Speech evalk   COPD duoneb.4 (11/24) solumed 40(11/24) Cont Rx   CHF 11/24/2019 bnp 3487  hydralazine 25.3 (11/24) imdur 60 (11/24) lasix 40 (11/24) 11/24/2019 check echo   ANEMIA norm 11/24/2019 Hb 9.9 Likely acd   DEVYN  11/24/2019 Cr 2.3 Monitor serially   PSYCH alprazolam 2.3 (11/24) saphris 10 (11/24) venlafax 150.2 (11/24) buprop 300 (11/24) Cont rx     PLAN  Continue azithro (11/24) rcephin for cap Need to ro aspiration Check speech eval Will need office fu for poss pleural mass with pet scan and biops Call  for pulm       TIME SPENT Over 55 minutes aggregate care time spent on encounter; activities included   direct pt care, counseling and/or coordinating care reviewing notes, lab data/ imaging , discussion with multidisciplinary team/ pt /family. Risks, benefits, alternatives  discussed in detail.

## 2019-11-24 NOTE — CONSULT NOTE ADULT - ASSESSMENT
1.	CKD 4  2.	Pneumonia  3.	CHF  4.	Diabetes  5.	Hypertension    Renal function ? close to recent baseline. Will try to get out pt labs from pt's PCP. Rx for Pneumonia, CHF.  Avoid nephrotoxic meds as possible. Monitor BP trend. Titrate BP meds as needed. Salt restriction.  Monitor blood sugar levels. Insulin coverage as needed. Dietary restriction. Will follow electrolytes and renal function trend.   Further recommendations pending clinical course. Thank you for the courtesy of this referral.

## 2019-11-24 NOTE — H&P ADULT - NSHPREVIEWOFSYSTEMS_GEN_ALL_CORE
CONSTITUTIONAL: denies fever, chills, fatigue, weakness  HEENT: denies blurred vision, sore throat  SKIN: denies new lesions, rash  CARDIOVASCULAR: denies chest pain, chest pressure, palpitations  RESPIRATORY: denies shortness of breath, sputum production  GASTROINTESTINAL: denies nausea, vomiting, diarrhea, abdominal pain  GENITOURINARY: denies dysuria, discharge  NEUROLOGICAL: denies numbness, headache, focal weakness  MUSCULOSKELETAL: denies new joint pain, muscle aches  HEMATOLOGIC: denies gross bleeding, bruising  LYMPHATICS: denies enlarged lymph nodes, extremity swelling  PSYCHIATRIC: denies recent changes in anxiety, depression  ENDOCRINOLOGIC: denies sweating, cold or heat intolerance CONSTITUTIONAL: denies fever, chills, + fatigue, +weakness  HEENT: denies blurred vision, sore throat  SKIN: denies new lesions, rash  CARDIOVASCULAR: denies chest pain, chest pressure, palpitations  RESPIRATORY: +shortness of breath, +sputum production  GASTROINTESTINAL: denies nausea, vomiting, diarrhea, abdominal pain  GENITOURINARY: denies dysuria, discharge, +frequency  NEUROLOGICAL: denies numbness, headache, focal weakness  MUSCULOSKELETAL: denies new joint pain, muscle aches  HEMATOLOGIC: denies gross bleeding, bruising  PSYCHIATRIC: denies recent changes in anxiety, depression CONSTITUTIONAL: denies fever, chills, admits fatigue, weakness  HEENT: denies blurred vision, sore throat  SKIN: denies new lesions, rash  CARDIOVASCULAR: denies chest pain, chest pressure, palpitations  RESPIRATORY: admits shortness of breath, admits sputum production  GASTROINTESTINAL: denies nausea, vomiting, diarrhea, abdominal pain  GENITOURINARY: denies dysuria, discharge, admits frequency  NEUROLOGICAL: denies numbness, headache, focal weakness  MUSCULOSKELETAL: denies new joint pain, muscle aches  HEMATOLOGIC: denies gross bleeding, bruising  PSYCHIATRIC: denies recent changes in anxiety, depression

## 2019-11-25 ENCOUNTER — TRANSCRIPTION ENCOUNTER (OUTPATIENT)
Age: 79
End: 2019-11-25

## 2019-11-25 LAB
ANION GAP SERPL CALC-SCNC: 8 MMOL/L — SIGNIFICANT CHANGE UP (ref 5–17)
BUN SERPL-MCNC: 45 MG/DL — HIGH (ref 7–23)
CALCIUM SERPL-MCNC: 9.2 MG/DL — SIGNIFICANT CHANGE UP (ref 8.5–10.1)
CHLORIDE SERPL-SCNC: 103 MMOL/L — SIGNIFICANT CHANGE UP (ref 96–108)
CO2 SERPL-SCNC: 26 MMOL/L — SIGNIFICANT CHANGE UP (ref 22–31)
CREAT SERPL-MCNC: 2.2 MG/DL — HIGH (ref 0.5–1.3)
GLUCOSE SERPL-MCNC: 146 MG/DL — HIGH (ref 70–99)
HCT VFR BLD CALC: 31.7 % — LOW (ref 39–50)
HGB BLD-MCNC: 10.2 G/DL — LOW (ref 13–17)
MAGNESIUM SERPL-MCNC: 2.1 MG/DL — SIGNIFICANT CHANGE UP (ref 1.6–2.6)
MCHC RBC-ENTMCNC: 27.6 PG — SIGNIFICANT CHANGE UP (ref 27–34)
MCHC RBC-ENTMCNC: 32.2 GM/DL — SIGNIFICANT CHANGE UP (ref 32–36)
MCV RBC AUTO: 85.9 FL — SIGNIFICANT CHANGE UP (ref 80–100)
NRBC # BLD: 0 /100 WBCS — SIGNIFICANT CHANGE UP (ref 0–0)
PLATELET # BLD AUTO: 148 K/UL — LOW (ref 150–400)
POTASSIUM SERPL-MCNC: 4.2 MMOL/L — SIGNIFICANT CHANGE UP (ref 3.5–5.3)
POTASSIUM SERPL-SCNC: 4.2 MMOL/L — SIGNIFICANT CHANGE UP (ref 3.5–5.3)
RBC # BLD: 3.69 M/UL — LOW (ref 4.2–5.8)
RBC # FLD: 14.8 % — HIGH (ref 10.3–14.5)
SODIUM SERPL-SCNC: 137 MMOL/L — SIGNIFICANT CHANGE UP (ref 135–145)
WBC # BLD: 13.05 K/UL — HIGH (ref 3.8–10.5)
WBC # FLD AUTO: 13.05 K/UL — HIGH (ref 3.8–10.5)

## 2019-11-25 PROCEDURE — 76770 US EXAM ABDO BACK WALL COMP: CPT | Mod: 26

## 2019-11-25 PROCEDURE — 99233 SBSQ HOSP IP/OBS HIGH 50: CPT

## 2019-11-25 PROCEDURE — 93970 EXTREMITY STUDY: CPT | Mod: 26

## 2019-11-25 PROCEDURE — 99232 SBSQ HOSP IP/OBS MODERATE 35: CPT

## 2019-11-25 RX ADMIN — BUPROPION HYDROCHLORIDE 300 MILLIGRAM(S): 150 TABLET, EXTENDED RELEASE ORAL at 12:30

## 2019-11-25 RX ADMIN — Medication 150 MILLIGRAM(S): at 18:17

## 2019-11-25 RX ADMIN — Medication 2 MILLIGRAM(S): at 22:44

## 2019-11-25 RX ADMIN — ENOXAPARIN SODIUM 30 MILLIGRAM(S): 100 INJECTION SUBCUTANEOUS at 12:31

## 2019-11-25 RX ADMIN — Medication 150 MILLIGRAM(S): at 07:07

## 2019-11-25 RX ADMIN — Medication 2 MILLIGRAM(S): at 13:59

## 2019-11-25 RX ADMIN — AZITHROMYCIN 500 MILLIGRAM(S): 500 TABLET, FILM COATED ORAL at 12:30

## 2019-11-25 RX ADMIN — Medication 25 MILLIGRAM(S): at 22:44

## 2019-11-25 RX ADMIN — ASENAPINE MALEATE 10 MILLIGRAM(S): 10 TABLET SUBLINGUAL at 00:19

## 2019-11-25 RX ADMIN — TAMSULOSIN HYDROCHLORIDE 0.4 MILLIGRAM(S): 0.4 CAPSULE ORAL at 22:44

## 2019-11-25 RX ADMIN — ISOSORBIDE MONONITRATE 60 MILLIGRAM(S): 60 TABLET, EXTENDED RELEASE ORAL at 12:30

## 2019-11-25 RX ADMIN — Medication 3 MILLILITER(S): at 20:23

## 2019-11-25 RX ADMIN — Medication 25 MILLIGRAM(S): at 13:59

## 2019-11-25 RX ADMIN — Medication 200 MILLIGRAM(S): at 18:17

## 2019-11-25 RX ADMIN — Medication 100 MILLIGRAM(S): at 12:30

## 2019-11-25 RX ADMIN — Medication 200 MILLIGRAM(S): at 07:05

## 2019-11-25 RX ADMIN — Medication 3 MILLILITER(S): at 00:56

## 2019-11-25 RX ADMIN — Medication 3 MILLILITER(S): at 08:13

## 2019-11-25 RX ADMIN — Medication 40 MILLIGRAM(S): at 05:28

## 2019-11-25 RX ADMIN — Medication 25 MILLIGRAM(S): at 05:28

## 2019-11-25 RX ADMIN — PANTOPRAZOLE SODIUM 40 MILLIGRAM(S): 20 TABLET, DELAYED RELEASE ORAL at 05:30

## 2019-11-25 RX ADMIN — AMLODIPINE BESYLATE 10 MILLIGRAM(S): 2.5 TABLET ORAL at 07:05

## 2019-11-25 RX ADMIN — CEFTRIAXONE 100 MILLIGRAM(S): 500 INJECTION, POWDER, FOR SOLUTION INTRAMUSCULAR; INTRAVENOUS at 00:19

## 2019-11-25 RX ADMIN — Medication 2 MILLIGRAM(S): at 07:13

## 2019-11-25 NOTE — PROGRESS NOTE ADULT - SUBJECTIVE AND OBJECTIVE BOX
CHIEF COMPLAINT/INTERVAL HISTORY:  Pt. seen and evaluated for sepsis 2/2 pneumonia and copd exacerbation.  Pt. is feeling better with less SOB.  Tolerating IV antibiotic and steroids.      REVIEW OF SYSTEMS:  No fever, CP, or abdominal pain.     Vital Signs Last 24 Hrs  T(C): 37.1 (2019 08:18), Max: 37.1 (2019 16:21)  T(F): 98.7 (2019 08:18), Max: 98.7 (2019 16:21)  HR: 86 (2019 08:18) (68 - 90)  BP: 137/86 (2019 08:18) (117/72 - 148/78)  BP(mean): --  RR: 23 (2019 08:18) (17 - 23)  SpO2: 93% (2019 08:18) (92% - 97%)    PHYSICAL EXAM:  GENERAL: NAD  HEENT: EOMI, hearing normal, conjunctiva and sclera clear  Chest: mild crackles at left base, no wheezing  CV: S1S2, RRR,   GI: soft, +BS, NT/ND  Musculoskeletal: trace LE edema  Psychiatric: affect nL, mood nL  Skin: warm and dry    LABS:                        10.2   13.05 )-----------( 148      ( 2019 06:59 )             31.7     11    137  |  103  |  45<H>  ----------------------------<  146<H>  4.2   |  26  |  2.20<H>    Ca    9.2      2019 06:59  Mg     2.1         TPro  6.6  /  Alb  3.1<L>  /  TBili  0.7  /  DBili  x   /  AST  35  /  ALT  28  /  AlkPhos  33<L>      PT/INR - ( 2019 23:23 )   PT: 16.1 sec;   INR: 1.40 ratio         PTT - ( 2019 23:23 )  PTT:21.6 sec  Urinalysis Basic - ( 2019 23:23 )    Color: Yellow / Appearance: Slightly Turbid / S.005 / pH: x  Gluc: x / Ketone: Negative  / Bili: Negative / Urobili: Negative   Blood: x / Protein: 75 mg/dL / Nitrite: Negative   Leuk Esterase: Moderate / RBC: 0-2 /HPF / WBC 11-25   Sq Epi: x / Non Sq Epi: Few / Bacteria: Moderate        Assessment and Plan:  -Acute hypoxic respiratory failure and sepsis 2/2 pneumonia and COPD exacerbation:  continue Azithromycin and Ceftriaxone.  Continue solu-medrol 40mg IV daily, Duoneb tx Q6h, and O2 support.  Check blood cx.  Pulmonary f/u  -Acute on chronic diastolic CHF exacerbation:  s/p Lasix 40mg IV once in ED.  Continue Lasix 40mg PO daily, hydralazine 25mg PO TID, Labetalol 200mg PO BID, and Imdur 60mg PO daily.  Cardiology f/u  -UTI:  Continue IV Ceftriaxone.  Check urine cx.   -Hyponatremia:  Resolved.  -Acute kidney injury:  stable.  No hydronephrosis on US.  Will continue to monitor while on diuretic.  Nephrology f/u  -Anxiety d/o:  continue Xanax 2mg PO TID and Effexor XR 150mg PO BID  -HTN:  continue Norvasc 10mg PO daily, Hydralazine 25mg PO TID, and Labetalol 200mg PO BID  -GERD:  continue Protonix 40mg PO daily  -BPH:  continue Flomax 0.4mg PO QHS  -VTE ppx: Lovenox 30mg SQ daily

## 2019-11-25 NOTE — PROGRESS NOTE ADULT - SUBJECTIVE AND OBJECTIVE BOX
Patient is a 79y old  Male who presents with a chief complaint of fever (2019 09:48)  Patient seen in follow up for CKD 4.     PAST MEDICAL HISTORY:  COPD, severe  Congestive heart failure  Pulmonary fibrosis  Pulmonary fibrosis  Smoker  BPH with obstruction/lower urinary tract symptoms  Vitamin D deficiency  GERD (gastroesophageal reflux disease)  Diabetes mellitus  Hyperlipidemia  Hypertension    MEDICATIONS  (STANDING):  albuterol/ipratropium for Nebulization 3 milliLiter(s) Nebulizer every 6 hours  allopurinol 100 milliGRAM(s) Oral daily  ALPRAZolam 2 milliGRAM(s) Oral three times a day  amLODIPine   Tablet 10 milliGRAM(s) Oral daily  asenapine SL 10 milliGRAM(s) SubLingual <User Schedule>  azithromycin   Tablet 500 milliGRAM(s) Oral daily  buPROPion XL . 300 milliGRAM(s) Oral daily  cefTRIAXone   IVPB 1000 milliGRAM(s) IV Intermittent every 24 hours  enoxaparin Injectable 30 milliGRAM(s) SubCutaneous daily  furosemide    Tablet 40 milliGRAM(s) Oral daily  hydrALAZINE 25 milliGRAM(s) Oral three times a day  isosorbide   mononitrate ER Tablet (IMDUR) 60 milliGRAM(s) Oral daily  labetalol 200 milliGRAM(s) Oral two times a day  methylPREDNISolone sodium succinate Injectable 40 milliGRAM(s) IV Push daily  pantoprazole    Tablet 40 milliGRAM(s) Oral before breakfast  tamsulosin 0.4 milliGRAM(s) Oral at bedtime  venlafaxine XR. 150 milliGRAM(s) Oral two times a day    MEDICATIONS  (PRN):  acetaminophen   Tablet .. 650 milliGRAM(s) Oral every 6 hours PRN Temp greater or equal to 38C (100.4F)    T(C): 37.1 (19 @ 08:18), Max: 38.4 (19 @ 01:30)  HR: 86 (19 @ 08:18) (68 - 90)  BP: 137/86 (19 @ 08:18) (114/64 - 151/86)  RR: 23 (19 @ 08:18) (17 - 28)  SpO2: 93% (19 @ 08:18) (92% - 98%)  Wt(kg): --  I&O's Detail    2019 07:01  -  2019 07:00  --------------------------------------------------------  IN:  Total IN: 0 mL    OUT:    Voided: 1450 mL  Total OUT: 1450 mL    Total NET: -1450 mL      2019 07:  -  2019 11:23  --------------------------------------------------------  IN:    Oral Fluid: 360 mL  Total IN: 360 mL    OUT:  Total OUT: 0 mL    Total NET: 360 mL      PHYSICAL EXAM:  General: NAD  Respiratory: b/l air entry  Cardiovascular: S1 S2  Gastrointestinal: soft  Extremities:  edema                       LABORATORY:                        10.2   13.05 )-----------( 148      ( 2019 06:59 )             31.7         137  |  103  |  45<H>  ----------------------------<  146<H>  4.2   |  26  |  2.20<H>    Ca    9.2      2019 06:59  Mg     2.1         TPro  6.6  /  Alb  3.1<L>  /  TBili  0.7  /  DBili  x   /  AST  35  /  ALT  28  /  AlkPhos  33<L>      Sodium, Serum: 137 mmol/L ( @ 06:59)  Sodium, Serum: 133 mmol/L ( @ 08:37)  Sodium, Serum: 130 mmol/L ( @ 23:23)    Potassium, Serum: 4.2 mmol/L ( @ 06:59)  Potassium, Serum: 4.9 mmol/L ( @ 08:37)  Potassium, Serum: 5.0 mmol/L ( @ 23:23)    Hemoglobin: 10.2 g/dL ( @ 06:59)  Hemoglobin: 9.9 g/dL ( @ 08:37)  Hemoglobin: 10.8 g/dL ( @ 23:23)    Creatinine, Serum 2.20 ( @ 06:59)  Creatinine, Serum 2.30 ( @ 08:37)  Creatinine, Serum 2.20 ( @ 23:23)        LIVER FUNCTIONS - ( 2019 23:23 )  Alb: 3.1 g/dL / Pro: 6.6 g/dL / ALK PHOS: 33 U/L / ALT: 28 U/L / AST: 35 U/L / GGT: x           Urinalysis Basic - ( 2019 23:23 )    Color: Yellow / Appearance: Slightly Turbid / S.005 / pH: x  Gluc: x / Ketone: Negative  / Bili: Negative / Urobili: Negative   Blood: x / Protein: 75 mg/dL / Nitrite: Negative   Leuk Esterase: Moderate / RBC: 0-2 /HPF / WBC 11-25   Sq Epi: x / Non Sq Epi: Few / Bacteria: Moderate      ABG - ( 2019 03:39 )  pH, Arterial: 7.42  pH, Blood: x     /  pCO2: 36    /  pO2: 99    / HCO3: 24    / Base Excess: -0.7  /  SaO2: 98

## 2019-11-25 NOTE — PROGRESS NOTE ADULT - ASSESSMENT
cont rx REVIEW OF SYMPTOMS      Able to give ROS  Yes     RELIABLE No   CONSTITUTIONAL Weakness Yes  Chills No Vision changes No  ENDOCRINE No unexplained hair loss No heat or cold intolerance    ALLERGY No hives  Sore throat No   RESP Coughing blood no  Shortness of breath YES   NEURO No Headache  Confusion Pain neck No   CARDIAC No Chest pain No Palpitations   GI No Pain abdomen NO   Vomiting NO     PHYSICAL EXAM    HEENT Unremarkable PERRLA atraumatic   RESP Fair air entry EXP prolonged    Harsh breath sound Resp distres mild   CARDIAC S1 S2 No S3     NO JVD    ABDOMEN SOFT BS PRESENT NOT DISTENDED No hepatosplenomegaly PEDAL EDEMA present No calf tenderness  NO rash   GENERAL Not TOXIC looking    VITALS/LABS       2019 afeb 68 130/78   2019 W 13 Hb 10.2 Plt 148 Na 137 K 4.2 CO2 26 Cr 2.2     PATIENT BONG HERNANDEZ [facility] P [phone]   1940                PATIENT DATA   ALLERGY nka   ADVANCED DIRECTIVE       WT    106 k            BMI  34  SMOKING HX     2019 Quit smoking 3 y 1 ppd   HOME O2/DME     2019 No home O2  No home neb                                                                                                                                  HEAD OF BED ELEVATION Yes          DVT PROPHYLAXIS  AC               lvnx 30 )   APA             PTA AC/APA    EKG  2019 ekg sr  ECHO  BNP 2019 bnp 3487      STRESS ULCER PROPHYLAXIS protonix 40 ()   GAS EXCHANGE 2019 4L 742/36/99   DUPLEX 2019 V duplx n   INFECTION PPLX  Cr -2019 Cr 2.3 -2.2  US renal  no hydro   Hb -2019 Hb 9.9 -10.2  W   -2019 W 17.2-13  MICRO     2019 Flu ab n RSV n    PC 2019 pc 1.1   ABIO Rocephin ()     Azithro ()    CXR     2019 CXR bl patchy infiltr                  CT      2019 CT ch  cw 2018  1) New peripheral density lung bases r larger dense pneu or masses both larger   2) prominent pretrach ln                                            DYSPHAGIA EVAL                                           DIET dash ()     PATIENT DESCRIPTION        CC           weakness sob  fever                  ER VS      146/88 76 20 afeb   ER MX                              HPI       2019 79 m former smoker PMH pulm fibrosis CHF D def dm Htn Hld  was admitted with fever sob leukocytosis hypoxemia basal pneumonia mild normocytic anemia and possible chf    NOTE As per cardio note pt has PMH chr d CHF (N LVSF ) Nonobstr cad (cath ) Home O2 dep (COPD)   Pulm consulted 2019                                 PMH       As above                          HOME MEDS                           hydralazine 25.3 oseltamivir pulmicort protonix aamlodipine labetalol 200.3 xanax wellbutrin 300 asa 81 saphris 10                               PULMONARY/CRITICAL CARE ASSESSMENT/RECOMMENDATIONS                      RO DYSPHAGIA (Basal pneumonia)   MASS PL BASD Will need fu as outpt with pet scan and possible bx See me in office  in 2 w   AC HYPOXEMIC RESP FAILURE 2019 4L 742/36/99  Check v duplex ()   PNEUMONIA  LEUKOCYTOSIS 2019 W 17.2  MICROBIO 2019 Flu ab n RSV n   PROCALCITONINEMIA 2019 pc 1.1  ABIO Rocephin ()     Azithro ()    A/R Checkl leg strep pn fu cultures  Speech evalk   COPD duoneb.4 () solumed 40() Cont Rx   CHF 2019 bnp 3487  hydralazine 25.3 () imdur 60 () lasix 40 () 2019 check echo   ANEMIA norm 2019 Hb 9.9 Likely acd   DEVYN  2019 Cr 2.3 Monitor serially   PSYCH alprazolam 2.3 () saphris 10 () venlafax 150.2 () buprop 300 () Cont rx     PLAN  Continue azithro () rcephin for cap Need to ro aspiration Check speech eval Will need office fu for poss pleural mass with pet scan and biops Call  for pulm       TIME SPENT Over 25 minutes aggregate care time spent on encounter; activities included   direct pt care, counseling and/or coordinating care reviewing notes, lab data/ imaging , discussion with multidisciplinary team/ pt /family. Risks, benefits, alternatives  discussed in detail.

## 2019-11-25 NOTE — DISCHARGE NOTE PROVIDER - NSDCMRMEDTOKEN_GEN_ALL_CORE_FT
allopurinol 100 mg oral tablet: 1 tab(s) orally once a day  amLODIPine 10 mg oral tablet: 1 tab(s) orally once a day  hydrALAZINE 25 mg oral tablet: 3 tab(s) orally 3 times a day   isosorbide mononitrate 60 mg oral tablet, extended release: 1 tab(s) orally once a day (in the morning)  labetalol 200 mg oral tablet: 1 tab(s) orally 2 times a day  Lasix 40 mg oral tablet: 1 tab(s) orally once a day  Protonix 40 mg oral delayed release tablet: 1 tab(s) orally once a day (before a meal)  Saphris 10 mg sublingual tablet: 1 tab(s) sublingual once a day (at bedtime)  tamsulosin 0.4 mg oral capsule: 1 cap(s) orally once a day  venlafaxine extended release: 150 milligram(s) orally 2 times a day  Wellbutrin  mg/24 hours oral tablet, extended release: 1 tab(s) orally every 24 hours  Xanax 2 mg oral tablet: 1 tab(s) orally 3 times a day, As Needed allopurinol 100 mg oral tablet: 1 tab(s) orally once a day  amLODIPine 10 mg oral tablet: 1 tab(s) orally once a day  aspirin 81 mg oral delayed release tablet: 1 tab(s) orally once a day  cefuroxime 500 mg oral tablet: 1 tab(s) orally every 12 hours for 2 more days starting this evening 11/29 ~9pm and ending tomorrow night  hydrALAZINE 25 mg oral tablet: 3 tab(s) orally 3 times a day   isosorbide mononitrate 60 mg oral tablet, extended release: 1 tab(s) orally once a day (in the morning)  labetalol 200 mg oral tablet: 1 tab(s) orally 2 times a day  Lasix 40 mg oral tablet: 1 tab(s) orally once a day  predniSONE 20 mg oral tablet: 1 tab(s) orally once a day for 3 more days starting tomorrow morning 11/30/19  Protonix 40 mg oral delayed release tablet: 1 tab(s) orally once a day (before a meal)  Saphris 10 mg sublingual tablet: 1 tab(s) sublingual once a day (at bedtime)  tamsulosin 0.4 mg oral capsule: 1 cap(s) orally once a day  venlafaxine extended release: 150 milligram(s) orally 2 times a day  Wellbutrin  mg/24 hours oral tablet, extended release: 1 tab(s) orally every 24 hours  Xanax 2 mg oral tablet: 1 tab(s) orally 3 times a day, As Needed

## 2019-11-25 NOTE — DISCHARGE NOTE PROVIDER - HOSPITAL COURSE
78yo M with PMH of chronic diastolic CHF (normal LVF 2016), non obstructive CAD (cardiac cath 2015), BPH, interstitial lung disease, COPD (on 4L home O2), HTN, HLD, 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 Hx obtained from son and patient. Per son, pt was in his usual state of health until this morning, when his home health aide noticed worsened SOB and weakness. She had difficulty moving him and lifting him from his chair. son states that the pt chronically has SOB and difficulty moving, but that it has worsened today. He has a chronic cough from COPD/Interstitial lung disease. Son notes no change in the cough or increased sputum production since his sx began. Pt notes increased urinary frequency over the past few weeks, but denies dysuria or  hematuria. Pt was afebrile at home on son's home thermometer. Pt complains that he is thirsty and wants water. Pt denies CP, palpitations, NVD, melena, hematochezia, numbness, tingling.             In ED Pt received one dose rocephin, one dose azithromycin, 125 mg IV solumedrol x1, duonebs x1, 2.2L NS bolus    Vitals: T 102.7, HR 78, RR 22, /63, SpO2 93    Labs: WBC 17.94, Hb 10.8, Na 130, Cr 2.2 (baseline around 1.4), procal 1.17, proBNP 3487, lactate 1.9    EKG sinus rhythm w first degree AV block, no acute ischemic changes noted     UA: slightly turbid, moderate leuk esterase, small blood, moderate bacteria     Imaging    CXR: Bilateral patchy infiltrates.        Pt. was admitted and had Pulmonary and Cardiology consult.  He was started on IV antibiotic treatment and given IV steroids.  Pt. had also received a dose of IV diuretic.  Flu and RSV swab were negative.   Blood culture showed no growth.  CT chest showed  posterior basilar lung densities bilaterally.  LE dopplers showed no DVT.  Kidney and bladder US echogenic kidneys can be seen with chronic kidney disease.  No hydronephrosis. 80yo M with PMH of chronic diastolic CHF (normal LVF 2016), non obstructive CAD (cardiac cath 2015), BPH, interstitial lung disease, COPD (on 4L home O2), HTN, HLD, 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 Hx obtained from son and patient. Per son, pt was in his usual state of health until this morning, when his home health aide noticed worsened SOB and weakness. She had difficulty moving him and lifting him from his chair. son states that the pt chronically has SOB and difficulty moving, but that it has worsened today. He has a chronic cough from COPD/Interstitial lung disease. Son notes no change in the cough or increased sputum production since his sx began. Pt notes increased urinary frequency over the past few weeks, but denies dysuria or  hematuria. Pt was afebrile at home on son's home thermometer. Pt complains that he is thirsty and wants water. Pt denies CP, palpitations, NVD, melena, hematochezia, numbness, tingling.             In ED Pt received one dose rocephin, one dose azithromycin, 125 mg IV solumedrol x1, duonebs x1, 2.2L NS bolus    Vitals: T 102.7, HR 78, RR 22, /63, SpO2 93    Labs: WBC 17.94, Hb 10.8, Na 130, Cr 2.2 (baseline around 1.4), procal 1.17, proBNP 3487, lactate 1.9    EKG sinus rhythm w first degree AV block, no acute ischemic changes noted     UA: slightly turbid, moderate leuk esterase, small blood, moderate bacteria     Imaging    CXR: Bilateral patchy infiltrates.        Pt. was admitted and had Pulmonary and Cardiology consult.  He was started on IV antibiotic treatment and given IV steroids.  Pt. had also received a dose of IV diuretic.  Flu and RSV swab were negative.   Blood culture showed no growth.  CT chest showed  posterior basilar lung densities bilaterally.  LE dopplers showed no DVT.  Kidney and bladder US echogenic kidneys can be seen with chronic kidney disease.  No hydronephrosis.  Pt. clinically improved with decreasing leukocytosis and decreased O2 requirements.  He had modified barium swallow study and diet was then changed to dysphagia 3 nectar thickened liquids for evidence of aspiration.   Urine culture grew out gram negative rods. HPI as documented in H&P:    78yo M with PMH of chronic diastolic CHF (normal LVF 2016), non obstructive CAD (cardiac cath 2015), BPH, interstitial lung disease, COPD (on 4L home O2), HTN, HLD, 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 Hx obtained from son and patient. Per son, pt was in his usual state of health until this morning, when his home health aide noticed worsened SOB and weakness. She had difficulty moving him and lifting him from his chair. son states that the pt chronically has SOB and difficulty moving, but that it has worsened today. He has a chronic cough from COPD/Interstitial lung disease. Son notes no change in the cough or increased sputum production since his sx began. Pt notes increased urinary frequency over the past few weeks, but denies dysuria or  hematuria. Pt was afebrile at home on son's home thermometer. Pt complains that he is thirsty and wants water. Pt denies CP, palpitations, NVD, melena, hematochezia, numbness, tingling.             In ED Pt received one dose rocephin, one dose azithromycin, 125 mg IV solumedrol x1, duonebs x1, 2.2L NS bolus    Vitals: T 102.7, HR 78, RR 22, /63, SpO2 93    Labs: WBC 17.94, Hb 10.8, Na 130, Cr 2.2 (baseline around 1.4), procal 1.17, proBNP 3487, lactate 1.9    EKG sinus rhythm w first degree AV block, no acute ischemic changes noted     UA: slightly turbid, moderate leuk esterase, small blood, moderate bacteria     Imaging    CXR: Bilateral patchy infiltrates.        Hospital Course:    Pt was admitted with sepsis and acute on chronic hypoxic respiratory failure due to suspected aspiration PNA and COPD exacerbation. Pt had Pulmonary (Lakisha / Stanislav) and Cardiology ([facility]) consults.  He was treated with ceftriaxone/azithromycin and solu-medrol and nebs.  Pt had also received 1 dose of IV lasix in the ER, but pt's respiratory decompensation deemed to be due to PNA+COPD as opposed to acute CHF as per cardio.  Pt was continued on his home dose of po lasix for his chronic diastolic CHF. RVP was negative.   Blood culture showed no growth.  CT chest showed  posterior basilar lung densities bilaterally which could be due to recurrent PNAs but may also be due to enlarging pleural based masses as per radiology with follow-up imaging recommended, which pt will do with his outpt pulmonologist, Dr. Camilo BAIG dopplers showed no DVT.  Pt thought he may have mild DEVYN on CKD Stage 4, but later decided this is pt's baseline CKD 4. Kidney and bladder US showed echogenic kidneys consistent with chronic kidney disease.  No hydronephrosis.  Nephrology (Shamar) consulted on the case. Pt clinically improved with decreasing leukocytosis and decreased O2 requirements (back to baseline).  He had modified barium swallow study and diet was then changed to dysphagia 3 nectar thickened liquids for evidence of aspiration.  However, upon trial of the fluid thickener, pt categorically refused to use it and insisted he go back to thin liquids despite risks of aspiration. Urine culture grew out a sensitive E coli which was covered by the rocephin pt was receiving for PNA -- this likely represented asymptomatic bacteriuria as opposed to true UTI.     Pt clinically improved and returned to baseline respiratory status. Phys therapy recommended JANELLE but pt refused and opted to go home with his HHAs and home PT.         On day of discharge:    ROS: denies SOB, cough, fever, chills, abd pain.        Vital Signs Last 24 Hrs    T(C): 36.8 (29 Nov 2019 12:42), Max: 37.1 (28 Nov 2019 20:23)    T(F): 98.3 (29 Nov 2019 12:42), Max: 98.7 (28 Nov 2019 20:23)    HR: 73 (29 Nov 2019 12:42) (71 - 90)    BP: 150/86 (29 Nov 2019 12:42) (124/79 - 161/100)    BP(mean): --    RR: 19 (29 Nov 2019 12:42) (18 - 20)    SpO2: 93% (29 Nov 2019 12:42) (93% - 97%)        PHYSICAL EXAM:    GENERAL: NAD    HEENT:  anicteric, moist mucous membranes    CHEST/LUNG:  decreased breath sounds in bases and few coarse breath sounds in bases    HEART:  RRR, S1, S2    ABDOMEN:  BS+, soft, nontender, nondistended    EXTREMITIES: no edema, cyanosis, or calf tenderness    NERVOUS SYSTEM: answers questions and follows commands appropriately        Time spent: 45min

## 2019-11-25 NOTE — DISCHARGE NOTE PROVIDER - NSDCCPCAREPLAN_GEN_ALL_CORE_FT
PRINCIPAL DISCHARGE DIAGNOSIS  Diagnosis: Pneumonia  Assessment and Plan of Treatment: You likely had an aspiration pneumonia that was treated with IV antibiotics. You have improved and you respiration is back to baseline. Complete the antibiotics with the prescribed cefuoxime 500mg twice daily for 3 more doses (first dose this evening 11/29/19 ~9PM, and two more doses tomorrow).   You had a swallow test performed by the speech and swallow therapist and it was found that you have signs of aspiration with this liquids (nectar-consistency liquids were safe for your to swallow). You tried a thickened fluid, but refused to use thickener while understanding the risk of aspiration including recurrent aspiration pneumonia, and respiratory failure. If you reconsider, we recommend you get thickener from your local pharmacy.   Please contnue to use the strategies taught to you by the swallow therapist including the chin-tuck method while drinking and sitting up when eating and drinking.   Follow up with your pulmonologist in a week.      SECONDARY DISCHARGE DIAGNOSES  Diagnosis: Opacity of lung on imaging study  Assessment and Plan of Treatment: As we discussed, your CT scan showed opacities in the lung bases that have increased in size since last time you had a CT. This could certainly be all due to pneumonia, but based on this study alone it cannot be determined if you may have another underlying problem like a pleural mass. Please follow up with your pulmonologist, Dr. Page, in a week to discuss these findings and to have a repeat CT scan or further imaging as he feels appropriate in 4-6 weeks to monitor for resolution of those opacities and to determine if any need for future biopsy. Please give Dr. Page the disc that was provided to your with the images.    Diagnosis: COPD with hypoxia  Assessment and Plan of Treatment: Continue to use your oxygen and follow up with your pulmonologist, Dr. Page, within a week.   Complete the course of prednisone as prescribed for 3 more days starting tomorrow morning 11/30/19.    Diagnosis: Hypertriglyceridemia  Assessment and Plan of Treatment: You have high triglyceride levels. Continue your home regimen and follow up with your PMD and/or cardiologist.    Diagnosis: Hypertension  Assessment and Plan of Treatment: Continue your home regimen and follow up with your PMD.    Diagnosis: CKD (chronic kidney disease)  Assessment and Plan of Treatment: Follow up with your PMD and/or nephrologist for monitoring.

## 2019-11-25 NOTE — PROGRESS NOTE ADULT - ASSESSMENT
78yo M with PMH of chronic diastolic CHF (normal LVF 2016), non obstructive CAD (cardiac cath 2015), BPH, interstitial lung disease, COPD (on 4L home O2), HTN, HLD, DMT2 on Janumet, GERD, BPH, depression, anxiety presents with SOB and weakness found with pna     Pulm PNA  Pulmonary following   Continue nebulizers, antibiotics as per pulmonary '  Supplemental oxygen    Diastolic chf  Continue lasix PO daily  Strict intake and output    non obstructive cad  unclear why not on asa, start if not contraindicated  Check full lipid  check troponin x 2  will have outpatient 2decho    HTN  Continue norvasc, , isosorbide, labetalol, hydralazine    HLD  Check full lipid     CKD  followed by renal     DM  management as per primary     DVT on lovenox    Monitor and replete electrolytes. Keep K>4.0 and Mg>2.0.  Milagro Barakat FNP-C  Cardiology NP  [id number] [phone] 76yo M with PMH of chronic diastolic CHF (normal LVF 2016), non obstructive CAD (cardiac cath 2015), BPH, interstitial lung disease, COPD (on 4L home O2), HTN, HLD, DMT2 on Janumet, GERD, BPH, depression, anxiety presents with SOB and weakness found with pna     Pulm PNA  Pulmonary following   Continue nebulizers, antibiotics as per pulmonary '  Supplemental oxygen.  Still with significant respiratory distress.    Diastolic chf  Continue lasix PO daily  Strict intake and output    non obstructive cad  unclear why not on asa, start if not contraindicated  Check full lipid  check troponin x 2  will have outpatient 2decho    HTN  Continue norvasc, , isosorbide, labetalol, hydralazine    HLD  Check full lipid     CKD  followed by renal     DM  management as per primary     DVT on lovenox    Monitor and replete electrolytes. Keep K>4.0 and Mg>2.0.  Milagro Barakat FNP-C  Cardiology NP  [id number] [phone]

## 2019-11-25 NOTE — PROGRESS NOTE ADULT - SUBJECTIVE AND OBJECTIVE BOX
[facility] -- Franco Mcallister, Benitez, Josse, Kalia Cheng Savella  Office # [phone]      Follow Up:    shortness of breath     Subjective/Observations:     No events overnight resting comfortably in bed.  No complaints of chest pain, dyspnea, or palpitations reported. No signs of orthopnea or PND.  + generalized weakness     REVIEW OF SYSTEMS: All other review of systems is negative unless indicated above    PAST MEDICAL & SURGICAL HISTORY:  COPD, severe  Congestive heart failure  Pulmonary fibrosis  Pulmonary fibrosis  Smoker  BPH with obstruction/lower urinary tract symptoms  Vitamin D deficiency  GERD (gastroesophageal reflux disease)  Diabetes mellitus  Hyperlipidemia  Hypertension  S/P tonsillectomy      MEDICATIONS  (STANDING):  albuterol/ipratropium for Nebulization 3 milliLiter(s) Nebulizer every 6 hours  allopurinol 100 milliGRAM(s) Oral daily  ALPRAZolam 2 milliGRAM(s) Oral three times a day  amLODIPine   Tablet 10 milliGRAM(s) Oral daily  asenapine SL 10 milliGRAM(s) SubLingual <User Schedule>  azithromycin   Tablet 500 milliGRAM(s) Oral daily  buPROPion XL . 300 milliGRAM(s) Oral daily  cefTRIAXone   IVPB 1000 milliGRAM(s) IV Intermittent every 24 hours  enoxaparin Injectable 30 milliGRAM(s) SubCutaneous daily  furosemide    Tablet 40 milliGRAM(s) Oral daily  hydrALAZINE 25 milliGRAM(s) Oral three times a day  isosorbide   mononitrate ER Tablet (IMDUR) 60 milliGRAM(s) Oral daily  labetalol 200 milliGRAM(s) Oral two times a day  methylPREDNISolone sodium succinate Injectable 40 milliGRAM(s) IV Push daily  pantoprazole    Tablet 40 milliGRAM(s) Oral before breakfast  tamsulosin 0.4 milliGRAM(s) Oral at bedtime  venlafaxine XR. 150 milliGRAM(s) Oral two times a day    MEDICATIONS  (PRN):  acetaminophen   Tablet .. 650 milliGRAM(s) Oral every 6 hours PRN Temp greater or equal to 38C (100.4F)      Allergies    No Known Allergies    Intolerances        Vital Signs Last 24 Hrs  T(C): 37.1 (25 Nov 2019 08:18), Max: 37.1 (24 Nov 2019 16:21)  T(F): 98.7 (25 Nov 2019 08:18), Max: 98.7 (24 Nov 2019 16:21)  HR: 86 (25 Nov 2019 08:18) (68 - 90)  BP: 137/86 (25 Nov 2019 08:18) (117/72 - 148/78)  BP(mean): --  RR: 23 (25 Nov 2019 08:18) (17 - 23)  SpO2: 93% (25 Nov 2019 08:18) (92% - 97%)    I&O's Summary    24 Nov 2019 07:01  -  25 Nov 2019 07:00  --------------------------------------------------------  IN: 0 mL / OUT: 1450 mL / NET: -1450 mL    25 Nov 2019 07:01  -  25 Nov 2019 09:18  --------------------------------------------------------  IN: 360 mL / OUT: 0 mL / NET: 360 mL          PHYSICAL EXAM:  TELE: Sr 1st degree AVB   Constitutional: NAD, awake and alert, well-developed  HEENT: Moist Mucous Membranes, Anicteric  Pulmonary: Non-labored, coarse breath sounds expiratory wheeze   Cardiovascular: Regular, S1 and S2 nl, No murmurs, rubs, gallops or clicks  Gastrointestinal: Bowel Sounds present, soft, nontender.   Lymph: No lymphadenopathy. No peripheral edema.  Skin: No visible rashes or ulcers.  Psych:  Mood & affect appropriate    LABS: All Labs Reviewed:                        10.2   13.05 )-----------( 148      ( 25 Nov 2019 06:59 )             31.7                         9.9    17.24 )-----------( 132      ( 24 Nov 2019 08:37 )             30.0                         10.8   17.94 )-----------( 136      ( 23 Nov 2019 23:23 )             32.7     25 Nov 2019 06:59    137    |  103    |  45     ----------------------------<  146    4.2     |  26     |  2.20   24 Nov 2019 08:37    133    |  99     |  38     ----------------------------<  201    4.9     |  26     |  2.30   23 Nov 2019 23:23    130    |  96     |  38     ----------------------------<  112    5.0     |  25     |  2.20     Ca    9.2        25 Nov 2019 06:59  Ca    8.5        24 Nov 2019 08:37  Ca    8.3        23 Nov 2019 23:23  Mg     2.1       25 Nov 2019 06:59    TPro  6.6    /  Alb  3.1    /  TBili  0.7    /  DBili  x      /  AST  35     /  ALT  28     /  AlkPhos  33     23 Nov 2019 23:23    PT/INR - ( 23 Nov 2019 23:23 )   PT: 16.1 sec;   INR: 1.40 ratio         PTT - ( 23 Nov 2019 23:23 )  PTT:21.6 sec         ECG:  < from: 12 Lead ECG (11.23.19 @ 22:36) >  Ventricular Rate 78 BPM    Atrial Rate 78 BPM    P-R Interval 288 ms    QRS Duration 94 ms    Q-T Interval 358 ms    QTC Calculation(Bezet) 408 ms    P Axis 46 degrees    R Axis -29 degrees    T Axis 29 degrees    < end of copied text >        Radiology:  < from: Xray Chest 2 Views PA/Lat (02.25.19 @ 13:35) >  FINDINGS:    Interval resolutionof left lower lobe opacity.    No consolidation, pleural effusion or pneumothorax. Bilateral reticular   and hazy opacities representing interstitial lung disease are unchanged.    The cardiomediastinal silhouette is normal in size and contour.    IMPRESSION:    Interval resolution of left lower lobe opacity.    < end of copied text >

## 2019-11-25 NOTE — SWALLOW BEDSIDE ASSESSMENT ADULT - SWALLOW EVAL: DIAGNOSIS
Pt p/w 1. Functional oral phase when given nectar thick liquids, thin liquids, and regular solids marked by adequate bolus retrieval and containment, timely mastication/manipulation of bolus and transfer, and adequate oral clearance post swallow. 2. Suspected pharyngeal dysphagia across aforementioned textures marked by suspected discoordinated swallow onset, +laryngeal elevation to palpation, and no (c)overt s/sx of aspiration. 3. Given dx and concern for recurrent PNA, recommend MBS to r/o silent aspiration. Recommend pt resume soft solids with thin liquids pending MBS.

## 2019-11-25 NOTE — SWALLOW BEDSIDE ASSESSMENT ADULT - SLP PERTINENT HISTORY OF CURRENT PROBLEM
Pt is a 80 y/o M who p/w Acute hypoxic respiratory failure and sepsis 2/2 pneumonia and COPD exacerbation. Pt's pmhx is significant for chronic diastolic CHF (normal LVF 2016), non obstructive CAD (cardiac cath 2015), BPH, interstitial lung disease, COPD (on 4L home O2), HTN, HLD, DMT2 on Janumet, GERD, BPH, depression, anxiety.

## 2019-11-25 NOTE — SWALLOW BEDSIDE ASSESSMENT ADULT - COMMENTS
Pt received upright in chair, awake and cooperative, on supplemental O2 via HFNC (25 LPM, 30% FiO2). Pt followed simple commands consistently. Pt's vocal quality/intensity and speech intelligibility deemed WFL.    CT chest: "Posterior basilar lung densities bilaterally. Differential diagnosis would be recurrent pneumonias or developing tumors."  Pt's WBC is elevated, no fever.

## 2019-11-25 NOTE — PROGRESS NOTE ADULT - ASSESSMENT
1.	CKD 4  2.	Pneumonia  3.	CHF  4.	Diabetes  5.	Hypertension    Stable renal indices. Will try to get baseline labs from pt's primary. Rx for Pneumonia, CHF.  Avoid nephrotoxic meds as possible. Monitor BP trend. Titrate BP meds as needed. Salt restriction.  Monitor blood sugar levels. Insulin coverage as needed. Dietary restriction.   Will follow electrolytes and renal function trend. D/w patient regarding need for out patient nephrology follow up.

## 2019-11-25 NOTE — DISCHARGE NOTE PROVIDER - CARE PROVIDER_API CALL
Bobby Page)  Critical Care Medicine; Internal Medicine; Pulmonary Disease; Sleep Medicine  [address], [address]  [place] [address]  Phone: [phone]  Fax: [phone]  Follow Up Time:     Frederick Bush)  Internal Medicine  [address] [place] [address]  Phone: [phone]  Fax: [phone]  Follow Up Time:

## 2019-11-25 NOTE — DISCHARGE NOTE PROVIDER - CARE PROVIDERS DIRECT ADDRESSES
[email or web address]

## 2019-11-25 NOTE — PROGRESS NOTE ADULT - SUBJECTIVE AND OBJECTIVE BOX
DAVID WOODY    V TELN 337 W1    Patient is a 79y old  Male who presents with a chief complaint of fever (2019 11:23)       Allergies    No Known Allergies    Intolerances        HPI:  78yo M with PMH of chronic diastolic CHF (normal LVF ), non obstructive CAD (cardiac cath ), BPH, interstitial lung disease, COPD (on 4L home O2), HTN, HLD, 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 Hx obtained from son and patient. Per son, pt was in his usual state of health until this morning, when his home health aide noticed worsened SOB and weakness. She had difficulty moving him and lifting him from his chair. son states that the pt chronically has SOB and difficulty moving, but that it has worsened today. He has a chronic cough from COPD/Interstitial lung disease. Son notes no change in the cough or increased sputum production since his sx began. Pt notes increased urinary frequency over the past few weeks, but denies dysuria or  hematuria. Pt was afebrile at home on son's home thermometer. Pt complains that he is thirsty and wants water. Pt denies CP, palpitations, NVD, melena, hematochezia, numbness, tingling.       In ED Pt received one dose rocephin, one dose azithromycin, 125 mg IV solumedrol x1, duonebs x1, 2.2L NS bolus  Vitals: T 102.7, HR 78, RR 22, /63, SpO2 93  Labs: WBC 17.94, Hb 10.8, Na 130, Cr 2.2 (baseline around 1.4), procal 1.17, proBNP 3487, lactate 1.9  EKG sinus rhythm w first degree AV block, no acute ischemic changes noted   UA: slightly turbid, moderate leuk esterase, small blood, moderate bacteria   Imaging  CXR: increased congestion compared to prior CXR in 2019, no consolidation appreciated, official read pending (2019 00:49)      PAST MEDICAL & SURGICAL HISTORY:  COPD, severe  Congestive heart failure  Pulmonary fibrosis  Pulmonary fibrosis  Smoker  BPH with obstruction/lower urinary tract symptoms  Vitamin D deficiency  GERD (gastroesophageal reflux disease)  Diabetes mellitus  Hyperlipidemia  Hypertension  S/P tonsillectomy      FAMILY HISTORY:  Family history of heart disease (Grandparent)  Family history of lymphoma        MEDICATIONS   acetaminophen   Tablet .. 650 milliGRAM(s) Oral every 6 hours PRN  albuterol/ipratropium for Nebulization 3 milliLiter(s) Nebulizer every 6 hours  allopurinol 100 milliGRAM(s) Oral daily  ALPRAZolam 2 milliGRAM(s) Oral three times a day  amLODIPine   Tablet 10 milliGRAM(s) Oral daily  asenapine SL 10 milliGRAM(s) SubLingual <User Schedule>  azithromycin   Tablet 500 milliGRAM(s) Oral daily  buPROPion XL . 300 milliGRAM(s) Oral daily  cefTRIAXone   IVPB 1000 milliGRAM(s) IV Intermittent every 24 hours  enoxaparin Injectable 30 milliGRAM(s) SubCutaneous daily  furosemide    Tablet 40 milliGRAM(s) Oral daily  hydrALAZINE 25 milliGRAM(s) Oral three times a day  isosorbide   mononitrate ER Tablet (IMDUR) 60 milliGRAM(s) Oral daily  labetalol 200 milliGRAM(s) Oral two times a day  methylPREDNISolone sodium succinate Injectable 40 milliGRAM(s) IV Push daily  pantoprazole    Tablet 40 milliGRAM(s) Oral before breakfast  tamsulosin 0.4 milliGRAM(s) Oral at bedtime  venlafaxine XR. 150 milliGRAM(s) Oral two times a day      Vital Signs Last 24 Hrs  T(C): 37.2 (2019 12:27), Max: 37.2 (2019 12:27)  T(F): 98.9 (2019 12:27), Max: 98.9 (2019 12:27)  HR: 80 (2019 12:27) (68 - 90)  BP: 136/89 (2019 12:27) (117/72 - 148/78)  BP(mean): --  RR: 22 (2019 12:27) (17 - 23)  SpO2: 96% (2019 12:27) (92% - 97%)      19 @ 07:01  -  19 @ 07:00  --------------------------------------------------------  IN: 0 mL / OUT: 1450 mL / NET: -1450 mL    19 @ 07:01  -  19 @ 13:21  --------------------------------------------------------  IN: 480 mL / OUT: 1450 mL / NET: -970 mL            LABS:                        10.2   13.05 )-----------( 148      ( 2019 06:59 )             31.7         137  |  103  |  45<H>  ----------------------------<  146<H>  4.2   |  26  |  2.20<H>    Ca    9.2      2019 06:59  Mg     2.1         TPro  6.6  /  Alb  3.1<L>  /  TBili  0.7  /  DBili  x   /  AST  35  /  ALT  28  /  AlkPhos  33<L>      PT/INR - ( 2019 23:23 )   PT: 16.1 sec;   INR: 1.40 ratio         PTT - ( 2019 23:23 )  PTT:21.6 sec  Urinalysis Basic - ( 2019 23:23 )    Color: Yellow / Appearance: Slightly Turbid / S.005 / pH: x  Gluc: x / Ketone: Negative  / Bili: Negative / Urobili: Negative   Blood: x / Protein: 75 mg/dL / Nitrite: Negative   Leuk Esterase: Moderate / RBC: 0-2 /HPF / WBC    Sq Epi: x / Non Sq Epi: Few / Bacteria: Moderate        ABG - ( 2019 03:39 )  pH, Arterial: 7.42  pH, Blood: x     /  pCO2: 36    /  pO2: 99    / HCO3: 24    / Base Excess: -0.7  /  SaO2: 98                  WBC:  WBC Count: 13.05 K/uL ( @ 06:59)  WBC Count: 17.24 K/uL ( @ 08:37)  WBC Count: 17.94 K/uL ( @ 23:23)      MICROBIOLOGY:  RECENT CULTURES:   .Blood Blood-Peripheral XXXX XXXX   No growth to date.                PT/INR - ( 2019 23:23 )   PT: 16.1 sec;   INR: 1.40 ratio         PTT - ( 2019 23:23 )  PTT:21.6 sec    Sodium:  Sodium, Serum: 137 mmol/L ( @ 06:59)  Sodium, Serum: 133 mmol/L ( @ 08:37)  Sodium, Serum: 130 mmol/L ( @ 23:23)      2.20 mg/dL :59  2.30 mg/dL  @ 08:37  2.20 mg/dL  @ 23:23      Hemoglobin:  Hemoglobin: 10.2 g/dL ( @ 06:59)  Hemoglobin: 9.9 g/dL ( @ 08:37)  Hemoglobin: 10.8 g/dL ( @ 23:23)      Platelets: Platelet Count - Automated: 148 K/uL ( @ 06:59)  Platelet Count - Automated: 132 K/uL ( @ 08:37)  Platelet Count - Automated: 136 K/uL ( @ 23:23)      LIVER FUNCTIONS - ( 2019 23:23 )  Alb: 3.1 g/dL / Pro: 6.6 g/dL / ALK PHOS: 33 U/L / ALT: 28 U/L / AST: 35 U/L / GGT: x             Urinalysis Basic - ( 2019 23:23 )    Color: Yellow / Appearance: Slightly Turbid / S.005 / pH: x  Gluc: x / Ketone: Negative  / Bili: Negative / Urobili: Negative   Blood: x / Protein: 75 mg/dL / Nitrite: Negative   Leuk Esterase: Moderate / RBC: 0-2 /HPF / WBC    Sq Epi: x / Non Sq Epi: Few / Bacteria: Moderate        RADIOLOGY & ADDITIONAL STUDIES:

## 2019-11-25 NOTE — SWALLOW BEDSIDE ASSESSMENT ADULT - SWALLOW EVAL: RECOMMENDED FEEDING/EATING TECHNIQUES
alternate food with liquid/allow for swallow between intakes/position upright (90 degrees)/small sips/bites/maintain upright posture during/after eating for 30 mins/no straws/oral hygiene

## 2019-11-25 NOTE — SWALLOW BEDSIDE ASSESSMENT ADULT - ASR SWALLOW ASPIRATION MONITOR
oral hygiene/pneumonia/fever/throat clearing/upper respiratory infection/change of breathing pattern/position upright (90Y)/gurgly voice/cough

## 2019-11-26 LAB
-  AMIKACIN: SIGNIFICANT CHANGE UP
-  AMPICILLIN/SULBACTAM: SIGNIFICANT CHANGE UP
-  AMPICILLIN: SIGNIFICANT CHANGE UP
-  AZTREONAM: SIGNIFICANT CHANGE UP
-  CEFAZOLIN: SIGNIFICANT CHANGE UP
-  CEFEPIME: SIGNIFICANT CHANGE UP
-  CEFOXITIN: SIGNIFICANT CHANGE UP
-  CEFTRIAXONE: SIGNIFICANT CHANGE UP
-  CIPROFLOXACIN: SIGNIFICANT CHANGE UP
-  GENTAMICIN: SIGNIFICANT CHANGE UP
-  IMIPENEM: SIGNIFICANT CHANGE UP
-  LEVOFLOXACIN: SIGNIFICANT CHANGE UP
-  MEROPENEM: SIGNIFICANT CHANGE UP
-  NITROFURANTOIN: SIGNIFICANT CHANGE UP
-  PIPERACILLIN/TAZOBACTAM: SIGNIFICANT CHANGE UP
-  TIGECYCLINE: SIGNIFICANT CHANGE UP
-  TOBRAMYCIN: SIGNIFICANT CHANGE UP
-  TRIMETHOPRIM/SULFAMETHOXAZOLE: SIGNIFICANT CHANGE UP
ANION GAP SERPL CALC-SCNC: 8 MMOL/L — SIGNIFICANT CHANGE UP (ref 5–17)
BUN SERPL-MCNC: 52 MG/DL — HIGH (ref 7–23)
CALCIUM SERPL-MCNC: 9 MG/DL — SIGNIFICANT CHANGE UP (ref 8.5–10.1)
CHLORIDE SERPL-SCNC: 104 MMOL/L — SIGNIFICANT CHANGE UP (ref 96–108)
CO2 SERPL-SCNC: 26 MMOL/L — SIGNIFICANT CHANGE UP (ref 22–31)
CREAT SERPL-MCNC: 2.2 MG/DL — HIGH (ref 0.5–1.3)
CULTURE RESULTS: SIGNIFICANT CHANGE UP
GLUCOSE SERPL-MCNC: 187 MG/DL — HIGH (ref 70–99)
HCT VFR BLD CALC: 32.1 % — LOW (ref 39–50)
HGB BLD-MCNC: 10.2 G/DL — LOW (ref 13–17)
MAGNESIUM SERPL-MCNC: 2.1 MG/DL — SIGNIFICANT CHANGE UP (ref 1.6–2.6)
MCHC RBC-ENTMCNC: 27.9 PG — SIGNIFICANT CHANGE UP (ref 27–34)
MCHC RBC-ENTMCNC: 31.8 GM/DL — LOW (ref 32–36)
MCV RBC AUTO: 87.7 FL — SIGNIFICANT CHANGE UP (ref 80–100)
METHOD TYPE: SIGNIFICANT CHANGE UP
NRBC # BLD: 0 /100 WBCS — SIGNIFICANT CHANGE UP (ref 0–0)
ORGANISM # SPEC MICROSCOPIC CNT: SIGNIFICANT CHANGE UP
ORGANISM # SPEC MICROSCOPIC CNT: SIGNIFICANT CHANGE UP
PLATELET # BLD AUTO: 168 K/UL — SIGNIFICANT CHANGE UP (ref 150–400)
POTASSIUM SERPL-MCNC: 4.1 MMOL/L — SIGNIFICANT CHANGE UP (ref 3.5–5.3)
POTASSIUM SERPL-SCNC: 4.1 MMOL/L — SIGNIFICANT CHANGE UP (ref 3.5–5.3)
RBC # BLD: 3.66 M/UL — LOW (ref 4.2–5.8)
RBC # FLD: 14.6 % — HIGH (ref 10.3–14.5)
SODIUM SERPL-SCNC: 138 MMOL/L — SIGNIFICANT CHANGE UP (ref 135–145)
SPECIMEN SOURCE: SIGNIFICANT CHANGE UP
WBC # BLD: 11.48 K/UL — HIGH (ref 3.8–10.5)
WBC # FLD AUTO: 11.48 K/UL — HIGH (ref 3.8–10.5)

## 2019-11-26 PROCEDURE — 99232 SBSQ HOSP IP/OBS MODERATE 35: CPT

## 2019-11-26 PROCEDURE — 74230 X-RAY XM SWLNG FUNCJ C+: CPT | Mod: 26

## 2019-11-26 RX ORDER — ASPIRIN/CALCIUM CARB/MAGNESIUM 324 MG
81 TABLET ORAL DAILY
Refills: 0 | Status: DISCONTINUED | OUTPATIENT
Start: 2019-11-26 | End: 2019-11-29

## 2019-11-26 RX ADMIN — Medication 40 MILLIGRAM(S): at 06:46

## 2019-11-26 RX ADMIN — Medication 40 MILLIGRAM(S): at 06:49

## 2019-11-26 RX ADMIN — Medication 3 MILLILITER(S): at 00:35

## 2019-11-26 RX ADMIN — Medication 25 MILLIGRAM(S): at 14:13

## 2019-11-26 RX ADMIN — Medication 2 MILLIGRAM(S): at 23:13

## 2019-11-26 RX ADMIN — Medication 2 MILLIGRAM(S): at 14:14

## 2019-11-26 RX ADMIN — Medication 200 MILLIGRAM(S): at 18:07

## 2019-11-26 RX ADMIN — Medication 100 MILLIGRAM(S): at 11:32

## 2019-11-26 RX ADMIN — Medication 25 MILLIGRAM(S): at 06:46

## 2019-11-26 RX ADMIN — CEFTRIAXONE 100 MILLIGRAM(S): 500 INJECTION, POWDER, FOR SOLUTION INTRAMUSCULAR; INTRAVENOUS at 23:16

## 2019-11-26 RX ADMIN — AMLODIPINE BESYLATE 10 MILLIGRAM(S): 2.5 TABLET ORAL at 06:46

## 2019-11-26 RX ADMIN — Medication 25 MILLIGRAM(S): at 23:16

## 2019-11-26 RX ADMIN — ASENAPINE MALEATE 10 MILLIGRAM(S): 10 TABLET SUBLINGUAL at 23:13

## 2019-11-26 RX ADMIN — Medication 150 MILLIGRAM(S): at 06:46

## 2019-11-26 RX ADMIN — Medication 150 MILLIGRAM(S): at 18:06

## 2019-11-26 RX ADMIN — TAMSULOSIN HYDROCHLORIDE 0.4 MILLIGRAM(S): 0.4 CAPSULE ORAL at 23:16

## 2019-11-26 RX ADMIN — CEFTRIAXONE 100 MILLIGRAM(S): 500 INJECTION, POWDER, FOR SOLUTION INTRAMUSCULAR; INTRAVENOUS at 00:14

## 2019-11-26 RX ADMIN — PANTOPRAZOLE SODIUM 40 MILLIGRAM(S): 20 TABLET, DELAYED RELEASE ORAL at 06:46

## 2019-11-26 RX ADMIN — Medication 81 MILLIGRAM(S): at 14:13

## 2019-11-26 RX ADMIN — ASENAPINE MALEATE 10 MILLIGRAM(S): 10 TABLET SUBLINGUAL at 00:14

## 2019-11-26 RX ADMIN — Medication 3 MILLILITER(S): at 14:55

## 2019-11-26 RX ADMIN — ISOSORBIDE MONONITRATE 60 MILLIGRAM(S): 60 TABLET, EXTENDED RELEASE ORAL at 11:31

## 2019-11-26 RX ADMIN — ENOXAPARIN SODIUM 30 MILLIGRAM(S): 100 INJECTION SUBCUTANEOUS at 11:32

## 2019-11-26 RX ADMIN — Medication 3 MILLILITER(S): at 07:39

## 2019-11-26 RX ADMIN — Medication 3 MILLILITER(S): at 20:10

## 2019-11-26 RX ADMIN — Medication 2 MILLIGRAM(S): at 07:55

## 2019-11-26 RX ADMIN — AZITHROMYCIN 500 MILLIGRAM(S): 500 TABLET, FILM COATED ORAL at 11:31

## 2019-11-26 RX ADMIN — Medication 200 MILLIGRAM(S): at 06:46

## 2019-11-26 RX ADMIN — BUPROPION HYDROCHLORIDE 300 MILLIGRAM(S): 150 TABLET, EXTENDED RELEASE ORAL at 11:31

## 2019-11-26 NOTE — PROGRESS NOTE ADULT - ASSESSMENT
cont rx REVIEW OF SYMPTOMS      Able to give ROS  Yes     RELIABLE No   CONSTITUTIONAL Weakness Yes  Chills No Vision changes No  ENDOCRINE No unexplained hair loss No heat or cold intolerance    ALLERGY No hives  Sore throat No   RESP Coughing blood no  Shortness of breath YES   NEURO No Headache  Confusion Pain neck No   CARDIAC No Chest pain No Palpitations   GI No Pain abdomen NO   Vomiting NO     PHYSICAL EXAM    HEENT Unremarkable PERRLA atraumatic   RESP Fair air entry EXP prolonged    Harsh breath sound Resp distres mild   CARDIAC S1 S2 No S3     NO JVD    ABDOMEN SOFT BS PRESENT NOT DISTENDED No hepatosplenomegaly PEDAL EDEMA present No calf tenderness  NO rash   GENERAL Not TOXIC looking    PATIENT BONG HERNANDEZ [facility] P [phone]   1940                PATIENT DATA   ALLERGY nka   ADVANCED DIRECTIVE       WT    106 k            BMI  34  SMOKING HX     2019 Quit smoking 3 y 1 ppd   HOME O2/DME     2019 No home O2  No home neb                                                                                                                                  HEAD OF BED ELEVATION Yes          DVT PROPHYLAXIS  AC               lvnx 30 91)   APA             PTA AC/APA    EKG  2019 ekg sr  ECHO  BNP 2019 bnp 3487      STRESS ULCER PROPHYLAXIS protonix 40 ()   GAS EXCHANGE 2019 4L 742/36/99   DUPLEX 2019 V duplx n   INFECTION PPLX         DYSPHAGIA EVAL      2019 speech recommended dys 3 nectar ()                                      DIET dash ()   Cr --2019 Cr 2.3 -2.2-2.2  US renal  no hydro   Hb --2019 Hb 9.9 -10.2-10.2   W   --2019 W 17.2-13-11.4  MICRO     2019 Flu ab n RSV n   urine 100k e coli  blod culture n   PC 2019 pc 1.1   ABIO Rocephin ()     Azithro ()    CXR     2019 CXR bl patchy infiltr                  CT      2019 CT ch  cw 2018  1) New peripheral density lung bases r larger dense pneu or masses both larger   2) prominent pretrach ln              PATIENT DESCRIPTION        CC           weakness sob  fever                  ER VS      146/88 76 20 afeb   ER MX                              HPI       2019 79 m former smoker PMH pulm fibrosis CHF D def dm Htn Hld  was admitted with fever sob leukocytosis hypoxemia basal pneumonia mild normocytic anemia and possible chf    NOTE As per cardio note pt has PMH chr d CHF (N LVSF ) Nonobstr cad (cath ) Home O2 dep (COPD)   Pulm consulted 2019                                 PMH       As above                          HOME MEDS                           hydralazine 25.3 oseltamivir pulmicort protonix aamlodipine labetalol 200.3 xanax wellbutrin 300 asa 81 saphris 10                               PULMONARY/CRITICAL CARE ASSESSMENT/RECOMMENDATIONS                      RO DYSPHAGIA (Basal pneumonia) speech recommended dys 3 nectar ()                                      MASS PL BASD Will need fu as outpt with pet scan and possible bx See me in office  in 2 w   AC HYPOXEMIC RESP FAILURE 2019 4L 742/36/99   v duplex n Target PO 90-95%  PNEUMONIA E COLI UTI   Improved with abio   ABIO Rocephin ()     Azithro ()    COPD duoneb.4 () solumed 40() Cont Rx   CHF 2019 bnp 3487  hydralazine 25.3 () imdur 60 () lasix 40 () 2019 check echo   ANEMIA norm 2019 Hb 9.9 Likely acd   DEVYN  2019 Cr 2.3  us renal No hydro Monitor serially   PSYCH alprazolam 2.3 () saphris 10 () venlafax 150.2 () buprop 300 () Cont rx     PLAN  Continue azithro () rcephin for caputi Need to ro asp  speech eval  noted Will need office fu for poss pleural mass with pet scan and biops Call  for pulm       TIME SPENT Over 25 minutes aggregate care time spent on encounter; activities included   direct pt care, counseling and/or coordinating care reviewing notes, lab data/ imaging , discussion with multidisciplinary team/ pt /family. Risks, benefits, alternatives  discussed in detail.

## 2019-11-26 NOTE — PHARMACOTHERAPY INTERVENTION NOTE - COMMENTS
Patient on Zithromax for PNA.  Discussed with MD about duration for 5 days therapy and recommended to add stop date for 11/27 for 5 total days of therapy.  MD agreed.

## 2019-11-26 NOTE — SWALLOW VFSS/MBS ASSESSMENT ADULT - ROSENBEK'S PENETRATION ASPIRATION SCALE
(1) no aspiration, contrast does not enter airway 1- Nectar thick liquids with utilization of a chin tuck posture; 2,3 - nectar thick liquids with head in neutral position (3) contrast remains above the vocal cords, visible residue remains (penetration)

## 2019-11-26 NOTE — SWALLOW VFSS/MBS ASSESSMENT ADULT - RECOMMENDED FEEDING/EATING TECHNIQUES
crush medication (when feasible)/provide rest periods between swallows/no straws/oral hygiene/small sips/bites/allow for swallow between intakes/maintain upright posture during/after eating for 30 mins/position upright (90 degrees)

## 2019-11-26 NOTE — SWALLOW VFSS/MBS ASSESSMENT ADULT - RECOMMENDED CONSISTENCY
DX CONT: reduced pharyngeal contractility, and incomplete closure of the laryngeal vestibule resulting in laryngeal penetration without retrieval. Compensatory strategies (i.e. chin tuck) were unsuccessful in consistently providing adequate airway protection. Absent patient response indicative of reduced laryngo-pharyngeal sensation. Trace stasis noted along the base of tongue, in the vallecula, along the posterior pharyngeal wall, and in the pyriforms which reduced with a subsequent swallow.    RECOMMENDATIONS:  1. Dysphagia 3 (soft solids) with nectar thick liquids, as tolerated  2. Patient must utilize a chin tuck posture during deglutition of all nectar thick liquids  3. Small bites and small, single cup sips   4. Upright positioning during and ~30 min post meals  5. Slow pacing during meals to ensure adequate respiration-swallow coordination  6. Strict aspiration precautions  7. Strict oral care

## 2019-11-26 NOTE — SWALLOW VFSS/MBS ASSESSMENT ADULT - CONSISTENCIES ADMINISTERED
[FreeTextEntry1] : 4 yr old male with lesion to right shoulder, likely due to irritant/contact dermatitis. Advised topical steroid BID to area, may give benadryl if pruritic. RTO as needed or if symptoms worsening/persistent. puree solid honey thick nectar thick thin liquid

## 2019-11-26 NOTE — PROGRESS NOTE ADULT - SUBJECTIVE AND OBJECTIVE BOX
DAVID WOODY    V TELN 337 W1    Patient is a 79y old  Male who presents with a chief complaint of fever (26 Nov 2019 12:36)       Allergies    No Known Allergies    Intolerances        HPI:  78yo M with PMH of chronic diastolic CHF (normal LVF 2016), non obstructive CAD (cardiac cath 2015), BPH, interstitial lung disease, COPD (on 4L home O2), HTN, HLD, 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 Hx obtained from son and patient. Per son, pt was in his usual state of health until this morning, when his home health aide noticed worsened SOB and weakness. She had difficulty moving him and lifting him from his chair. son states that the pt chronically has SOB and difficulty moving, but that it has worsened today. He has a chronic cough from COPD/Interstitial lung disease. Son notes no change in the cough or increased sputum production since his sx began. Pt notes increased urinary frequency over the past few weeks, but denies dysuria or  hematuria. Pt was afebrile at home on son's home thermometer. Pt complains that he is thirsty and wants water. Pt denies CP, palpitations, NVD, melena, hematochezia, numbness, tingling.       In ED Pt received one dose rocephin, one dose azithromycin, 125 mg IV solumedrol x1, duonebs x1, 2.2L NS bolus  Vitals: T 102.7, HR 78, RR 22, /63, SpO2 93  Labs: WBC 17.94, Hb 10.8, Na 130, Cr 2.2 (baseline around 1.4), procal 1.17, proBNP 3487, lactate 1.9  EKG sinus rhythm w first degree AV block, no acute ischemic changes noted   UA: slightly turbid, moderate leuk esterase, small blood, moderate bacteria   Imaging  CXR: increased congestion compared to prior CXR in feb 2019, no consolidation appreciated, official read pending (24 Nov 2019 00:49)      PAST MEDICAL & SURGICAL HISTORY:  COPD, severe  Congestive heart failure  Pulmonary fibrosis  Pulmonary fibrosis  Smoker  BPH with obstruction/lower urinary tract symptoms  Vitamin D deficiency  GERD (gastroesophageal reflux disease)  Diabetes mellitus  Hyperlipidemia  Hypertension  S/P tonsillectomy      FAMILY HISTORY:  Family history of heart disease (Grandparent)  Family history of lymphoma        MEDICATIONS   acetaminophen   Tablet .. 650 milliGRAM(s) Oral every 6 hours PRN  albuterol/ipratropium for Nebulization 3 milliLiter(s) Nebulizer every 6 hours  allopurinol 100 milliGRAM(s) Oral daily  ALPRAZolam 2 milliGRAM(s) Oral three times a day  amLODIPine   Tablet 10 milliGRAM(s) Oral daily  asenapine SL 10 milliGRAM(s) SubLingual <User Schedule>  aspirin enteric coated 81 milliGRAM(s) Oral daily  azithromycin   Tablet 500 milliGRAM(s) Oral daily  buPROPion XL . 300 milliGRAM(s) Oral daily  cefTRIAXone   IVPB 1000 milliGRAM(s) IV Intermittent every 24 hours  enoxaparin Injectable 30 milliGRAM(s) SubCutaneous daily  furosemide    Tablet 40 milliGRAM(s) Oral daily  hydrALAZINE 25 milliGRAM(s) Oral three times a day  isosorbide   mononitrate ER Tablet (IMDUR) 60 milliGRAM(s) Oral daily  labetalol 200 milliGRAM(s) Oral two times a day  methylPREDNISolone sodium succinate Injectable 40 milliGRAM(s) IV Push daily  pantoprazole    Tablet 40 milliGRAM(s) Oral before breakfast  tamsulosin 0.4 milliGRAM(s) Oral at bedtime  venlafaxine XR. 150 milliGRAM(s) Oral two times a day      Vital Signs Last 24 Hrs  T(C): 37.1 (26 Nov 2019 12:21), Max: 37.1 (26 Nov 2019 12:21)  T(F): 98.8 (26 Nov 2019 12:21), Max: 98.8 (26 Nov 2019 12:21)  HR: 90 (26 Nov 2019 12:21) (68 - 90)  BP: 143/85 (26 Nov 2019 12:21) (134/82 - 152/89)  BP(mean): --  RR: 18 (26 Nov 2019 12:21) (18 - 19)  SpO2: 98% (26 Nov 2019 12:21) (95% - 98%)      11-25-19 @ 07:01  -  11-26-19 @ 07:00  --------------------------------------------------------  IN: 920 mL / OUT: 2900 mL / NET: -1980 mL    11-26-19 @ 07:01  -  11-26-19 @ 13:25  --------------------------------------------------------  IN: 0 mL / OUT: 450 mL / NET: -450 mL            LABS:                        10.2   11.48 )-----------( 168      ( 26 Nov 2019 06:49 )             32.1     11-26    138  |  104  |  52<H>  ----------------------------<  187<H>  4.1   |  26  |  2.20<H>    Ca    9.0      26 Nov 2019 06:49  Mg     2.1     11-26                WBC:  WBC Count: 11.48 K/uL (11-26 @ 06:49)  WBC Count: 13.05 K/uL (11-25 @ 06:59)  WBC Count: 17.24 K/uL (11-24 @ 08:37)  WBC Count: 17.94 K/uL (11-23 @ 23:23)      MICROBIOLOGY:  RECENT CULTURES:  11-24 .Urine Clean Catch (Midstream) XXXX XXXX   >100,000 CFU/ml Gram Negative Rods    11-24 .Blood Blood-Peripheral XXXX XXXX   No growth to date.                    Sodium:  Sodium, Serum: 138 mmol/L (11-26 @ 06:49)  Sodium, Serum: 137 mmol/L (11-25 @ 06:59)  Sodium, Serum: 133 mmol/L (11-24 @ 08:37)  Sodium, Serum: 130 mmol/L (11-23 @ 23:23)      2.20 mg/dL 11-26 @ 06:49  2.20 mg/dL 11-25 @ 06:59  2.30 mg/dL 11-24 @ 08:37  2.20 mg/dL 11-23 @ 23:23      Hemoglobin:  Hemoglobin: 10.2 g/dL (11-26 @ 06:49)  Hemoglobin: 10.2 g/dL (11-25 @ 06:59)  Hemoglobin: 9.9 g/dL (11-24 @ 08:37)  Hemoglobin: 10.8 g/dL (11-23 @ 23:23)      Platelets: Platelet Count - Automated: 168 K/uL (11-26 @ 06:49)  Platelet Count - Automated: 148 K/uL (11-25 @ 06:59)  Platelet Count - Automated: 132 K/uL (11-24 @ 08:37)  Platelet Count - Automated: 136 K/uL (11-23 @ 23:23)              RADIOLOGY & ADDITIONAL STUDIES:

## 2019-11-26 NOTE — SWALLOW VFSS/MBS ASSESSMENT ADULT - COMMENTS
The patient was received in the radiology suite this AM, at which time he was alert and cooperative. Patient currently with supplemental O2 via 4L NC in place. The patient is known to this department as he was initially seen for a bedside swallow evaluation on 11/25/19 (please see full report for details), at which time a soft solid with thin liquid diet and a Modified Barium Swallow Study were recommended. It should be noted that the patient was evaluated at bedside with high-flow nasal cannula in place.    Per charting, the patient is a "78 y/o M who p/w Acute hypoxic respiratory failure and sepsis 2/2 pneumonia and COPD exacerbation. Pt's pmhx is significant for chronic diastolic CHF (normal LVF 2016), non obstructive CAD (cardiac cath 2015), BPH, interstitial lung disease, COPD (on 4L home O2), HTN, HLD, DMT2 on Janumet, GERD, BPH, depression, anxiety." Recent CT of the chest revealed, "Posterior basilar lung densities bilaterally. Differential diagnosis would be recurrent pneumonias or developing tumors." Discussed results and recommendations from this evaluation with the patient and call out to MD.

## 2019-11-26 NOTE — PROGRESS NOTE ADULT - SUBJECTIVE AND OBJECTIVE BOX
[facility] -- Franco Mcallister, Benitez, Josse, Kalia Cheng Savella  Office # [phone]      Follow Up:    shortness of breath     Subjective/Observations:   No events overnight resting comfortably with high flow oxygen in NAD  denies chest pain dizziness palpitations     REVIEW OF SYSTEMS: All other review of systems is negative unless indicated above    PAST MEDICAL & SURGICAL HISTORY:  COPD, severe  Congestive heart failure  Pulmonary fibrosis  Pulmonary fibrosis  Smoker  BPH with obstruction/lower urinary tract symptoms  Vitamin D deficiency  GERD (gastroesophageal reflux disease)  Diabetes mellitus  Hyperlipidemia  Hypertension  S/P tonsillectomy      MEDICATIONS  (STANDING):  albuterol/ipratropium for Nebulization 3 milliLiter(s) Nebulizer every 6 hours  allopurinol 100 milliGRAM(s) Oral daily  ALPRAZolam 2 milliGRAM(s) Oral three times a day  amLODIPine   Tablet 10 milliGRAM(s) Oral daily  asenapine SL 10 milliGRAM(s) SubLingual <User Schedule>  azithromycin   Tablet 500 milliGRAM(s) Oral daily  buPROPion XL . 300 milliGRAM(s) Oral daily  cefTRIAXone   IVPB 1000 milliGRAM(s) IV Intermittent every 24 hours  enoxaparin Injectable 30 milliGRAM(s) SubCutaneous daily  furosemide    Tablet 40 milliGRAM(s) Oral daily  hydrALAZINE 25 milliGRAM(s) Oral three times a day  isosorbide   mononitrate ER Tablet (IMDUR) 60 milliGRAM(s) Oral daily  labetalol 200 milliGRAM(s) Oral two times a day  methylPREDNISolone sodium succinate Injectable 40 milliGRAM(s) IV Push daily  pantoprazole    Tablet 40 milliGRAM(s) Oral before breakfast  tamsulosin 0.4 milliGRAM(s) Oral at bedtime  venlafaxine XR. 150 milliGRAM(s) Oral two times a day    MEDICATIONS  (PRN):  acetaminophen   Tablet .. 650 milliGRAM(s) Oral every 6 hours PRN Temp greater or equal to 38C (100.4F)      Allergies    No Known Allergies    Intolerances        Vital Signs Last 24 Hrs  T(C): 37 (26 Nov 2019 08:21), Max: 37.2 (25 Nov 2019 12:27)  T(F): 98.6 (26 Nov 2019 08:21), Max: 98.9 (25 Nov 2019 12:27)  HR: 76 (26 Nov 2019 08:26) (68 - 87)  BP: 134/85 (26 Nov 2019 08:21) (134/82 - 152/89)  BP(mean): --  RR: 18 (26 Nov 2019 08:21) (18 - 22)  SpO2: 96% (26 Nov 2019 08:26) (95% - 98%)    I&O's Summary    25 Nov 2019 07:01  -  26 Nov 2019 07:00  --------------------------------------------------------  IN: 920 mL / OUT: 2900 mL / NET: -1980 mL          PHYSICAL EXAM:  TELE: NSR 85 bpm  Constitutional: NAD, awake and alert, well-developed  HEENT: Moist Mucous Membranes, Anicteric  Pulmonary: Non-labored, diffuse rhonchi exp wheeze   Cardiovascular: Regular, S1 and S2 nl, No murmurs, rubs, gallops or clicks  Gastrointestinal: Bowel Sounds present, soft, nontender.   Lymph: No lymphadenopathy. No peripheral edema.  Skin: No visible rashes or ulcers.  Psych:  Mood & affect appropriate    LABS: All Labs Reviewed:                        10.2   11.48 )-----------( 168      ( 26 Nov 2019 06:49 )             32.1                         10.2   13.05 )-----------( 148      ( 25 Nov 2019 06:59 )             31.7                         9.9    17.24 )-----------( 132      ( 24 Nov 2019 08:37 )             30.0     26 Nov 2019 06:49    138    |  104    |  52     ----------------------------<  187    4.1     |  26     |  2.20   25 Nov 2019 06:59    137    |  103    |  45     ----------------------------<  146    4.2     |  26     |  2.20   24 Nov 2019 08:37    133    |  99     |  38     ----------------------------<  201    4.9     |  26     |  2.30     Ca    9.0        26 Nov 2019 06:49  Ca    9.2        25 Nov 2019 06:59  Ca    8.5        24 Nov 2019 08:37  Mg     2.1       26 Nov 2019 06:49  Mg     2.1       25 Nov 2019 06:59    TPro  6.6    /  Alb  3.1    /  TBili  0.7    /  DBili  x      /  AST  35     /  ALT  28     /  AlkPhos  33     23 Nov 2019 23:23             ECG:  < from: 12 Lead ECG (11.23.19 @ 22:36) >  Ventricular Rate 78 BPM    Atrial Rate 78 BPM    P-R Interval 288 ms    QRS Duration 94 ms    Q-T Interval 358 ms    QTC Calculation(Bezet) 408 ms    P Axis 46 degrees    R Axis -29 degrees    T Axis 29 degrees    < end of copied text >            Radiology:  < from: CT Chest No Cont (11.24.19 @ 16:38) >  INTERPRETATION: The lung windows demonstrate new areas of peripheral   density of the lung bases, right larger than left, either indicating   dense pneumonias or developing pleural-based tumors as they both are   larger than in the prior study.      The mediastinum shows similar heart size with similar prominent   pretracheal lymph nodes.      No pericardial nor pleural effusion is identified.      The trachea and proximal bronchi show no focal lesions .      The bony structures show no gross destructive changes.      Below the hemidiaphragms, the visualized solid visceral organs are   unremarkable.      IMPRESSION: Posterior basilar lung densities bilaterally. Differential   diagnosis wouldbe recurrent pneumonias or developing tumors. Follow-up   study is recommended to help distinguish between these possibilities.    < end of copied text > [facility] -- Franco Mcallister, Benitez, Josse, Kalia Cheng Savella  Office # [phone]      Follow Up:    shortness of breath     Subjective/Observations:   No events overnight resting comfortably with high flow oxygen in NAD complaints of fatigue   denies chest pain dizziness palpitations     REVIEW OF SYSTEMS: All other review of systems is negative unless indicated above    PAST MEDICAL & SURGICAL HISTORY:  COPD, severe  Congestive heart failure  Pulmonary fibrosis  Pulmonary fibrosis  Smoker  BPH with obstruction/lower urinary tract symptoms  Vitamin D deficiency  GERD (gastroesophageal reflux disease)  Diabetes mellitus  Hyperlipidemia  Hypertension  S/P tonsillectomy      MEDICATIONS  (STANDING):  albuterol/ipratropium for Nebulization 3 milliLiter(s) Nebulizer every 6 hours  allopurinol 100 milliGRAM(s) Oral daily  ALPRAZolam 2 milliGRAM(s) Oral three times a day  amLODIPine   Tablet 10 milliGRAM(s) Oral daily  asenapine SL 10 milliGRAM(s) SubLingual <User Schedule>  azithromycin   Tablet 500 milliGRAM(s) Oral daily  buPROPion XL . 300 milliGRAM(s) Oral daily  cefTRIAXone   IVPB 1000 milliGRAM(s) IV Intermittent every 24 hours  enoxaparin Injectable 30 milliGRAM(s) SubCutaneous daily  furosemide    Tablet 40 milliGRAM(s) Oral daily  hydrALAZINE 25 milliGRAM(s) Oral three times a day  isosorbide   mononitrate ER Tablet (IMDUR) 60 milliGRAM(s) Oral daily  labetalol 200 milliGRAM(s) Oral two times a day  methylPREDNISolone sodium succinate Injectable 40 milliGRAM(s) IV Push daily  pantoprazole    Tablet 40 milliGRAM(s) Oral before breakfast  tamsulosin 0.4 milliGRAM(s) Oral at bedtime  venlafaxine XR. 150 milliGRAM(s) Oral two times a day    MEDICATIONS  (PRN):  acetaminophen   Tablet .. 650 milliGRAM(s) Oral every 6 hours PRN Temp greater or equal to 38C (100.4F)      Allergies    No Known Allergies    Intolerances        Vital Signs Last 24 Hrs  T(C): 37 (26 Nov 2019 08:21), Max: 37.2 (25 Nov 2019 12:27)  T(F): 98.6 (26 Nov 2019 08:21), Max: 98.9 (25 Nov 2019 12:27)  HR: 76 (26 Nov 2019 08:26) (68 - 87)  BP: 134/85 (26 Nov 2019 08:21) (134/82 - 152/89)  BP(mean): --  RR: 18 (26 Nov 2019 08:21) (18 - 22)  SpO2: 96% (26 Nov 2019 08:26) (95% - 98%)    I&O's Summary    25 Nov 2019 07:01  -  26 Nov 2019 07:00  --------------------------------------------------------  IN: 920 mL / OUT: 2900 mL / NET: -1980 mL          PHYSICAL EXAM:  TELE: NSR 85 bpm  Constitutional: NAD, awake and alert, well-developed  HEENT: Moist Mucous Membranes, Anicteric  Pulmonary: Non-labored, diffuse rhonchi exp wheeze   Cardiovascular: Regular, S1 and S2 nl, No murmurs, rubs, gallops or clicks  Gastrointestinal: Bowel Sounds present, soft, nontender.   Lymph: No lymphadenopathy. No peripheral edema.  Skin: No visible rashes or ulcers.  Psych:  Mood & affect appropriate    LABS: All Labs Reviewed:                        10.2   11.48 )-----------( 168      ( 26 Nov 2019 06:49 )             32.1                         10.2   13.05 )-----------( 148      ( 25 Nov 2019 06:59 )             31.7                         9.9    17.24 )-----------( 132      ( 24 Nov 2019 08:37 )             30.0     26 Nov 2019 06:49    138    |  104    |  52     ----------------------------<  187    4.1     |  26     |  2.20   25 Nov 2019 06:59    137    |  103    |  45     ----------------------------<  146    4.2     |  26     |  2.20   24 Nov 2019 08:37    133    |  99     |  38     ----------------------------<  201    4.9     |  26     |  2.30     Ca    9.0        26 Nov 2019 06:49  Ca    9.2        25 Nov 2019 06:59  Ca    8.5        24 Nov 2019 08:37  Mg     2.1       26 Nov 2019 06:49  Mg     2.1       25 Nov 2019 06:59    TPro  6.6    /  Alb  3.1    /  TBili  0.7    /  DBili  x      /  AST  35     /  ALT  28     /  AlkPhos  33     23 Nov 2019 23:23             ECG:  < from: 12 Lead ECG (11.23.19 @ 22:36) >  Ventricular Rate 78 BPM    Atrial Rate 78 BPM    P-R Interval 288 ms    QRS Duration 94 ms    Q-T Interval 358 ms    QTC Calculation(Bezet) 408 ms    P Axis 46 degrees    R Axis -29 degrees    T Axis 29 degrees    < end of copied text >            Radiology:  < from: CT Chest No Cont (11.24.19 @ 16:38) >  INTERPRETATION: The lung windows demonstrate new areas of peripheral   density of the lung bases, right larger than left, either indicating   dense pneumonias or developing pleural-based tumors as they both are   larger than in the prior study.      The mediastinum shows similar heart size with similar prominent   pretracheal lymph nodes.      No pericardial nor pleural effusion is identified.      The trachea and proximal bronchi show no focal lesions .      The bony structures show no gross destructive changes.      Below the hemidiaphragms, the visualized solid visceral organs are   unremarkable.      IMPRESSION: Posterior basilar lung densities bilaterally. Differential   diagnosis wouldbe recurrent pneumonias or developing tumors. Follow-up   study is recommended to help distinguish between these possibilities.    < end of copied text >

## 2019-11-26 NOTE — SWALLOW VFSS/MBS ASSESSMENT ADULT - DIAGNOSTIC IMPRESSIONS
1. The patient demonstrated functional oral management of puree, honey thick, nectar thick, and thin liquid textures marked by adequate oral containment with adequate bolus collection, transfer, and AP transit time.  2. The patient demonstrated a mild oral dysphagia for solid textures marked by adequate oral containment with delayed bolus collection, transfer, and AP transit time. Trace lingual and palatal residue noted subsequent to deglutition which reduced with a liquid wash.  3. The patient demonstrated a mild pharyngeal dysphagia for puree, solid, and honey thick liquids marked by a timely pharyngeal swallow trigger with reduced base of tongue retraction, reduced epiglottic deflection, reduced hyolaryngeal elevation, and reduced pharyngeal contractility resulting in trace stasis along the base of tongue, mild stasis in the vallecula, trace stasis along the posterior pharyngeal wall, and trace stasis in the pyriforms which reduced with a subsequent swallow. No laryngeal penetration/aspiration observed.  4. The patient demonstrated a mild-moderate pharyngeal dysphagia for nectar thick liquids marked by a timely pharyngeal swallow trigger with reduced base of tongue retraction, reduced epiglottic deflection, reduced hyolaryngeal elevation, reduced pharyngeal contractility, and incomplete closure of the laryngeal vestibule resulting in laryngeal penetration during the swallow with and without full retrieval. Utilization of a chin tuck posture was successful in eliminating laryngeal penetration and in providing adequate airway protection. Trace stasis noted along the base of tongue, in the vallecula, along the posterior pharyngeal wall, and in the pyriforms which reduced with a subsequent swallow.  5. The patient demonstrated a moderate pharyngeal dysphagia for thin liquids marked by a delayed pharyngeal swallow trigger (bolus head at the level of the pyriforms) with reduced base of tongue retraction, reduced epiglottic deflection, reduced hyolaryngeal elevation,

## 2019-11-26 NOTE — SWALLOW VFSS/MBS ASSESSMENT ADULT - ORAL PHASE
within functional limits Residue in oral cavity/Delayed oral transit time/Reduced anterior - posterior transport Within functional limits

## 2019-11-26 NOTE — PROGRESS NOTE ADULT - SUBJECTIVE AND OBJECTIVE BOX
CHIEF COMPLAINT/INTERVAL HISTORY:  Pt. seen and evaluated for sepsis 2/2 pneumonia.  Pt. is in no distress.  Feeling better.  Requiring less O2 support.  Tolerating IV antibiotics.     REVIEW OF SYSTEMS:  No fever, CP, acute respiratory distress, or abdominal pain.     Vital Signs Last 24 Hrs  T(C): 37.1 (26 Nov 2019 12:21), Max: 37.1 (26 Nov 2019 12:21)  T(F): 98.8 (26 Nov 2019 12:21), Max: 98.8 (26 Nov 2019 12:21)  HR: 90 (26 Nov 2019 12:21) (68 - 90)  BP: 143/85 (26 Nov 2019 12:21) (134/82 - 152/89)  BP(mean): --  RR: 18 (26 Nov 2019 12:21) (18 - 19)  SpO2: 98% (26 Nov 2019 12:21) (95% - 98%)    PHYSICAL EXAM:  GENERAL: NAD  HEENT: EOMI, hearing normal, conjunctiva and sclera clear  Chest: Diminished BS at bases, no wheezing  CV: S1S2, RRR,   GI: soft, +BS, NT/ND  Musculoskeletal: trace LE edema  Psychiatric: affect nL, mood nL  Skin: warm and dry    LABS:                        10.2   11.48 )-----------( 168      ( 26 Nov 2019 06:49 )             32.1     11-26    138  |  104  |  52<H>  ----------------------------<  187<H>  4.1   |  26  |  2.20<H>    Ca    9.0      26 Nov 2019 06:49  Mg     2.1     11-26      Assessment and Plan:  -Acute hypoxic respiratory failure and sepsis 2/2 pneumonia and COPD exacerbation:  Blood cx NGTD.  Continue Azithromycin and Ceftriaxone.  Continue solu-medrol 40mg IV daily, Duoneb tx Q6h, and O2 support.  s/p MBS on dysphagia 3 nectar thickened liquid diet.  Pulmonary f/u  -Acute on chronic diastolic CHF exacerbation:  s/p Lasix 40mg IV once in ED.  Continue Lasix 40mg PO daily, hydralazine 25mg PO TID, Labetalol 200mg PO BID, and Imdur 60mg PO daily.  Cardiology f/u  -UTI:  Urine cx +gram negative rods.  Continue IV Ceftriaxone.    -Hyponatremia:  Resolved.  -Acute kidney injury:  stable.  No hydronephrosis on US.  Will continue to monitor while on diuretic.  Nephrology f/u  -Anxiety d/o:  continue Xanax 2mg PO TID and Effexor XR 150mg PO BID  -HTN:  continue Norvasc 10mg PO daily, Hydralazine 25mg PO TID, and Labetalol 200mg PO BID  -GERD:  continue Protonix 40mg PO daily  -BPH:  continue Flomax 0.4mg PO QHS  -VTE ppx: Lovenox 30mg SQ daily

## 2019-11-26 NOTE — PROGRESS NOTE ADULT - ASSESSMENT
79 male with a history of HTN, CAD, CHF, DM. PVD, COPD, ILD and CKD now admitted with SOB.   Being treated with lasix for CHF. Renal sonogram shows echogenicity due to CKD.  renal indices are stable, continue present treatement.

## 2019-11-26 NOTE — SWALLOW VFSS/MBS ASSESSMENT ADULT - NS SWALLOW VFSS REC ASPIR MON
position upright (90Y)/pneumonia/oral hygiene/fever/change of breathing pattern/upper respiratory infection/cough/gurgly voice/throat clearing

## 2019-11-26 NOTE — PHYSICAL THERAPY INITIAL EVALUATION ADULT - PERTINENT HX OF CURRENT PROBLEM, REHAB EVAL
78yo M with PMH of chronic diastolic CHF, non obstructive CAD, BPH, interstitial lung disease, COPD, HTN, HLD, 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 Hx obtained from son and patient. Per son, pt was in his usual state of health until this morning, when his home health aide noticed worsened SOB and weakness. She had difficulty moving him and lifting him from his chair.

## 2019-11-26 NOTE — PROGRESS NOTE ADULT - ASSESSMENT
76yo M with PMH of chronic diastolic CHF (normal LVF 2016), non obstructive CAD (cardiac cath 2015), BPH, interstitial lung disease, COPD (on 4L home O2), HTN, HLD, DMT2 on Janumet, GERD, BPH, depression, anxiety presents with SOB and weakness found with pna     Pulm PNA  Pulmonary following - Ct result as above   Continue nebulizers, antibiotics as per pulmonary '  Supplemental oxygen    Diastolic chf  Continue lasix PO daily  Strict intake and output- negative 1980 cc 24 hours     non obstructive cad  unclear why not on asa, start if not contraindicated  Check full lipid  check troponin x 2  outpatient echo     HTN  Continue norvasc, , isosorbide, labetalol, hydralazine    HLD  Check full lipid     CKD  followed by renal     DM  management as per primary     DVT on lovenox    Monitor and replete electrolytes. Keep K>4.0 and Mg>2.0.  Milagro Barakat FNP-C  Cardiology NP  [id number] [phone] 78yo M with PMH of chronic diastolic CHF (normal LVF 2016), non obstructive CAD (cardiac cath 2015), BPH, interstitial lung disease, COPD (on 4L home O2), HTN, HLD, DMT2 on Janumet, GERD, BPH, depression, anxiety presents with SOB and weakness found with pna     Pulm PNA  Pulmonary following - Ct result as above   Continue nebulizers, antibiotics as per pulmonary '  Supplemental oxygen    Diastolic chf  Continue lasix PO daily  Strict intake and output- negative 1980 cc 24 hours     non obstructive cad  unclear why not on asa, start if not contraindicated  Check full lipid  check troponin x 2  outpatient echo     HTN  Continue norvasc, , isosorbide, labetalol, hydralazine    HLD  Check full lipid     CKD  followed by renal     DM  management as per primary       lethargy  Consider decreasing xanax dosing on 2mg TID     DVT on lovenox    Monitor and replete electrolytes. Keep K>4.0 and Mg>2.0.  Milagro Barakat FNP-C  Cardiology NP  [phone]

## 2019-11-26 NOTE — PROGRESS NOTE ADULT - SUBJECTIVE AND OBJECTIVE BOX
DAVID WOODY  79y  Male    Patient is a 79y old  Male who presents with a chief complaint of fever (26 Nov 2019 13:25)      HPI:  78yo M with PMH of chronic diastolic CHF (normal LVF 2016), non obstructive CAD (cardiac cath 2015), BPH, interstitial lung disease, COPD (on 4L home O2), HTN, HLD, 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 Hx obtained from son and patient. Per son, pt was in his usual state of health until this morning, when his home health aide noticed worsened SOB and weakness. She had difficulty moving him and lifting him from his chair. son states that the pt chronically has SOB and difficulty moving, but that it has worsened today. He has a chronic cough from COPD/Interstitial lung disease. Son notes no change in the cough or increased sputum production since his sx began. Pt notes increased urinary frequency over the past few weeks, but denies dysuria or  hematuria. Pt was afebrile at home on son's home thermometer. Pt complains that he is thirsty and wants water. Pt denies CP, palpitations, NVD, melena, hematochezia, numbness, tingling.     feels much better today.       PAST MEDICAL & SURGICAL HISTORY:  COPD, severe  Congestive heart failure  Pulmonary fibrosis  Pulmonary fibrosis  Smoker  BPH with obstruction/lower urinary tract symptoms  Vitamin D deficiency  GERD (gastroesophageal reflux disease)  Diabetes mellitus  Hyperlipidemia  Hypertension  S/P tonsillectomy          PHYSICAL EXAM:    T(C): 37.1 (11-26-19 @ 12:21), Max: 37.1 (11-26-19 @ 12:21)  HR: 90 (11-26-19 @ 12:21) (68 - 90)  BP: 149/87 (11-26-19 @ 14:10) (134/82 - 152/89)  RR: 18 (11-26-19 @ 12:21) (18 - 19)  SpO2: 98% (11-26-19 @ 12:21) (95% - 98%)  Wt(kg): --    I&O's Detail    25 Nov 2019 07:01  -  26 Nov 2019 07:00  --------------------------------------------------------  IN:    Oral Fluid: 920 mL  Total IN: 920 mL    OUT:    Incontinent per Condom Catheter: 2900 mL  Total OUT: 2900 mL    Total NET: -1980 mL      26 Nov 2019 07:01  -  26 Nov 2019 14:24  --------------------------------------------------------  IN:  Total IN: 0 mL    OUT:    Incontinent per Condom Catheter: 450 mL  Total OUT: 450 mL    Total NET: -450 mL          Respiratory: clear anteriorly, decreased BS at bases  Cardiovascular: S1 S2  Gastrointestinal: soft NT ND +BS  Extremities: trace edema   Neuro: Awake and alert    MEDICATIONS  (STANDING):  albuterol/ipratropium for Nebulization 3 milliLiter(s) Nebulizer every 6 hours  allopurinol 100 milliGRAM(s) Oral daily  ALPRAZolam 2 milliGRAM(s) Oral three times a day  amLODIPine   Tablet 10 milliGRAM(s) Oral daily  asenapine SL 10 milliGRAM(s) SubLingual <User Schedule>  aspirin enteric coated 81 milliGRAM(s) Oral daily  azithromycin   Tablet 500 milliGRAM(s) Oral daily  buPROPion XL . 300 milliGRAM(s) Oral daily  cefTRIAXone   IVPB 1000 milliGRAM(s) IV Intermittent every 24 hours  enoxaparin Injectable 30 milliGRAM(s) SubCutaneous daily  furosemide    Tablet 40 milliGRAM(s) Oral daily  hydrALAZINE 25 milliGRAM(s) Oral three times a day  isosorbide   mononitrate ER Tablet (IMDUR) 60 milliGRAM(s) Oral daily  labetalol 200 milliGRAM(s) Oral two times a day  methylPREDNISolone sodium succinate Injectable 40 milliGRAM(s) IV Push daily  pantoprazole    Tablet 40 milliGRAM(s) Oral before breakfast  tamsulosin 0.4 milliGRAM(s) Oral at bedtime  venlafaxine XR. 150 milliGRAM(s) Oral two times a day    MEDICATIONS  (PRN):  acetaminophen   Tablet .. 650 milliGRAM(s) Oral every 6 hours PRN Temp greater or equal to 38C (100.4F)                            10.2   11.48 )-----------( 168      ( 26 Nov 2019 06:49 )             32.1       11-26    138  |  104  |  52<H>  ----------------------------<  187<H>  4.1   |  26  |  2.20<H>    Ca    9.0      26 Nov 2019 06:49  Mg     2.1     11-26        Creatinine Trend: Creatinine Trend: 2.20<--, 2.20<--, 2.30<--, 2.20<--    < from: US Kidney and Bladder (11.25.19 @ 01:05) >  EXAM:  US KIDNEYS AND BLADDER                            PROCEDURE DATE:  11/25/2019          INTERPRETATION:  [facility] PRELIMINARY REPORT    PROCEDURE INFORMATION:   Exam: US Retroperitoneal Limited, Kidneys   Exam date and time: 11/24/2019 10:33 PM   Age: 79 years old   Clinical history: Abnormal findings; Abnormal lab test; Other abnormal   tumor   markers     TECHNIQUE:   Imaging protocol: Real-time ultrasound of the retroperitoneum with image   documentation. Examination was focused on the kidneys.     COMPARISON:   US KIDNEYS 1/3/2018 7:50 AM     FINDINGS:   Right kidney: 13.4 x 5.9 x 7.1 cm. Echogenic. 25 mm simple cyst is not   significantly changed. No solid mass is identified. No visualized stones.   No   hydronephrosis.   Left kidney: 13.0 x 6.0 x 6.8 cm. Echogenic. No visualized cystic or   solid   lesion. No visualized stones. No hydronephrosis.   Bladder: No visualized stones. Ureteral jets were not assessed. Volume   198 cc.   Postvoid residual was not assessed.    IMPRESSION:   Echogenic kidneys can be seen with chronic kidney disease.  No hydronephrosis.

## 2019-11-27 LAB
ANION GAP SERPL CALC-SCNC: 6 MMOL/L — SIGNIFICANT CHANGE UP (ref 5–17)
BUN SERPL-MCNC: 61 MG/DL — HIGH (ref 7–23)
CALCIUM SERPL-MCNC: 9.1 MG/DL — SIGNIFICANT CHANGE UP (ref 8.5–10.1)
CHLORIDE SERPL-SCNC: 104 MMOL/L — SIGNIFICANT CHANGE UP (ref 96–108)
CO2 SERPL-SCNC: 29 MMOL/L — SIGNIFICANT CHANGE UP (ref 22–31)
CREAT SERPL-MCNC: 2.2 MG/DL — HIGH (ref 0.5–1.3)
GLUCOSE SERPL-MCNC: 92 MG/DL — SIGNIFICANT CHANGE UP (ref 70–99)
HCT VFR BLD CALC: 32 % — LOW (ref 39–50)
HGB BLD-MCNC: 10.2 G/DL — LOW (ref 13–17)
MAGNESIUM SERPL-MCNC: 2.2 MG/DL — SIGNIFICANT CHANGE UP (ref 1.6–2.6)
MCHC RBC-ENTMCNC: 27.8 PG — SIGNIFICANT CHANGE UP (ref 27–34)
MCHC RBC-ENTMCNC: 31.9 GM/DL — LOW (ref 32–36)
MCV RBC AUTO: 87.2 FL — SIGNIFICANT CHANGE UP (ref 80–100)
NRBC # BLD: 0 /100 WBCS — SIGNIFICANT CHANGE UP (ref 0–0)
PLATELET # BLD AUTO: 165 K/UL — SIGNIFICANT CHANGE UP (ref 150–400)
POTASSIUM SERPL-MCNC: 4.4 MMOL/L — SIGNIFICANT CHANGE UP (ref 3.5–5.3)
POTASSIUM SERPL-SCNC: 4.4 MMOL/L — SIGNIFICANT CHANGE UP (ref 3.5–5.3)
RBC # BLD: 3.67 M/UL — LOW (ref 4.2–5.8)
RBC # FLD: 14.7 % — HIGH (ref 10.3–14.5)
S PNEUM AG SER QL: SIGNIFICANT CHANGE UP
SODIUM SERPL-SCNC: 139 MMOL/L — SIGNIFICANT CHANGE UP (ref 135–145)
WBC # BLD: 10.36 K/UL — SIGNIFICANT CHANGE UP (ref 3.8–10.5)
WBC # FLD AUTO: 10.36 K/UL — SIGNIFICANT CHANGE UP (ref 3.8–10.5)

## 2019-11-27 PROCEDURE — 99233 SBSQ HOSP IP/OBS HIGH 50: CPT

## 2019-11-27 PROCEDURE — 99232 SBSQ HOSP IP/OBS MODERATE 35: CPT

## 2019-11-27 RX ORDER — DEXTROSE 50 % IN WATER 50 %
25 SYRINGE (ML) INTRAVENOUS ONCE
Refills: 0 | Status: DISCONTINUED | OUTPATIENT
Start: 2019-11-27 | End: 2019-11-29

## 2019-11-27 RX ORDER — DEXTROSE 50 % IN WATER 50 %
15 SYRINGE (ML) INTRAVENOUS ONCE
Refills: 0 | Status: DISCONTINUED | OUTPATIENT
Start: 2019-11-27 | End: 2019-11-29

## 2019-11-27 RX ORDER — DEXTROSE 50 % IN WATER 50 %
12.5 SYRINGE (ML) INTRAVENOUS ONCE
Refills: 0 | Status: DISCONTINUED | OUTPATIENT
Start: 2019-11-27 | End: 2019-11-29

## 2019-11-27 RX ORDER — SODIUM CHLORIDE 9 MG/ML
1000 INJECTION, SOLUTION INTRAVENOUS
Refills: 0 | Status: DISCONTINUED | OUTPATIENT
Start: 2019-11-27 | End: 2019-11-29

## 2019-11-27 RX ORDER — GLUCAGON INJECTION, SOLUTION 0.5 MG/.1ML
1 INJECTION, SOLUTION SUBCUTANEOUS ONCE
Refills: 0 | Status: DISCONTINUED | OUTPATIENT
Start: 2019-11-27 | End: 2019-11-29

## 2019-11-27 RX ORDER — HEPARIN SODIUM 5000 [USP'U]/ML
5000 INJECTION INTRAVENOUS; SUBCUTANEOUS EVERY 8 HOURS
Refills: 0 | Status: DISCONTINUED | OUTPATIENT
Start: 2019-11-27 | End: 2019-11-29

## 2019-11-27 RX ORDER — SENNA PLUS 8.6 MG/1
2 TABLET ORAL AT BEDTIME
Refills: 0 | Status: DISCONTINUED | OUTPATIENT
Start: 2019-11-27 | End: 2019-11-29

## 2019-11-27 RX ORDER — INSULIN LISPRO 100/ML
VIAL (ML) SUBCUTANEOUS
Refills: 0 | Status: DISCONTINUED | OUTPATIENT
Start: 2019-11-27 | End: 2019-11-29

## 2019-11-27 RX ORDER — POLYETHYLENE GLYCOL 3350 17 G/17G
17 POWDER, FOR SOLUTION ORAL DAILY
Refills: 0 | Status: DISCONTINUED | OUTPATIENT
Start: 2019-11-27 | End: 2019-11-29

## 2019-11-27 RX ADMIN — Medication 40 MILLIGRAM(S): at 05:02

## 2019-11-27 RX ADMIN — Medication 100 MILLIGRAM(S): at 13:48

## 2019-11-27 RX ADMIN — Medication 25 MILLIGRAM(S): at 05:02

## 2019-11-27 RX ADMIN — Medication 2 MILLIGRAM(S): at 14:05

## 2019-11-27 RX ADMIN — Medication 25 MILLIGRAM(S): at 22:05

## 2019-11-27 RX ADMIN — POLYETHYLENE GLYCOL 3350 17 GRAM(S): 17 POWDER, FOR SOLUTION ORAL at 18:25

## 2019-11-27 RX ADMIN — HEPARIN SODIUM 5000 UNIT(S): 5000 INJECTION INTRAVENOUS; SUBCUTANEOUS at 14:06

## 2019-11-27 RX ADMIN — Medication 81 MILLIGRAM(S): at 13:50

## 2019-11-27 RX ADMIN — Medication 3 MILLILITER(S): at 19:15

## 2019-11-27 RX ADMIN — Medication 2 MILLIGRAM(S): at 05:01

## 2019-11-27 RX ADMIN — PANTOPRAZOLE SODIUM 40 MILLIGRAM(S): 20 TABLET, DELAYED RELEASE ORAL at 05:03

## 2019-11-27 RX ADMIN — ISOSORBIDE MONONITRATE 60 MILLIGRAM(S): 60 TABLET, EXTENDED RELEASE ORAL at 13:49

## 2019-11-27 RX ADMIN — Medication 25 MILLIGRAM(S): at 13:51

## 2019-11-27 RX ADMIN — Medication 2 MILLIGRAM(S): at 22:04

## 2019-11-27 RX ADMIN — Medication 150 MILLIGRAM(S): at 18:26

## 2019-11-27 RX ADMIN — Medication 200 MILLIGRAM(S): at 05:02

## 2019-11-27 RX ADMIN — Medication 150 MILLIGRAM(S): at 05:01

## 2019-11-27 RX ADMIN — ASENAPINE MALEATE 10 MILLIGRAM(S): 10 TABLET SUBLINGUAL at 22:05

## 2019-11-27 RX ADMIN — AMLODIPINE BESYLATE 10 MILLIGRAM(S): 2.5 TABLET ORAL at 05:02

## 2019-11-27 RX ADMIN — Medication 3 MILLILITER(S): at 14:39

## 2019-11-27 RX ADMIN — HEPARIN SODIUM 5000 UNIT(S): 5000 INJECTION INTRAVENOUS; SUBCUTANEOUS at 22:05

## 2019-11-27 RX ADMIN — TAMSULOSIN HYDROCHLORIDE 0.4 MILLIGRAM(S): 0.4 CAPSULE ORAL at 22:06

## 2019-11-27 RX ADMIN — SENNA PLUS 2 TABLET(S): 8.6 TABLET ORAL at 22:05

## 2019-11-27 RX ADMIN — Medication 200 MILLIGRAM(S): at 18:26

## 2019-11-27 RX ADMIN — CEFTRIAXONE 100 MILLIGRAM(S): 500 INJECTION, POWDER, FOR SOLUTION INTRAMUSCULAR; INTRAVENOUS at 22:05

## 2019-11-27 RX ADMIN — Medication 3 MILLILITER(S): at 07:38

## 2019-11-27 RX ADMIN — BUPROPION HYDROCHLORIDE 300 MILLIGRAM(S): 150 TABLET, EXTENDED RELEASE ORAL at 13:49

## 2019-11-27 RX ADMIN — AZITHROMYCIN 500 MILLIGRAM(S): 500 TABLET, FILM COATED ORAL at 13:48

## 2019-11-27 NOTE — PROGRESS NOTE ADULT - SUBJECTIVE AND OBJECTIVE BOX
DAVID WOODY    V TELN 337 W1    Patient is a 79y old  Male who presents with a chief complaint of fever (27 Nov 2019 10:47)       Allergies    No Known Allergies    Intolerances        HPI:  78yo M with PMH of chronic diastolic CHF (normal LVF 2016), non obstructive CAD (cardiac cath 2015), BPH, interstitial lung disease, COPD (on 4L home O2), HTN, HLD, 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 Hx obtained from son and patient. Per son, pt was in his usual state of health until this morning, when his home health aide noticed worsened SOB and weakness. She had difficulty moving him and lifting him from his chair. son states that the pt chronically has SOB and difficulty moving, but that it has worsened today. He has a chronic cough from COPD/Interstitial lung disease. Son notes no change in the cough or increased sputum production since his sx began. Pt notes increased urinary frequency over the past few weeks, but denies dysuria or  hematuria. Pt was afebrile at home on son's home thermometer. Pt complains that he is thirsty and wants water. Pt denies CP, palpitations, NVD, melena, hematochezia, numbness, tingling.       In ED Pt received one dose rocephin, one dose azithromycin, 125 mg IV solumedrol x1, duonebs x1, 2.2L NS bolus  Vitals: T 102.7, HR 78, RR 22, /63, SpO2 93  Labs: WBC 17.94, Hb 10.8, Na 130, Cr 2.2 (baseline around 1.4), procal 1.17, proBNP 3487, lactate 1.9  EKG sinus rhythm w first degree AV block, no acute ischemic changes noted   UA: slightly turbid, moderate leuk esterase, small blood, moderate bacteria   Imaging  CXR: increased congestion compared to prior CXR in feb 2019, no consolidation appreciated, official read pending (24 Nov 2019 00:49)      PAST MEDICAL & SURGICAL HISTORY:  COPD, severe  Congestive heart failure  Pulmonary fibrosis  Pulmonary fibrosis  Smoker  BPH with obstruction/lower urinary tract symptoms  Vitamin D deficiency  GERD (gastroesophageal reflux disease)  Diabetes mellitus  Hyperlipidemia  Hypertension  S/P tonsillectomy      FAMILY HISTORY:  Family history of heart disease (Grandparent)  Family history of lymphoma        MEDICATIONS   acetaminophen   Tablet .. 650 milliGRAM(s) Oral every 6 hours PRN  albuterol/ipratropium for Nebulization 3 milliLiter(s) Nebulizer every 6 hours  allopurinol 100 milliGRAM(s) Oral daily  ALPRAZolam 2 milliGRAM(s) Oral three times a day  amLODIPine   Tablet 10 milliGRAM(s) Oral daily  asenapine SL 10 milliGRAM(s) SubLingual <User Schedule>  aspirin enteric coated 81 milliGRAM(s) Oral daily  azithromycin   Tablet 500 milliGRAM(s) Oral daily  buPROPion XL . 300 milliGRAM(s) Oral daily  cefTRIAXone   IVPB 1000 milliGRAM(s) IV Intermittent every 24 hours  enoxaparin Injectable 30 milliGRAM(s) SubCutaneous daily  furosemide    Tablet 40 milliGRAM(s) Oral daily  hydrALAZINE 25 milliGRAM(s) Oral three times a day  isosorbide   mononitrate ER Tablet (IMDUR) 60 milliGRAM(s) Oral daily  labetalol 200 milliGRAM(s) Oral two times a day  methylPREDNISolone sodium succinate Injectable 40 milliGRAM(s) IV Push daily  pantoprazole    Tablet 40 milliGRAM(s) Oral before breakfast  tamsulosin 0.4 milliGRAM(s) Oral at bedtime  venlafaxine XR. 150 milliGRAM(s) Oral two times a day      Vital Signs Last 24 Hrs  T(C): 36.6 (27 Nov 2019 12:34), Max: 36.8 (26 Nov 2019 23:52)  T(F): 97.8 (27 Nov 2019 12:34), Max: 98.2 (26 Nov 2019 23:52)  HR: 67 (27 Nov 2019 12:34) (63 - 121)  BP: 147/73 (27 Nov 2019 12:34) (107/56 - 151/89)  BP(mean): --  RR: 19 (27 Nov 2019 12:34) (18 - 20)  SpO2: 94% (27 Nov 2019 12:34) (94% - 98%)      11-26-19 @ 07:01  -  11-27-19 @ 07:00  --------------------------------------------------------  IN: 20 mL / OUT: 1950 mL / NET: -1930 mL    11-27-19 @ 07:01  -  11-27-19 @ 13:30  --------------------------------------------------------  IN: 0 mL / OUT: 800 mL / NET: -800 mL            LABS:                        10.2   10.36 )-----------( 165      ( 27 Nov 2019 05:46 )             32.0     11-27    139  |  104  |  61<H>  ----------------------------<  92  4.4   |  29  |  2.20<H>    Ca    9.1      27 Nov 2019 05:46  Mg     2.2     11-27                WBC:  WBC Count: 10.36 K/uL (11-27 @ 05:46)  WBC Count: 11.48 K/uL (11-26 @ 06:49)  WBC Count: 13.05 K/uL (11-25 @ 06:59)  WBC Count: 17.24 K/uL (11-24 @ 08:37)  WBC Count: 17.94 K/uL (11-23 @ 23:23)      MICROBIOLOGY:  RECENT CULTURES:  11-24 .Urine Clean Catch (Midstream) Escherichia coli XXXX   >100,000 CFU/ml Escherichia coli    11-24 .Blood Blood-Peripheral XXXX XXXX   No growth to date.                    Sodium:  Sodium, Serum: 139 mmol/L (11-27 @ 05:46)  Sodium, Serum: 138 mmol/L (11-26 @ 06:49)  Sodium, Serum: 137 mmol/L (11-25 @ 06:59)  Sodium, Serum: 133 mmol/L (11-24 @ 08:37)  Sodium, Serum: 130 mmol/L (11-23 @ 23:23)      2.20 mg/dL 11-27 @ 05:46  2.20 mg/dL 11-26 @ 06:49  2.20 mg/dL 11-25 @ 06:59  2.30 mg/dL 11-24 @ 08:37  2.20 mg/dL 11-23 @ 23:23      Hemoglobin:  Hemoglobin: 10.2 g/dL (11-27 @ 05:46)  Hemoglobin: 10.2 g/dL (11-26 @ 06:49)  Hemoglobin: 10.2 g/dL (11-25 @ 06:59)  Hemoglobin: 9.9 g/dL (11-24 @ 08:37)  Hemoglobin: 10.8 g/dL (11-23 @ 23:23)      Platelets: Platelet Count - Automated: 165 K/uL (11-27 @ 05:46)  Platelet Count - Automated: 168 K/uL (11-26 @ 06:49)  Platelet Count - Automated: 148 K/uL (11-25 @ 06:59)  Platelet Count - Automated: 132 K/uL (11-24 @ 08:37)  Platelet Count - Automated: 136 K/uL (11-23 @ 23:23)              RADIOLOGY & ADDITIONAL STUDIES:

## 2019-11-27 NOTE — DIETITIAN INITIAL EVALUATION ADULT. - OTHER INFO
patient seen sitting in chair. reports with good PO intake. dislikes thick liquids. admitted with sepsis 2/2 PN. MBS complete with current diet recommended. no food allergies no recent weight change. good PO at home. weight noted stable from 2018 admit. 11/25 noted BM

## 2019-11-27 NOTE — DIETITIAN INITIAL EVALUATION ADULT. - PROBLEM SELECTOR PLAN 4
Pt w Na 130 on admission, likely 2/2 poor po intake and increased free water intake  S/p 2.2L NS in ED  f.u am bmp

## 2019-11-27 NOTE — DIETITIAN INITIAL EVALUATION ADULT. - PROBLEM SELECTOR PLAN 2
pt presenting with acute hypoxic respiratory failure - 2/2 pna with component of decompensated heart failure. last known echo 2016    BNP 3487 on admission  hold off on further IVF   check repeat echo  give lasix 40 IV x 1. resume lasix 40 po QD tomorrow

## 2019-11-27 NOTE — DIETITIAN INITIAL EVALUATION ADULT. - PROBLEM SELECTOR PLAN 6
Pt w history of anxiety, controlled on xanax and venlafaxine  At risk for withdrawal as pt takes high dose.  continue xanax 2mg TID with holds for lethargy or sedation

## 2019-11-27 NOTE — PROGRESS NOTE ADULT - SUBJECTIVE AND OBJECTIVE BOX
Patient is a 79y old  Male who presents with a chief complaint of fever (25 Nov 2019 09:48)  Patient seen in follow up for CKD 4.     PAST MEDICAL HISTORY:  COPD, severe  Congestive heart failure  Pulmonary fibrosis  Pulmonary fibrosis  Smoker  BPH with obstruction/lower urinary tract symptoms  Vitamin D deficiency  GERD (gastroesophageal reflux disease)  Diabetes mellitus  Hyperlipidemia  Hypertension    MEDICATIONS  (STANDING):  albuterol/ipratropium for Nebulization 3 milliLiter(s) Nebulizer every 6 hours  allopurinol 100 milliGRAM(s) Oral daily  ALPRAZolam 2 milliGRAM(s) Oral three times a day  amLODIPine   Tablet 10 milliGRAM(s) Oral daily  asenapine SL 10 milliGRAM(s) SubLingual <User Schedule>  aspirin enteric coated 81 milliGRAM(s) Oral daily  buPROPion XL . 300 milliGRAM(s) Oral daily  cefTRIAXone   IVPB 1000 milliGRAM(s) IV Intermittent every 24 hours  furosemide    Tablet 40 milliGRAM(s) Oral daily  heparin  Injectable 5000 Unit(s) SubCutaneous every 8 hours  hydrALAZINE 25 milliGRAM(s) Oral three times a day  isosorbide   mononitrate ER Tablet (IMDUR) 60 milliGRAM(s) Oral daily  labetalol 200 milliGRAM(s) Oral two times a day  methylPREDNISolone sodium succinate Injectable 40 milliGRAM(s) IV Push daily  pantoprazole    Tablet 40 milliGRAM(s) Oral before breakfast  tamsulosin 0.4 milliGRAM(s) Oral at bedtime  venlafaxine XR. 150 milliGRAM(s) Oral two times a day    MEDICATIONS  (PRN):  acetaminophen   Tablet .. 650 milliGRAM(s) Oral every 6 hours PRN Temp greater or equal to 38C (100.4F)    T(C): 36.6 (11-27-19 @ 12:34), Max: 37.1 (11-26-19 @ 12:21)  HR: 67 (11-27-19 @ 12:34) (63 - 121)  BP: 147/73 (11-27-19 @ 12:34) (107/56 - 152/89)  RR: 19 (11-27-19 @ 12:34)  SpO2: 94% (11-27-19 @ 12:34)  Wt(kg): --  I&O's Detail    26 Nov 2019 07:01  -  27 Nov 2019 07:00  --------------------------------------------------------  IN:    Oral Fluid: 20 mL  Total IN: 20 mL    OUT:    Incontinent per Condom Catheter: 1950 mL  Total OUT: 1950 mL    Total NET: -1930 mL      27 Nov 2019 07:01  -  27 Nov 2019 14:13  --------------------------------------------------------  IN:  Total IN: 0 mL    OUT:    Incontinent per Condom Catheter: 800 mL  Total OUT: 800 mL    Total NET: -800 mL      PHYSICAL EXAM:  General: NAD  Respiratory: b/l air entry  Cardiovascular: S1 S2  Gastrointestinal: soft  Extremities:  edema                     LABORATORY:                        10.2   10.36 )-----------( 165      ( 27 Nov 2019 05:46 )             32.0     11-27    139  |  104  |  61<H>  ----------------------------<  92  4.4   |  29  |  2.20<H>    Ca    9.1      27 Nov 2019 05:46  Mg     2.2     11-27      Sodium, Serum: 139 mmol/L (11-27 @ 05:46)  Sodium, Serum: 138 mmol/L (11-26 @ 06:49)    Potassium, Serum: 4.4 mmol/L (11-27 @ 05:46)  Potassium, Serum: 4.1 mmol/L (11-26 @ 06:49)    Hemoglobin: 10.2 g/dL (11-27 @ 05:46)  Hemoglobin: 10.2 g/dL (11-26 @ 06:49)  Hemoglobin: 10.2 g/dL (11-25 @ 06:59)    Creatinine, Serum 2.20 (11-27 @ 05:46)  Creatinine, Serum 2.20 (11-26 @ 06:49)  Creatinine, Serum 2.20 (11-25 @ 06:59)

## 2019-11-27 NOTE — PROGRESS NOTE ADULT - SUBJECTIVE AND OBJECTIVE BOX
Patient is a 79y old  Male who presents with a chief complaint of SOB, weakness.      INTERVAL HPI/OVERNIGHT EVENTS: Pt states his cough and SOB is improving. Denies CP, palpitations, fever, chills. Pt is furious regarding his diet change to nectar-thickened liquids and demands going back to think fluids regardless of any potential risks.     MEDICATIONS  (STANDING):  albuterol/ipratropium for Nebulization 3 milliLiter(s) Nebulizer every 6 hours  allopurinol 100 milliGRAM(s) Oral daily  ALPRAZolam 2 milliGRAM(s) Oral three times a day  amLODIPine   Tablet 10 milliGRAM(s) Oral daily  asenapine SL 10 milliGRAM(s) SubLingual <User Schedule>  aspirin enteric coated 81 milliGRAM(s) Oral daily  buPROPion XL . 300 milliGRAM(s) Oral daily  cefTRIAXone   IVPB 1000 milliGRAM(s) IV Intermittent every 24 hours  dextrose 5%. 1000 milliLiter(s) (50 mL/Hr) IV Continuous <Continuous>  dextrose 50% Injectable 12.5 Gram(s) IV Push once  dextrose 50% Injectable 25 Gram(s) IV Push once  dextrose 50% Injectable 25 Gram(s) IV Push once  furosemide    Tablet 40 milliGRAM(s) Oral daily  heparin  Injectable 5000 Unit(s) SubCutaneous every 8 hours  hydrALAZINE 25 milliGRAM(s) Oral three times a day  insulin lispro (HumaLOG) corrective regimen sliding scale   SubCutaneous three times a day before meals  isosorbide   mononitrate ER Tablet (IMDUR) 60 milliGRAM(s) Oral daily  labetalol 200 milliGRAM(s) Oral two times a day  pantoprazole    Tablet 40 milliGRAM(s) Oral before breakfast  polyethylene glycol 3350 17 Gram(s) Oral daily  senna 2 Tablet(s) Oral at bedtime  tamsulosin 0.4 milliGRAM(s) Oral at bedtime  venlafaxine XR. 150 milliGRAM(s) Oral two times a day    MEDICATIONS  (PRN):  acetaminophen   Tablet .. 650 milliGRAM(s) Oral every 6 hours PRN Temp greater or equal to 38C (100.4F)  dextrose 40% Gel 15 Gram(s) Oral once PRN Blood Glucose LESS THAN 70 milliGRAM(s)/deciliter  glucagon  Injectable 1 milliGRAM(s) IntraMuscular once PRN Glucose LESS THAN 70 milligrams/deciliter      Allergies    No Known Allergies    Intolerances        REVIEW OF SYSTEMS:  CONSTITUTIONAL: No fever or chills  HEENT:  No headache, no sore throat  RESPIRATORY: improving cough and shortness of breath  CARDIOVASCULAR: No chest pain, palpitations  GASTROINTESTINAL: No abd pain, nausea, vomiting, or diarrhea  GENITOURINARY: No dysuria, frequency, or hematuria  NEUROLOGICAL: no new focal weakness or dizziness  MUSCULOSKELETAL: no myalgias     Vital Signs Last 24 Hrs  T(C): 37.3 (27 Nov 2019 16:39), Max: 37.3 (27 Nov 2019 16:39)  T(F): 99.1 (27 Nov 2019 16:39), Max: 99.1 (27 Nov 2019 16:39)  HR: 89 (27 Nov 2019 16:39) (63 - 89)  BP: 143/89 (27 Nov 2019 16:39) (132/82 - 151/89)  BP(mean): --  RR: 18 (27 Nov 2019 16:39) (18 - 20)  SpO2: 94% (27 Nov 2019 16:39) (94% - 98%)    PHYSICAL EXAM:  GENERAL: NAD  HEENT:  anicteric, moist mucous membranes  CHEST/LUNG:  decreased breath sounds in bases and few scattered wheezes  HEART:  RRR, S1, S2  ABDOMEN:  BS+, soft, nontender, nondistended  EXTREMITIES: no edema, cyanosis, or calf tenderness  NERVOUS SYSTEM: answers questions and follows commands appropriately  PSYCH: frustrated mood    LABS:                        10.2   10.36 )-----------( 165      ( 27 Nov 2019 05:46 )             32.0     CBC Full  -  ( 27 Nov 2019 05:46 )  WBC Count : 10.36 K/uL  Hemoglobin : 10.2 g/dL  Hematocrit : 32.0 %  Platelet Count - Automated : 165 K/uL  Mean Cell Volume : 87.2 fl  Mean Cell Hemoglobin : 27.8 pg  Mean Cell Hemoglobin Concentration : 31.9 gm/dL  Auto Neutrophil # : x  Auto Lymphocyte # : x  Auto Monocyte # : x  Auto Eosinophil # : x  Auto Basophil # : x  Auto Neutrophil % : x  Auto Lymphocyte % : x  Auto Monocyte % : x  Auto Eosinophil % : x  Auto Basophil % : x    27 Nov 2019 05:46    139    |  104    |  61     ----------------------------<  92     4.4     |  29     |  2.20     Ca    9.1        27 Nov 2019 05:46  Mg     2.2       27 Nov 2019 05:46          CAPILLARY BLOOD GLUCOSE      POCT Blood Glucose.: 149 mg/dL (27 Nov 2019 16:57)        Culture - Urine (collected 11-24-19 @ 11:18)  Source: .Urine Clean Catch (Midstream)  Final Report (11-26-19 @ 17:19):    >100,000 CFU/ml Escherichia coli  Organism: Escherichia coli (11-26-19 @ 17:19)  Organism: Escherichia coli (11-26-19 @ 17:19)      -  Amikacin: S <=16      -  Ampicillin: S <=8 These ampicillin results predict results for amoxicillin      -  Ampicillin/Sulbactam: S <=8/4 Enterobacter, Citrobacter, and Serratia may develop resistance during prolonged therapy (3-4 days)      -  Aztreonam: S <=4      -  Cefazolin: S <=8 (MIC_CL_COM_ENTERIC_CEFAZU) For uncomplicated UTI with K. pneumoniae, E. coli, or P. mirablis: KASEY <=16 is sensitive and KASEY >=32 is resistant. This also predicts results for oral agents cefaclor, cefdinir, cefpodoxime, cefprozil, cefuroxime axetil, cephalexin and locarbef for uncomplicated UTI. Note that some isolates may be susceptible to these agents while testing resistant to cefazolin.      -  Cefepime: S <=4      -  Cefoxitin: S <=8      -  Ceftriaxone: S <=1 Enterobacter, Citrobacter, and Serratia may develop resistance during prolonged therapy      -  Ciprofloxacin: S <=1      -  Gentamicin: S <=4      -  Imipenem: S <=1      -  Levofloxacin: S <=2      -  Meropenem: S <=1      -  Nitrofurantoin: S <=32 Should not be used to treat pyelonephritis      -  Piperacillin/Tazobactam: S <=16      -  Tigecycline: S <=2      -  Tobramycin: S <=4      -  Trimethoprim/Sulfamethoxazole: S <=2/38      Method Type: KASEY    Culture - Blood (collected 11-24-19 @ 11:01)  Source: .Blood Blood-Peripheral  Preliminary Report (11-25-19 @ 12:01):    No growth to date.    Culture - Blood (collected 11-24-19 @ 11:01)  Source: .Blood Blood-Peripheral  Preliminary Report (11-25-19 @ 12:01):    No growth to date.        RADIOLOGY & ADDITIONAL TESTS:    Personally reviewed.     Consultant(s) Notes Reviewed:  [x] YES  [ ] NO

## 2019-11-27 NOTE — PROGRESS NOTE ADULT - ASSESSMENT
cont rx REVIEW OF SYMPTOMS      Able to give ROS  Yes     RELIABLE No   CONSTITUTIONAL Weakness Yes  Chills No Vision changes No  ENDOCRINE No unexplained hair loss No heat or cold intolerance    ALLERGY No hives  Sore throat No   RESP Coughing blood no  Shortness of breath YES   NEURO No Headache  Confusion Pain neck No   CARDIAC No Chest pain No Palpitations   GI No Pain abdomen NO   Vomiting NO     PHYSICAL EXAM    HEENT Unremarkable PERRLA atraumatic   RESP Fair air entry EXP prolonged    Harsh breath sound Resp distres mild   CARDIAC S1 S2 No S3     NO JVD    ABDOMEN SOFT BS PRESENT NOT DISTENDED No hepatosplenomegaly PEDAL EDEMA present No calf tenderness  NO rash   GENERAL Not TOXIC looking    VITALS/LABS   11/27/2019 99f 89 140/89   11/27/2019 W 10.3 Hb 10.2 Plt 163 Na 139 K 4.4 CO2 29 Cr 2.2       PATIENT DESCRIPTION        CC           weakness sob  fever                  ER VS      146/88 76 20 afeb   ER MX                              HPI       11/24/2019 79 m former smoker PMH pulm fibrosis CHF D def dm Htn Hld  was admitted with fever sob leukocytosis hypoxemia basal pneumonia mild normocytic anemia and possible chf    NOTE As per cardio note pt has PMH chr d CHF (N LVSF 2016) Nonobstr cad (cath 2015) Home O2 dep (COPD)   Pulm consulted 11/24/2019                                 PMH       As above                          HOME MEDS                           hydralazine 25.3 oseltamivir pulmicort protonix aamlodipine labetalol 200.3 xanax wellbutrin 300 asa 81 saphris 10                                 PULMONARY/CRITICAL CARE ASSESSMENT/RECOMMENDATIONS                      RO DYSPHAGIA (Basal pneumonia) speech recommended dys 3 nectar (11/26)                                      MASS PL BASD Will need fu as outpt with pet scan and possible bx See me in office  in 2 w   AC HYPOXEMIC RESP FAILURE 11/24/2019 4L 742/36/99  11/25 v duplex n Target PO 90-95%  PNEUMONIA E COLI UTI   Improved with abio   ABIO Rocephin (11/24)     Azithro (11/24)    COPD duoneb.4 (11/24) solumed 40(11/24) Cont Rx   CHF 11/24/2019 bnp 3487  hydralazine 25.3 (11/24) imdur 60 (11/24) lasix 40 (11/24) Cont Rx   ANEMIA norm 11/24/2019 Hb 9.9 Likely acd   DEVYN  11/24/2019 Cr 2.3 11/25 us renal No hydro Monitor serially   PSYCH alprazolam 2.3 (11/24) saphris 10 (11/24) venlafax 150.2 (11/24) buprop 300 (11/24) Cont rx     PLAN  Continue azithro (11/24) rcephin for caputi Need to ro asp  speech eval  noted Will need office fu for poss pleural mass with pet scan and biops Call  for pulm       TIME SPENT Over 25 minutes aggregate care time spent on encounter; activities included   direct pt care, counseling and/or coordinating care reviewing notes, lab data/ imaging , discussion with multidisciplinary team/ pt /family. Risks, benefits, alternatives  discussed in detail.

## 2019-11-27 NOTE — DIETITIAN INITIAL EVALUATION ADULT. - PROBLEM SELECTOR PLAN 3
UA shows moderate leuk esterase, mod bacteria, small blood, slightly turbid. Pt with complaint of frequency- attributed to BPH - denies hematuria, dysuria.   on rocephin for pna  f/u culture

## 2019-11-27 NOTE — DIETITIAN INITIAL EVALUATION ADULT. - PROBLEM SELECTOR PLAN 5
PT with increased Cr.- unclear baseline- 1.5 back in 2018-  per chart review. Likely secondary to hypoperfusion/hypovolemia   Will hold off on fluids for now. S/p 2.2 L NS in ED  Continue to trend renal indices, avoid nephrotoxic agents

## 2019-11-27 NOTE — PROGRESS NOTE ADULT - ASSESSMENT
78yo M with PMH of chronic diastolic CHF (normal LVF 2016), non obstructive CAD (cardiac cath 2015), BPH, interstitial lung disease, COPD (on 4L home O2), HTN, HLD, DMT2 on Janumet, GERD, BPH, depression, anxiety presents with SOB and weakness found with pna. Cardiology called to evaluate volume status in setting of shortness of breath    Diastolic chf HF pEF:  -appears euvolemic on exam  Continue lasix 40mg PO qd for maintenance diuretic therapy   Strict intake and output-  -net negative 1900 last 24 hours  -continue guidline directed medical therapy:  -hydral 25 tid, imdur 60, labetolol 200 bid, amldopine  -recommend outpatient ECHO    non obstructive cad  -continue aspirin     HTN  -151 systolic  Continue norvasc, , isosorbide, labetalol, hydralazine    HLD  -lipid panel in am  -will follow up with results    Pulm PNA  Pulmonary following - Ct result as above   Continue nebulizers, antibiotics as per pulmonary '  Supplemental oxygen    CKD  followed by renal     DM  management as per primary       lethargy  Consider decreasing xanax dosing on 2mg TID     DVT on lovenox

## 2019-11-27 NOTE — PROGRESS NOTE ADULT - SUBJECTIVE AND OBJECTIVE BOX
[facility] -- Franco Mcallister, Josse Marquis, Kalia Cheng Savella, [place]  Office # [phone]    Follow Up:    shorntess of breath  Subjective/Observations:   Patient seen and examined. Patient is resting comfortably with the venti mask on. He has no complaints of SOB. He continues to have oxygen 2 liters nasal cannula on as well.       REVIEW OF SYSTEMS: All other review of systems is negative unless indicated above  PAST MEDICAL & SURGICAL HISTORY:  COPD, severe  Congestive heart failure  Pulmonary fibrosis  Pulmonary fibrosis  Smoker  BPH with obstruction/lower urinary tract symptoms  Vitamin D deficiency  GERD (gastroesophageal reflux disease)  Diabetes mellitus  Hyperlipidemia  Hypertension  S/P tonsillectomy    MEDICATIONS  (STANDING):  albuterol/ipratropium for Nebulization 3 milliLiter(s) Nebulizer every 6 hours  allopurinol 100 milliGRAM(s) Oral daily  ALPRAZolam 2 milliGRAM(s) Oral three times a day  amLODIPine   Tablet 10 milliGRAM(s) Oral daily  asenapine SL 10 milliGRAM(s) SubLingual <User Schedule>  aspirin enteric coated 81 milliGRAM(s) Oral daily  azithromycin   Tablet 500 milliGRAM(s) Oral daily  buPROPion XL . 300 milliGRAM(s) Oral daily  cefTRIAXone   IVPB 1000 milliGRAM(s) IV Intermittent every 24 hours  enoxaparin Injectable 30 milliGRAM(s) SubCutaneous daily  furosemide    Tablet 40 milliGRAM(s) Oral daily  hydrALAZINE 25 milliGRAM(s) Oral three times a day  isosorbide   mononitrate ER Tablet (IMDUR) 60 milliGRAM(s) Oral daily  labetalol 200 milliGRAM(s) Oral two times a day  methylPREDNISolone sodium succinate Injectable 40 milliGRAM(s) IV Push daily  pantoprazole    Tablet 40 milliGRAM(s) Oral before breakfast  tamsulosin 0.4 milliGRAM(s) Oral at bedtime  venlafaxine XR. 150 milliGRAM(s) Oral two times a day    MEDICATIONS  (PRN):  acetaminophen   Tablet .. 650 milliGRAM(s) Oral every 6 hours PRN Temp greater or equal to 38C (100.4F)    Allergies    No Known Allergies    Intolerances      Vital Signs Last 24 Hrs  T(C): 36.7 (27 Nov 2019 08:10), Max: 37.1 (26 Nov 2019 12:21)  T(F): 98 (27 Nov 2019 08:10), Max: 98.8 (26 Nov 2019 12:21)  HR: 67 (27 Nov 2019 08:10) (63 - 121)  BP: 132/82 (27 Nov 2019 08:10) (107/56 - 151/89)  BP(mean): --  RR: 19 (27 Nov 2019 08:10) (18 - 20)  SpO2: 97% (27 Nov 2019 08:10) (96% - 98%)  I&O's Summary    26 Nov 2019 07:01  -  27 Nov 2019 07:00  --------------------------------------------------------  IN: 20 mL / OUT: 1950 mL / NET: -1930 mL        PHYSICAL EXAM:  TELE: Normal sinus rhythm with 1st degree HB  Constitutional: NAD, awake and alert, well-developed  HEENT: Moist Mucous Membranes, Anicteric  Pulmonary: decreased breath sounds at bases with wheeze   Cardiovascular: Regular, S1 and S2, No murmurs, rubs, gallops or clicks  Gastrointestinal: obese abdomen, Bowel Sounds present, soft, nontender.   Lymph: No peripheral edema. No lymphadenopathy.  Skin: No visible rashes or ulcers.  Psych:  Mood & affect appropriate  LABS: All Labs Reviewed:                        10.2   10.36 )-----------( 165      ( 27 Nov 2019 05:46 )             32.0                         10.2   11.48 )-----------( 168      ( 26 Nov 2019 06:49 )             32.1                         10.2   13.05 )-----------( 148      ( 25 Nov 2019 06:59 )             31.7     27 Nov 2019 05:46    139    |  104    |  61     ----------------------------<  92     4.4     |  29     |  2.20   26 Nov 2019 06:49    138    |  104    |  52     ----------------------------<  187    4.1     |  26     |  2.20   25 Nov 2019 06:59    137    |  103    |  45     ----------------------------<  146    4.2     |  26     |  2.20     Ca    9.1        27 Nov 2019 05:46  Ca    9.0        26 Nov 2019 06:49  Ca    9.2        25 Nov 2019 06:59  Mg     2.2       27 Nov 2019 05:46  Mg     2.1       26 Nov 2019 06:49  Mg     2.1       25 Nov 2019 06:59            ECG:  < from: 12 Lead ECG (11.23.19 @ 22:36) >  Ventricular Rate 78 BPM    Atrial Rate 78 BPM    P-R Interval 288 ms    QRS Duration 94 ms    Q-T Interval 358 ms    QTC Calculation(Bezet) 408 ms    P Axis 46 degrees    R Axis -29 degrees    T Axis 29 degrees    < end of copied text >            Radiology:  < from: CT Chest No Cont (11.24.19 @ 16:38) >  INTERPRETATION: The lung windows demonstrate new areas of peripheral   density of the lung bases, right larger than left, either indicating   dense pneumonias or developing pleural-based tumors as they both are   larger than in the prior study.      The mediastinum shows similar heart size with similar prominent   pretracheal lymph nodes.      No pericardial nor pleural effusion is identified.      The trachea and proximal bronchi show no focal lesions .      The bony structures show no gross destructive changes.      Below the hemidiaphragms, the visualized solid visceral organs are   unremarkable.      IMPRESSION: Posterior basilar lung densities bilaterally. Differential   diagnosis wouldbe recurrent pneumonias or developing tumors. Follow-up   study is recommended to help distinguish between these possibilities.    < end of copied text >         Jessi Mcbride NP #[id number]

## 2019-11-27 NOTE — PROGRESS NOTE ADULT - ASSESSMENT
1.	CKD 4  2.	Pneumonia  3.	CHF  4.	Diabetes  5.	Hypertension    Stable renal indices. To continue current meds. Avoid nephrotoxic meds as possible. Monitor BP trend. Titrate BP meds as needed. Salt restriction.  Monitor blood sugar levels. Insulin coverage as needed. Dietary restriction. Will follow electrolytes and renal function trend.

## 2019-11-27 NOTE — PROGRESS NOTE ADULT - ASSESSMENT
80yo M with PMH of chronic diastolic CHF (normal LVEF 2016), non obstructive CAD (cardiac cath 2015), BPH, interstitial lung disease, COPD (on 4L home O2 intermittently), HTN, HLD, Type 2 DM on Janumet, GERD, BPH, depression, anxiety presents with SOB and weakness a/w acute on chronic hypoxic respiratory failure and sepsis due to likely aspiration PNA and superimposed COPD exacerbation. 80yo M with PMH of chronic diastolic CHF (normal LVEF 2016), non obstructive CAD (cardiac cath 2015), BPH, interstitial lung disease, COPD (on 4L home O2 intermittently), HTN, HLD, Type 2 DM on Janumet, GERD, BPH, depression, anxiety presents with SOB and weakness a/w acute on chronic hypoxic respiratory failure and sepsis due to likely aspiration PNA and superimposed COPD exacerbation.    1. Acute hypoxic respiratory failure   -2/2 aspiration pneumonia and COPD exacerbation  -Blood cx NGTD  -Continue Azithromycin and Ceftriaxone.   -will change solu-medrol 40mg IV daily to prednisone 40mg po daily tomorrow   -c/w duoneb Q6h, and O2 support.    -had MBS with recommendation for dysphagia 3 soft solids with nectar thickened liquid diet -- However, pt categorically refuses to have thickened fluids despite extensive counseling on his diagnosis and on the MBS findings. Pt understood and was able to verbalized the potential risks of aspiration PNA, respiratory failure, and possibly death that can occur with aspiration, but still wishes to have thin liquids. Spoke with pt's HCP/son, Balaji, who also understood and agreed that the pt should have regular fluids if those are his wishes at this point.  -oxygen requirements back to baseline -- saturating well on 2L NC (at home varied with O2 up to 4L NC)  -f/up pulm recs    2. Sepsis:  -due to aspiration pneumonia   -Blood cx NGTD  -leukocytosis resolving, fever resolved  -Continue Azithromycin and Ceftriaxone.   -had MBS with recommendation for dysphagia 3 soft solids with nectar thickened liquid diet -- However, pt categorically refuses to have thickened fluids despite extensive counseling on his diagnosis and on the MBS findings. Pt understood and was able to verbalized the potential risks of aspiration PNA, respiratory failure, and possibly death that can occur with aspiration, but still wishes to have thin liquids. Spoke with pt's HCP/son, Balaji, who also understood and agreed that the pt should have regular fluids if those are his wishes at this point.  -Urine cx growing E coli that is sensitive to ceftriaxone -- pt had no urinary symptoms so this may have been asymptomatic bacteriuria; less likely to have been UTI -- regardless, pt is being covered by the rocephin he is receiving for PNA.     3. Chronic diastolic CHF:    -earlier in admission, pt had one dose of Lasix 40mg IV in the ED and was then continued on home dose Lasix 40mg PO daily  -reviewed with cardiology, pt's resp symptoms and hypoxia were likely due to PNA/COPD exacerbation as opposed to CHF exacerbation -- doubt pt had CHF exacerbation  -c/w lasix 40mg po daily  -c/w hydralazine, labetalol, and Imdur 60mg PO daily.    -f/up cardiology recs    4. CKD Stage 4:    - pt likely at baseline renal function, doubt DEVYN  - No hydronephrosis on US  - Nephrology recs appreciated  - monitor BMP    5. Anxiety d/o:    -continue Xanax 2mg PO TID and Effexor XR 150mg PO BID   -checked iSTOP and confirmed that is pt's home dose of Xanax.    6. Hyponatremia:  Resolved.    7. HTN:  continue Norvasc, Hydralazine, imdur and Labetalol     8. GERD:  continue Protonix 40mg PO daily    9. BPH:  continue Flomax 0.4mg PO QHS    10. DM2:   - hold home Janumet  - started on HISS  - monitor FSG  - cons carb diet    11. VTE ppx: given CKD Stage 4, will switch to HSQ 80yo M with PMH of chronic diastolic CHF (normal LVEF 2016), non obstructive CAD (cardiac cath 2015), BPH, interstitial lung disease, COPD (on 4L home O2 intermittently), HTN, HLD, Type 2 DM on Janumet, GERD, BPH, depression, anxiety presents with SOB and weakness a/w acute on chronic hypoxic respiratory failure and sepsis due to likely aspiration PNA and superimposed COPD exacerbation.    1. Acute hypoxic respiratory failure   -2/2 aspiration pneumonia and COPD exacerbation  -Blood cx NGTD  -Continue Azithromycin and Ceftriaxone.   -will change solu-medrol 40mg IV daily to prednisone 40mg po daily tomorrow   -c/w duoneb Q6h, and O2 support.    -had MBS with recommendation for dysphagia 3 soft solids with nectar thickened liquid diet -- However, pt categorically refuses to have thickened fluids despite extensive counseling on his diagnosis and on the MBS findings. Pt understood and was able to verbalized the potential risks of aspiration PNA, respiratory failure, and possibly death that can occur with aspiration, but still wishes to have thin liquids. Spoke with pt's HCP/son, Balaji, who also understood and agreed that the pt should have regular fluids if those are his wishes at this point.  -oxygen requirements back to baseline -- saturating well on 2L NC (at home varied with O2 up to 4L NC)  -f/up pulm recs    2. Sepsis:  -due to aspiration pneumonia   -Blood cx NGTD  -leukocytosis resolving, fever resolved  -Continue Azithromycin and Ceftriaxone.   -had MBS with recommendation for dysphagia 3 soft solids with nectar thickened liquid diet -- However, pt categorically refuses to have thickened fluids despite extensive counseling on his diagnosis and on the MBS findings. Pt understood and was able to verbalized the potential risks of aspiration PNA, respiratory failure, and possibly death that can occur with aspiration, but still wishes to have thin liquids. Spoke with pt's HCP/son, Balaji, who also understood and agreed that the pt should have regular fluids if those are his wishes at this point.  -Urine cx growing E coli that is sensitive to ceftriaxone -- pt had no urinary symptoms so this may have been asymptomatic bacteriuria; less likely to have been UTI -- regardless, pt is being covered by the rocephin he is receiving for PNA.     3. Chronic diastolic CHF:    -earlier in admission, pt had one dose of Lasix 40mg IV in the ED and was then continued on home dose Lasix 40mg PO daily  -reviewed with cardiology, pt's resp symptoms and hypoxia were likely due to PNA/COPD exacerbation as opposed to CHF exacerbation -- doubt pt had CHF exacerbation  -c/w lasix 40mg po daily  -c/w hydralazine, labetalol, and Imdur 60mg PO daily.    -f/up cardiology recs    4. Lung mass:  -densities seen on admission CT in the lung bases may be due to recurrent pneumonias vs enlarging pleural based masses per radiologist read  -pt clearly had infection on admission, but it does not r/out underlying mass  -discussed with pulm -- pt will f/up in the office as outpt 2 wks post-discharge and have further imaging, possible PET, and consider biopsies as outpt if deemed appropriate based on further future imaging    5. CKD Stage 4:    - pt likely at baseline renal function, doubt DEVYN  - No hydronephrosis on US  - Nephrology recs appreciated  - monitor BMP    6. Anxiety d/o:    -continue Xanax 2mg PO TID and Effexor XR 150mg PO BID   -checked iSTOP and confirmed that is pt's home dose of Xanax.    7. Hyponatremia:  Resolved.    8. HTN:  continue Norvasc, Hydralazine, imdur and Labetalol     9. GERD:  continue Protonix 40mg PO daily    10. BPH:  continue Flomax 0.4mg PO QHS    11. DM2:   - hold home Janumet  - started on HISS  - monitor FSG  - cons carb diet    12. VTE ppx: given CKD Stage 4, will switch to HSQ

## 2019-11-27 NOTE — DIETITIAN INITIAL EVALUATION ADULT. - PROBLEM SELECTOR PLAN 1
Pt febrile on admission (T102.7) with significant leukocytosis (17.94).   acute hypoxic respiratory failure, likley multifactorial- sepsis 2/2 pna with component of copd exacerbation and hypervolemia   check stat ABG  blood cultures drawn , f/u results  procalcitonin elevated. RSV/Flu Neg.  initial lactate 1.9. s/p 2L NS. no further IVF due to hypervolemia/h/o CHF  S/p 1 dose rocephin 1 dose azithromycin in ED. continue  s/p solumedrol 125mg x 1, continue with solumedrol 40 QD  duonebs q 6 hours  check urine legionella, urine strep   am cbc, cmp  follow up AM CBC, BMP  O2 support via NC. maintain sat >88%  consult Pulm Dr. Banuelos

## 2019-11-28 LAB
ANION GAP SERPL CALC-SCNC: 9 MMOL/L — SIGNIFICANT CHANGE UP (ref 5–17)
BUN SERPL-MCNC: 63 MG/DL — HIGH (ref 7–23)
CALCIUM SERPL-MCNC: 9.6 MG/DL — SIGNIFICANT CHANGE UP (ref 8.5–10.1)
CHLORIDE SERPL-SCNC: 100 MMOL/L — SIGNIFICANT CHANGE UP (ref 96–108)
CHOLEST SERPL-MCNC: 134 MG/DL — SIGNIFICANT CHANGE UP (ref 10–199)
CO2 SERPL-SCNC: 28 MMOL/L — SIGNIFICANT CHANGE UP (ref 22–31)
CREAT SERPL-MCNC: 2 MG/DL — HIGH (ref 0.5–1.3)
GLUCOSE SERPL-MCNC: 104 MG/DL — HIGH (ref 70–99)
HCT VFR BLD CALC: 35.2 % — LOW (ref 39–50)
HDLC SERPL-MCNC: 28 MG/DL — LOW
HGB BLD-MCNC: 11.4 G/DL — LOW (ref 13–17)
LIPID PNL WITH DIRECT LDL SERPL: SIGNIFICANT CHANGE UP MG/DL
MAGNESIUM SERPL-MCNC: 2.2 MG/DL — SIGNIFICANT CHANGE UP (ref 1.6–2.6)
MCHC RBC-ENTMCNC: 28.1 PG — SIGNIFICANT CHANGE UP (ref 27–34)
MCHC RBC-ENTMCNC: 32.4 GM/DL — SIGNIFICANT CHANGE UP (ref 32–36)
MCV RBC AUTO: 86.9 FL — SIGNIFICANT CHANGE UP (ref 80–100)
NRBC # BLD: 0 /100 WBCS — SIGNIFICANT CHANGE UP (ref 0–0)
PLATELET # BLD AUTO: 177 K/UL — SIGNIFICANT CHANGE UP (ref 150–400)
POTASSIUM SERPL-MCNC: 4.5 MMOL/L — SIGNIFICANT CHANGE UP (ref 3.5–5.3)
POTASSIUM SERPL-SCNC: 4.5 MMOL/L — SIGNIFICANT CHANGE UP (ref 3.5–5.3)
RBC # BLD: 4.05 M/UL — LOW (ref 4.2–5.8)
RBC # FLD: 14.6 % — HIGH (ref 10.3–14.5)
SODIUM SERPL-SCNC: 137 MMOL/L — SIGNIFICANT CHANGE UP (ref 135–145)
TOTAL CHOLESTEROL/HDL RATIO MEASUREMENT: 4.8 RATIO — SIGNIFICANT CHANGE UP (ref 3.4–9.6)
TRIGL SERPL-MCNC: 437 MG/DL — HIGH (ref 10–149)
WBC # BLD: 10.26 K/UL — SIGNIFICANT CHANGE UP (ref 3.8–10.5)
WBC # FLD AUTO: 10.26 K/UL — SIGNIFICANT CHANGE UP (ref 3.8–10.5)

## 2019-11-28 PROCEDURE — 99232 SBSQ HOSP IP/OBS MODERATE 35: CPT

## 2019-11-28 RX ADMIN — HEPARIN SODIUM 5000 UNIT(S): 5000 INJECTION INTRAVENOUS; SUBCUTANEOUS at 22:15

## 2019-11-28 RX ADMIN — Medication 81 MILLIGRAM(S): at 13:00

## 2019-11-28 RX ADMIN — Medication 40 MILLIGRAM(S): at 06:27

## 2019-11-28 RX ADMIN — Medication 2 MILLIGRAM(S): at 15:10

## 2019-11-28 RX ADMIN — Medication 3 MILLILITER(S): at 13:42

## 2019-11-28 RX ADMIN — Medication 200 MILLIGRAM(S): at 17:46

## 2019-11-28 RX ADMIN — Medication 150 MILLIGRAM(S): at 06:28

## 2019-11-28 RX ADMIN — TAMSULOSIN HYDROCHLORIDE 0.4 MILLIGRAM(S): 0.4 CAPSULE ORAL at 22:15

## 2019-11-28 RX ADMIN — Medication 25 MILLIGRAM(S): at 15:10

## 2019-11-28 RX ADMIN — PANTOPRAZOLE SODIUM 40 MILLIGRAM(S): 20 TABLET, DELAYED RELEASE ORAL at 06:28

## 2019-11-28 RX ADMIN — Medication 2 MILLIGRAM(S): at 06:27

## 2019-11-28 RX ADMIN — HEPARIN SODIUM 5000 UNIT(S): 5000 INJECTION INTRAVENOUS; SUBCUTANEOUS at 15:10

## 2019-11-28 RX ADMIN — Medication 2: at 17:05

## 2019-11-28 RX ADMIN — Medication 25 MILLIGRAM(S): at 06:27

## 2019-11-28 RX ADMIN — ISOSORBIDE MONONITRATE 60 MILLIGRAM(S): 60 TABLET, EXTENDED RELEASE ORAL at 13:00

## 2019-11-28 RX ADMIN — ASENAPINE MALEATE 10 MILLIGRAM(S): 10 TABLET SUBLINGUAL at 23:23

## 2019-11-28 RX ADMIN — Medication 3 MILLILITER(S): at 07:46

## 2019-11-28 RX ADMIN — AMLODIPINE BESYLATE 10 MILLIGRAM(S): 2.5 TABLET ORAL at 06:27

## 2019-11-28 RX ADMIN — Medication 25 MILLIGRAM(S): at 22:15

## 2019-11-28 RX ADMIN — POLYETHYLENE GLYCOL 3350 17 GRAM(S): 17 POWDER, FOR SOLUTION ORAL at 13:07

## 2019-11-28 RX ADMIN — Medication 3 MILLILITER(S): at 19:52

## 2019-11-28 RX ADMIN — Medication 2 MILLIGRAM(S): at 22:39

## 2019-11-28 RX ADMIN — BUPROPION HYDROCHLORIDE 300 MILLIGRAM(S): 150 TABLET, EXTENDED RELEASE ORAL at 13:02

## 2019-11-28 RX ADMIN — Medication 200 MILLIGRAM(S): at 06:27

## 2019-11-28 RX ADMIN — SENNA PLUS 2 TABLET(S): 8.6 TABLET ORAL at 22:15

## 2019-11-28 RX ADMIN — Medication 150 MILLIGRAM(S): at 17:46

## 2019-11-28 RX ADMIN — HEPARIN SODIUM 5000 UNIT(S): 5000 INJECTION INTRAVENOUS; SUBCUTANEOUS at 06:27

## 2019-11-28 RX ADMIN — Medication 1: at 12:30

## 2019-11-28 RX ADMIN — Medication 100 MILLIGRAM(S): at 13:08

## 2019-11-28 NOTE — PROGRESS NOTE ADULT - SUBJECTIVE AND OBJECTIVE BOX
Patient is a 79y old  Male who presents with a chief complaint of SOB and weakness.      INTERVAL HPI/OVERNIGHT EVENTS: Pt states cough much improved and SOB has resolved. Denies fever, chills, CP.     MEDICATIONS  (STANDING):  albuterol/ipratropium for Nebulization 3 milliLiter(s) Nebulizer every 6 hours  allopurinol 100 milliGRAM(s) Oral daily  ALPRAZolam 2 milliGRAM(s) Oral three times a day  amLODIPine   Tablet 10 milliGRAM(s) Oral daily  asenapine SL 10 milliGRAM(s) SubLingual <User Schedule>  aspirin enteric coated 81 milliGRAM(s) Oral daily  buPROPion XL . 300 milliGRAM(s) Oral daily  cefTRIAXone   IVPB 1000 milliGRAM(s) IV Intermittent every 24 hours  dextrose 5%. 1000 milliLiter(s) (50 mL/Hr) IV Continuous <Continuous>  dextrose 50% Injectable 12.5 Gram(s) IV Push once  dextrose 50% Injectable 25 Gram(s) IV Push once  dextrose 50% Injectable 25 Gram(s) IV Push once  furosemide    Tablet 40 milliGRAM(s) Oral daily  heparin  Injectable 5000 Unit(s) SubCutaneous every 8 hours  hydrALAZINE 25 milliGRAM(s) Oral three times a day  insulin lispro (HumaLOG) corrective regimen sliding scale   SubCutaneous three times a day before meals  isosorbide   mononitrate ER Tablet (IMDUR) 60 milliGRAM(s) Oral daily  labetalol 200 milliGRAM(s) Oral two times a day  pantoprazole    Tablet 40 milliGRAM(s) Oral before breakfast  polyethylene glycol 3350 17 Gram(s) Oral daily  predniSONE   Tablet 40 milliGRAM(s) Oral daily  senna 2 Tablet(s) Oral at bedtime  tamsulosin 0.4 milliGRAM(s) Oral at bedtime  venlafaxine XR. 150 milliGRAM(s) Oral two times a day    MEDICATIONS  (PRN):  acetaminophen   Tablet .. 650 milliGRAM(s) Oral every 6 hours PRN Temp greater or equal to 38C (100.4F)  dextrose 40% Gel 15 Gram(s) Oral once PRN Blood Glucose LESS THAN 70 milliGRAM(s)/deciliter  glucagon  Injectable 1 milliGRAM(s) IntraMuscular once PRN Glucose LESS THAN 70 milligrams/deciliter      Allergies    No Known Allergies    Intolerances        REVIEW OF SYSTEMS:  CONSTITUTIONAL: No fever or chills  HEENT:  No headache, no sore throat  RESPIRATORY: improving cough, No wheezing, or shortness of breath  CARDIOVASCULAR: No chest pain, palpitations  GASTROINTESTINAL: No abd pain, nausea, vomiting, or diarrhea  GENITOURINARY: No dysuria, frequency, or hematuria  NEUROLOGICAL: no focal weakness or dizziness  MUSCULOSKELETAL: no myalgias     Vital Signs Last 24 Hrs  T(C): 36.9 (28 Nov 2019 15:33), Max: 36.9 (28 Nov 2019 15:33)  T(F): 98.5 (28 Nov 2019 15:33), Max: 98.5 (28 Nov 2019 15:33)  HR: 87 (28 Nov 2019 15:33) (69 - 87)  BP: 147/91 (28 Nov 2019 15:33) (131/84 - 167/81)  BP(mean): --  RR: 18 (28 Nov 2019 15:33) (18 - 20)  SpO2: 94% (28 Nov 2019 15:33) (94% - 98%)    PHYSICAL EXAM:  GENERAL: NAD  HEENT:  anicteric, moist mucous membranes  CHEST/LUNG:  decreased breath sounds in bases and scant scattered wheezes  HEART:  RRR, S1, S2  ABDOMEN:  BS+, soft, nontender, nondistended  EXTREMITIES: no edema, cyanosis, or calf tenderness  NERVOUS SYSTEM: answers questions and follows commands appropriately    LABS:                        11.4   10.26 )-----------( 177      ( 28 Nov 2019 06:49 )             35.2     CBC Full  -  ( 28 Nov 2019 06:49 )  WBC Count : 10.26 K/uL  Hemoglobin : 11.4 g/dL  Hematocrit : 35.2 %  Platelet Count - Automated : 177 K/uL  Mean Cell Volume : 86.9 fl  Mean Cell Hemoglobin : 28.1 pg  Mean Cell Hemoglobin Concentration : 32.4 gm/dL  Auto Neutrophil # : x  Auto Lymphocyte # : x  Auto Monocyte # : x  Auto Eosinophil # : x  Auto Basophil # : x  Auto Neutrophil % : x  Auto Lymphocyte % : x  Auto Monocyte % : x  Auto Eosinophil % : x  Auto Basophil % : x    28 Nov 2019 06:49    137    |  100    |  63     ----------------------------<  104    4.5     |  28     |  2.00     Ca    9.6        28 Nov 2019 06:49  Mg     2.2       28 Nov 2019 06:49          CAPILLARY BLOOD GLUCOSE      POCT Blood Glucose.: 228 mg/dL (28 Nov 2019 16:53)  POCT Blood Glucose.: 185 mg/dL (28 Nov 2019 12:11)  POCT Blood Glucose.: 136 mg/dL (28 Nov 2019 08:27)  POCT Blood Glucose.: 152 mg/dL (27 Nov 2019 21:21)        Culture - Urine (collected 11-24-19 @ 11:18)  Source: .Urine Clean Catch (Midstream)  Final Report (11-26-19 @ 17:19):    >100,000 CFU/ml Escherichia coli  Organism: Escherichia coli (11-26-19 @ 17:19)  Organism: Escherichia coli (11-26-19 @ 17:19)      -  Amikacin: S <=16      -  Ampicillin: S <=8 These ampicillin results predict results for amoxicillin      -  Ampicillin/Sulbactam: S <=8/4 Enterobacter, Citrobacter, and Serratia may develop resistance during prolonged therapy (3-4 days)      -  Aztreonam: S <=4      -  Cefazolin: S <=8 (MIC_CL_COM_ENTERIC_CEFAZU) For uncomplicated UTI with K. pneumoniae, E. coli, or P. mirablis: KASEY <=16 is sensitive and KASEY >=32 is resistant. This also predicts results for oral agents cefaclor, cefdinir, cefpodoxime, cefprozil, cefuroxime axetil, cephalexin and locarbef for uncomplicated UTI. Note that some isolates may be susceptible to these agents while testing resistant to cefazolin.      -  Cefepime: S <=4      -  Cefoxitin: S <=8      -  Ceftriaxone: S <=1 Enterobacter, Citrobacter, and Serratia may develop resistance during prolonged therapy      -  Ciprofloxacin: S <=1      -  Gentamicin: S <=4      -  Imipenem: S <=1      -  Levofloxacin: S <=2      -  Meropenem: S <=1      -  Nitrofurantoin: S <=32 Should not be used to treat pyelonephritis      -  Piperacillin/Tazobactam: S <=16      -  Tigecycline: S <=2      -  Tobramycin: S <=4      -  Trimethoprim/Sulfamethoxazole: S <=2/38      Method Type: KASEY    Culture - Blood (collected 11-24-19 @ 11:01)  Source: .Blood Blood-Peripheral  Preliminary Report (11-25-19 @ 12:01):    No growth to date.    Culture - Blood (collected 11-24-19 @ 11:01)  Source: .Blood Blood-Peripheral  Preliminary Report (11-25-19 @ 12:01):    No growth to date.        RADIOLOGY & ADDITIONAL TESTS:    Personally reviewed.     Consultant(s) Notes Reviewed:  [x] YES  [ ] NO

## 2019-11-28 NOTE — PROGRESS NOTE ADULT - SUBJECTIVE AND OBJECTIVE BOX
DAVID WOODY  79y  Male    Patient is a 79y old  Male who presents with a chief complaint of aspiration PNA (27 Nov 2019 18:05)      comfortable.   was incontinent this AM      PAST MEDICAL & SURGICAL HISTORY:  COPD, severe  Congestive heart failure  Pulmonary fibrosis  Pulmonary fibrosis  Smoker  BPH with obstruction/lower urinary tract symptoms  Vitamin D deficiency  GERD (gastroesophageal reflux disease)  Diabetes mellitus  Hyperlipidemia  Hypertension  S/P tonsillectomy          PHYSICAL EXAM:    T(C): 36.6 (11-28-19 @ 08:05), Max: 37.3 (11-27-19 @ 16:39)  HR: 69 (11-28-19 @ 08:05) (67 - 89)  BP: 150/82 (11-28-19 @ 08:05) (131/84 - 161/96)  RR: 19 (11-28-19 @ 08:05) (18 - 20)  SpO2: 97% (11-28-19 @ 08:05) (94% - 98%)  Wt(kg): --    I&O's Detail    27 Nov 2019 07:01  -  28 Nov 2019 07:00  --------------------------------------------------------  IN:    Oral Fluid: 480 mL    Solution: 50 mL  Total IN: 530 mL    OUT:    Incontinent per Condom Catheter: 800 mL    Voided: 1150 mL  Total OUT: 1950 mL    Total NET: -1420 mL          Respiratory: clear anteriorly, decreased BS at bases  Cardiovascular: S1 S2  Gastrointestinal: soft NT ND +BS  Extremities: trace edema   Neuro: Awake and alert    MEDICATIONS  (STANDING):  albuterol/ipratropium for Nebulization 3 milliLiter(s) Nebulizer every 6 hours  allopurinol 100 milliGRAM(s) Oral daily  ALPRAZolam 2 milliGRAM(s) Oral three times a day  amLODIPine   Tablet 10 milliGRAM(s) Oral daily  asenapine SL 10 milliGRAM(s) SubLingual <User Schedule>  aspirin enteric coated 81 milliGRAM(s) Oral daily  buPROPion XL . 300 milliGRAM(s) Oral daily  cefTRIAXone   IVPB 1000 milliGRAM(s) IV Intermittent every 24 hours  dextrose 5%. 1000 milliLiter(s) (50 mL/Hr) IV Continuous <Continuous>  dextrose 50% Injectable 12.5 Gram(s) IV Push once  dextrose 50% Injectable 25 Gram(s) IV Push once  dextrose 50% Injectable 25 Gram(s) IV Push once  furosemide    Tablet 40 milliGRAM(s) Oral daily  heparin  Injectable 5000 Unit(s) SubCutaneous every 8 hours  hydrALAZINE 25 milliGRAM(s) Oral three times a day  insulin lispro (HumaLOG) corrective regimen sliding scale   SubCutaneous three times a day before meals  isosorbide   mononitrate ER Tablet (IMDUR) 60 milliGRAM(s) Oral daily  labetalol 200 milliGRAM(s) Oral two times a day  pantoprazole    Tablet 40 milliGRAM(s) Oral before breakfast  polyethylene glycol 3350 17 Gram(s) Oral daily  predniSONE   Tablet 40 milliGRAM(s) Oral daily  senna 2 Tablet(s) Oral at bedtime  tamsulosin 0.4 milliGRAM(s) Oral at bedtime  venlafaxine XR. 150 milliGRAM(s) Oral two times a day    MEDICATIONS  (PRN):  acetaminophen   Tablet .. 650 milliGRAM(s) Oral every 6 hours PRN Temp greater or equal to 38C (100.4F)  dextrose 40% Gel 15 Gram(s) Oral once PRN Blood Glucose LESS THAN 70 milliGRAM(s)/deciliter  glucagon  Injectable 1 milliGRAM(s) IntraMuscular once PRN Glucose LESS THAN 70 milligrams/deciliter                            11.4   10.26 )-----------( 177      ( 28 Nov 2019 06:49 )             35.2       11-28    137  |  100  |  63<H>  ----------------------------<  104<H>  4.5   |  28  |  2.00<H>    Ca    9.6      28 Nov 2019 06:49  Mg     2.2     11-28        Creatinine Trend: Creatinine Trend: 2.00<--, 2.20<--, 2.20<--, 2.20<--, 2.30<--, 2.20<--

## 2019-11-28 NOTE — PROGRESS NOTE ADULT - ASSESSMENT
78yo M with PMH of chronic diastolic CHF (normal LVF 2016), non obstructive CAD (cardiac cath 2015), BPH, interstitial lung disease, COPD (on 4L home O2), HTN, HLD, DMT2 on Janumet, GERD, BPH, depression, anxiety presents with SOB and weakness found with pna. Cardiology called to evaluate volume status in setting of shortness of breath    Diastolic chf HF pEF:  -appears euvolemic on exam  Continue lasix 40mg PO qd for maintenance diuretic therapy   Strict intake and output-  -net negative 1420 last 24 hours  -continue guidline directed medical therapy:  -hydral 25 tid, imdur 60, labetolol 200 bid, amldopine  -recommend outpatient ECHO    non obstructive cad  -continue aspirin     HTN  controlled  Continue norvasc, , isosorbide, labetalol, hydralazine    HLD  -lipid panel in am  -will follow up with results    Pulm PNA  Pulmonary following - Ct result as above   Continue nebulizers, antibiotics as per pulmonary '  Supplemental oxygen    CKD  followed by renal     DM  management as per primary       DVT on lovenox    -Monitor and replete lytes, keep K>4 and Mg >2  - Further cardiac workup will depend on clinical course.   - All other workup per primary team  Thank you for the consult  Will continue to follow  Casey Person [facility]  Cardiology   Spectra #[phone] 76yo M with PMH of chronic diastolic CHF (normal LVF 2016), non obstructive CAD (cardiac cath 2015), BPH, interstitial lung disease, COPD (on 4L home O2), HTN, HLD, DMT2 on Janumet, GERD, BPH, depression, anxiety presents with SOB and weakness found with pna. Cardiology called to evaluate volume status in setting of shortness of breath    Diastolic chf HF pEF:  - appears euvolemic on exam  - Continue lasix 40mg PO qd for maintenance diuretic therapy   - Strict intake and output-  -net negative 1420 last 24 hours  -continue guideline directed medical therapy:  -hydral 25 tid, imdur 60, labetolol 200 bid, amldopine  -recommend outpatient ECHO    non obstructive cad  -continue aspirin     HTN  controlled  Continue norvasc, , isosorbide, labetalol, hydralazine    HLD  -lipid panel in am  -will follow up with results    Pulm PNA  Pulmonary following - Ct result as above   Continue nebulizers, antibiotics as per pulmonary '  Supplemental oxygen    CKD  followed by renal     DM  management as per primary       DVT on lovenox    - Monitor and replete lytes, keep K>4 and Mg >2  - Further cardiac workup will depend on clinical course.   - All other workup per primary team  Thank you for the consult  Will continue to follow  Casey Person [facility]  Cardiology   Spectra #[phone]

## 2019-11-28 NOTE — PROGRESS NOTE ADULT - ASSESSMENT
cont rx REVIEW OF SYMPTOMS      Able to give ROS  Yes     RELIABLE No   CONSTITUTIONAL Weakness Yes  Chills No Vision changes No  ENDOCRINE No unexplained hair loss No heat or cold intolerance    ALLERGY No hives  Sore throat No   RESP Coughing blood no  Shortness of breath YES   NEURO No Headache  Confusion Pain neck No   CARDIAC No Chest pain No Palpitations   GI No Pain abdomen NO   Vomiting NO     PHYSICAL EXAM    HEENT Unremarkable PERRLA atraumatic   RESP Fair air entry EXP prolonged    Harsh breath sound Resp distres mild   CARDIAC S1 S2 No S3     NO JVD    ABDOMEN SOFT BS PRESENT NOT DISTENDED No hepatosplenomegaly PEDAL EDEMA present No calf tenderness  NO rash   GENERAL Not TOXIC looking    VITALS/LABS   11/28/2019 afeb 87 147/91   11/28/2019 W 10.2 Hb 11.4 Plt 177 Na 137 K 4.5 CO2 28 Cr 2     PATIENT DESCRIPTION        CC           weakness sob  fever                  ER VS      146/88 76 20 afeb   ER MX                              HPI       11/24/2019 79 m former smoker PMH pulm fibrosis CHF D def dm Htn Hld  was admitted with fever sob leukocytosis hypoxemia basal pneumonia mild normocytic anemia and possible chf    NOTE As per cardio note pt has PMH chr d CHF (N LVSF 2016) Nonobstr cad (cath 2015) Home O2 dep (COPD)   Pulm consulted 11/24/2019                                 PMH       As above                          HOME MEDS                           hydralazine 25.3 oseltamivir pulmicort protonix aamlodipine labetalol 200.3 xanax wellbutrin 300 asa 81 saphris 10                               PULMONARY/CRITICAL CARE ASSESSMENT/RECOMMENDATIONS                      RO DYSPHAGIA (Basal pneumonia) speech recommended dys 3 nectar (11/26)                                      MASS PL BASD Will need fu as outpt with pet scan and possible bx See me in office  in 2 w   AC HYPOXEMIC RESP FAILURE 11/24/2019 4L 742/36/99  11/25 v duplex n Target PO 90-95%  PNEUMONIA E COLI UTI   Improved with abio   ABIO Rocephin (11/24)     Azithro (11/24)    COPD duoneb.4 (11/24) solumed 40(11/24) Cont Rx   CHF 11/24/2019 bnp 3487  hydralazine 25.3 (11/24) imdur 60 (11/24) lasix 40 (11/24) Cont Rx   ANEMIA norm 11/24/2019 Hb 9.9 Likely acd   DEVYN  11/24/2019 Cr 2.3 11/25 us renal No hydro Monitor serially   PSYCH alprazolam 2.3 (11/24) saphris 10 (11/24) venlafax 150.2 (11/24) buprop 300 (11/24) Cont rx     PLAN  Continue azithro (11/24) rcephin for caputi Need to ro asp  speech eval  noted Will need office fu for poss pleural mass with pet scan and biops Call  for pulm       TIME SPENT Over 25 minutes aggregate care time spent on encounter; activities included   direct pt care, counseling and/or coordinating care reviewing notes, lab data/ imaging , discussion with multidisciplinary team/ pt /family. Risks, benefits, alternatives  discussed in detail.

## 2019-11-28 NOTE — PROGRESS NOTE ADULT - SUBJECTIVE AND OBJECTIVE BOX
DAVID WOODY    V TELN 337 W1    Patient is a 79y old  Male who presents with a chief complaint of fever (28 Nov 2019 08:52)       Allergies    No Known Allergies    Intolerances        HPI:  80yo M with PMH of chronic diastolic CHF (normal LVF 2016), non obstructive CAD (cardiac cath 2015), BPH, interstitial lung disease, COPD (on 4L home O2), HTN, HLD, 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 Hx obtained from son and patient. Per son, pt was in his usual state of health until this morning, when his home health aide noticed worsened SOB and weakness. She had difficulty moving him and lifting him from his chair. son states that the pt chronically has SOB and difficulty moving, but that it has worsened today. He has a chronic cough from COPD/Interstitial lung disease. Son notes no change in the cough or increased sputum production since his sx began. Pt notes increased urinary frequency over the past few weeks, but denies dysuria or  hematuria. Pt was afebrile at home on son's home thermometer. Pt complains that he is thirsty and wants water. Pt denies CP, palpitations, NVD, melena, hematochezia, numbness, tingling.       In ED Pt received one dose rocephin, one dose azithromycin, 125 mg IV solumedrol x1, duonebs x1, 2.2L NS bolus  Vitals: T 102.7, HR 78, RR 22, /63, SpO2 93  Labs: WBC 17.94, Hb 10.8, Na 130, Cr 2.2 (baseline around 1.4), procal 1.17, proBNP 3487, lactate 1.9  EKG sinus rhythm w first degree AV block, no acute ischemic changes noted   UA: slightly turbid, moderate leuk esterase, small blood, moderate bacteria   Imaging  CXR: increased congestion compared to prior CXR in feb 2019, no consolidation appreciated, official read pending (24 Nov 2019 00:49)      PAST MEDICAL & SURGICAL HISTORY:  COPD, severe  Congestive heart failure  Pulmonary fibrosis  Pulmonary fibrosis  Smoker  BPH with obstruction/lower urinary tract symptoms  Vitamin D deficiency  GERD (gastroesophageal reflux disease)  Diabetes mellitus  Hyperlipidemia  Hypertension  S/P tonsillectomy      FAMILY HISTORY:  Family history of heart disease (Grandparent)  Family history of lymphoma        MEDICATIONS   acetaminophen   Tablet .. 650 milliGRAM(s) Oral every 6 hours PRN  albuterol/ipratropium for Nebulization 3 milliLiter(s) Nebulizer every 6 hours  allopurinol 100 milliGRAM(s) Oral daily  ALPRAZolam 2 milliGRAM(s) Oral three times a day  amLODIPine   Tablet 10 milliGRAM(s) Oral daily  asenapine SL 10 milliGRAM(s) SubLingual <User Schedule>  aspirin enteric coated 81 milliGRAM(s) Oral daily  buPROPion XL . 300 milliGRAM(s) Oral daily  cefTRIAXone   IVPB 1000 milliGRAM(s) IV Intermittent every 24 hours  dextrose 40% Gel 15 Gram(s) Oral once PRN  dextrose 5%. 1000 milliLiter(s) IV Continuous <Continuous>  dextrose 50% Injectable 12.5 Gram(s) IV Push once  dextrose 50% Injectable 25 Gram(s) IV Push once  dextrose 50% Injectable 25 Gram(s) IV Push once  furosemide    Tablet 40 milliGRAM(s) Oral daily  glucagon  Injectable 1 milliGRAM(s) IntraMuscular once PRN  heparin  Injectable 5000 Unit(s) SubCutaneous every 8 hours  hydrALAZINE 25 milliGRAM(s) Oral three times a day  insulin lispro (HumaLOG) corrective regimen sliding scale   SubCutaneous three times a day before meals  isosorbide   mononitrate ER Tablet (IMDUR) 60 milliGRAM(s) Oral daily  labetalol 200 milliGRAM(s) Oral two times a day  pantoprazole    Tablet 40 milliGRAM(s) Oral before breakfast  polyethylene glycol 3350 17 Gram(s) Oral daily  predniSONE   Tablet 40 milliGRAM(s) Oral daily  senna 2 Tablet(s) Oral at bedtime  tamsulosin 0.4 milliGRAM(s) Oral at bedtime  venlafaxine XR. 150 milliGRAM(s) Oral two times a day      Vital Signs Last 24 Hrs  T(C): 36.6 (28 Nov 2019 08:05), Max: 37.3 (27 Nov 2019 16:39)  T(F): 97.9 (28 Nov 2019 08:05), Max: 99.1 (27 Nov 2019 16:39)  HR: 69 (28 Nov 2019 08:05) (67 - 89)  BP: 150/82 (28 Nov 2019 08:05) (131/84 - 161/96)  BP(mean): --  RR: 19 (28 Nov 2019 08:05) (18 - 20)  SpO2: 97% (28 Nov 2019 08:05) (94% - 98%)      11-27-19 @ 07:01  -  11-28-19 @ 07:00  --------------------------------------------------------  IN: 530 mL / OUT: 1950 mL / NET: -1420 mL            LABS:                        11.4   10.26 )-----------( 177      ( 28 Nov 2019 06:49 )             35.2     11-28    137  |  100  |  63<H>  ----------------------------<  104<H>  4.5   |  28  |  2.00<H>    Ca    9.6      28 Nov 2019 06:49  Mg     2.2     11-28                WBC:  WBC Count: 10.26 K/uL (11-28 @ 06:49)  WBC Count: 10.36 K/uL (11-27 @ 05:46)  WBC Count: 11.48 K/uL (11-26 @ 06:49)  WBC Count: 13.05 K/uL (11-25 @ 06:59)      MICROBIOLOGY:  RECENT CULTURES:  11-24 .Urine Clean Catch (Midstream) Escherichia coli XXXX   >100,000 CFU/ml Escherichia coli    11-24 .Blood Blood-Peripheral XXXX XXXX   No growth to date.                    Sodium:  Sodium, Serum: 137 mmol/L (11-28 @ 06:49)  Sodium, Serum: 139 mmol/L (11-27 @ 05:46)  Sodium, Serum: 138 mmol/L (11-26 @ 06:49)  Sodium, Serum: 137 mmol/L (11-25 @ 06:59)      2.00 mg/dL 11-28 @ 06:49  2.20 mg/dL 11-27 @ 05:46  2.20 mg/dL 11-26 @ 06:49  2.20 mg/dL 11-25 @ 06:59      Hemoglobin:  Hemoglobin: 11.4 g/dL (11-28 @ 06:49)  Hemoglobin: 10.2 g/dL (11-27 @ 05:46)  Hemoglobin: 10.2 g/dL (11-26 @ 06:49)  Hemoglobin: 10.2 g/dL (11-25 @ 06:59)      Platelets: Platelet Count - Automated: 177 K/uL (11-28 @ 06:49)  Platelet Count - Automated: 165 K/uL (11-27 @ 05:46)  Platelet Count - Automated: 168 K/uL (11-26 @ 06:49)  Platelet Count - Automated: 148 K/uL (11-25 @ 06:59)              RADIOLOGY & ADDITIONAL STUDIES:

## 2019-11-28 NOTE — PROGRESS NOTE ADULT - ASSESSMENT
78yo M with PMH of chronic diastolic CHF (normal LVEF 2016), non obstructive CAD (cardiac cath 2015), BPH, interstitial lung disease, COPD (on 4L home O2 intermittently), HTN, HLD, Type 2 DM on Janumet, GERD, BPH, depression, anxiety presents with SOB and weakness a/w acute on chronic hypoxic respiratory failure and sepsis due to likely aspiration PNA and superimposed COPD exacerbation.    1. Acute on chronic hypoxic respiratory failure:  -2/2 aspiration pneumonia and COPD exacerbation  -Blood cx NGTD  -Continue Ceftriaxone ; completed 5 days of azithromycin  -prednisone 40mg po daily -- stop completely or taper per pulm recs  -c/w duoneb Q6h, and O2 support.    -had MBS with recommendation for dysphagia 3 soft solids with nectar thickened liquid diet -- However, pt categorically refuses to have thickened fluids despite extensive counseling on his diagnosis and on the MBS findings. Pt understood and was able to verbalize the potential risks of aspiration PNA, respiratory failure, and possibly death that can occur with aspiration, but still wishes to have thin liquids. Spoke with pt's HCP/son, Balaji, who also understood and agreed that the pt should have regular fluids if those are his wishes at this point.  -oxygen requirements back to baseline -- saturating well on 2L NC (at home varied with O2 up to 4L NC)  -f/up pulm recs    2. Sepsis:  -due to aspiration pneumonia   -Blood cx NGTD  -leukocytosis resolving, fever resolved  -Continue Ceftriaxone.   -had MBS with recommendation for dysphagia 3 soft solids with nectar thickened liquid diet -- However, pt categorically refuses to have thickened fluids despite extensive counseling on his diagnosis and on the MBS findings. Pt understood and was able to verbalize the potential risks of aspiration PNA, respiratory failure, and possibly death that can occur with aspiration, but still wishes to have thin liquids. Spoke with pt's HCP/son, Balaji, who also understood and agreed that the pt should have regular fluids if those are his wishes at this point.  -Urine cx growing E coli that is sensitive to ceftriaxone -- pt had no urinary symptoms so this may have been asymptomatic bacteriuria; less likely to have been UTI -- regardless, pt is being covered by the rocephin he is receiving for PNA.     3. Chronic diastolic CHF:    -earlier in admission, pt had one dose of Lasix 40mg IV in the ED and was then continued on home dose Lasix 40mg PO daily  -reviewed with cardiology, pt's resp symptoms and hypoxia were likely due to PNA/COPD exacerbation as opposed to CHF exacerbation -- doubt pt had CHF exacerbation  -c/w lasix 40mg po daily  -c/w hydralazine, labetalol, and Imdur  -f/up cardiology recs    4. Lung mass:  -densities seen on admission CT in the lung bases may be due to recurrent pneumonias vs enlarging pleural based masses per radiologist read  -pt clearly had infection on admission, but it does not r/out underlying mass  -discussed with pulm -- pt will f/up in the office as outpt 2 wks post-discharge and have further imaging, possible PET, and consider biopsies as outpt if deemed appropriate based on further future imaging    5. CKD Stage 4:    - pt likely at baseline renal function, doubt DEVYN  - No hydronephrosis on US  - Nephrology recs appreciated  - monitor BMP    6. Anxiety d/o:    -continue Xanax 2mg PO TID and Effexor XR 150mg PO BID   -checked iSTOP and confirmed that is pt's home dose of Xanax.    7. Hyponatremia:  Resolved.    8. HTN:  continue Norvasc, Hydralazine, imdur and Labetalol     9. GERD:  continue Protonix 40mg PO daily    10. BPH:  continue Flomax 0.4mg PO QHS    11. DM2:   - hold home Janumet  - started on HISS  - monitor FSG  - cons carb diet    12. VTE ppx:   - HSQ    13. dispo:  - asked CM to start reinstating pt's Medicaid HHA; expect pt to be discharged tomorrow or Saturday

## 2019-11-29 ENCOUNTER — TRANSCRIPTION ENCOUNTER (OUTPATIENT)
Age: 79
End: 2019-11-29

## 2019-11-29 VITALS
DIASTOLIC BLOOD PRESSURE: 86 MMHG | OXYGEN SATURATION: 93 % | RESPIRATION RATE: 19 BRPM | TEMPERATURE: 98 F | SYSTOLIC BLOOD PRESSURE: 150 MMHG | HEART RATE: 73 BPM

## 2019-11-29 DIAGNOSIS — J44.9 CHRONIC OBSTRUCTIVE PULMONARY DISEASE, UNSPECIFIED: ICD-10-CM

## 2019-11-29 LAB
ANION GAP SERPL CALC-SCNC: 9 MMOL/L — SIGNIFICANT CHANGE UP (ref 5–17)
BUN SERPL-MCNC: 69 MG/DL — HIGH (ref 7–23)
CALCIUM SERPL-MCNC: 9.6 MG/DL — SIGNIFICANT CHANGE UP (ref 8.5–10.1)
CHLORIDE SERPL-SCNC: 97 MMOL/L — SIGNIFICANT CHANGE UP (ref 96–108)
CO2 SERPL-SCNC: 30 MMOL/L — SIGNIFICANT CHANGE UP (ref 22–31)
CREAT SERPL-MCNC: 2.2 MG/DL — HIGH (ref 0.5–1.3)
CULTURE RESULTS: SIGNIFICANT CHANGE UP
CULTURE RESULTS: SIGNIFICANT CHANGE UP
GLUCOSE SERPL-MCNC: 113 MG/DL — HIGH (ref 70–99)
HCT VFR BLD CALC: 34.3 % — LOW (ref 39–50)
HGB BLD-MCNC: 11.1 G/DL — LOW (ref 13–17)
MCHC RBC-ENTMCNC: 28 PG — SIGNIFICANT CHANGE UP (ref 27–34)
MCHC RBC-ENTMCNC: 32.4 GM/DL — SIGNIFICANT CHANGE UP (ref 32–36)
MCV RBC AUTO: 86.6 FL — SIGNIFICANT CHANGE UP (ref 80–100)
NRBC # BLD: 0 /100 WBCS — SIGNIFICANT CHANGE UP (ref 0–0)
PLATELET # BLD AUTO: 195 K/UL — SIGNIFICANT CHANGE UP (ref 150–400)
POTASSIUM SERPL-MCNC: 4.3 MMOL/L — SIGNIFICANT CHANGE UP (ref 3.5–5.3)
POTASSIUM SERPL-SCNC: 4.3 MMOL/L — SIGNIFICANT CHANGE UP (ref 3.5–5.3)
RBC # BLD: 3.96 M/UL — LOW (ref 4.2–5.8)
RBC # FLD: 14.3 % — SIGNIFICANT CHANGE UP (ref 10.3–14.5)
SODIUM SERPL-SCNC: 136 MMOL/L — SIGNIFICANT CHANGE UP (ref 135–145)
SPECIMEN SOURCE: SIGNIFICANT CHANGE UP
SPECIMEN SOURCE: SIGNIFICANT CHANGE UP
WBC # BLD: 11.81 K/UL — HIGH (ref 3.8–10.5)
WBC # FLD AUTO: 11.81 K/UL — HIGH (ref 3.8–10.5)

## 2019-11-29 PROCEDURE — 97112 NEUROMUSCULAR REEDUCATION: CPT

## 2019-11-29 PROCEDURE — 87086 URINE CULTURE/COLONY COUNT: CPT

## 2019-11-29 PROCEDURE — 87186 SC STD MICRODIL/AGAR DIL: CPT

## 2019-11-29 PROCEDURE — 92611 MOTION FLUOROSCOPY/SWALLOW: CPT

## 2019-11-29 PROCEDURE — 83036 HEMOGLOBIN GLYCOSYLATED A1C: CPT

## 2019-11-29 PROCEDURE — 71250 CT THORAX DX C-: CPT

## 2019-11-29 PROCEDURE — 81001 URINALYSIS AUTO W/SCOPE: CPT

## 2019-11-29 PROCEDURE — 82962 GLUCOSE BLOOD TEST: CPT

## 2019-11-29 PROCEDURE — 87899 AGENT NOS ASSAY W/OPTIC: CPT

## 2019-11-29 PROCEDURE — 87631 RESP VIRUS 3-5 TARGETS: CPT

## 2019-11-29 PROCEDURE — 99291 CRITICAL CARE FIRST HOUR: CPT

## 2019-11-29 PROCEDURE — 85610 PROTHROMBIN TIME: CPT

## 2019-11-29 PROCEDURE — 92610 EVALUATE SWALLOWING FUNCTION: CPT

## 2019-11-29 PROCEDURE — 97530 THERAPEUTIC ACTIVITIES: CPT

## 2019-11-29 PROCEDURE — 85027 COMPLETE CBC AUTOMATED: CPT

## 2019-11-29 PROCEDURE — 85730 THROMBOPLASTIN TIME PARTIAL: CPT

## 2019-11-29 PROCEDURE — 83880 ASSAY OF NATRIURETIC PEPTIDE: CPT

## 2019-11-29 PROCEDURE — 87040 BLOOD CULTURE FOR BACTERIA: CPT

## 2019-11-29 PROCEDURE — 82803 BLOOD GASES ANY COMBINATION: CPT

## 2019-11-29 PROCEDURE — 97116 GAIT TRAINING THERAPY: CPT

## 2019-11-29 PROCEDURE — 84145 PROCALCITONIN (PCT): CPT

## 2019-11-29 PROCEDURE — 93005 ELECTROCARDIOGRAM TRACING: CPT

## 2019-11-29 PROCEDURE — 74230 X-RAY XM SWLNG FUNCJ C+: CPT

## 2019-11-29 PROCEDURE — 97161 PT EVAL LOW COMPLEX 20 MIN: CPT

## 2019-11-29 PROCEDURE — 92526 ORAL FUNCTION THERAPY: CPT

## 2019-11-29 PROCEDURE — 94760 N-INVAS EAR/PLS OXIMETRY 1: CPT

## 2019-11-29 PROCEDURE — 83735 ASSAY OF MAGNESIUM: CPT

## 2019-11-29 PROCEDURE — 99239 HOSP IP/OBS DSCHRG MGMT >30: CPT

## 2019-11-29 PROCEDURE — 94640 AIRWAY INHALATION TREATMENT: CPT

## 2019-11-29 PROCEDURE — 76770 US EXAM ABDO BACK WALL COMP: CPT

## 2019-11-29 PROCEDURE — 96375 TX/PRO/DX INJ NEW DRUG ADDON: CPT

## 2019-11-29 PROCEDURE — 83605 ASSAY OF LACTIC ACID: CPT

## 2019-11-29 PROCEDURE — 99221 1ST HOSP IP/OBS SF/LOW 40: CPT

## 2019-11-29 PROCEDURE — 80048 BASIC METABOLIC PNL TOTAL CA: CPT

## 2019-11-29 PROCEDURE — 96365 THER/PROPH/DIAG IV INF INIT: CPT

## 2019-11-29 PROCEDURE — 93970 EXTREMITY STUDY: CPT

## 2019-11-29 PROCEDURE — 80061 LIPID PANEL: CPT

## 2019-11-29 PROCEDURE — 96367 TX/PROPH/DG ADDL SEQ IV INF: CPT

## 2019-11-29 PROCEDURE — 36415 COLL VENOUS BLD VENIPUNCTURE: CPT

## 2019-11-29 PROCEDURE — 80053 COMPREHEN METABOLIC PANEL: CPT

## 2019-11-29 PROCEDURE — 71045 X-RAY EXAM CHEST 1 VIEW: CPT

## 2019-11-29 PROCEDURE — 99232 SBSQ HOSP IP/OBS MODERATE 35: CPT

## 2019-11-29 RX ORDER — CEFUROXIME AXETIL 250 MG
1 TABLET ORAL
Qty: 3 | Refills: 0
Start: 2019-11-29 | End: 2019-12-08

## 2019-11-29 RX ORDER — ASPIRIN/CALCIUM CARB/MAGNESIUM 324 MG
1 TABLET ORAL
Qty: 0 | Refills: 0 | DISCHARGE
Start: 2019-11-29

## 2019-11-29 RX ORDER — ATORVASTATIN CALCIUM 80 MG/1
40 TABLET, FILM COATED ORAL AT BEDTIME
Refills: 0 | Status: DISCONTINUED | OUTPATIENT
Start: 2019-11-29 | End: 2019-11-29

## 2019-11-29 RX ADMIN — ISOSORBIDE MONONITRATE 60 MILLIGRAM(S): 60 TABLET, EXTENDED RELEASE ORAL at 12:15

## 2019-11-29 RX ADMIN — POLYETHYLENE GLYCOL 3350 17 GRAM(S): 17 POWDER, FOR SOLUTION ORAL at 12:35

## 2019-11-29 RX ADMIN — Medication 150 MILLIGRAM(S): at 07:08

## 2019-11-29 RX ADMIN — Medication 40 MILLIGRAM(S): at 07:09

## 2019-11-29 RX ADMIN — PANTOPRAZOLE SODIUM 40 MILLIGRAM(S): 20 TABLET, DELAYED RELEASE ORAL at 07:09

## 2019-11-29 RX ADMIN — Medication 2: at 12:20

## 2019-11-29 RX ADMIN — Medication 200 MILLIGRAM(S): at 07:09

## 2019-11-29 RX ADMIN — CEFTRIAXONE 100 MILLIGRAM(S): 500 INJECTION, POWDER, FOR SOLUTION INTRAMUSCULAR; INTRAVENOUS at 01:04

## 2019-11-29 RX ADMIN — Medication 3 MILLILITER(S): at 07:34

## 2019-11-29 RX ADMIN — BUPROPION HYDROCHLORIDE 300 MILLIGRAM(S): 150 TABLET, EXTENDED RELEASE ORAL at 12:15

## 2019-11-29 RX ADMIN — AMLODIPINE BESYLATE 10 MILLIGRAM(S): 2.5 TABLET ORAL at 07:09

## 2019-11-29 RX ADMIN — HEPARIN SODIUM 5000 UNIT(S): 5000 INJECTION INTRAVENOUS; SUBCUTANEOUS at 07:08

## 2019-11-29 RX ADMIN — Medication 25 MILLIGRAM(S): at 07:09

## 2019-11-29 RX ADMIN — Medication 100 MILLIGRAM(S): at 12:15

## 2019-11-29 RX ADMIN — Medication 2 MILLIGRAM(S): at 07:33

## 2019-11-29 RX ADMIN — Medication 81 MILLIGRAM(S): at 12:15

## 2019-11-29 NOTE — PROGRESS NOTE ADULT - SUBJECTIVE AND OBJECTIVE BOX
Patient is a 79y old  Male who presents with a chief complaint of fever (25 Nov 2019 09:48)  Patient seen in follow up for CKD 4.     PAST MEDICAL HISTORY:  COPD, severe  Congestive heart failure  Pulmonary fibrosis  Pulmonary fibrosis  Smoker  BPH with obstruction/lower urinary tract symptoms  Vitamin D deficiency  GERD (gastroesophageal reflux disease)  Diabetes mellitus  Hyperlipidemia  Hypertension    MEDICATIONS  (STANDING):  albuterol/ipratropium for Nebulization 3 milliLiter(s) Nebulizer every 6 hours  allopurinol 100 milliGRAM(s) Oral daily  ALPRAZolam 2 milliGRAM(s) Oral three times a day  amLODIPine   Tablet 10 milliGRAM(s) Oral daily  asenapine SL 10 milliGRAM(s) SubLingual <User Schedule>  aspirin enteric coated 81 milliGRAM(s) Oral daily  atorvastatin 40 milliGRAM(s) Oral at bedtime  buPROPion XL . 300 milliGRAM(s) Oral daily  cefTRIAXone   IVPB 1000 milliGRAM(s) IV Intermittent every 24 hours  dextrose 5%. 1000 milliLiter(s) (50 mL/Hr) IV Continuous <Continuous>  dextrose 50% Injectable 12.5 Gram(s) IV Push once  dextrose 50% Injectable 25 Gram(s) IV Push once  dextrose 50% Injectable 25 Gram(s) IV Push once  furosemide    Tablet 40 milliGRAM(s) Oral daily  heparin  Injectable 5000 Unit(s) SubCutaneous every 8 hours  hydrALAZINE 25 milliGRAM(s) Oral three times a day  insulin lispro (HumaLOG) corrective regimen sliding scale   SubCutaneous three times a day before meals  isosorbide   mononitrate ER Tablet (IMDUR) 60 milliGRAM(s) Oral daily  labetalol 200 milliGRAM(s) Oral two times a day  pantoprazole    Tablet 40 milliGRAM(s) Oral before breakfast  polyethylene glycol 3350 17 Gram(s) Oral daily  predniSONE   Tablet 40 milliGRAM(s) Oral daily  senna 2 Tablet(s) Oral at bedtime  tamsulosin 0.4 milliGRAM(s) Oral at bedtime  venlafaxine XR. 150 milliGRAM(s) Oral two times a day    MEDICATIONS  (PRN):  acetaminophen   Tablet .. 650 milliGRAM(s) Oral every 6 hours PRN Temp greater or equal to 38C (100.4F)  dextrose 40% Gel 15 Gram(s) Oral once PRN Blood Glucose LESS THAN 70 milliGRAM(s)/deciliter  glucagon  Injectable 1 milliGRAM(s) IntraMuscular once PRN Glucose LESS THAN 70 milligrams/deciliter    T(C): 36.8 (11-29-19 @ 12:42), Max: 37.3 (11-27-19 @ 16:39)  HR: 73 (11-29-19 @ 12:42) (69 - 90)  BP: 150/86 (11-29-19 @ 12:42) (124/79 - 167/81)  RR: 19 (11-29-19 @ 12:42)  SpO2: 93% (11-29-19 @ 12:42)  Wt(kg): --  I&O's Detail    28 Nov 2019 07:01  -  29 Nov 2019 07:00  --------------------------------------------------------  IN:  Total IN: 0 mL    OUT:    Voided: 400 mL  Total OUT: 400 mL    Total NET: -400 mL          PHYSICAL EXAM:  General: NAD  Respiratory: b/l air entry  Cardiovascular: S1 S2  Gastrointestinal: soft  Extremities:  edema                  LABORATORY:                        11.1   11.81 )-----------( 195      ( 29 Nov 2019 06:59 )             34.3     11-29    136  |  97  |  69<H>  ----------------------------<  113<H>  4.3   |  30  |  2.20<H>    Ca    9.6      29 Nov 2019 06:59  Mg     2.2     11-28      Sodium, Serum: 136 mmol/L (11-29 @ 06:59)  Sodium, Serum: 137 mmol/L (11-28 @ 06:49)    Potassium, Serum: 4.3 mmol/L (11-29 @ 06:59)  Potassium, Serum: 4.5 mmol/L (11-28 @ 06:49)    Hemoglobin: 11.1 g/dL (11-29 @ 06:59)  Hemoglobin: 11.4 g/dL (11-28 @ 06:49)  Hemoglobin: 10.2 g/dL (11-27 @ 05:46)    Creatinine, Serum 2.20 (11-29 @ 06:59)  Creatinine, Serum 2.00 (11-28 @ 06:49)  Creatinine, Serum 2.20 (11-27 @ 05:46)

## 2019-11-29 NOTE — PROGRESS NOTE ADULT - ASSESSMENT
78yo M with PMH of chronic diastolic CHF (normal LVF 2016), non obstructive CAD (cardiac cath 2015), BPH, interstitial lung disease, COPD (on 4L home O2), HTN, HLD, DMT2 on Janumet, GERD, BPH, depression, anxiety presents with SOB and weakness found with PNA. Cardiology called to evaluate volume status in setting of shortness of breath    Diastolic CHF HFpEF:  - appears euvolemic on exam  - Continue lasix 40mg PO qd for maintenance diuretic therapy   - Strict intake and output-  -net negative 1420 last 24 hours  -Continue guideline directed medical therapy:  -Hydralazine  25 tid, imdur 60, labetolol 200 bid, amldopine  -recommend outpatient ECHO    Non obstructive cad  -Continue aspirin     HTN-controlled  Continue norvasc, , isosorbide, labetalol, hydralazine    HLD  -Lipid panel in am      Pulm PNA  Pulmonary following - Ct result as above   Continue nebulizers, antibiotics as per pulmonary '  Supplemental oxygen    CKD  followed by renal     DM  management as per primary       DVT on lovenox    - Monitor and replete lytes, keep K>4 and Mg >2  - Further cardiac workup will depend on clinical course.   - All other workup per primary team    Thank you for the consult  Will continue to follow  Kamla George DNP, ANP-c  Cardiology   Spectra #[phone] 76yo M with PMH of chronic diastolic CHF (normal LVF 2016), non obstructive CAD (cardiac cath 2015), BPH, interstitial lung disease, COPD (on 4L home O2), HTN, HLD, DMT2 on Janumet, GERD, BPH, depression, anxiety presents with SOB and weakness found with PNA. Cardiology called to evaluate volume status in setting of shortness of breath    Diastolic CHF HFpEF:  - appears euvolemic on exam  - Continue lasix 40mg PO qd for maintenance diuretic therapy   - Strict intake and output-  -net negative 1420 last 24 hours  -Continue guideline directed medical therapy:  -Hydralazine  25 tid, imdur 60, labetolol 200 bid, amldopine  -recommend outpatient ECHO    Non obstructive cad  -Continue aspirin     HTN-controlled  Continue norvasc, , isosorbide, labetalol, hydralazine    HLD  -Triglycerides 437 HDL 28      Pulm PNA  Pulmonary following - Ct result as above   Continue nebulizers, antibiotics as per pulmonary '  Supplemental oxygen    CKD  followed by renal     DM  management as per primary       DVT on lovenox    - Monitor and replete lytes, keep K>4 and Mg >2  - Further cardiac workup will depend on clinical course.   - All other workup per primary team    Thank you for the consult  Will continue to follow  Kamla George DNP, ANP-c  Cardiology   Spectra #[phone] 78yo M with PMH of chronic diastolic CHF (normal LVF 2016), non obstructive CAD (cardiac cath 2015), BPH, interstitial lung disease, COPD (on 4L home O2), HTN, HLD, DMT2 on Janumet, GERD, BPH, depression, anxiety presents with SOB and weakness found with PNA. Cardiology called to evaluate volume status in setting of shortness of breath    Diastolic CHF HFpEF:  - appears euvolemic on exam  - Continue lasix 40mg PO qd for maintenance diuretic therapy   - Strict intake and output-  - Net negative -400  -Continue guideline directed medical therapy:  -Hydralazine  25 tid, imdur 60, labetolol 200 bid, amldopine  -recommend outpatient ECHO    Non obstructive cad  -Continue aspirin     HTN-controlled  Continue norvasc, , isosorbide, labetalol, hydralazine    HLD  -Triglycerides 437 HDL 28 Atorvastatin 40mg ordered       Pulm PNA  Pulmonary following - Ct result as above   Continue nebulizers, antibiotics as per pulmonary '  Supplemental oxygen    CKD  followed by renal     DM  management as per primary       DVT on lovenox    - Monitor and replete lytes, keep K>4 and Mg >2  - Further cardiac workup will depend on clinical course.   - All other workup per primary team    Thank you for the consult  Will continue to follow  Kamla George DNP, ANP-c  Cardiology   Spectra #[phone]

## 2019-11-29 NOTE — PROGRESS NOTE ADULT - ASSESSMENT
1.	CKD 4  2.	Pneumonia  3.	CHF  4.	Diabetes  5.	Hypertension    Stable renal indices. To continue current meds. Avoid nephrotoxic meds as possible. Monitor BP trend. Titrate BP meds as needed. Salt restriction.  Monitor blood sugar levels. Insulin coverage as needed. Dietary restriction. Will follow electrolytes and renal function trend. D/c planning.

## 2019-11-29 NOTE — PROGRESS NOTE ADULT - SUBJECTIVE AND OBJECTIVE BOX
Date/Time Patient Seen:  		  Referring MD:   Data Reviewed	       Patient is a 79y old  Male who presents with a chief complaint of fever (29 Nov 2019 07:30)      Subjective/HPI     PAST MEDICAL & SURGICAL HISTORY:  COPD, severe  Congestive heart failure  Pulmonary fibrosis  Pulmonary fibrosis  Smoker  BPH with obstruction/lower urinary tract symptoms  Vitamin D deficiency  GERD (gastroesophageal reflux disease)  Diabetes mellitus  Hyperlipidemia  Hypertension  S/P tonsillectomy  No significant past surgical history        Medication list         MEDICATIONS  (STANDING):  albuterol/ipratropium for Nebulization 3 milliLiter(s) Nebulizer every 6 hours  allopurinol 100 milliGRAM(s) Oral daily  ALPRAZolam 2 milliGRAM(s) Oral three times a day  amLODIPine   Tablet 10 milliGRAM(s) Oral daily  asenapine SL 10 milliGRAM(s) SubLingual <User Schedule>  aspirin enteric coated 81 milliGRAM(s) Oral daily  buPROPion XL . 300 milliGRAM(s) Oral daily  cefTRIAXone   IVPB 1000 milliGRAM(s) IV Intermittent every 24 hours  dextrose 5%. 1000 milliLiter(s) (50 mL/Hr) IV Continuous <Continuous>  dextrose 50% Injectable 12.5 Gram(s) IV Push once  dextrose 50% Injectable 25 Gram(s) IV Push once  dextrose 50% Injectable 25 Gram(s) IV Push once  furosemide    Tablet 40 milliGRAM(s) Oral daily  heparin  Injectable 5000 Unit(s) SubCutaneous every 8 hours  hydrALAZINE 25 milliGRAM(s) Oral three times a day  insulin lispro (HumaLOG) corrective regimen sliding scale   SubCutaneous three times a day before meals  isosorbide   mononitrate ER Tablet (IMDUR) 60 milliGRAM(s) Oral daily  labetalol 200 milliGRAM(s) Oral two times a day  pantoprazole    Tablet 40 milliGRAM(s) Oral before breakfast  polyethylene glycol 3350 17 Gram(s) Oral daily  predniSONE   Tablet 40 milliGRAM(s) Oral daily  senna 2 Tablet(s) Oral at bedtime  tamsulosin 0.4 milliGRAM(s) Oral at bedtime  venlafaxine XR. 150 milliGRAM(s) Oral two times a day    MEDICATIONS  (PRN):  acetaminophen   Tablet .. 650 milliGRAM(s) Oral every 6 hours PRN Temp greater or equal to 38C (100.4F)  dextrose 40% Gel 15 Gram(s) Oral once PRN Blood Glucose LESS THAN 70 milliGRAM(s)/deciliter  glucagon  Injectable 1 milliGRAM(s) IntraMuscular once PRN Glucose LESS THAN 70 milligrams/deciliter         Vitals log        ICU Vital Signs Last 24 Hrs  T(C): 36.8 (29 Nov 2019 08:32), Max: 37.1 (28 Nov 2019 20:23)  T(F): 98.3 (29 Nov 2019 08:32), Max: 98.7 (28 Nov 2019 20:23)  HR: 83 (29 Nov 2019 08:32) (69 - 90)  BP: 161/100 (29 Nov 2019 08:32) (124/79 - 167/81)  BP(mean): --  ABP: --  ABP(mean): --  RR: 19 (29 Nov 2019 08:32) (18 - 20)  SpO2: 95% (29 Nov 2019 08:32) (94% - 98%)           Input and Output:  I&O's Detail    28 Nov 2019 07:01  -  29 Nov 2019 07:00  --------------------------------------------------------  IN:  Total IN: 0 mL    OUT:    Voided: 400 mL  Total OUT: 400 mL    Total NET: -400 mL          Lab Data                        11.1   11.81 )-----------( 195      ( 29 Nov 2019 06:59 )             34.3     11-29    136  |  97  |  69<H>  ----------------------------<  113<H>  4.3   |  30  |  2.20<H>    Ca    9.6      29 Nov 2019 06:59  Mg     2.2     11-28              Review of Systems	      Objective     Physical Examination    heart s1s2  lung dec BS  abd soft  head nc      Pertinent Lab findings & Imaging      Princess:  NO   Adequate UO     I&O's Detail    28 Nov 2019 07:01  -  29 Nov 2019 07:00  --------------------------------------------------------  IN:  Total IN: 0 mL    OUT:    Voided: 400 mL  Total OUT: 400 mL    Total NET: -400 mL               Discussed with:     Cultures:	        Radiology

## 2019-11-29 NOTE — PROGRESS NOTE ADULT - REASON FOR ADMISSION
fever
aspiration PNA
aspiration PNA

## 2019-11-29 NOTE — DISCHARGE NOTE NURSING/CASE MANAGEMENT/SOCIAL WORK - PATIENT PORTAL LINK FT
You can access the FollowMyHealth Patient Portal offered by [facility] by registering at the following website: [email or web address]. By joining [facility]’s FollowMyHealth portal, you will also be able to view your health information using other applications (apps) compatible with our system.

## 2019-11-29 NOTE — PROGRESS NOTE ADULT - PROBLEM SELECTOR PLAN 1
copd  poss aspiration  ? ILD  will need CT chest repeat as outpatient - in 6 - 8 weeks  keep HOB elev  on emp ABX regimen -   Prednisone taper in progress  on NEBS  assist with ADL  will follow  pt will need Pulm Follow up in [place] Office - in Dr. Bush's practice - Pulm - Dr. Page or Dr. Vane Keita or Dr. Gonzales

## 2019-11-29 NOTE — PROGRESS NOTE ADULT - SUBJECTIVE AND OBJECTIVE BOX
[facility] -- Franco Mcallister, Benitez, Josse, Kalia Cheng Savella  Office # [phone]    Follow Up: SOB      HPI:  78yo M with PMH of chronic diastolic CHF (normal LVF 2016), non obstructive CAD (cardiac cath 2015), BPH, interstitial lung disease, COPD (on 4L home O2), HTN, HLD, 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 Hx obtained from son and patient. Per son, pt was in his usual state of health until this morning, when his home health aide noticed worsened SOB and weakness. She had difficulty moving him and lifting him from his chair. son states that the pt chronically has SOB and difficulty moving, but that it has worsened today. He has a chronic cough from COPD/Interstitial lung disease. Son notes no change in the cough or increased sputum production since his sx began. Pt notes increased urinary frequency over the past few weeks, but denies dysuria or  hematuria. Pt was afebrile at home on son's home thermometer. Pt complains that he is thirsty and wants water. Pt denies CP, palpitations, NVD, melena, hematochezia, numbness, tingling.       In ED Pt received one dose rocephin, one dose azithromycin, 125 mg IV solumedrol x1, duonebs x1, 2.2L NS bolus  Vitals: T 102.7, HR 78, RR 22, /63, SpO2 93  Labs: WBC 17.94, Hb 10.8, Na 130, Cr 2.2 (baseline around 1.4), procal 1.17, proBNP 3487, lactate 1.9  EKG sinus rhythm w first degree AV block, no acute ischemic changes noted   UA: slightly turbid, moderate leuk esterase, small blood, moderate bacteria   Imaging  CXR: increased congestion compared to prior CXR in feb 2019, no consolidation appreciated, official read pending (24 Nov 2019 00:49)      Subjective/Observations:     Pt. seen examined and evaluated. Pt. resting comfortably in bed in NAD, with no respiratory distress, no chest pain, dyspnea, palpitations, PND or orthopnea     REVIEW OF SYSTEMS: All other review of systems is negative unless indicated above    PAST MEDICAL & SURGICAL HISTORY:  COPD, severe  Congestive heart failure  Pulmonary fibrosis  Pulmonary fibrosis  Smoker  BPH with obstruction/lower urinary tract symptoms  Vitamin D deficiency  GERD (gastroesophageal reflux disease)  Diabetes mellitus  Hyperlipidemia  Hypertension  S/P tonsillectomy      MEDICATIONS  (STANDING):  albuterol/ipratropium for Nebulization 3 milliLiter(s) Nebulizer every 6 hours  allopurinol 100 milliGRAM(s) Oral daily  ALPRAZolam 2 milliGRAM(s) Oral three times a day  amLODIPine   Tablet 10 milliGRAM(s) Oral daily  asenapine SL 10 milliGRAM(s) SubLingual <User Schedule>  aspirin enteric coated 81 milliGRAM(s) Oral daily  buPROPion XL . 300 milliGRAM(s) Oral daily  cefTRIAXone   IVPB 1000 milliGRAM(s) IV Intermittent every 24 hours  dextrose 5%. 1000 milliLiter(s) (50 mL/Hr) IV Continuous <Continuous>  dextrose 50% Injectable 12.5 Gram(s) IV Push once  dextrose 50% Injectable 25 Gram(s) IV Push once  dextrose 50% Injectable 25 Gram(s) IV Push once  furosemide    Tablet 40 milliGRAM(s) Oral daily  heparin  Injectable 5000 Unit(s) SubCutaneous every 8 hours  hydrALAZINE 25 milliGRAM(s) Oral three times a day  insulin lispro (HumaLOG) corrective regimen sliding scale   SubCutaneous three times a day before meals  isosorbide   mononitrate ER Tablet (IMDUR) 60 milliGRAM(s) Oral daily  labetalol 200 milliGRAM(s) Oral two times a day  pantoprazole    Tablet 40 milliGRAM(s) Oral before breakfast  polyethylene glycol 3350 17 Gram(s) Oral daily  predniSONE   Tablet 40 milliGRAM(s) Oral daily  senna 2 Tablet(s) Oral at bedtime  tamsulosin 0.4 milliGRAM(s) Oral at bedtime  venlafaxine XR. 150 milliGRAM(s) Oral two times a day    MEDICATIONS  (PRN):  acetaminophen   Tablet .. 650 milliGRAM(s) Oral every 6 hours PRN Temp greater or equal to 38C (100.4F)  dextrose 40% Gel 15 Gram(s) Oral once PRN Blood Glucose LESS THAN 70 milliGRAM(s)/deciliter  glucagon  Injectable 1 milliGRAM(s) IntraMuscular once PRN Glucose LESS THAN 70 milligrams/deciliter      Allergies: No Known Allergies    Intolerances      Vital Signs Last 24 Hrs  T(C): 36.9 (29 Nov 2019 04:02), Max: 37.1 (28 Nov 2019 20:23)  T(F): 98.4 (29 Nov 2019 04:02), Max: 98.7 (28 Nov 2019 20:23)  HR: 71 (29 Nov 2019 04:02) (69 - 90)  BP: 124/79 (29 Nov 2019 04:02) (124/79 - 167/81)  BP(mean): --  RR: 20 (29 Nov 2019 04:02) (18 - 20)  SpO2: 96% (29 Nov 2019 04:02) (94% - 98%)    I&O's Summary    28 Nov 2019 07:01  -  29 Nov 2019 07:00  --------------------------------------------------------  IN: 0 mL / OUT: 400 mL / NET: -400 mL          PHYSICAL EXAM:    Constitutional: NAD, awake and alert, well-developed  HEENT: Moist Mucous Membranes, Anicteric  Pulmonary: Non-labored, breath sounds are clear bilaterally, No wheezing, rales or rhonchi  Cardiovascular: Regular, S1 and S2, No murmurs, rubs, gallops or clicks  Gastrointestinal: Bowel Sounds present, soft, nontender.   Lymph: No peripheral edema. No lymphadenopathy.  Skin: No visible rashes or ulcers.  Psych:  Mood & affect appropriate    LABS: All Labs Reviewed:                        11.1   11.81 )-----------( 195      ( 29 Nov 2019 06:59 )             34.3                29 Nov 2019 06:59    136    |  97     |  69     ----------------------------<  113    4.3     |  30     |  2.20     Ca    9.6        29 Nov 2019 06:59              Interpretation of Telemetry:       ECHO:         Imaging: [facility] -- Franco Mcallister, Benitez, Josse, Kalia Cheng Savella  Office # [phone]    Follow Up: SOB      HPI:  78yo M with PMH of chronic diastolic CHF (normal LVF 2016), non obstructive CAD (cardiac cath 2015), BPH, interstitial lung disease, COPD (on 4L home O2), HTN, HLD, 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 Hx obtained from son and patient. Per son, pt was in his usual state of health until this morning, when his home health aide noticed worsened SOB and weakness. She had difficulty moving him and lifting him from his chair. son states that the pt chronically has SOB and difficulty moving, but that it has worsened today. He has a chronic cough from COPD/Interstitial lung disease. Son notes no change in the cough or increased sputum production since his sx began. Pt notes increased urinary frequency over the past few weeks, but denies dysuria or  hematuria. Pt was afebrile at home on son's home thermometer. Pt complains that he is thirsty and wants water. Pt denies CP, palpitations, NVD, melena, hematochezia, numbness, tingling.       In ED Pt received one dose rocephin, one dose azithromycin, 125 mg IV solumedrol x1, duonebs x1, 2.2L NS bolus  Vitals: T 102.7, HR 78, RR 22, /63, SpO2 93  Labs: WBC 17.94, Hb 10.8, Na 130, Cr 2.2 (baseline around 1.4), procal 1.17, proBNP 3487, lactate 1.9  EKG sinus rhythm w first degree AV block, no acute ischemic changes noted   UA: slightly turbid, moderate leuk esterase, small blood, moderate bacteria   Imaging  CXR: increased congestion compared to prior CXR in feb 2019, no consolidation appreciated, official read pending (24 Nov 2019 00:49)      Subjective/Observations:     Pt. seen examined and evaluated. Pt. resting comfortably in bed in NAD, with no respiratory distress, no chest pain, dyspnea, palpitations, PND or orthopnea     REVIEW OF SYSTEMS: All other review of systems is negative unless indicated above    PAST MEDICAL & SURGICAL HISTORY:  COPD, severe  Congestive heart failure  Pulmonary fibrosis  Pulmonary fibrosis  Smoker  BPH with obstruction/lower urinary tract symptoms  Vitamin D deficiency  GERD (gastroesophageal reflux disease)  Diabetes mellitus  Hyperlipidemia  Hypertension  S/P tonsillectomy      MEDICATIONS  (STANDING):  albuterol/ipratropium for Nebulization 3 milliLiter(s) Nebulizer every 6 hours  allopurinol 100 milliGRAM(s) Oral daily  ALPRAZolam 2 milliGRAM(s) Oral three times a day  amLODIPine   Tablet 10 milliGRAM(s) Oral daily  asenapine SL 10 milliGRAM(s) SubLingual <User Schedule>  aspirin enteric coated 81 milliGRAM(s) Oral daily  buPROPion XL . 300 milliGRAM(s) Oral daily  cefTRIAXone   IVPB 1000 milliGRAM(s) IV Intermittent every 24 hours  dextrose 5%. 1000 milliLiter(s) (50 mL/Hr) IV Continuous <Continuous>  dextrose 50% Injectable 12.5 Gram(s) IV Push once  dextrose 50% Injectable 25 Gram(s) IV Push once  dextrose 50% Injectable 25 Gram(s) IV Push once  furosemide    Tablet 40 milliGRAM(s) Oral daily  heparin  Injectable 5000 Unit(s) SubCutaneous every 8 hours  hydrALAZINE 25 milliGRAM(s) Oral three times a day  insulin lispro (HumaLOG) corrective regimen sliding scale   SubCutaneous three times a day before meals  isosorbide   mononitrate ER Tablet (IMDUR) 60 milliGRAM(s) Oral daily  labetalol 200 milliGRAM(s) Oral two times a day  pantoprazole    Tablet 40 milliGRAM(s) Oral before breakfast  polyethylene glycol 3350 17 Gram(s) Oral daily  predniSONE   Tablet 40 milliGRAM(s) Oral daily  senna 2 Tablet(s) Oral at bedtime  tamsulosin 0.4 milliGRAM(s) Oral at bedtime  venlafaxine XR. 150 milliGRAM(s) Oral two times a day    MEDICATIONS  (PRN):  acetaminophen   Tablet .. 650 milliGRAM(s) Oral every 6 hours PRN Temp greater or equal to 38C (100.4F)  dextrose 40% Gel 15 Gram(s) Oral once PRN Blood Glucose LESS THAN 70 milliGRAM(s)/deciliter  glucagon  Injectable 1 milliGRAM(s) IntraMuscular once PRN Glucose LESS THAN 70 milligrams/deciliter      Allergies: No Known Allergies    Intolerances      Vital Signs Last 24 Hrs  T(C): 36.9 (29 Nov 2019 04:02), Max: 37.1 (28 Nov 2019 20:23)  T(F): 98.4 (29 Nov 2019 04:02), Max: 98.7 (28 Nov 2019 20:23)  HR: 71 (29 Nov 2019 04:02) (69 - 90)  BP: 124/79 (29 Nov 2019 04:02) (124/79 - 167/81)  BP(mean): --  RR: 20 (29 Nov 2019 04:02) (18 - 20)  SpO2: 96% (29 Nov 2019 04:02) (94% - 98%)    I&O's Summary    28 Nov 2019 07:01  -  29 Nov 2019 07:00  --------------------------------------------------------  IN: 0 mL / OUT: 400 mL / NET: -400 mL          PHYSICAL EXAM:    Constitutional: NAD, awake and alert, well-developed  HEENT: Moist Mucous Membranes, Anicteric  Pulmonary: Non-labored, breath sounds are clear bilaterally, No wheezing, rales or rhonchi  Cardiovascular: Regular, S1 and S2, No murmurs, rubs, gallops or clicks  Gastrointestinal: Bowel Sounds present, soft, nontender.   Lymph: No peripheral edema. No lymphadenopathy.  Skin: No visible rashes or ulcers.  Psych:  Mood & affect appropriate    LABS: All Labs Reviewed:                        11.1   11.81 )-----------( 195      ( 29 Nov 2019 06:59 )             34.3                29 Nov 2019 06:59    136    |  97     |  69     ----------------------------<  113    4.3     |  30     |  2.20     Ca    9.6        29 Nov 2019 06:59        Interpretation of Telemetry: Overnight on telemetry SR 1st degree AVB 60-80's

## 2019-12-04 PROBLEM — J44.9 CHRONIC OBSTRUCTIVE PULMONARY DISEASE, UNSPECIFIED: Chronic | Status: ACTIVE | Noted: 2019-11-24

## 2019-12-11 ENCOUNTER — APPOINTMENT (OUTPATIENT)
Dept: PULMONOLOGY | Facility: CLINIC | Age: 79
End: 2019-12-11
Payer: COMMERCIAL

## 2019-12-11 VITALS
HEIGHT: 69 IN | SYSTOLIC BLOOD PRESSURE: 121 MMHG | WEIGHT: 220 LBS | DIASTOLIC BLOOD PRESSURE: 77 MMHG | TEMPERATURE: 97.5 F | BODY MASS INDEX: 32.58 KG/M2 | OXYGEN SATURATION: 99 % | HEART RATE: 73 BPM

## 2019-12-11 DIAGNOSIS — N17.9 ACUTE KIDNEY FAILURE, UNSPECIFIED: ICD-10-CM

## 2019-12-11 PROCEDURE — 36415 COLL VENOUS BLD VENIPUNCTURE: CPT

## 2019-12-11 PROCEDURE — 99214 OFFICE O/P EST MOD 30 MIN: CPT | Mod: 25

## 2019-12-12 LAB
ALBUMIN SERPL ELPH-MCNC: 4.1 G/DL
ALP BLD-CCNC: 27 U/L
ALT SERPL-CCNC: 20 U/L
ANION GAP SERPL CALC-SCNC: 16 MMOL/L
AST SERPL-CCNC: 19 U/L
BASOPHILS # BLD AUTO: 0.01 K/UL
BASOPHILS NFR BLD AUTO: 0.1 %
BILIRUB SERPL-MCNC: 0.3 MG/DL
BUN SERPL-MCNC: 46 MG/DL
CALCIUM SERPL-MCNC: 10 MG/DL
CHLORIDE SERPL-SCNC: 100 MMOL/L
CO2 SERPL-SCNC: 22 MMOL/L
CREAT SERPL-MCNC: 2.08 MG/DL
EOSINOPHIL # BLD AUTO: 0.04 K/UL
EOSINOPHIL NFR BLD AUTO: 0.5 %
GLUCOSE SERPL-MCNC: 185 MG/DL
HCT VFR BLD CALC: 36.2 %
HGB BLD-MCNC: 11 G/DL
IMM GRANULOCYTES NFR BLD AUTO: 3.2 %
LYMPHOCYTES # BLD AUTO: 1.89 K/UL
LYMPHOCYTES NFR BLD AUTO: 25.5 %
MAN DIFF?: NORMAL
MCHC RBC-ENTMCNC: 28.2 PG
MCHC RBC-ENTMCNC: 30.4 GM/DL
MCV RBC AUTO: 92.8 FL
MONOCYTES # BLD AUTO: 0.93 K/UL
MONOCYTES NFR BLD AUTO: 12.5 %
NEUTROPHILS # BLD AUTO: 4.31 K/UL
NEUTROPHILS NFR BLD AUTO: 58.2 %
PLATELET # BLD AUTO: 167 K/UL
POTASSIUM SERPL-SCNC: 4.7 MMOL/L
PROT SERPL-MCNC: 6.3 G/DL
RBC # BLD: 3.9 M/UL
RBC # FLD: 16.4 %
SODIUM SERPL-SCNC: 138 MMOL/L
WBC # FLD AUTO: 7.42 K/UL

## 2019-12-15 NOTE — HISTORY OF PRESENT ILLNESS
[FreeTextEntry1] : PRIOR HX 2018\par Dequan Torres returns for followup.  He has a history of diastolic heart failure, interstitial lung disease, and pneumonia.  He was hospitalized in late 2017 for pneumonia.  He continues to be short of breath with minimal exertion.  Per his son, he is using supplemental oxygen 24/7.  He does not complain of cough or congestion.  He continues with inhaler.\par \par Physical Examination:  Reveals a chronically ill male in no acute distress.  O2 saturation 89% on room air, blood pressure 98/65.  Chest revealed inspiratory crackles at both bases.\par \par Pulmonary functions were performed.  Spirometry was mildly worse than at the last visit.  Diffusing capacity was supernormal; however, the effort appeared consistent.\par \par CT scan of the chest was performed.  A right lower lobe infiltrate is somewhat less prominent.  There are ground-glass opacities peripherally, most prominently in the right upper lobe.  These appear little changed.\par \par Impression:  Mr. Torres has a combination of chronic diastolic heart failure, ILD, and prior history of pneumonia.\par \par CURRENT: \par \par Hx of COPD, ILD, CHF\par Here for increased SOB and cough\par wet cough, unable to clear secretions\par Using 02 75% of the time\par Also complaining of increased edema

## 2019-12-30 ENCOUNTER — RX RENEWAL (OUTPATIENT)
Age: 79
End: 2019-12-30

## 2019-12-30 RX ORDER — FLUOCINONIDE 0.5 MG/G
0.05 CREAM TOPICAL TWICE DAILY
Qty: 2 | Refills: 1 | Status: ACTIVE | COMMUNITY
Start: 2017-04-11 | End: 1900-01-01

## 2020-01-03 ENCOUNTER — RX RENEWAL (OUTPATIENT)
Age: 80
End: 2020-01-03

## 2020-01-06 ENCOUNTER — RX RENEWAL (OUTPATIENT)
Age: 80
End: 2020-01-06

## 2020-01-27 ENCOUNTER — RX RENEWAL (OUTPATIENT)
Age: 80
End: 2020-01-27

## 2020-04-07 ENCOUNTER — RX RENEWAL (OUTPATIENT)
Age: 80
End: 2020-04-07

## 2020-04-11 ENCOUNTER — RX RENEWAL (OUTPATIENT)
Age: 80
End: 2020-04-11

## 2020-04-26 ENCOUNTER — RX RENEWAL (OUTPATIENT)
Age: 80
End: 2020-04-26

## 2020-05-06 ENCOUNTER — APPOINTMENT (OUTPATIENT)
Dept: INTERNAL MEDICINE | Facility: CLINIC | Age: 80
End: 2020-05-06
Payer: COMMERCIAL

## 2020-05-06 PROCEDURE — 99441: CPT

## 2020-05-06 RX ORDER — FUROSEMIDE 40 MG/1
40 TABLET ORAL
Qty: 90 | Refills: 1 | Status: ACTIVE | COMMUNITY
Start: 2019-10-08 | End: 1900-01-01

## 2020-05-07 ENCOUNTER — RX RENEWAL (OUTPATIENT)
Age: 80
End: 2020-05-07

## 2020-05-12 ENCOUNTER — APPOINTMENT (OUTPATIENT)
Dept: INTERNAL MEDICINE | Facility: CLINIC | Age: 80
End: 2020-05-12
Payer: COMMERCIAL

## 2020-05-12 DIAGNOSIS — M10.9 GOUT, UNSPECIFIED: ICD-10-CM

## 2020-05-12 PROCEDURE — 99214 OFFICE O/P EST MOD 30 MIN: CPT | Mod: 95

## 2020-05-12 NOTE — PHYSICAL EXAM
[Well Nourished] : well nourished [No Acute Distress] : no acute distress [Well Developed] : well developed [Well-Appearing] : well-appearing [Normal Voice/Communication] : normal voice/communication [Speech Grossly Normal] : speech grossly normal [Memory Grossly Normal] : memory grossly normal [Normal Affect] : the affect was normal [Alert and Oriented x3] : oriented to person, place, and time [Normal Mood] : the mood was normal [Normal Insight/Judgement] : insight and judgment were intact

## 2020-05-12 NOTE — HISTORY OF PRESENT ILLNESS
[Home] : at home, [unfilled] , at the time of the visit. [Medical Office: ([facility])___] : at the medical office located in  [Patient] : the patient [Self] : self [Family Member] : family member [FreeTextEntry1] : follow up appointment for his diabetes and COPD breathing stable\par tolerating medications well \par no ankle edema

## 2020-05-15 ENCOUNTER — LABORATORY RESULT (OUTPATIENT)
Age: 80
End: 2020-05-15

## 2020-05-18 LAB
25(OH)D3 SERPL-MCNC: 35.1 NG/ML
BASOPHILS # BLD AUTO: 0.02 K/UL
BASOPHILS NFR BLD AUTO: 0.2 %
CHOLEST SERPL-MCNC: 166 MG/DL
CHOLEST/HDLC SERPL: 4.4 RATIO
CK SERPL-CCNC: 56 U/L
EOSINOPHIL # BLD AUTO: 0.04 K/UL
EOSINOPHIL NFR BLD AUTO: 0.4 %
ESTIMATED AVERAGE GLUCOSE: 111 MG/DL
HBA1C MFR BLD HPLC: 5.5 %
HCT VFR BLD CALC: 33.8 %
HDLC SERPL-MCNC: 38 MG/DL
HGB BLD-MCNC: 10.8 G/DL
IMM GRANULOCYTES NFR BLD AUTO: 4 %
LDLC SERPL CALC-MCNC: NORMAL MG/DL
LYMPHOCYTES # BLD AUTO: 2.26 K/UL
LYMPHOCYTES NFR BLD AUTO: 22.7 %
MAN DIFF?: NORMAL
MCHC RBC-ENTMCNC: 28.3 PG
MCHC RBC-ENTMCNC: 32 GM/DL
MCV RBC AUTO: 88.5 FL
MONOCYTES # BLD AUTO: 1.01 K/UL
MONOCYTES NFR BLD AUTO: 10.2 %
NEUTROPHILS # BLD AUTO: 6.21 K/UL
NEUTROPHILS NFR BLD AUTO: 62.5 %
PLATELET # BLD AUTO: 168 K/UL
RBC # BLD: 3.82 M/UL
RBC # FLD: 14.7 %
TRIGL SERPL-MCNC: 440 MG/DL
TSH SERPL-ACNC: 2.93 UIU/ML
URATE SERPL-MCNC: 7.9 MG/DL
WBC # FLD AUTO: 9.94 K/UL

## 2020-06-04 ENCOUNTER — RX RENEWAL (OUTPATIENT)
Age: 80
End: 2020-06-04

## 2020-07-28 ENCOUNTER — RX RENEWAL (OUTPATIENT)
Age: 80
End: 2020-07-28

## 2020-07-28 RX ORDER — HYDRALAZINE HYDROCHLORIDE 25 MG/1
25 TABLET ORAL
Qty: 84 | Refills: 2 | Status: ACTIVE | COMMUNITY
Start: 2018-01-06 | End: 1900-01-01

## 2020-07-28 RX ORDER — AMLODIPINE BESYLATE 10 MG/1
10 TABLET ORAL
Qty: 28 | Refills: 2 | Status: ACTIVE | COMMUNITY
Start: 2018-03-05 | End: 1900-01-01

## 2020-07-28 RX ORDER — BUPROPION HYDROCHLORIDE 300 MG/1
300 TABLET, EXTENDED RELEASE ORAL DAILY
Qty: 28 | Refills: 2 | Status: ACTIVE | COMMUNITY
Start: 2019-11-04 | End: 1900-01-01

## 2020-07-28 NOTE — PATIENT PROFILE ADULT. - FUNCTIONAL SCREEN CURRENT LEVEL: COMMUNICATION, MLM
No improvement with steroid therapy. Ear canal clear  Audiogram shows symmetric hearing levels with normal tympanogram.  Does have some tenderness at TMJ and feels that he likely does grind his teeth at night. It is possible that the full sensation could be related to TMJ inflammation (Costen's syndrome)  I discussed with him that he could also have an undiagnosed dental abscess that could cause some referred pain and fullness to the left ear. He had not been to the dentist for a while although his teeth looked in good repair.   Certainly as part of that evaluation they can discuss the possibility of TMJ bite guards etc.
(2) difficulty understanding (not related to language barrier)/confused at times

## 2020-08-13 NOTE — PATIENT PROFILE ADULT. - MEDICATION HERBAL REMEDIES, PROFILE
Patient: Tanja Brice    Procedure Summary     Date:  08/13/20 Room / Location:  [facility] OR 03 / [facility] OR    Anesthesia Start:  0836 Anesthesia Stop:  1021    Procedure:  LAPAROSCOPIC CHOLECYSTECTOMY WITH INTRAOPERATIVE CHOLANGIOGRAM               (c-arm#1) (N/A Abdomen) Diagnosis:       Calculus of gallbladder without cholecystitis without obstruction      (Calculus of gallbladder without cholecystitis without obstruction [K80.20])    Surgeon:  Tristin Brownlee MD Provider:  Andrew Boo MD    Anesthesia Type:  general ASA Status:  2          Anesthesia Type: general    Vitals  No vitals data found for the desired time range.          Post Anesthesia Care and Evaluation    Patient location during evaluation: PACU  Patient participation: complete - patient participated  Level of consciousness: sleepy but conscious  Pain score: 0  Pain management: adequate  Airway patency: patent  Anesthetic complications: No anesthetic complications  PONV Status: none  Cardiovascular status: acceptable  Respiratory status: acceptable and spontaneous ventilation  Hydration status: acceptable       no

## 2020-08-19 ENCOUNTER — APPOINTMENT (OUTPATIENT)
Dept: PULMONOLOGY | Facility: CLINIC | Age: 80
End: 2020-08-19
Payer: COMMERCIAL

## 2020-08-19 VITALS
HEIGHT: 69 IN | SYSTOLIC BLOOD PRESSURE: 107 MMHG | HEART RATE: 73 BPM | OXYGEN SATURATION: 92 % | WEIGHT: 220 LBS | BODY MASS INDEX: 32.58 KG/M2 | DIASTOLIC BLOOD PRESSURE: 66 MMHG

## 2020-08-19 DIAGNOSIS — R05 COUGH: ICD-10-CM

## 2020-08-19 DIAGNOSIS — J18.9 PNEUMONIA, UNSPECIFIED ORGANISM: ICD-10-CM

## 2020-08-19 LAB
ALBUMIN SERPL ELPH-MCNC: 4.3 G/DL
ALP BLD-CCNC: 88 U/L
ALT SERPL-CCNC: 10 U/L
ANION GAP SERPL CALC-SCNC: 14 MMOL/L
AST SERPL-CCNC: 10 U/L
BASOPHILS # BLD AUTO: 0.04 K/UL
BASOPHILS NFR BLD AUTO: 0.3 %
BILIRUB SERPL-MCNC: 0.3 MG/DL
BUN SERPL-MCNC: 38 MG/DL
CALCIUM SERPL-MCNC: 9.8 MG/DL
CHLORIDE SERPL-SCNC: 100 MMOL/L
CO2 SERPL-SCNC: 23 MMOL/L
CREAT SERPL-MCNC: 2.09 MG/DL
EOSINOPHIL # BLD AUTO: 0.06 K/UL
EOSINOPHIL NFR BLD AUTO: 0.4 %
GLUCOSE SERPL-MCNC: 107 MG/DL
HCT VFR BLD CALC: 28.3 %
HGB BLD-MCNC: 9 G/DL
IMM GRANULOCYTES NFR BLD AUTO: 4.9 %
LYMPHOCYTES # BLD AUTO: 1.6 K/UL
LYMPHOCYTES NFR BLD AUTO: 11.8 %
MAN DIFF?: NORMAL
MCHC RBC-ENTMCNC: 29.1 PG
MCHC RBC-ENTMCNC: 31.8 GM/DL
MCV RBC AUTO: 91.6 FL
MONOCYTES # BLD AUTO: 1.27 K/UL
MONOCYTES NFR BLD AUTO: 9.4 %
NEUTROPHILS # BLD AUTO: 9.88 K/UL
NEUTROPHILS NFR BLD AUTO: 73.2 %
PLATELET # BLD AUTO: 115 K/UL
POTASSIUM SERPL-SCNC: 5.1 MMOL/L
PROCALCITONIN SERPL-MCNC: 0.51 NG/ML
PROT SERPL-MCNC: 6.3 G/DL
RBC # BLD: 3.09 M/UL
RBC # FLD: 16.2 %
SODIUM SERPL-SCNC: 136 MMOL/L
WBC # FLD AUTO: 13.51 K/UL

## 2020-08-19 PROCEDURE — 99214 OFFICE O/P EST MOD 30 MIN: CPT | Mod: 25

## 2020-08-19 PROCEDURE — 99285 EMERGENCY DEPT VISIT HI MDM: CPT

## 2020-08-19 PROCEDURE — 71046 X-RAY EXAM CHEST 2 VIEWS: CPT

## 2020-08-19 RX ORDER — CEFUROXIME AXETIL 500 MG/1
500 TABLET ORAL
Qty: 7 | Refills: 0 | Status: ACTIVE | COMMUNITY
Start: 2020-08-19 | End: 1900-01-01

## 2020-08-19 RX ORDER — NEBULIZER AND COMPRESSOR
EACH MISCELLANEOUS
Qty: 1 | Refills: 5 | Status: ACTIVE | COMMUNITY
Start: 2020-08-19 | End: 1900-01-01

## 2020-08-19 RX ORDER — IPRATROPIUM BROMIDE AND ALBUTEROL SULFATE 2.5; .5 MG/3ML; MG/3ML
0.5-2.5 (3) SOLUTION RESPIRATORY (INHALATION)
Qty: 120 | Refills: 3 | Status: ACTIVE | COMMUNITY
Start: 2020-08-19 | End: 1900-01-01

## 2020-08-19 NOTE — ASSESSMENT
[FreeTextEntry1] : Acute respiratory tract infection no discrete x-ray change but elevated white blood cell count.  Start antibiotics dose adjusted for renal failure.  Follow-up 1 week unless symptoms not improved.  Hospitalization if worsened

## 2020-08-19 NOTE — PHYSICAL EXAM
[Normal Oropharynx] : normal oropharynx [Normal Appearance] : normal appearance [No Neck Mass] : no neck mass [Normal Rate/Rhythm] : normal rate/rhythm [Normal S1, S2] : normal s1, s2 [No Murmurs] : no murmurs [No Abnormalities] : no abnormalities [Benign] : benign [Normal Gait] : normal gait [No Clubbing] : no clubbing [No Cyanosis] : no cyanosis [No Edema] : no edema [FROM] : FROM [Normal Color/ Pigmentation] : normal color/ pigmentation [No Focal Deficits] : no focal deficits [Oriented x3] : oriented x3 [Normal Affect] : normal affect [TextBox_2] : Elderly gentleman chronically ill [TextBox_68] : Coarse rhonchi at right base residential up

## 2020-08-19 NOTE — HISTORY OF PRESENT ILLNESS
[Former] : former [Never] : never [TextBox_4] : chronic cough\par 02 dependence\par pain in left chest-says he reached too far\par always SOB\par Last few days increased cough sputum pain with breathing.  No fever reported but son says patient is unwell\par On oxygen 24/7\par \par

## 2020-08-19 NOTE — REVIEW OF SYSTEMS
[Fatigue] : fatigue [Cough] : cough [Sputum] : sputum [Dyspnea] : dyspnea [Wheezing] : wheezing [SOB on Exertion] : sob on exertion [Negative] : Endocrine

## 2020-08-20 ENCOUNTER — INPATIENT (INPATIENT)
Facility: HOSPITAL | Age: 80
LOS: 7 days | Discharge: HOME | DRG: 178 | End: 2020-08-28
Attending: FAMILY MEDICINE | Admitting: INTERNAL MEDICINE
Payer: MEDICARE

## 2020-08-20 VITALS
DIASTOLIC BLOOD PRESSURE: 74 MMHG | TEMPERATURE: 98 F | OXYGEN SATURATION: 100 % | RESPIRATION RATE: 18 BRPM | HEART RATE: 80 BPM | WEIGHT: 190.04 LBS | SYSTOLIC BLOOD PRESSURE: 166 MMHG

## 2020-08-20 DIAGNOSIS — E11.9 TYPE 2 DIABETES MELLITUS WITHOUT COMPLICATIONS: ICD-10-CM

## 2020-08-20 DIAGNOSIS — N40.1 BENIGN PROSTATIC HYPERPLASIA WITH LOWER URINARY TRACT SYMPTOMS: ICD-10-CM

## 2020-08-20 DIAGNOSIS — Z29.9 ENCOUNTER FOR PROPHYLACTIC MEASURES, UNSPECIFIED: ICD-10-CM

## 2020-08-20 DIAGNOSIS — R53.1 WEAKNESS: ICD-10-CM

## 2020-08-20 DIAGNOSIS — J44.9 CHRONIC OBSTRUCTIVE PULMONARY DISEASE, UNSPECIFIED: ICD-10-CM

## 2020-08-20 DIAGNOSIS — Z79.899 OTHER LONG TERM (CURRENT) DRUG THERAPY: ICD-10-CM

## 2020-08-20 DIAGNOSIS — I10 ESSENTIAL (PRIMARY) HYPERTENSION: ICD-10-CM

## 2020-08-20 DIAGNOSIS — K21.9 GASTRO-ESOPHAGEAL REFLUX DISEASE WITHOUT ESOPHAGITIS: ICD-10-CM

## 2020-08-20 DIAGNOSIS — J98.4 OTHER DISORDERS OF LUNG: ICD-10-CM

## 2020-08-20 DIAGNOSIS — N18.9 CHRONIC KIDNEY DISEASE, UNSPECIFIED: ICD-10-CM

## 2020-08-20 DIAGNOSIS — I50.9 HEART FAILURE, UNSPECIFIED: ICD-10-CM

## 2020-08-20 DIAGNOSIS — J18.9 PNEUMONIA, UNSPECIFIED ORGANISM: ICD-10-CM

## 2020-08-20 DIAGNOSIS — Z90.89 ACQUIRED ABSENCE OF OTHER ORGANS: Chronic | ICD-10-CM

## 2020-08-20 DIAGNOSIS — F41.9 ANXIETY DISORDER, UNSPECIFIED: ICD-10-CM

## 2020-08-20 LAB
ALBUMIN SERPL ELPH-MCNC: 3.3 G/DL — SIGNIFICANT CHANGE UP (ref 3.3–5)
ALP SERPL-CCNC: 90 U/L — SIGNIFICANT CHANGE UP (ref 40–120)
ALT FLD-CCNC: 16 U/L — SIGNIFICANT CHANGE UP (ref 12–78)
ANION GAP SERPL CALC-SCNC: 8 MMOL/L — SIGNIFICANT CHANGE UP (ref 5–17)
APPEARANCE UR: ABNORMAL
APTT BLD: 31.3 SEC — SIGNIFICANT CHANGE UP (ref 27.5–35.5)
AST SERPL-CCNC: 11 U/L — LOW (ref 15–37)
BASE EXCESS BLDV CALC-SCNC: 0 MMOL/L — SIGNIFICANT CHANGE UP (ref -2–2)
BASOPHILS # BLD AUTO: 0 K/UL — SIGNIFICANT CHANGE UP (ref 0–0.2)
BASOPHILS NFR BLD AUTO: 0 % — SIGNIFICANT CHANGE UP (ref 0–2)
BILIRUB SERPL-MCNC: 0.4 MG/DL — SIGNIFICANT CHANGE UP (ref 0.2–1.2)
BILIRUB UR-MCNC: NEGATIVE — SIGNIFICANT CHANGE UP
BLOOD GAS COMMENTS, VENOUS: SIGNIFICANT CHANGE UP
BUN SERPL-MCNC: 39 MG/DL — HIGH (ref 7–23)
CALCIUM SERPL-MCNC: 9.3 MG/DL — SIGNIFICANT CHANGE UP (ref 8.5–10.1)
CHLORIDE SERPL-SCNC: 105 MMOL/L — SIGNIFICANT CHANGE UP (ref 96–108)
CO2 SERPL-SCNC: 25 MMOL/L — SIGNIFICANT CHANGE UP (ref 22–31)
COLOR SPEC: YELLOW — SIGNIFICANT CHANGE UP
CREAT SERPL-MCNC: 2.1 MG/DL — HIGH (ref 0.5–1.3)
DIFF PNL FLD: NEGATIVE — SIGNIFICANT CHANGE UP
EOSINOPHIL # BLD AUTO: 0.13 K/UL — SIGNIFICANT CHANGE UP (ref 0–0.5)
EOSINOPHIL NFR BLD AUTO: 1 % — SIGNIFICANT CHANGE UP (ref 0–6)
GAS PNL BLDV: SIGNIFICANT CHANGE UP
GLUCOSE SERPL-MCNC: 102 MG/DL — HIGH (ref 70–99)
GLUCOSE UR QL: NEGATIVE — SIGNIFICANT CHANGE UP
HCO3 BLDV-SCNC: 24 MMOL/L — SIGNIFICANT CHANGE UP (ref 21–29)
HCT VFR BLD CALC: 26.8 % — LOW (ref 39–50)
HGB BLD-MCNC: 8.6 G/DL — LOW (ref 13–17)
HOROWITZ INDEX BLDV+IHG-RTO: 21 — SIGNIFICANT CHANGE UP
INR BLD: 1.27 RATIO — HIGH (ref 0.88–1.16)
KETONES UR-MCNC: NEGATIVE — SIGNIFICANT CHANGE UP
LACTATE SERPL-SCNC: 1.7 MMOL/L — SIGNIFICANT CHANGE UP (ref 0.7–2)
LEGIONELLA AG UR QL: NEGATIVE — SIGNIFICANT CHANGE UP
LEUKOCYTE ESTERASE UR-ACNC: ABNORMAL
LYMPHOCYTES # BLD AUTO: 17 % — SIGNIFICANT CHANGE UP (ref 13–44)
LYMPHOCYTES # BLD AUTO: 2.14 K/UL — SIGNIFICANT CHANGE UP (ref 1–3.3)
MCHC RBC-ENTMCNC: 28.4 PG — SIGNIFICANT CHANGE UP (ref 27–34)
MCHC RBC-ENTMCNC: 32.1 GM/DL — SIGNIFICANT CHANGE UP (ref 32–36)
MCV RBC AUTO: 88.4 FL — SIGNIFICANT CHANGE UP (ref 80–100)
MONOCYTES # BLD AUTO: 0.5 K/UL — SIGNIFICANT CHANGE UP (ref 0–0.9)
MONOCYTES NFR BLD AUTO: 4 % — SIGNIFICANT CHANGE UP (ref 2–14)
MRSA PCR RESULT.: SIGNIFICANT CHANGE UP
NEUTROPHILS # BLD AUTO: 9.43 K/UL — HIGH (ref 1.8–7.4)
NEUTROPHILS NFR BLD AUTO: 75 % — SIGNIFICANT CHANGE UP (ref 43–77)
NITRITE UR-MCNC: NEGATIVE — SIGNIFICANT CHANGE UP
NRBC # BLD: SIGNIFICANT CHANGE UP /100 WBCS (ref 0–0)
PCO2 BLDV: 40 MMHG — SIGNIFICANT CHANGE UP (ref 35–50)
PH BLDV: 7.4 — SIGNIFICANT CHANGE UP (ref 7.35–7.45)
PH UR: 5 — SIGNIFICANT CHANGE UP (ref 5–8)
PLATELET # BLD AUTO: 128 K/UL — LOW (ref 150–400)
PO2 BLDV: 63 MMHG — HIGH (ref 25–45)
POTASSIUM SERPL-MCNC: 4.6 MMOL/L — SIGNIFICANT CHANGE UP (ref 3.5–5.3)
POTASSIUM SERPL-SCNC: 4.6 MMOL/L — SIGNIFICANT CHANGE UP (ref 3.5–5.3)
PROT SERPL-MCNC: 6.9 G/DL — SIGNIFICANT CHANGE UP (ref 6–8.3)
PROT UR-MCNC: 30 MG/DL
PROTHROM AB SERPL-ACNC: 14.7 SEC — HIGH (ref 10.6–13.6)
RBC # BLD: 3.03 M/UL — LOW (ref 4.2–5.8)
RBC # FLD: 16.5 % — HIGH (ref 10.3–14.5)
S AUREUS DNA NOSE QL NAA+PROBE: DETECTED
SAO2 % BLDV: 88 % — SIGNIFICANT CHANGE UP (ref 67–88)
SARS-COV-2 RNA SPEC QL NAA+PROBE: SIGNIFICANT CHANGE UP
SODIUM SERPL-SCNC: 138 MMOL/L — SIGNIFICANT CHANGE UP (ref 135–145)
SP GR SPEC: 1.01 — SIGNIFICANT CHANGE UP (ref 1.01–1.02)
UROBILINOGEN FLD QL: NEGATIVE — SIGNIFICANT CHANGE UP
WBC # BLD: 12.57 K/UL — HIGH (ref 3.8–10.5)
WBC # FLD AUTO: 12.57 K/UL — HIGH (ref 3.8–10.5)

## 2020-08-20 PROCEDURE — 71250 CT THORAX DX C-: CPT | Mod: 26

## 2020-08-20 PROCEDURE — 93010 ELECTROCARDIOGRAM REPORT: CPT

## 2020-08-20 PROCEDURE — 93306 TTE W/DOPPLER COMPLETE: CPT | Mod: 26

## 2020-08-20 PROCEDURE — 99223 1ST HOSP IP/OBS HIGH 75: CPT | Mod: GC

## 2020-08-20 PROCEDURE — 99233 SBSQ HOSP IP/OBS HIGH 50: CPT

## 2020-08-20 PROCEDURE — 71045 X-RAY EXAM CHEST 1 VIEW: CPT | Mod: 26

## 2020-08-20 RX ORDER — FENOFIBRATE,MICRONIZED 130 MG
160 CAPSULE ORAL DAILY
Refills: 0 | Status: DISCONTINUED | OUTPATIENT
Start: 2020-08-20 | End: 2020-08-20

## 2020-08-20 RX ORDER — HEPARIN SODIUM 5000 [USP'U]/ML
5000 INJECTION INTRAVENOUS; SUBCUTANEOUS EVERY 8 HOURS
Refills: 0 | Status: DISCONTINUED | OUTPATIENT
Start: 2020-08-20 | End: 2020-08-20

## 2020-08-20 RX ORDER — ALPRAZOLAM 0.25 MG
2 TABLET ORAL THREE TIMES A DAY
Refills: 0 | Status: DISCONTINUED | OUTPATIENT
Start: 2020-08-20 | End: 2020-08-22

## 2020-08-20 RX ORDER — DEXTROSE 50 % IN WATER 50 %
15 SYRINGE (ML) INTRAVENOUS ONCE
Refills: 0 | Status: DISCONTINUED | OUTPATIENT
Start: 2020-08-20 | End: 2020-08-20

## 2020-08-20 RX ORDER — DEXTROSE 50 % IN WATER 50 %
15 SYRINGE (ML) INTRAVENOUS ONCE
Refills: 0 | Status: DISCONTINUED | OUTPATIENT
Start: 2020-08-20 | End: 2020-08-28

## 2020-08-20 RX ORDER — INSULIN LISPRO 100/ML
VIAL (ML) SUBCUTANEOUS EVERY 6 HOURS
Refills: 0 | Status: DISCONTINUED | OUTPATIENT
Start: 2020-08-20 | End: 2020-08-20

## 2020-08-20 RX ORDER — IPRATROPIUM/ALBUTEROL SULFATE 18-103MCG
3 AEROSOL WITH ADAPTER (GRAM) INHALATION EVERY 6 HOURS
Refills: 0 | Status: DISCONTINUED | OUTPATIENT
Start: 2020-08-20 | End: 2020-08-28

## 2020-08-20 RX ORDER — DEXTROSE 50 % IN WATER 50 %
25 SYRINGE (ML) INTRAVENOUS ONCE
Refills: 0 | Status: DISCONTINUED | OUTPATIENT
Start: 2020-08-20 | End: 2020-08-28

## 2020-08-20 RX ORDER — TAMSULOSIN HYDROCHLORIDE 0.4 MG/1
0.8 CAPSULE ORAL AT BEDTIME
Refills: 0 | Status: DISCONTINUED | OUTPATIENT
Start: 2020-08-20 | End: 2020-08-28

## 2020-08-20 RX ORDER — SODIUM CHLORIDE 9 MG/ML
1000 INJECTION, SOLUTION INTRAVENOUS
Refills: 0 | Status: DISCONTINUED | OUTPATIENT
Start: 2020-08-20 | End: 2020-08-20

## 2020-08-20 RX ORDER — VENLAFAXINE HCL 75 MG
150 CAPSULE, EXT RELEASE 24 HR ORAL
Refills: 0 | Status: DISCONTINUED | OUTPATIENT
Start: 2020-08-20 | End: 2020-08-28

## 2020-08-20 RX ORDER — ASENAPINE MALEATE 10 MG/1
1 TABLET SUBLINGUAL
Qty: 0 | Refills: 0 | DISCHARGE

## 2020-08-20 RX ORDER — AZITHROMYCIN 500 MG/1
500 TABLET, FILM COATED ORAL ONCE
Refills: 0 | Status: COMPLETED | OUTPATIENT
Start: 2020-08-20 | End: 2020-08-20

## 2020-08-20 RX ORDER — ISOSORBIDE MONONITRATE 60 MG/1
60 TABLET, EXTENDED RELEASE ORAL DAILY
Refills: 0 | Status: DISCONTINUED | OUTPATIENT
Start: 2020-08-20 | End: 2020-08-28

## 2020-08-20 RX ORDER — GLUCAGON INJECTION, SOLUTION 0.5 MG/.1ML
1 INJECTION, SOLUTION SUBCUTANEOUS ONCE
Refills: 0 | Status: DISCONTINUED | OUTPATIENT
Start: 2020-08-20 | End: 2020-08-28

## 2020-08-20 RX ORDER — FENOFIBRATE,MICRONIZED 130 MG
145 CAPSULE ORAL DAILY
Refills: 0 | Status: DISCONTINUED | OUTPATIENT
Start: 2020-08-20 | End: 2020-08-20

## 2020-08-20 RX ORDER — ACETAMINOPHEN 500 MG
650 TABLET ORAL ONCE
Refills: 0 | Status: COMPLETED | OUTPATIENT
Start: 2020-08-20 | End: 2020-08-20

## 2020-08-20 RX ORDER — PIPERACILLIN AND TAZOBACTAM 4; .5 G/20ML; G/20ML
3.38 INJECTION, POWDER, LYOPHILIZED, FOR SOLUTION INTRAVENOUS EVERY 8 HOURS
Refills: 0 | Status: DISCONTINUED | OUTPATIENT
Start: 2020-08-20 | End: 2020-08-20

## 2020-08-20 RX ORDER — DEXTROSE 50 % IN WATER 50 %
12.5 SYRINGE (ML) INTRAVENOUS ONCE
Refills: 0 | Status: DISCONTINUED | OUTPATIENT
Start: 2020-08-20 | End: 2020-08-20

## 2020-08-20 RX ORDER — DEXTROSE 50 % IN WATER 50 %
25 SYRINGE (ML) INTRAVENOUS ONCE
Refills: 0 | Status: DISCONTINUED | OUTPATIENT
Start: 2020-08-20 | End: 2020-08-20

## 2020-08-20 RX ORDER — CEFTRIAXONE 500 MG/1
1000 INJECTION, POWDER, FOR SOLUTION INTRAMUSCULAR; INTRAVENOUS ONCE
Refills: 0 | Status: COMPLETED | OUTPATIENT
Start: 2020-08-20 | End: 2020-08-20

## 2020-08-20 RX ORDER — SODIUM CHLORIDE 9 MG/ML
1000 INJECTION, SOLUTION INTRAVENOUS
Refills: 0 | Status: DISCONTINUED | OUTPATIENT
Start: 2020-08-20 | End: 2020-08-28

## 2020-08-20 RX ORDER — GLUCAGON INJECTION, SOLUTION 0.5 MG/.1ML
1 INJECTION, SOLUTION SUBCUTANEOUS ONCE
Refills: 0 | Status: DISCONTINUED | OUTPATIENT
Start: 2020-08-20 | End: 2020-08-20

## 2020-08-20 RX ORDER — HEPARIN SODIUM 5000 [USP'U]/ML
5000 INJECTION INTRAVENOUS; SUBCUTANEOUS EVERY 8 HOURS
Refills: 0 | Status: DISCONTINUED | OUTPATIENT
Start: 2020-08-20 | End: 2020-08-28

## 2020-08-20 RX ORDER — INSULIN LISPRO 100/ML
VIAL (ML) SUBCUTANEOUS
Refills: 0 | Status: DISCONTINUED | OUTPATIENT
Start: 2020-08-20 | End: 2020-08-28

## 2020-08-20 RX ORDER — BUPROPION HYDROCHLORIDE 150 MG/1
300 TABLET, EXTENDED RELEASE ORAL DAILY
Refills: 0 | Status: DISCONTINUED | OUTPATIENT
Start: 2020-08-20 | End: 2020-08-28

## 2020-08-20 RX ORDER — DEXTROSE 50 % IN WATER 50 %
12.5 SYRINGE (ML) INTRAVENOUS ONCE
Refills: 0 | Status: DISCONTINUED | OUTPATIENT
Start: 2020-08-20 | End: 2020-08-28

## 2020-08-20 RX ORDER — ALLOPURINOL 300 MG
100 TABLET ORAL DAILY
Refills: 0 | Status: DISCONTINUED | OUTPATIENT
Start: 2020-08-20 | End: 2020-08-28

## 2020-08-20 RX ORDER — HYDRALAZINE HCL 50 MG
25 TABLET ORAL THREE TIMES A DAY
Refills: 0 | Status: DISCONTINUED | OUTPATIENT
Start: 2020-08-20 | End: 2020-08-28

## 2020-08-20 RX ORDER — LABETALOL HCL 100 MG
200 TABLET ORAL
Refills: 0 | Status: DISCONTINUED | OUTPATIENT
Start: 2020-08-20 | End: 2020-08-28

## 2020-08-20 RX ORDER — AMLODIPINE BESYLATE 2.5 MG/1
10 TABLET ORAL DAILY
Refills: 0 | Status: DISCONTINUED | OUTPATIENT
Start: 2020-08-20 | End: 2020-08-28

## 2020-08-20 RX ORDER — FUROSEMIDE 40 MG
40 TABLET ORAL DAILY
Refills: 0 | Status: DISCONTINUED | OUTPATIENT
Start: 2020-08-20 | End: 2020-08-28

## 2020-08-20 RX ORDER — INSULIN LISPRO 100/ML
VIAL (ML) SUBCUTANEOUS AT BEDTIME
Refills: 0 | Status: DISCONTINUED | OUTPATIENT
Start: 2020-08-20 | End: 2020-08-28

## 2020-08-20 RX ORDER — PANTOPRAZOLE SODIUM 20 MG/1
40 TABLET, DELAYED RELEASE ORAL
Refills: 0 | Status: DISCONTINUED | OUTPATIENT
Start: 2020-08-20 | End: 2020-08-28

## 2020-08-20 RX ADMIN — Medication 40 MILLIGRAM(S): at 12:57

## 2020-08-20 RX ADMIN — Medication 3 MILLILITER(S): at 20:40

## 2020-08-20 RX ADMIN — CEFTRIAXONE 1000 MILLIGRAM(S): 500 INJECTION, POWDER, FOR SOLUTION INTRAMUSCULAR; INTRAVENOUS at 05:33

## 2020-08-20 RX ADMIN — ISOSORBIDE MONONITRATE 60 MILLIGRAM(S): 60 TABLET, EXTENDED RELEASE ORAL at 12:57

## 2020-08-20 RX ADMIN — Medication 25 MILLIGRAM(S): at 21:47

## 2020-08-20 RX ADMIN — Medication 150 MILLIGRAM(S): at 21:47

## 2020-08-20 RX ADMIN — HEPARIN SODIUM 5000 UNIT(S): 5000 INJECTION INTRAVENOUS; SUBCUTANEOUS at 21:42

## 2020-08-20 RX ADMIN — TAMSULOSIN HYDROCHLORIDE 0.8 MILLIGRAM(S): 0.4 CAPSULE ORAL at 21:42

## 2020-08-20 RX ADMIN — CEFTRIAXONE 100 MILLIGRAM(S): 500 INJECTION, POWDER, FOR SOLUTION INTRAMUSCULAR; INTRAVENOUS at 05:21

## 2020-08-20 RX ADMIN — BUPROPION HYDROCHLORIDE 300 MILLIGRAM(S): 150 TABLET, EXTENDED RELEASE ORAL at 13:08

## 2020-08-20 RX ADMIN — Medication 3 MILLILITER(S): at 12:47

## 2020-08-20 RX ADMIN — AZITHROMYCIN 500 MILLIGRAM(S): 500 TABLET, FILM COATED ORAL at 06:45

## 2020-08-20 RX ADMIN — AZITHROMYCIN 255 MILLIGRAM(S): 500 TABLET, FILM COATED ORAL at 05:33

## 2020-08-20 RX ADMIN — Medication 100 MILLIGRAM(S): at 12:57

## 2020-08-20 RX ADMIN — Medication 3 MILLILITER(S): at 06:30

## 2020-08-20 RX ADMIN — Medication 2 MILLIGRAM(S): at 13:02

## 2020-08-20 RX ADMIN — Medication 200 MILLIGRAM(S): at 18:13

## 2020-08-20 RX ADMIN — HEPARIN SODIUM 5000 UNIT(S): 5000 INJECTION INTRAVENOUS; SUBCUTANEOUS at 14:51

## 2020-08-20 RX ADMIN — PIPERACILLIN AND TAZOBACTAM 25 GRAM(S): 4; .5 INJECTION, POWDER, LYOPHILIZED, FOR SOLUTION INTRAVENOUS at 14:51

## 2020-08-20 RX ADMIN — Medication 650 MILLIGRAM(S): at 05:55

## 2020-08-20 RX ADMIN — AMLODIPINE BESYLATE 10 MILLIGRAM(S): 2.5 TABLET ORAL at 12:57

## 2020-08-20 RX ADMIN — Medication 650 MILLIGRAM(S): at 04:55

## 2020-08-20 RX ADMIN — Medication 25 MILLIGRAM(S): at 14:51

## 2020-08-20 NOTE — PROGRESS NOTE ADULT - PROBLEM SELECTOR PLAN 5
Diabetes type II (not on home insulin)  Hold oral hypoglycemic meds  Insulin Corrective Scale  Finger sticks per routine  Consistent Carb Diet after evaluation by speech and swallow  Hemoglobin A1c in AM  Hypoglycemia protocol

## 2020-08-20 NOTE — ADVANCED PRACTICE NURSE CONSULT - ASSESSMENT
Patient assessed found to have a .5 x .3 moisture associated skin damage to the intergluteal cleft. RN educated in the care of this patients skin damage.  1. Maintain moisture free environment  2. Turn and reposition Q2h  3. Apply cavilon daily and criticaid Q shift and PRN  4. No diaper  5. 1 purple pad at a time

## 2020-08-20 NOTE — ED PROVIDER NOTE - OBJECTIVE STATEMENT
79 yo M with PMH of chronic diastolic CHF (normal LVF 2016), non obstructive CAD (cardiac cath 2015), BPH, interstitial lung disease, COPD (on 4L home O2), HTN, HLD, DMT2 on Janumet, GERD, BPH, depression, anxiety BIBEMS for generalized weakness, unable to get up to go to the bathroom. 79 yo M with PMH of chronic diastolic CHF (normal LVF 2016), non obstructive CAD (cardiac cath 2015), BPH, interstitial lung disease, COPD (on 4L home O2), HTN, HLD, DMT2 on Janumet, GERD, BPH, depression, anxiety BIBEMS for generalized weakness, unable to get up to go to the bathroom. rectal temp here 99.9.  Pt is poor historian.

## 2020-08-20 NOTE — SWALLOW BEDSIDE ASSESSMENT ADULT - ASR SWALLOW ASPIRATION MONITOR
oral hygiene/pneumonia/throat clearing/upper respiratory infection/change of breathing pattern/gurgly voice/position upright (89Y)/cough/fever

## 2020-08-20 NOTE — ED PROVIDER NOTE - PHYSICAL EXAMINATION
Gen: Alert, but appears weak and lethargic  Head/eyes: NC/AT, PERRL  ENT: airway patent  Neck: supple, no tenderness/meningismus/JVD, Trachea midline  Pulm/lung: coarse breath sounds b/l, normal resp effort, no wheeze/stridor/retractions  CV/heart: RRR, no M/R/G, +2 dist pulses (radial, pedal DP/PT, popliteal)  GI/Abd: soft, NT/ND, +BS, no guarding/rebound tenderness  Musculoskeletal: no edema/erythema/cyanosis, FROM in all extremities, no C/T/L spine ttp  Skin: no rash, no vesicles, no petechaie, no ecchymosis, no swelling  Neuro: AAOx3, moving all extremities Gen: Alert, but appears weak and lethargic  Head/eyes: NC/AT, PERRL  ENT: airway patent  Neck: supple, no tenderness/meningismus/JVD, Trachea midline  Pulm/lung: coarse breath sounds b/l, normal resp effort, no wheeze/stridor/retractions  CV/heart: RRR, no M/R/G, +2 dist pulses (radial, pedal DP/PT, popliteal)  GI/Abd: soft, NT/ND, +BS, no guarding/rebound tenderness  Musculoskeletal: no edema/erythema/cyanosis, FROM in all extremities, no C/T/L spine ttp  Skin: no vesicles, no petechaie, no ecchymosis, no swelling, stage 2 sacral ulcer  Neuro: AAOx3, moving all extremities

## 2020-08-20 NOTE — SWALLOW BEDSIDE ASSESSMENT ADULT - ORAL PHASE
Within functional limits Decreased anterior-posterior movement of the bolus/Lingual stasis/Delayed oral transit time

## 2020-08-20 NOTE — H&P ADULT - PROBLEM SELECTOR PLAN 10
heparin subq    11. HLD continue home dose fenofibrate   IMPROVE VTE Individual Risk Assessment          RISK                                                          Points    [  ] Previous VTE                                                3  [  ] Thrombophilia                                             2  [  ] Lower limb paralysis                                   2        (unable to hold up >15 seconds)    [  ] Current Cancer                                            2         (within 6 months)  [ x ] Immobilization > 24 hrs                              1  [  ] ICU/CCU stay > 24 hours                            1  [ x ] Age > 60                                                    1    IMPROVE VTE Score _____2____ heparin subq    11. HLD continue home dose fenofibrate   12. Stage 2 pressure injury, sacrum- wound care rn consulted   IMPROVE VTE Individual Risk Assessment          RISK                                                          Points    [  ] Previous VTE                                                3  [  ] Thrombophilia                                             2  [  ] Lower limb paralysis                                   2        (unable to hold up >15 seconds)    [  ] Current Cancer                                            2         (within 6 months)  [ x ] Immobilization > 24 hrs                              1  [  ] ICU/CCU stay > 24 hours                            1  [ x ] Age > 60                                                    1    IMPROVE VTE Score _____2____

## 2020-08-20 NOTE — PROGRESS NOTE ADULT - ASSESSMENT
81 yo M with PMH of chronic diastolic CHF (normal LVF 2016), non obstructive CAD (cardiac cath 2015), BPH, interstitial lung disease, COPD (on 4L home O2), HTN, HLD, DMT2 on Janumet, GERD, BPH, depression, anxiety BIBEMS for generalized weakness. Admitted with PNA.

## 2020-08-20 NOTE — H&P ADULT - PROBLEM SELECTOR PLAN 5
Diabetes type II (not on home insulin)  Hold oral hypoglycemic meds  Insulin Corrective Scale  Finger sticks per routine  Consistent Carb Diet  Hemoglobin A1c in AM  Hypoglycemia protocol Diabetes type II (not on home insulin)  Hold oral hypoglycemic meds  Insulin Corrective Scale  Finger sticks per routine  Consistent Carb Diet after evaluation by speech and swallow  Hemoglobin A1c in AM  Hypoglycemia protocol

## 2020-08-20 NOTE — SWALLOW BEDSIDE ASSESSMENT ADULT - ASR SWALLOW RECOMMEND DIAG
VFSS/MBS/Consider Modified Barium Swallow Study if concern for silent aspiration given chest x-ray results.

## 2020-08-20 NOTE — SWALLOW BEDSIDE ASSESSMENT ADULT - SWALLOW EVAL: RECOMMENDED FEEDING/EATING TECHNIQUES
small sips/bites/position upright (90 degrees)/maintain upright posture during/after eating for 30 mins/alternate food with liquid

## 2020-08-20 NOTE — H&P ADULT - HISTORY OF PRESENT ILLNESS
79 yo M with PMH of chronic diastolic CHF (normal LVF 2016), non obstructive CAD (cardiac cath 2015), BPH, interstitial lung disease, COPD (on 4L home O2), HTN, HLD, DMT2 on Janumet, GERD, BPH, depression, anxiety BIBEMS for generalized weakness, unable to get up to go to the bathroom. rectal temp here 99.9.  Pt is poor historian.      In the ED  VS: T97.8, HR 80, /74, RR 18, SpO2 100 on 4L NC  Labs: WBC 12.57, H/H 8.6/26.8, platelets 128, INR 1.27, BUN 39, Cr 2.10  CXR: right sided infiltrate   Given zothromax x1, rocephin x1, tylenol 650mg x1. 79 yo M with PMH of chronic diastolic CHF (normal LVF 2016), non obstructive CAD (cardiac cath 2015), BPH, interstitial lung disease, COPD (on 4L home O2), HTN, HLD, DMT2 on Janumet, GERD, BPH, depression, anxiety BIBEMS for generalized weakness, unable to get up to go to the bathroom. rectal temp here 99.9.  Pt is poor historian, attempted to reach son Balaji at number listed but unable to reach. History obtained via chart review. Per EMS reports patient called complaining of generalized weakness. He was unable to get up to go to the bathroom. Patient states he is sleepy but denies SOB or difficulty breathing. Denies chest pain, palpitations, muscle or body aches.     In the ED  VS: T97.8, HR 80, /74, RR 18, SpO2 100 on 4L NC  Labs: WBC 12.57, H/H 8.6/26.8, platelets 128, INR 1.27, BUN 39, Cr 2.10  CXR: right sided infiltrate   Given zothromax x1, rocephin x1, tylenol 650mg x1.

## 2020-08-20 NOTE — ED ADULT NURSE NOTE - PMH
BPH with obstruction/lower urinary tract symptoms    Congestive heart failure    COPD, severe    Diabetes mellitus    GERD (gastroesophageal reflux disease)    Hyperlipidemia    Hypertension    Pulmonary fibrosis    Pulmonary fibrosis    Smoker    Vitamin D deficiency

## 2020-08-20 NOTE — H&P ADULT - NSHPREVIEWOFSYSTEMS_GEN_ALL_CORE
CONSTITUTIONAL: admits weakness, denies fever  HEENT: denies blurred vision, sore throat  CARDIOVASCULAR: denies chest pain, chest pressure, palpitations  RESPIRATORY: denies shortness of breath, sputum production  GASTROINTESTINAL: denies nausea, vomiting, diarrhea, abdominal pain  NEUROLOGICAL: denies numbness, headache, focal weakness  MUSCULOSKELETAL: denies new joint pain, muscle aches  HEMATOLOGIC: denies gross bleeding, bruising  LYMPHATICS: denies enlarged lymph nodes, extremity swelling

## 2020-08-20 NOTE — H&P ADULT - PROBLEM SELECTOR PLAN 6
on 4L NC at home chronically   duonebs q6 PRN for SOB or wheezing   patient pharmacy on record with only two recent prescriptions (lasix and fenofibrate) and patient unable to recall name of current pharmacy. AM team to attempt to verify medications.

## 2020-08-20 NOTE — H&P ADULT - PROBLEM SELECTOR PLAN 1
Admit to [facility]  likely CAP   WBC 12.57, afebrile in ED, 02 sat wnl on home O2   CXR showed right lobe infiltrate  s/p Azithromycin x1 and Rocephin x1 in ED,   c/w Ceftriaxone 1 gram daily and Azithromycin (d/c azithro if urine legionella negative)  f/u RVP, MRSA PCR, CRP, ESR, legionella urine antigen, strept urine antigen, and BCx. Admit to [facility]  possible aspiration PNA given bedbound status   WBC 12.57, afebrile in ED, 02 sat wnl on home O2   CXR showed right lobe infiltrate  s/p Azithromycin x1 and Rocephin x1 in ED, will continue zosyn pending CT chest   f/u MRSA PCR, CRP, ESR, legionella urine antigen, strept urine antigen, and BCx.  will obtain ct chest to further evaluate  NPO pending speech and swallow   Pulm Dr. Banuelos consulted, recs appreciated Admit to [facility]  possible aspiration PNA given bedbound status   WBC 12.57, afebrile in ED, 02 sat wnl on home O2   CXR showed right lobe infiltrate  s/p Azithromycin x1 and Rocephin x1 in ED, will continue zosyn pending CT chest   f/u MRSA PCR, CRP, ESR, legionella urine antigen, strept urine antigen, and BCx.  will obtain ct chest to further evaluate  NPO pending speech and swallow   Pulm Dr. Banuelos consulted, recs appreciated  PT consult  SW consult   aspiration precautions

## 2020-08-20 NOTE — PHYSICAL THERAPY INITIAL EVALUATION ADULT - ADDITIONAL COMMENTS
Patient lives in a first floor apartment, 0 ARNAV.  Patient sleeps in a recliner, requires assistance for all ADLs, and ambulates with assist to commode which is at side of commode.

## 2020-08-20 NOTE — ED PROVIDER NOTE - EKG #1 DATE/TIME
Quality 110: Preventive Care And Screening: Influenza Immunization: Influenza Immunization previously received during influenza season Quality 111:Pneumonia Vaccination Status For Older Adults: Pneumococcal Vaccination Previously Received Detail Level: Detailed 20-Aug-2020 05:11

## 2020-08-20 NOTE — ED ADULT NURSE REASSESSMENT NOTE - NS ED NURSE REASSESS COMMENT FT1
Patient provided with water as requested he was thirsty asked for urine not ready to give urine at this time.

## 2020-08-20 NOTE — H&P ADULT - PROBLEM SELECTOR PLAN 3
per chart review patient with history of anxiety on xanax  ISTOP reviewed, patient appears to be on 2mg TID PRN   will continue with hold parameters to avoid withdrawal

## 2020-08-20 NOTE — PATIENT PROFILE ADULT - MONEY FOR FOOD
Render Post-Care Instructions In Note?: no Consent was obtained from the patient. The risks, benefits and alternatives to therapy were discussed in detail. Specifically, the risks of infection, scarring, bleeding, prolonged wound healing, nerve injury, incomplete removal, allergy to anesthesia and recurrence were addressed. Alternatives to ED&C, such as: surgical removal and XRT were also discussed.  Prior to the procedure, the treatment site was clearly identified and confirmed by the patient. All components of Universal Protocol/PAUSE Rule completed. Post-Care Instructions: I reviewed with the patient in detail post-care instructions. Patient is to keep the area dry for 24 hours, and not to engage in any swimming until the area is healed. Should the patient develop any fevers, chills, bleeding, severe pain patient will contact the office immediately. Patient is advised to wash with warm water and soap, apply aquaphor, and change bandage daily for 2-3 days. What Was Performed First?: Curettage Anesthesia Type: 1% lidocaine with epinephrine and a 1:10 solution of 8.4% sodium bicarbonate Number Of Curettages: 3 Detail Level: Detailed Total Volume (Ccs): 1 Medication Injected: 5-Fluorouracil Size Of Lesion After Curettage: 0 Size Of Lesion In Cm: 3.1 Additional Information: (Optional): The wound was cleaned, and a pressure dressing was applied.  The patient received detailed post-op instructions. Cautery Type: electrodesiccation Bill As A Line Item Or As Units: Line Item no

## 2020-08-20 NOTE — H&P ADULT - NSHPPHYSICALEXAM_GEN_ALL_CORE
T(C): 36.8 (08-20-20 @ 05:55), Max: 37.7 (08-20-20 @ 04:15)  HR: 73 (08-20-20 @ 05:55) (73 - 80)  BP: 143/67 (08-20-20 @ 05:55) (143/67 - 170/70)  RR: 18 (08-20-20 @ 05:55) (18 - 18)  SpO2: 99% (08-20-20 @ 05:55) (99% - 100%)    GENERAL: no acute distress, sleepy but easily awoken. weak   EYES: sclera clear, no exudates  ENMT: oropharynx clear without erythema, no exudates, moist mucous membranes  LUNGS: coarse breath sounds b/l, transmitted upper airway sounds noted   HEART: soft S1/S2, regular rate and rhythm, no murmurs noted, +1 LE edema b/l   GASTROINTESTINAL: abdomen is soft, nontender, nondistended, normoactive bowel sounds, no palpable masses  INTEGUMENT: good skin turgor  MUSCULOSKELETAL: no clubbing or cyanosis, no obvious deformity  NEUROLOGIC: awake, alert, good muscle tone in 4 extremities, no obvious sensory deficits

## 2020-08-20 NOTE — ED ADULT NURSE NOTE - OBJECTIVE STATEMENT
Patient brought in by ambulance from home as reported was having generalized body weakness and was stuck in his bed and cannot get up patient leaning to his right side with O2 via nasal cannula at 4 liters was seen and evaluated by MD with orders made and carried out blood drawn and sent to lab attached to cardiac monitor EKG done. Patient noted with stage 2 to sacral area and redness to sacral area as advised by patient he had 2 aides at home to help him around.

## 2020-08-20 NOTE — PHYSICAL THERAPY INITIAL EVALUATION ADULT - BED MOBILITY TRAINING, PT EVAL
Patient will perform all bed mobility with min A x 1 in 1 week in order to increase mobility at home

## 2020-08-20 NOTE — H&P ADULT - PROBLEM SELECTOR PLAN 8
continue home dose lasix  per chart review it appears patient has been on several BP meds in the past, unable to verify medications with pharmacy or reach son, please verify meds with pharmacy or son in the AM.

## 2020-08-20 NOTE — H&P ADULT - PROBLEM SELECTOR PLAN 9
patient pharmacy on record with only two recent prescriptions (lasix and fenofibrate) and patient unable to recall name of current pharmacy. AM team to attempt to verify medications.

## 2020-08-20 NOTE — SWALLOW BEDSIDE ASSESSMENT ADULT - SWALLOW EVAL: DIAGNOSIS
1. The patient demonstrated functional oral management of puree, honey thick, nectar thick, and thin liquid textures marked by adequate bolus collection, transfer, and posterior transport. 2. The patient demonstrated a mild oral dysphagia for mechanical soft and regular solids marked by prolonged oral manipulation resulting in delayed bolus collection, transfer, and posterior transport. Trace lingual residue noted subsequent to deglutition which cleared with a liquid wash. 3. The patient demonstrated a mild pharyngeal dysphagia for puree, mechanical soft, regular solids, honey thick liquid, nectar thick liquid and thin liquid textures marked by suspect delayed initiation of swallow trigger with adequate hyolaryngeal elevation upon digital palpation without evidence of laryngeal penetration. 1. The patient demonstrated functional oral management of puree, honey thick, nectar thick, and thin liquid textures marked by adequate bolus collection, transfer, and posterior transport. 2. The patient demonstrated a mild oral dysphagia for mechanical soft and regular solids marked by prolonged oral manipulation resulting in delayed bolus collection, transfer, and posterior transport. Trace lingual residue noted subsequent to deglutition which cleared with a liquid wash. Noted increased WOB during mastication of regular solids. 3. The patient demonstrated a mild pharyngeal dysphagia for puree, mechanical soft, regular solids, honey thick liquid, nectar thick liquid and thin liquid textures marked by suspect delayed initiation of swallow trigger with adequate hyolaryngeal elevation upon digital palpation without evidence of laryngeal penetration.

## 2020-08-20 NOTE — PHYSICAL THERAPY INITIAL EVALUATION ADULT - PERTINENT HX OF CURRENT PROBLEM, REHAB EVAL
81 yo M with PMH of chronic diastolic CHF (normal LVF 2016), non obstructive CAD (cardiac cath 2015), BPH, interstitial lung disease, COPD (on 4L home O2), HTN, HLD, DMT2 on Janumet, GERD, BPH, depression, anxiety BIBEMS for generalized weakness, unable to get up to go to the bathroom. rectal temp here 99.9.

## 2020-08-20 NOTE — H&P ADULT - NSICDXPASTMEDICALHX_GEN_ALL_CORE_FT
PAST MEDICAL HISTORY:  BPH with obstruction/lower urinary tract symptoms     Congestive heart failure     COPD, severe     Diabetes mellitus     GERD (gastroesophageal reflux disease)     Hyperlipidemia     Hypertension     Pulmonary fibrosis     Pulmonary fibrosis     Smoker     Vitamin D deficiency

## 2020-08-20 NOTE — ED PROVIDER NOTE - SECONDARY DIAGNOSIS.
Difficulty walking Pneumonia due to infectious organism, unspecified laterality, unspecified part of lung

## 2020-08-20 NOTE — ED ADULT TRIAGE NOTE - CHIEF COMPLAINT QUOTE
BIBEMS from home C/O generalized weakness. Patient states he was unable to get up from bed this evening to go to the bathroom.

## 2020-08-20 NOTE — CONSULT NOTE ADULT - SUBJECTIVE AND OBJECTIVE BOX
Date/Time Patient Seen:  		  Referring MD:   Data Reviewed	       Patient is a 80y old  Male who presents with a chief complaint of pneumonia (20 Aug 2020 05:22)      Subjective/HPI  in bed  seen and examined  vs and meds reviewed  labs reviewed  H and P reviewed  ER provider note reviewed  pt is a poor historian  follows with Pulm Medicine - chr lung disease - COPD and ILD     79 yo M with PMH of chronic diastolic CHF (normal LVF 2016), non obstructive CAD (cardiac cath 2015), BPH, interstitial lung disease, COPD (on 4L home O2), HTN, HLD, DMT2 on Janumet, GERD, BPH, depression, anxiety BIBEMS for generalized weakness, unable to get up to go to the bathroom. rectal temp here 99.9.  Pt is poor historian, attempted to reach son Balaji at number listed but unable to reach. History obtained via chart review. Per EMS reports patient called complaining of generalized weakness. He was unable to get up to go to the bathroom. Patient states he is sleepy but denies SOB or difficulty breathing. Denies chest pain, palpitations, muscle or body aches.      · Chief Complaint: The patient is a 80y Male complaining of weakness.  · HPI Objective Statement: 79 yo M with PMH of chronic diastolic CHF (normal LVF 2016), non obstructive CAD (cardiac cath 2015), BPH, interstitial lung disease, COPD (on 4L home O2), HTN, HLD, DMT2 on Janumet, GERD, BPH, depression, anxiety BIBEMS for generalized weakness, unable to get up to go to the bathroom. rectal temp here 99.9.  Pt is poor historian.    HIV:    HIV Test Questions:  · In accordance with [place] State law, we offer every patient who comes to our ED an HIV test. Would you like to be tested today?	Opt out      PAST SURGICAL HISTORY:  S/P tonsillectomy.     FAMILY HISTORY:  Family history of lymphoma    Grandparent  Still living? No  Family history of heart disease, Age at diagnosis: Age Unknown.     Social History:  Social History (marital status, living situation, occupation, tobacco use, alcohol and drug use, and sexual history): lives alone with aide  	Mostly bed bound  	former smoker, 60 pack years, quit 3 years ago  No alcohol or drug use     Tobacco Screening:  · Core Measure Site	Yes  · Has the patient used tobacco in the past 30 days?	No    Risk Assessment:    Present on Admission:  Deep Venous Thrombosis	no  Pulmonary Embolus	no     Heart Failure:  Does this patient have a history of or has been diagnosed with heart failure? yes.     LV Function Assessment (LVS function was evaluated before arrival and/or during hospitalization) unknown.  PAST MEDICAL & SURGICAL HISTORY:  COPD, severe  Congestive heart failure  Pulmonary fibrosis  Pulmonary fibrosis  Smoker  BPH with obstruction/lower urinary tract symptoms  Vitamin D deficiency  GERD (gastroesophageal reflux disease)  Diabetes mellitus  Hyperlipidemia  Hypertension  S/P tonsillectomy  No significant past surgical history        Medication list         MEDICATIONS  (STANDING):  albuterol/ipratropium for Nebulization 3 milliLiter(s) Nebulizer every 6 hours  dextrose 5%. 1000 milliLiter(s) (50 mL/Hr) IV Continuous <Continuous>  dextrose 50% Injectable 12.5 Gram(s) IV Push once  dextrose 50% Injectable 25 Gram(s) IV Push once  dextrose 50% Injectable 25 Gram(s) IV Push once  fenofibrate Tablet 145 milliGRAM(s) Oral daily  furosemide    Tablet 40 milliGRAM(s) Oral daily  heparin   Injectable 5000 Unit(s) SubCutaneous every 8 hours  insulin lispro (HumaLOG) corrective regimen sliding scale   SubCutaneous every 6 hours  piperacillin/tazobactam IVPB.. 3.375 Gram(s) IV Intermittent every 8 hours    MEDICATIONS  (PRN):  ALPRAZolam 2 milliGRAM(s) Oral three times a day PRN agitation  dextrose 40% Gel 15 Gram(s) Oral once PRN Blood Glucose LESS THAN 70 milliGRAM(s)/deciliter  glucagon  Injectable 1 milliGRAM(s) IntraMuscular once PRN Glucose LESS THAN 70 milligrams/deciliter         Vitals log        ICU Vital Signs Last 24 Hrs  T(C): 36.9 (20 Aug 2020 06:33), Max: 37.7 (20 Aug 2020 04:15)  T(F): 98.4 (20 Aug 2020 06:33), Max: 99.9 (20 Aug 2020 04:15)  HR: 77 (20 Aug 2020 06:33) (73 - 80)  BP: 142/78 (20 Aug 2020 06:33) (142/78 - 170/70)  BP(mean): --  ABP: --  ABP(mean): --  RR: 18 (20 Aug 2020 06:33) (18 - 18)  SpO2: 100% (20 Aug 2020 06:33) (99% - 100%)           Input and Output:  I&O's Detail      Lab Data                        8.6    12.57 )-----------( 128      ( 20 Aug 2020 04:17 )             26.8     08-20    138  |  105  |  39<H>  ----------------------------<  102<H>  4.6   |  25  |  2.10<H>    Ca    9.3      20 Aug 2020 04:17    TPro  6.9  /  Alb  3.3  /  TBili  0.4  /  DBili  x   /  AST  11<L>  /  ALT  16  /  AlkPhos  90  08-20            Review of Systems	    weak  poor historian    Objective     Physical Examination    heart s1s2  lung dec BS  abd soft  head nc  head at  obese  extr chr changes      Pertinent Lab findings & Imaging      Princess:  NO   Adequate UO     I&O's Detail           Discussed with:     Cultures:	        Radiology            CXR showed right lobe infiltrate

## 2020-08-20 NOTE — ED PROVIDER NOTE - NS ED ROS FT

## 2020-08-20 NOTE — CONSULT NOTE ADULT - PROBLEM SELECTOR RECOMMENDATION 9
81 yo M with PMH of chronic diastolic CHF (normal LVF 2016), non obstructive CAD (cardiac cath 2015), BPH, interstitial lung disease, COPD (on 4L home O2), HTN, HLD, DMT2 on Janumet, GERD, BPH, depression, anxiety BIBEMS for generalized weakness, unable to get up to go to the bathroom. rectal temp here 99.9.  Pt is poor historian  chr lung disease - ILD - COPD - o2 support at home -   cont NEBS for now  on emp ABX for poss LRTI - CXR formal report pending - poss Right Infiltrate - will consider CT scan chest  HOB elev - asp prec - SLP eval  [facility] rx regimen - on LASIX - monitor renal indices - TTE ordered  unable to pull up old records - from Outpatient  left a message for Son  supportive measures  assist with ADL  work up under way  monitor for needs  wean fio2  will check VBG 81 yo M with PMH of chronic diastolic CHF (normal LVF 2016), non obstructive CAD (cardiac cath 2015), BPH, interstitial lung disease, COPD (on 4L home O2), HTN, HLD, DMT2 on Janumet, GERD, BPH, depression, anxiety BIBEMS for generalized weakness, unable to get up to go to the bathroom. rectal temp here 99.9.  Pt is poor historian  chr lung disease - ILD - COPD - o2 support at home -   cont NEBS for now  on emp ABX for poss LRTI - CXR formal report pending - poss Right Infiltrate - will ct chest done this am - pending official report - bilateral parenchymal lung disease  HOB elev - asp prec - SLP eval  [facility] rx regimen - on LASIX - monitor renal indices - TTE ordered  unable to pull up old records - from Outpatient  left a message for Son  supportive measures  assist with ADL  work up under way  monitor for needs  wean fio2  will check VBG

## 2020-08-20 NOTE — SWALLOW BEDSIDE ASSESSMENT ADULT - COMMENTS
Per charting, patient is a "81 yo M with PMH of chronic diastolic CHF (normal LVF 2016), non obstructive CAD (cardiac cath 2015), BPH, interstitial lung disease, COPD (on 4L home O2), HTN, HLD, DMT2 on Janumet, GERD, BPH, depression, anxiety BIBEMS for generalized weakness, unable to get up to go to the bathroom. rectal temp here 99.9.  Pt is poor historian, attempted to reach son Balaji at number listed but unable to reach. History obtained via chart review. Per EMS reports patient called complaining of generalized weakness. He was unable to get up to go to the bathroom. Patient states he is sleepy but denies SOB or difficulty breathing. Denies chest pain, palpitations, muscle or body aches."    XR CHEST 8/20: "Patchy airspace opacities throughout the right lung and left lower lobe, suggestive of multifocal pneumonia. No significant pleural effusions"    Consult received and chart reviewed. The patient was seen at bedside this AM to assess swallow function, at which time he was awake/alert and oriented. The patient was able to follow simple directives and functionally communicate basic wants/needs. RN reported periods of confusion. Per charting, patient is a "81 yo M with PMH of chronic diastolic CHF (normal LVF 2016), non obstructive CAD (cardiac cath 2015), BPH, interstitial lung disease, COPD (on 4L home O2), HTN, HLD, DMT2 on Janumet, GERD, BPH, depression, anxiety BIBEMS for generalized weakness, unable to get up to go to the bathroom. rectal temp here 99.9.  Pt is poor historian, attempted to reach son Balaji at number listed but unable to reach. History obtained via chart review. Per EMS reports patient called complaining of generalized weakness. He was unable to get up to go to the bathroom. Patient states he is sleepy but denies SOB or difficulty breathing. Denies chest pain, palpitations, muscle or body aches."    XR CHEST 8/20: "Patchy airspace opacities throughout the right lung and left lower lobe, suggestive of multifocal pneumonia. No significant pleural effusions"    Consult received and chart reviewed. The patient was seen at bedside this AM to assess swallow function, at which time he was awake/alert and oriented. The patient was able to follow simple directives and functionally communicate basic wants/needs. Patient leaning on right side throughout evaluation despite attempts to reposition by this clinician - patient stated he has been leaning for years. RN reported periods of confusion.

## 2020-08-20 NOTE — H&P ADULT - PROBLEM SELECTOR PLAN 2
last echo 2016  caution with IVF   daily weights   monitor I&O  monitor electrolytes, keep mg>2 and k>4 last echo 2016  caution with IVF   daily weights   monitor I&O  monitor electrolytes, keep mg>2 and k>4  continue home dose lasix 40mg daily   patient pharmacy on record with only two recent prescriptions (lasix and fenofibrate) and patient unable to recall name of current pharmacy. AM team to attempt to verify medications. last echo 2016, will order repeat   caution with IVF   daily weights   monitor I&O  monitor electrolytes, keep mg>2 and k>4  continue home dose lasix 40mg daily   patient pharmacy on record with only two recent prescriptions (lasix and fenofibrate) and patient unable to recall name of current pharmacy. AM team to attempt to verify medications.

## 2020-08-20 NOTE — PHYSICAL THERAPY INITIAL EVALUATION ADULT - GAIT TRAINING, PT EVAL
Patient will ambulate bed to chair with min A x 1 in 1 week in order to safely transfer onto [facility] at home

## 2020-08-20 NOTE — ED ADULT NURSE NOTE - NSIMPLEMENTINTERV_GEN_ALL_ED
Implemented All Fall Risk Interventions:  [place] to call system. Call bell, personal items and telephone within reach. Instruct patient to call for assistance. Room bathroom lighting operational. Non-slip footwear when patient is off stretcher. Physically safe environment: no spills, clutter or unnecessary equipment. Stretcher in lowest position, wheels locked, appropriate side rails in place. Provide visual cue, wrist band, yellow gown, etc. Monitor gait and stability. Monitor for mental status changes and reorient to person, place, and time. Review medications for side effects contributing to fall risk. Reinforce activity limits and safety measures with patient and family.

## 2020-08-20 NOTE — PROGRESS NOTE ADULT - SUBJECTIVE AND OBJECTIVE BOX
Patient is a 80y old  Male who presents with a chief complaint of pneumonia (20 Aug 2020 08:01)      FROM ADMISSION H+P:   HPI:  81 yo M with PMH of chronic diastolic CHF (normal LVF ), non obstructive CAD (cardiac cath ), BPH, interstitial lung disease, COPD (on 4L home O2), HTN, HLD, DMT2 on Janumet, GERD, BPH, depression, anxiety BIBEMS for generalized weakness, unable to get up to go to the bathroom. rectal temp here 99.9.  Pt is poor historian, attempted to reach son Balaji at number listed but unable to reach. History obtained via chart review. Per EMS reports patient called complaining of generalized weakness. He was unable to get up to go to the bathroom. Patient states he is sleepy but denies SOB or difficulty breathing. Denies chest pain, palpitations, muscle or body aches.     In the ED  VS: T97.8, HR 80, /74, RR 18, SpO2 100 on 4L NC  Labs: WBC 12.57, H/H 8.6/26.8, platelets 128, INR 1.27, BUN 39, Cr 2.10  CXR: right sided infiltrate   Given zothromax x1, rocephin x1, tylenol 650mg x1. (20 Aug 2020 05:22)      ----  INTERVAL HPI/OVERNIGHT EVENTS: Pt seen and evaluated at the bedside. No acute overnight events occurred. Pt comfortable in bed, denies any CP, SOB, F/Ch    ----  PAST MEDICAL & SURGICAL HISTORY:  COPD, severe  Congestive heart failure  Pulmonary fibrosis  Pulmonary fibrosis  Smoker  BPH with obstruction/lower urinary tract symptoms  Vitamin D deficiency  GERD (gastroesophageal reflux disease)  Diabetes mellitus  Hyperlipidemia  Hypertension  S/P tonsillectomy      FAMILY HISTORY:  Family history of heart disease (Grandparent)  Family history of lymphoma      ----  MEDICATIONS  (STANDING):  albuterol/ipratropium for Nebulization 3 milliLiter(s) Nebulizer every 6 hours  allopurinol 100 milliGRAM(s) Oral daily  amLODIPine   Tablet 10 milliGRAM(s) Oral daily  buPROPion XL . 300 milliGRAM(s) Oral daily  dextrose 5%. 1000 milliLiter(s) (50 mL/Hr) IV Continuous <Continuous>  dextrose 50% Injectable 12.5 Gram(s) IV Push once  dextrose 50% Injectable 25 Gram(s) IV Push once  dextrose 50% Injectable 25 Gram(s) IV Push once  furosemide    Tablet 40 milliGRAM(s) Oral daily  heparin   Injectable 5000 Unit(s) SubCutaneous every 8 hours  hydrALAZINE 25 milliGRAM(s) Oral three times a day  insulin lispro (HumaLOG) corrective regimen sliding scale   SubCutaneous every 6 hours  isosorbide   mononitrate ER Tablet (IMDUR) 60 milliGRAM(s) Oral daily  labetalol 200 milliGRAM(s) Oral two times a day  pantoprazole    Tablet 40 milliGRAM(s) Oral before breakfast  piperacillin/tazobactam IVPB.. 3.375 Gram(s) IV Intermittent every 8 hours  tamsulosin 0.8 milliGRAM(s) Oral at bedtime  venlafaxine XR. 150 milliGRAM(s) Oral two times a day    MEDICATIONS  (PRN):  ALPRAZolam 2 milliGRAM(s) Oral three times a day PRN agitation  dextrose 40% Gel 15 Gram(s) Oral once PRN Blood Glucose LESS THAN 70 milliGRAM(s)/deciliter  glucagon  Injectable 1 milliGRAM(s) IntraMuscular once PRN Glucose LESS THAN 70 milligrams/deciliter      ----  REVIEW OF SYSTEMS:  CONSTITUTIONAL: denies fever, chills, fatigue, weakness  HEENT: denies blurred vision, sore throat  SKIN: denies new lesions, rash  CARDIOVASCULAR: denies chest pain, chest pressure, palpitations  RESPIRATORY: denies shortness of breath, sputum production  GASTROINTESTINAL: denies nausea, vomiting, diarrhea, abdominal pain  GENITOURINARY: denies dysuria, discharge  NEUROLOGICAL: denies numbness, headache, focal weakness  MUSCULOSKELETAL: denies new joint pain, muscle aches  HEMATOLOGIC: denies gross bleeding, bruising  LYMPHATICS: denies enlarged lymph nodes, extremity swelling  PSYCHIATRIC: denies recent changes in anxiety, depression	  ENDOCRINOLOGIC: denies sweating, cold or heat intolerance    ----  PHYSICAL EXAM:  GENERAL: patient appears well, no acute distress, appropriate, pleasant  EYES: sclera clear, no exudates  ENMT: oropharynx clear without erythema, no exudates, moist mucous membranes  NECK: supple, soft, no thyromegaly noted  LUNGS:  coarse breath sounds b/l, transmitted upper airway sounds noted   HEART: soft S1/S2, regular rate and rhythm, no murmurs noted, + lower extremity edema  GASTROINTESTINAL: abdomen is soft, nontender, nondistended, normoactive bowel sounds, no palpable masses  INTEGUMENT: good skin turgor, no lesions noted  MUSCULOSKELETAL: no clubbing or cyanosis, no obvious deformity  NEUROLOGIC: awake, alert, oriented x3, good muscle tone in 4 extremities, no obvious sensory deficits    T(C): 37.1 (20 @ 08:32), Max: 37.7 (20 @ 04:15)  HR: 73 (20 @ 08:32) (73 - 80)  BP: 165/82 (20 @ 08:32) (142/78 - 170/70)  RR: 17 (20 @ 08:32) (17 - 18)  SpO2: 96% (20 @ 08:32) (96% - 100%)  Wt(kg): --    ----  I&O's Summary      LABS:                        8.6    12.57 )-----------( 128      ( 20 Aug 2020 04:17 )             26.8     08-20    138  |  105  |  39<H>  ----------------------------<  102<H>  4.6   |  25  |  2.10<H>    Ca    9.3      20 Aug 2020 04:17    TPro  6.9  /  Alb  3.3  /  TBili  0.4  /  DBili  x   /  AST  11<L>  /  ALT  16  /  AlkPhos  90  08-20    PT/INR - ( 20 Aug 2020 04:17 )   PT: 14.7 sec;   INR: 1.27 ratio         PTT - ( 20 Aug 2020 04:17 )  PTT:31.3 sec  Urinalysis Basic - ( 20 Aug 2020 05:45 )    Color: Yellow / Appearance: Slightly Turbid / S.010 / pH: x  Gluc: x / Ketone: Negative  / Bili: Negative / Urobili: Negative   Blood: x / Protein: 30 mg/dL / Nitrite: Negative   Leuk Esterase: Trace / RBC: x / WBC 6-10   Sq Epi: x / Non Sq Epi: Few / Bacteria: Moderate      CAPILLARY BLOOD GLUCOSE      POCT Blood Glucose.: 115 mg/dL (20 Aug 2020 07:50)                ----  Personally reviewed:  Vital sign trends: [  ] yes    [  ] no     [  ] n/a  Laboratory results: [  ] yes    [  ] no     [  ] n/a  Radiology results: [  ] yes    [  ] no     [  ] n/a  Culture results: [  ] yes    [  ] no     [  ] n/a  Consultant recommendations: [  ] yes    [  ] no     [  ] n/a

## 2020-08-20 NOTE — ED PROVIDER NOTE - CARE PLAN
Principal Discharge DX:	Generalized weakness  Secondary Diagnosis:	Difficulty walking Principal Discharge DX:	Generalized weakness  Secondary Diagnosis:	Difficulty walking  Secondary Diagnosis:	Pneumonia due to infectious organism, unspecified laterality, unspecified part of lung

## 2020-08-20 NOTE — H&P ADULT - ASSESSMENT
81 yo M with PMH of chronic diastolic CHF (normal LVF 2016), non obstructive CAD (cardiac cath 2015), BPH, interstitial lung disease, COPD (on 4L home O2), HTN, HLD, DMT2 on Janumet, GERD, BPH, depression, anxiety BIBEMS for generalized weakness. Admitted with PNA. 81 yo M with PMH of chronic diastolic CHF (normal LVF 2016), non obstructive CAD (cardiac cath 2015), BPH, interstitial lung disease, COPD (on 4L home O2), HTN, HLD, DMT2 on Janumet, GERD, BPH, depression, anxiety BIBEMS for generalized weakness. Admitted with PNA.     **patient pharmacy on record with only two recent prescriptions (lasix and fenofibrate) and patient unable to recall name of current pharmacy. AM team to attempt to verify medications with pharmacy or son.***

## 2020-08-20 NOTE — H&P ADULT - NSHPSOCIALHISTORY_GEN_ALL_CORE
lives alone with aide  Mostly bed bound  former smoker, 60 pack years, quit 3 years ago  No alcohol or drug use

## 2020-08-21 LAB
ALBUMIN SERPL ELPH-MCNC: 3.1 G/DL — LOW (ref 3.3–5)
ALP SERPL-CCNC: 89 U/L — SIGNIFICANT CHANGE UP (ref 40–120)
ALT FLD-CCNC: 15 U/L — SIGNIFICANT CHANGE UP (ref 12–78)
ANION GAP SERPL CALC-SCNC: 7 MMOL/L — SIGNIFICANT CHANGE UP (ref 5–17)
AST SERPL-CCNC: 13 U/L — LOW (ref 15–37)
BASOPHILS # BLD AUTO: 0.02 K/UL — SIGNIFICANT CHANGE UP (ref 0–0.2)
BASOPHILS NFR BLD AUTO: 0.1 % — SIGNIFICANT CHANGE UP (ref 0–2)
BILIRUB SERPL-MCNC: 0.5 MG/DL — SIGNIFICANT CHANGE UP (ref 0.2–1.2)
BUN SERPL-MCNC: 32 MG/DL — HIGH (ref 7–23)
CALCIUM SERPL-MCNC: 9.2 MG/DL — SIGNIFICANT CHANGE UP (ref 8.5–10.1)
CHLORIDE SERPL-SCNC: 102 MMOL/L — SIGNIFICANT CHANGE UP (ref 96–108)
CO2 SERPL-SCNC: 27 MMOL/L — SIGNIFICANT CHANGE UP (ref 22–31)
CREAT SERPL-MCNC: 1.7 MG/DL — HIGH (ref 0.5–1.3)
CRP SERPL-MCNC: 9.31 MG/DL — HIGH (ref 0–0.4)
EOSINOPHIL # BLD AUTO: 0.05 K/UL — SIGNIFICANT CHANGE UP (ref 0–0.5)
EOSINOPHIL NFR BLD AUTO: 0.4 % — SIGNIFICANT CHANGE UP (ref 0–6)
GLUCOSE SERPL-MCNC: 108 MG/DL — HIGH (ref 70–99)
HCT VFR BLD CALC: 26.3 % — LOW (ref 39–50)
HGB BLD-MCNC: 8.4 G/DL — LOW (ref 13–17)
IMM GRANULOCYTES NFR BLD AUTO: 5.5 % — HIGH (ref 0–1.5)
LYMPHOCYTES # BLD AUTO: 1.24 K/UL — SIGNIFICANT CHANGE UP (ref 1–3.3)
LYMPHOCYTES # BLD AUTO: 9.1 % — LOW (ref 13–44)
MAGNESIUM SERPL-MCNC: 1.7 MG/DL — SIGNIFICANT CHANGE UP (ref 1.6–2.6)
MCHC RBC-ENTMCNC: 28.5 PG — SIGNIFICANT CHANGE UP (ref 27–34)
MCHC RBC-ENTMCNC: 31.9 GM/DL — LOW (ref 32–36)
MCV RBC AUTO: 89.2 FL — SIGNIFICANT CHANGE UP (ref 80–100)
MONOCYTES # BLD AUTO: 1.17 K/UL — HIGH (ref 0–0.9)
MONOCYTES NFR BLD AUTO: 8.6 % — SIGNIFICANT CHANGE UP (ref 2–14)
NEUTROPHILS # BLD AUTO: 10.38 K/UL — HIGH (ref 1.8–7.4)
NEUTROPHILS NFR BLD AUTO: 76.3 % — SIGNIFICANT CHANGE UP (ref 43–77)
NRBC # BLD: 0 /100 WBCS — SIGNIFICANT CHANGE UP (ref 0–0)
PLATELET # BLD AUTO: 124 K/UL — LOW (ref 150–400)
POTASSIUM SERPL-MCNC: 4.4 MMOL/L — SIGNIFICANT CHANGE UP (ref 3.5–5.3)
POTASSIUM SERPL-SCNC: 4.4 MMOL/L — SIGNIFICANT CHANGE UP (ref 3.5–5.3)
PROT SERPL-MCNC: 6.6 G/DL — SIGNIFICANT CHANGE UP (ref 6–8.3)
RBC # BLD: 2.95 M/UL — LOW (ref 4.2–5.8)
RBC # FLD: 16.1 % — HIGH (ref 10.3–14.5)
SARS-COV-2 IGG SERPL QL IA: NEGATIVE — SIGNIFICANT CHANGE UP
SARS-COV-2 IGM SERPL IA-ACNC: <0.1 INDEX — SIGNIFICANT CHANGE UP
SODIUM SERPL-SCNC: 136 MMOL/L — SIGNIFICANT CHANGE UP (ref 135–145)
TSH SERPL-MCNC: 2.01 UIU/ML — SIGNIFICANT CHANGE UP (ref 0.36–3.74)
WBC # BLD: 13.61 K/UL — HIGH (ref 3.8–10.5)
WBC # FLD AUTO: 13.61 K/UL — HIGH (ref 3.8–10.5)

## 2020-08-21 PROCEDURE — 99233 SBSQ HOSP IP/OBS HIGH 50: CPT

## 2020-08-21 PROCEDURE — 71045 X-RAY EXAM CHEST 1 VIEW: CPT | Mod: 26

## 2020-08-21 PROCEDURE — 99497 ADVNCD CARE PLAN 30 MIN: CPT

## 2020-08-21 PROCEDURE — 99223 1ST HOSP IP/OBS HIGH 75: CPT

## 2020-08-21 RX ORDER — DRONABINOL 2.5 MG
5 CAPSULE ORAL DAILY
Refills: 0 | Status: DISCONTINUED | OUTPATIENT
Start: 2020-08-21 | End: 2020-08-28

## 2020-08-21 RX ORDER — METOCLOPRAMIDE HCL 10 MG
5 TABLET ORAL EVERY 8 HOURS
Refills: 0 | Status: DISCONTINUED | OUTPATIENT
Start: 2020-08-21 | End: 2020-08-28

## 2020-08-21 RX ORDER — ACETAMINOPHEN 500 MG
650 TABLET ORAL ONCE
Refills: 0 | Status: COMPLETED | OUTPATIENT
Start: 2020-08-21 | End: 2020-08-21

## 2020-08-21 RX ORDER — ONDANSETRON 8 MG/1
4 TABLET, FILM COATED ORAL ONCE
Refills: 0 | Status: COMPLETED | OUTPATIENT
Start: 2020-08-21 | End: 2020-08-21

## 2020-08-21 RX ADMIN — Medication 100 MILLIGRAM(S): at 12:36

## 2020-08-21 RX ADMIN — Medication 200 MILLIGRAM(S): at 05:24

## 2020-08-21 RX ADMIN — AMLODIPINE BESYLATE 10 MILLIGRAM(S): 2.5 TABLET ORAL at 05:25

## 2020-08-21 RX ADMIN — HEPARIN SODIUM 5000 UNIT(S): 5000 INJECTION INTRAVENOUS; SUBCUTANEOUS at 22:08

## 2020-08-21 RX ADMIN — Medication 5 MILLIGRAM(S): at 22:09

## 2020-08-21 RX ADMIN — Medication 1: at 17:16

## 2020-08-21 RX ADMIN — Medication 25 MILLIGRAM(S): at 13:32

## 2020-08-21 RX ADMIN — TAMSULOSIN HYDROCHLORIDE 0.8 MILLIGRAM(S): 0.4 CAPSULE ORAL at 22:08

## 2020-08-21 RX ADMIN — Medication 650 MILLIGRAM(S): at 22:08

## 2020-08-21 RX ADMIN — Medication 150 MILLIGRAM(S): at 17:40

## 2020-08-21 RX ADMIN — HEPARIN SODIUM 5000 UNIT(S): 5000 INJECTION INTRAVENOUS; SUBCUTANEOUS at 05:24

## 2020-08-21 RX ADMIN — Medication 40 MILLIGRAM(S): at 05:24

## 2020-08-21 RX ADMIN — Medication 3 MILLILITER(S): at 08:08

## 2020-08-21 RX ADMIN — ONDANSETRON 4 MILLIGRAM(S): 8 TABLET, FILM COATED ORAL at 13:26

## 2020-08-21 RX ADMIN — HEPARIN SODIUM 5000 UNIT(S): 5000 INJECTION INTRAVENOUS; SUBCUTANEOUS at 13:31

## 2020-08-21 RX ADMIN — Medication 25 MILLIGRAM(S): at 23:00

## 2020-08-21 RX ADMIN — Medication 200 MILLIGRAM(S): at 17:40

## 2020-08-21 RX ADMIN — Medication 150 MILLIGRAM(S): at 05:24

## 2020-08-21 RX ADMIN — BUPROPION HYDROCHLORIDE 300 MILLIGRAM(S): 150 TABLET, EXTENDED RELEASE ORAL at 12:35

## 2020-08-21 RX ADMIN — ISOSORBIDE MONONITRATE 60 MILLIGRAM(S): 60 TABLET, EXTENDED RELEASE ORAL at 12:36

## 2020-08-21 RX ADMIN — Medication 2 MILLIGRAM(S): at 12:36

## 2020-08-21 RX ADMIN — Medication 650 MILLIGRAM(S): at 23:00

## 2020-08-21 RX ADMIN — PANTOPRAZOLE SODIUM 40 MILLIGRAM(S): 20 TABLET, DELAYED RELEASE ORAL at 05:24

## 2020-08-21 RX ADMIN — Medication 40 MILLIGRAM(S): at 12:37

## 2020-08-21 RX ADMIN — Medication 3 MILLILITER(S): at 13:56

## 2020-08-21 RX ADMIN — Medication 25 MILLIGRAM(S): at 05:24

## 2020-08-21 RX ADMIN — Medication 3 MILLILITER(S): at 19:38

## 2020-08-21 NOTE — GOALS OF CARE CONVERSATION - ADVANCED CARE PLANNING - CONVERSATION DETAILS
Spoke with son/HCP Lisatres Melissa by phone. He states his father wants all done FULL CODE,knows he doesn't want to be kept alive if he is" braindead". Discussed his current condition and son states he is worse than he has been. Son to speak to pt to see if he still wants all done. Discussed hospice at home he will speak to his Dad when the time is right

## 2020-08-21 NOTE — DIETITIAN INITIAL EVALUATION ADULT. - PROBLEM SELECTOR PLAN 1
Admit to F  possible aspiration PNA given bedbound status   WBC 12.57, afebrile in ED, 02 sat wnl on home O2   CXR showed right lobe infiltrate  s/p Azithromycin x1 and Rocephin x1 in ED, will continue zosyn pending CT chest   f/u MRSA PCR, CRP, ESR, legionella urine antigen, strept urine antigen, and BCx.  will obtain ct chest to further evaluate  NPO pending speech and swallow   Pulm Dr. Banuelos consulted, recs appreciated  PT consult  SW consult   aspiration precautions

## 2020-08-21 NOTE — PROGRESS NOTE ADULT - SUBJECTIVE AND OBJECTIVE BOX
Date/Time Patient Seen:  		  Referring MD:   Data Reviewed	       Patient is a 80y old  Male who presents with a chief complaint of pneumonia (20 Aug 2020 10:29)      Subjective/HPI     PAST MEDICAL & SURGICAL HISTORY:  COPD, severe  Congestive heart failure  Pulmonary fibrosis  Pulmonary fibrosis  Smoker  BPH with obstruction/lower urinary tract symptoms  Vitamin D deficiency  GERD (gastroesophageal reflux disease)  Diabetes mellitus  Hyperlipidemia  Hypertension  S/P tonsillectomy  No significant past surgical history        Medication list         MEDICATIONS  (STANDING):  albuterol/ipratropium for Nebulization 3 milliLiter(s) Nebulizer every 6 hours  allopurinol 100 milliGRAM(s) Oral daily  amLODIPine   Tablet 10 milliGRAM(s) Oral daily  buPROPion XL . 300 milliGRAM(s) Oral daily  dextrose 5%. 1000 milliLiter(s) (50 mL/Hr) IV Continuous <Continuous>  dextrose 50% Injectable 12.5 Gram(s) IV Push once  dextrose 50% Injectable 25 Gram(s) IV Push once  dextrose 50% Injectable 25 Gram(s) IV Push once  furosemide    Tablet 40 milliGRAM(s) Oral daily  heparin   Injectable 5000 Unit(s) SubCutaneous every 8 hours  hydrALAZINE 25 milliGRAM(s) Oral three times a day  insulin lispro (HumaLOG) corrective regimen sliding scale   SubCutaneous three times a day before meals  insulin lispro (HumaLOG) corrective regimen sliding scale   SubCutaneous at bedtime  isosorbide   mononitrate ER Tablet (IMDUR) 60 milliGRAM(s) Oral daily  labetalol 200 milliGRAM(s) Oral two times a day  pantoprazole    Tablet 40 milliGRAM(s) Oral before breakfast  tamsulosin 0.8 milliGRAM(s) Oral at bedtime  venlafaxine XR. 150 milliGRAM(s) Oral two times a day    MEDICATIONS  (PRN):  ALPRAZolam 2 milliGRAM(s) Oral three times a day PRN agitation  dextrose 40% Gel 15 Gram(s) Oral once PRN Blood Glucose LESS THAN 70 milliGRAM(s)/deciliter  glucagon  Injectable 1 milliGRAM(s) IntraMuscular once PRN Glucose LESS THAN 70 milligrams/deciliter         Vitals log        ICU Vital Signs Last 24 Hrs  T(C): 37.4 (21 Aug 2020 04:59), Max: 37.4 (20 Aug 2020 21:37)  T(F): 99.4 (21 Aug 2020 04:59), Max: 99.4 (21 Aug 2020 04:59)  HR: 82 (21 Aug 2020 04:59) (69 - 86)  BP: 159/80 (21 Aug 2020 04:59) (137/76 - 165/82)  BP(mean): 97 (20 Aug 2020 21:37) (97 - 97)  ABP: --  ABP(mean): --  RR: 18 (21 Aug 2020 04:59) (17 - 18)  SpO2: 93% (21 Aug 2020 04:59) (92% - 96%)           Input and Output:  I&O's Detail    20 Aug 2020 07:01  -  21 Aug 2020 06:34  --------------------------------------------------------  IN:  Total IN: 0 mL    OUT:    Voided: 1000 mL  Total OUT: 1000 mL    Total NET: -1000 mL          Lab Data                        8.6    12.57 )-----------( 128      ( 20 Aug 2020 04:17 )             26.8     08-20    138  |  105  |  39<H>  ----------------------------<  102<H>  4.6   |  25  |  2.10<H>    Ca    9.3      20 Aug 2020 04:17    TPro  6.9  /  Alb  3.3  /  TBili  0.4  /  DBili  x   /  AST  11<L>  /  ALT  16  /  AlkPhos  90  08-20            Review of Systems	      Objective     Physical Examination    heart s1s2  lung dec BS  abd soft  on o2 support  weak  frail  verbal      Pertinent Lab findings & Imaging      Princess:  NO   Adequate UO     I&O's Detail    20 Aug 2020 07:01  -  21 Aug 2020 06:34  --------------------------------------------------------  IN:  Total IN: 0 mL    OUT:    Voided: 1000 mL  Total OUT: 1000 mL    Total NET: -1000 mL               Discussed with:     Cultures:	        Radiology

## 2020-08-21 NOTE — DIETITIAN INITIAL EVALUATION ADULT. - PROBLEM SELECTOR PLAN 10
heparin subq    11. HLD continue home dose fenofibrate   12. Stage 2 pressure injury, sacrum- wound care rn consulted   IMPROVE VTE Individual Risk Assessment          RISK                                                          Points    [  ] Previous VTE                                                3  [  ] Thrombophilia                                             2  [  ] Lower limb paralysis                                   2        (unable to hold up >15 seconds)    [  ] Current Cancer                                            2         (within 6 months)  [ x ] Immobilization > 24 hrs                              1  [  ] ICU/CCU stay > 24 hours                            1  [ x ] Age > 60                                                    1    IMPROVE VTE Score _____2____

## 2020-08-21 NOTE — DIETITIAN INITIAL EVALUATION ADULT. - OTHER INFO
Pt asleep x 2 attempts, admit with weakness, advanced COPD. NFPE deferred, but pt with possible wt loss over last few months. Per EMR, UBW was ~235#, current wt on bedscale today 220#. No prominent signs of fat muscle loss around face, but body covered by blankets. Per EMR, possible home hospice in future. Seen by SLP, om Dys 3 diet, possible MBS. Hx DM, HF, HTN, HLD. Agree with diet liberalization in regards to DM, as glu fairly controlled, but may consider adding Low Sodium as pt with HF on Lasix.

## 2020-08-21 NOTE — PROGRESS NOTE ADULT - SUBJECTIVE AND OBJECTIVE BOX
Patient is a 80y old  Male who presents with a chief complaint of pneumonia (20 Aug 2020 08:01)      FROM ADMISSION H+P:   HPI:  81 yo M with PMH of chronic diastolic CHF (normal LVF ), non obstructive CAD (cardiac cath ), BPH, interstitial lung disease, COPD (on 4L home O2), HTN, HLD, DMT2 on Janumet, GERD, BPH, depression, anxiety BIBEMS for generalized weakness, unable to get up to go to the bathroom. rectal temp here 99.9.  Pt is poor historian, attempted to reach son Balaji at number listed but unable to reach. History obtained via chart review. Per EMS reports patient called complaining of generalized weakness. He was unable to get up to go to the bathroom. Patient states he is sleepy but denies SOB or difficulty breathing. Denies chest pain, palpitations, muscle or body aches.     In the ED  VS: T97.8, HR 80, /74, RR 18, SpO2 100 on 4L NC  Labs: WBC 12.57, H/H 8.6/26.8, platelets 128, INR 1.27, BUN 39, Cr 2.10  CXR: right sided infiltrate   Given zothromax x1, rocephin x1, tylenol 650mg x1. (20 Aug 2020 05:22)      ----  INTERVAL HPI/OVERNIGHT EVENTS: Pt seen and evaluated at the bedside. No acute overnight events occurred. Pt comfortable in bed, denies any CP, SOB, F/Ch    ----  PAST MEDICAL & SURGICAL HISTORY:  COPD, severe  Congestive heart failure  Pulmonary fibrosis  Pulmonary fibrosis  Smoker  BPH with obstruction/lower urinary tract symptoms  Vitamin D deficiency  GERD (gastroesophageal reflux disease)  Diabetes mellitus  Hyperlipidemia  Hypertension  S/P tonsillectomy      FAMILY HISTORY:  Family history of heart disease (Grandparent)  Family history of lymphoma      ----  MEDICATIONS  (STANDING):  albuterol/ipratropium for Nebulization 3 milliLiter(s) Nebulizer every 6 hours  allopurinol 100 milliGRAM(s) Oral daily  amLODIPine   Tablet 10 milliGRAM(s) Oral daily  buPROPion XL . 300 milliGRAM(s) Oral daily  dextrose 5%. 1000 milliLiter(s) (50 mL/Hr) IV Continuous <Continuous>  dextrose 50% Injectable 12.5 Gram(s) IV Push once  dextrose 50% Injectable 25 Gram(s) IV Push once  dextrose 50% Injectable 25 Gram(s) IV Push once  furosemide    Tablet 40 milliGRAM(s) Oral daily  heparin   Injectable 5000 Unit(s) SubCutaneous every 8 hours  hydrALAZINE 25 milliGRAM(s) Oral three times a day  insulin lispro (HumaLOG) corrective regimen sliding scale   SubCutaneous every 6 hours  isosorbide   mononitrate ER Tablet (IMDUR) 60 milliGRAM(s) Oral daily  labetalol 200 milliGRAM(s) Oral two times a day  pantoprazole    Tablet 40 milliGRAM(s) Oral before breakfast  piperacillin/tazobactam IVPB.. 3.375 Gram(s) IV Intermittent every 8 hours  tamsulosin 0.8 milliGRAM(s) Oral at bedtime  venlafaxine XR. 150 milliGRAM(s) Oral two times a day    MEDICATIONS  (PRN):  ALPRAZolam 2 milliGRAM(s) Oral three times a day PRN agitation  dextrose 40% Gel 15 Gram(s) Oral once PRN Blood Glucose LESS THAN 70 milliGRAM(s)/deciliter  glucagon  Injectable 1 milliGRAM(s) IntraMuscular once PRN Glucose LESS THAN 70 milligrams/deciliter      ----  REVIEW OF SYSTEMS:  CONSTITUTIONAL: denies fever, chills, fatigue, weakness  HEENT: denies blurred vision, sore throat  SKIN: denies new lesions, rash  CARDIOVASCULAR: denies chest pain, chest pressure, palpitations  RESPIRATORY: denies shortness of breath, sputum production  GASTROINTESTINAL: denies nausea, vomiting, diarrhea, abdominal pain  GENITOURINARY: denies dysuria, discharge  NEUROLOGICAL: denies numbness, headache, focal weakness  MUSCULOSKELETAL: denies new joint pain, muscle aches  HEMATOLOGIC: denies gross bleeding, bruising  LYMPHATICS: denies enlarged lymph nodes, extremity swelling  PSYCHIATRIC: denies recent changes in anxiety, depression	  ENDOCRINOLOGIC: denies sweating, cold or heat intolerance    ----  PHYSICAL EXAM:  GENERAL: patient appears well, no acute distress, appropriate, pleasant  EYES: sclera clear, no exudates  ENMT: oropharynx clear without erythema, no exudates, moist mucous membranes  NECK: supple, soft, no thyromegaly noted  LUNGS:  coarse breath sounds b/l, transmitted upper airway sounds noted   HEART: soft S1/S2, regular rate and rhythm, no murmurs noted, + lower extremity edema  GASTROINTESTINAL: abdomen is soft, nontender, nondistended, normoactive bowel sounds, no palpable masses  INTEGUMENT: good skin turgor, no lesions noted  MUSCULOSKELETAL: no clubbing or cyanosis, no obvious deformity  NEUROLOGIC: awake, alert, oriented x3, good muscle tone in 4 extremities, no obvious sensory deficits    Vital Signs Last 24 Hrs  T(C): 37.4 (21 Aug 2020 04:59), Max: 37.4 (20 Aug 2020 21:37)  T(F): 99.4 (21 Aug 2020 04:59), Max: 99.4 (21 Aug 2020 04:59)  HR: 72 (21 Aug 2020 08:12) (69 - 86)  BP: 159/80 (21 Aug 2020 04:59) (137/76 - 159/80)  BP(mean): 97 (20 Aug 2020 21:37) (97 - 97)  RR: 18 (21 Aug 2020 04:59) (18 - 18)  SpO2: 96% (21 Aug 2020 08:12) (92% - 96%)    LABS:                        8.4    13.61 )-----------( 124      ( 21 Aug 2020 08:21 )             26.3     21 Aug 2020 08:21    136    |  102    |  32     ----------------------------<  108    4.4     |  27     |  1.70     Ca    9.2        21 Aug 2020 08:21  Mg     1.7       21 Aug 2020 08:21    TPro  6.6    /  Alb  3.1    /  TBili  0.5    /  DBili  x      /  AST  13     /  ALT  15     /  AlkPhos  89     21 Aug 2020 08:21    PT/INR - ( 20 Aug 2020 04:17 )   PT: 14.7 sec;   INR: 1.27 ratio         PTT - ( 20 Aug 2020 04:17 )  PTT:31.3 sec  Urinalysis Basic - ( 20 Aug 2020 05:45 )    Color: Yellow / Appearance: Slightly Turbid / S.010 / pH: x  Gluc: x / Ketone: Negative  / Bili: Negative / Urobili: Negative   Blood: x / Protein: 30 mg/dL / Nitrite: Negative   Leuk Esterase: Trace / RBC: x / WBC 6-10   Sq Epi: x / Non Sq Epi: Few / Bacteria: Moderate      CAPILLARY BLOOD GLUCOSE      POCT Blood Glucose.: 125 mg/dL (21 Aug 2020 07:41)  POCT Blood Glucose.: 119 mg/dL (20 Aug 2020 22:01)  POCT Blood Glucose.: 136 mg/dL (20 Aug 2020 16:42)  POCT Blood Glucose.: 130 mg/dL (20 Aug 2020 12:28)    UCx       RADIOLOGY & ADDITIONAL TESTS:                  ----  Personally reviewed:  Vital sign trends: [ x ] yes    [  ] no     [  ] n/a  Laboratory results: [ x ] yes    [  ] no     [  ] n/a  Radiology results: [ x ] yes    [  ] no     [  ] n/a  Culture results: [ x ] yes    [  ] no     [  ] n/a  Consultant recommendations: [ x ] yes    [  ] no     [  ] n/a

## 2020-08-21 NOTE — CONSULT NOTE ADULT - CONVERSATION DETAILS
Writer met with pt at bedside and then again later in the afternoon with pt's son/HCP Balaji and pt together. Reviewed patient's medical and social history as well as events leading to patient's hospitalization. Writer discussed patient's current diagnosis (COPD, functional decline/failure to thrive), medical condition and management,  poor prognosis, and life expectancy. Inquired about patient's wishes regarding extent of medical care to be provided including escalation of medical care into the ICU and use of vasopressor support. In addition, the writer inquired about thoughts regarding cardiopulmonary resuscitation, artificial nutrition and hydration including use of feeding tubes and IVF, antibiotics, and further investigative studies such as blood draws and radiology. Both pt and son showed good insight into pt's medical condition. Patient asked to focus on his comfort & reduce suffering as he hates being hospitalized. He prefers to be home with his two aids at his bedside as they make life worth living. All questions answered. Writer recommended home hospice. Patient consented to DNR/DNI, no feeding tube and do not rehospitalize. MOLST form filled out and placed in chart. Home hospice referral initiated. Pt felt relieved, son was grieving appropriately. Psychosocial support provided.

## 2020-08-21 NOTE — CHART NOTE - NSCHARTNOTEFT_GEN_A_CORE
Called by RN for fever. Pt is admitted to hospital with pneumonia. Temperature recorded 101, /69, HR 78 RR18 91% on NC 4L. Patient seen and examined at bedside. Patient is non-toxic appearing, AAOx4, resting comfortably in bed. He denies any headache, chest pain, sob.        T(C): 38.3 (20 @ 20:37), Max: 38.3 (20 @ 20:37)  HR: 78 (20 @ 20:37) (71 - 82)  BP: 151/69 (20 @ 20:37) (151/69 - 166/89)  RR: 18 (20 @ 20:37) (18 - 18)  SpO2: 91% (20 @ 20:37) (91% - 97%)  Wt(kg): --    Physical :  Gen- NAD, ncat resting comfortably in bed   Cardio - s+1,s+2, rrr, no murmur  Lung - crackles heard throughout all lung fields  Abdomen- +BS, NT/ND, no guarding, no rebound, no masses  Ext- no edema, 2+ pulses b/l  Neuro- no focal deficits, appropriately answering questions.     LABS:                        8.4    13.61 )-----------( 124      ( 21 Aug 2020 08:21 )             26.3         136  |  102  |  32<H>  ----------------------------<  108<H>  4.4   |  27  |  1.70<H>    Ca    9.2      21 Aug 2020 08:21  Mg     1.7         TPro  6.6  /  Alb  3.1<L>  /  TBili  0.5  /  DBili  x   /  AST  13<L>  /  ALT  15  /  AlkPhos  89      PT/INR - ( 20 Aug 2020 04:17 )   PT: 14.7 sec;   INR: 1.27 ratio         PTT - ( 20 Aug 2020 04:17 )  PTT:31.3 sec  Urinalysis Basic - ( 20 Aug 2020 05:45 )    Color: Yellow / Appearance: Slightly Turbid / S.010 / pH: x  Gluc: x / Ketone: Negative  / Bili: Negative / Urobili: Negative   Blood: x / Protein: 30 mg/dL / Nitrite: Negative   Leuk Esterase: Trace / RBC: x / WBC 6-10   Sq Epi: x / Non Sq Epi: Few / Bacteria: Moderate              Assessment/Plan  79 yo M with PMH of chronic diastolic CHF (normal LVF ), non obstructive CAD (cardiac cath ), BPH, interstitial lung disease, COPD (on 4L home O2), HTN, HLD, DMT2 on Janumet, GERD, BPH, depression, anxiety BIBEMS for generalized weakness. Admitted with PNA. Called by RN fever.      1. Pt is admitted with PNA. He is non toxic appearing with stable vitals. Currently off of antibiotics. Will order chest CXR, blood cxrs, Urine cx, urine analysis. Will f/u    -RN to call for any clinical changes.

## 2020-08-21 NOTE — CONSULT NOTE ADULT - ASSESSMENT
1) Decreased PO intake/anorexia/dysphagia   - comfort feeding as tolerated (dysphagia 3 diet)  - no feeding tubes  - Trial of marinol as appetite stimulant as discussed with pt & HCP > start 5 mg PO daily.    2) Failure to thrive  3) Goals of care/advance care planning  - Palliative performance scale score: 20% (poor)  - Prognosis: days-weeks (pt is hospice eligible)   - Code status: DNR/DNI (MOLST form filled out and placed in the chart)  - GOC: trial of IV antibiotics and IVF. No feeding tube. Home hospice upon discharge.   - HCP: Pt's son Balaji  - Psychosocial support provided  - SW to start referral to home hospice    4) Nausea likely 2/2 GERD and diabetic gastroparesis  - start reglan 5 mg IVPB q8h (prokinetic)  - zofran prn  - continue PPI  - can use xanax if nausea is related to anxiety (anticipatory nausea)    Appreciate consult. Discussed with the primary team.    Will continue to follow.    Gabino Mcgee MD

## 2020-08-21 NOTE — DIETITIAN INITIAL EVALUATION ADULT. - PROBLEM SELECTOR PLAN 2
last echo 2016, will order repeat   caution with IVF   daily weights   monitor I&O  monitor electrolytes, keep mg>2 and k>4  continue home dose lasix 40mg daily   patient pharmacy on record with only two recent prescriptions (lasix and fenofibrate) and patient unable to recall name of current pharmacy. AM team to attempt to verify medications.

## 2020-08-21 NOTE — CONSULT NOTE ADULT - SUBJECTIVE AND OBJECTIVE BOX
HPI:  81 yo M with PMH of chronic diastolic CHF, non obstructive CAD, BPH, interstitial lung disease, COPD (on 4L home O2), HTN, HLD, DMT2 on Janumet, GERD, BPH, depression, anxiety BIBEMS for generalized weakness, unable to get up to go to the bathroom. rectal temp here 99.9.  Pt is poor historian, attempted to reach son Balaji at number listed but unable to reach. History obtained via chart review. Per EMS reports patient called complaining of generalized weakness. He was unable to get up to go to the bathroom. Patient states he is sleepy but denies SOB or difficulty breathing. Denies chest pain, palpitations, muscle or body aches.     In the ED  VS: T97.8, HR 80, /74, RR 18, SpO2 100 on 4L NC  Labs: WBC 12.57, H/H 8.6/26.8, platelets 128, INR 1.27, BUN 39, Cr 2.10  CXR: right sided infiltrate   Given zothromax x1, rocephin x1, tylenol 650mg x1. (20 Aug 2020 05:22)    PERTINENT PM/SXH:   COPD, severe  Congestive heart failure  Pulmonary fibrosis  Pulmonary fibrosis  Smoker  BPH with obstruction/lower urinary tract symptoms  Vitamin D deficiency  GERD (gastroesophageal reflux disease)  Diabetes mellitus  Hyperlipidemia  Hypertension    S/P tonsillectomy  No significant past surgical history    FAMILY HISTORY:  Family history of heart disease (Grandparent)  Family history of lymphoma    ITEMS NOT CHECKED ARE NOT PRESENT    SOCIAL HISTORY:   Significant other/partner[ ]  Children[ ]  Jehovah's witness/Spirituality:  Substance hx:  [ ]   Tobacco hx:  [ ]   Alcohol hx: [ ]   Home Opioid hx:  [ ] I-Stop Reference No:  Living Situation: [ ]Home  [ ]Long term care  [ ]Rehab [ ]Other    ADVANCE DIRECTIVES:    DNR  Yes  MOLST  [ ]  Living Will  [ ]   DECISION MAKER(s):  [ ] Health Care Proxy(s)  [ ] Surrogate(s)  [ ] Guardian           Name(s): Phone Number(s):    BASELINE (I)ADL(s) (prior to admission):  [place]: [ ]Total  [ ] Moderate [ ]Dependent    Allergies    No Known Allergies    Intolerances    MEDICATIONS  (STANDING):  albuterol/ipratropium for Nebulization 3 milliLiter(s) Nebulizer every 6 hours  allopurinol 100 milliGRAM(s) Oral daily  amLODIPine   Tablet 10 milliGRAM(s) Oral daily  buPROPion XL . 300 milliGRAM(s) Oral daily  dextrose 5%. 1000 milliLiter(s) (50 mL/Hr) IV Continuous <Continuous>  dextrose 50% Injectable 12.5 Gram(s) IV Push once  dextrose 50% Injectable 25 Gram(s) IV Push once  dextrose 50% Injectable 25 Gram(s) IV Push once  furosemide    Tablet 40 milliGRAM(s) Oral daily  heparin   Injectable 5000 Unit(s) SubCutaneous every 8 hours  hydrALAZINE 25 milliGRAM(s) Oral three times a day  insulin lispro (HumaLOG) corrective regimen sliding scale   SubCutaneous three times a day before meals  insulin lispro (HumaLOG) corrective regimen sliding scale   SubCutaneous at bedtime  isosorbide   mononitrate ER Tablet (IMDUR) 60 milliGRAM(s) Oral daily  labetalol 200 milliGRAM(s) Oral two times a day  methylPREDNISolone sodium succinate Injectable 40 milliGRAM(s) IV Push daily  pantoprazole    Tablet 40 milliGRAM(s) Oral before breakfast  tamsulosin 0.8 milliGRAM(s) Oral at bedtime  venlafaxine XR. 150 milliGRAM(s) Oral two times a day    MEDICATIONS  (PRN):  ALPRAZolam 2 milliGRAM(s) Oral three times a day PRN agitation  dextrose 40% Gel 15 Gram(s) Oral once PRN Blood Glucose LESS THAN 70 milliGRAM(s)/deciliter  glucagon  Injectable 1 milliGRAM(s) IntraMuscular once PRN Glucose LESS THAN 70 milligrams/deciliter    PRESENT SYMPTOMS: [ ]Unable to obtain due to poor mentation   Source if other than patient:  [ ]Family   [ ]Team     Pain: [ ]yes [ ]no  QOL impact -   Location -                    Aggravating factors -  Quality -  Radiation -  Timing-  Severity (0-10 scale):  Minimal acceptable level (0-10 scale):     CPOT:    https://www.sccm.org/getattachment/ghz93n21-0a7m-4q5v-4q1o-2257t1630r2l/Critical-Care-Pain-Observation-Tool-(CPOT)      PAIN AD Score:     [email or web address].pdf (press ctrl +  left click to view)    Dyspnea:                           [ ]Mild [ ]Moderate [ ]Severe  Anxiety:                             [ ]Mild [ ]Moderate [ ]Severe  Fatigue:                             [ ]Mild [ ]Moderate [ ]Severe  Nausea:                             [ ]Mild [ ]Moderate [ ]Severe  Loss of appetite:              [ ]Mild [ ]Moderate [ ]Severe  Constipation:                    [ ]Mild [ ]Moderate [ ]Severe    Other Symptoms:  [ ]All other review of systems negative     Palliative Performance Status Version 2:         %    http://[email or web address].org/files/news/palliative_performance_scale_[email or web address].pdf  PHYSICAL EXAM:  Vital Signs Last 24 Hrs  T(C): 37.6 (21 Aug 2020 14:09), Max: 37.6 (21 Aug 2020 14:09)  T(F): 99.6 (21 Aug 2020 14:09), Max: 99.6 (21 Aug 2020 14:09)  HR: 74 (21 Aug 2020 19:39) (69 - 82)  BP: 163/81 (21 Aug 2020 14:09) (137/76 - 166/89)  BP(mean): 97 (20 Aug 2020 21:37) (97 - 97)  RR: 18 (21 Aug 2020 14:09) (18 - 18)  SpO2: 95% (21 Aug 2020 19:39) (92% - 97%) I&O's Summary    20 Aug 2020 07:  -  21 Aug 2020 07:00  --------------------------------------------------------  IN: 0 mL / OUT: 1000 mL / NET: -1000 mL    21 Aug 2020 07:01  -  21 Aug 2020 20:10  --------------------------------------------------------  IN: 0 mL / OUT: 1250 mL / NET: -1250 mL      GENERAL:  [ ]Alert  [ ]Oriented x   [ ]Lethargic  [ ]Cachexia  [ ]Unarousable  [ ]Verbal  [ ]Non-Verbal  Behavioral:   [ ] Anxiety  [ ] Delirium [ ] Agitation [ ] Other  HEENT:  [ ]Normal   [ ]Dry mouth   [ ]ET Tube/Trach  [ ]Oral lesions  PULMONARY:   [ ]Clear [ ]Tachypnea  [ ]Audible excessive secretions   [ ]Rhonchi        [ ]Right [ ]Left [ ]Bilateral  [ ]Crackles        [ ]Right [ ]Left [ ]Bilateral  [ ]Wheezing     [ ]Right [ ]Left [ ]Bilateral  [ ]Diminished breath sounds [ ]right [ ]left [ ]bilateral  CARDIOVASCULAR:    [ ]Regular [ ]Irregular [ ]Tachy  [ ]Alhaji [ ]Murmur [ ]Other  GASTROINTESTINAL:  [ ]Soft  [ ]Distended   [ ]+BS  [ ]Non tender [ ]Tender  [ ]PEG [ ]OGT/ NGT  Last BM:     GENITOURINARY:  [ ]Normal [ ] Incontinent   [ ]Oliguria/Anuria   [ ]Sánchez  MUSCULOSKELETAL:   [ ]Normal   [ ]Weakness  [ ]Bed/Wheelchair bound [ ]Edema  NEUROLOGIC:   [ ]No focal deficits  [ ]Cognitive impairment  [ ]Dysphagia [ ]Dysarthria [ ]Paresis [ ]Other   SKIN:   [ ]Normal    [ ]Rash  [ ]Pressure ulcer(s)       Present on admission [ ]y [ ]n    CRITICAL CARE:  [ ] Shock Present  [ ]Septic [ ]Cardiogenic [ ]Neurologic [ ]Hypovolemic  [ ]  Vasopressors [ ]  Inotropes   [ ]Respiratory failure present [ ]Mechanical ventilation [ ]Non-invasive ventilatory support [ ]High flow    [ ]Acute  [ ]Chronic [ ]Hypoxic  [ ]Hypercarbic [ ]Other  [ ]Other organ failure     LABS:                        8.4    13.61 )-----------( 124      ( 21 Aug 2020 08:21 )             26.3   08-21    136  |  102  |  32<H>  ----------------------------<  108<H>  4.4   |  27  |  1.70<H>    Ca    9.2      21 Aug 2020 08:21  Mg     1.7     08-21    TPro  6.6  /  Alb  3.1<L>  /  TBili  0.5  /  DBili  x   /  AST  13<L>  /  ALT  15  /  AlkPhos  89  08-21  PT/INR - ( 20 Aug 2020 04:17 )   PT: 14.7 sec;   INR: 1.27 ratio         PTT - ( 20 Aug 2020 04:17 )  PTT:31.3 sec    Urinalysis Basic - ( 20 Aug 2020 05:45 )    Color: Yellow / Appearance: Slightly Turbid / S.010 / pH: x  Gluc: x / Ketone: Negative  / Bili: Negative / Urobili: Negative   Blood: x / Protein: 30 mg/dL / Nitrite: Negative   Leuk Esterase: Trace / RBC: x / WBC 6-10   Sq Epi: x / Non Sq Epi: Few / Bacteria: Moderate      RADIOLOGY & ADDITIONAL STUDIES:    PROTEIN CALORIE MALNUTRITION PRESENT: [ ]mild [ ]moderate [ ]severe [ ]underweight [ ]morbid obesity  https://www.andeal.org/vault/2440/web/files/ONC/Table_Clinical%20Characteristics%20to%20Document%20Malnutrition-White%20JV%20et%20al%2020.pdf    Height (cm): 175.26 (19 @ 22:16)  Weight (kg): 86.2 (20 @ 03:46), 106.6 (19 @ 22:16)  BMI (kg/m2): 28.1 (20 @ 03:46), 34.7 (19 @ 22:16)    [ ]PPSV2 < or = to 30% [ ]significant weight loss  [ ]poor nutritional intake  [ ]anasarca     Albumin, Serum: 3.1 g/dL (20 @ 08:21)   [ ]Artificial Nutrition      REFERRALS:   [ ]Chaplaincy  [ ]Hospice  [ ]Child Life  [ ]Social Work  [ ]Case management [ ]Holistic Therapy     Goals of Care Document: TAWANDA Arthur (20 @ 14:26)  Goals of Care Conversation:   Participants:  · Participants  Family  · Child(anel)  Rich Torres    Advance Directives:  · Does patient have Advance Directive  Yes  · Indicate Type  Health Care Proxy (HCP)  · Agent's Name  Rich Torres  · Phone #  [phone]  · Are any of the items on the chart  Yes  · Specify which ones are on chart  Health Care Proxy (HCP)  · Caregiver:  no    Conversation Discussion:  · Conversation  MOLST Discussed  · Conversation Details  Spoke with son/HCP Rich Torres by phone. He states his father wants all done FULL CODE,knows he doesn't want to be kept alive if he is" braindead". Discussed his current condition and son states he is worse than he has been. Son to speak to pt to see if he still wants all done. Discussed hospice at home he will speak to his Dad when the time is right      Electronic Signatures:  Karlie Arthur (RN)  (Signed 21-Aug-2020 14:31)  	Authored: Goals of Care Conversation      Last Updated: 21-Aug-2020 14:31 by Karlie Arthur (RN) HPI:  79 yo M with PMH of chronic diastolic CHF, non obstructive CAD, BPH, interstitial lung disease, COPD (on 4L home O2), HTN, HLD, DMT2 on Janumet, GERD, BPH, depression, anxiety BIBEMS for generalized weakness, unable to get up to go to the bathroom. rectal temp here 99.9.  Pt is poor historian, attempted to reach son Balaji at number listed but unable to reach. History obtained via chart review. Per EMS reports patient called complaining of generalized weakness. He was unable to get up to go to the bathroom. Patient states he is sleepy but denies SOB or difficulty breathing. Denies chest pain, palpitations, muscle or body aches.     In the ED  VS: T97.8, HR 80, /74, RR 18, SpO2 100 on 4L NC  Labs: WBC 12.57, H/H 8.6/26.8, platelets 128, INR 1.27, BUN 39, Cr 2.10  CXR: right sided infiltrate   Given zothromax x1, rocephin x1, tylenol 650mg x1. (20 Aug 2020 05:22)    PERTINENT PM/SXH:   COPD, severe  Congestive heart failure  Pulmonary fibrosis  Pulmonary fibrosis  Smoker  BPH with obstruction/lower urinary tract symptoms  Vitamin D deficiency  GERD (gastroesophageal reflux disease)  Diabetes mellitus  Hyperlipidemia  Hypertension    S/P tonsillectomy  No significant past surgical history    FAMILY HISTORY:  Family history of heart disease (Grandparent)  Family history of lymphoma    ITEMS NOT CHECKED ARE NOT PRESENT    SOCIAL HISTORY:   Significant other/partner[ ]  Children[x ]  Episcopal/Spirituality: Mandaen  Substance hx:  [ ]   Tobacco hx:  [ ]   Alcohol hx: [ ]   Home Opioid hx:  [ ] I-Stop Reference No:  Living Situation: [ x]Home  [ ]Long term care  [ ]Rehab [ ]Other    ADVANCE DIRECTIVES:    DNR  Yes  MOLST  [ ]  Living Will  [ ]   DECISION MAKER(s): patient   [ x] Health Care Proxy(s)  [ ] Surrogate(s)  [ ] Guardian           Name(s): Pt's son Balaji  Phone Number(s):    BASELINE (I)ADL(s) (prior to admission):  [place]: [ ]Total  [ ] Moderate [ x]Dependent    Allergies    No Known Allergies    Intolerances    MEDICATIONS  (STANDING):  albuterol/ipratropium for Nebulization 3 milliLiter(s) Nebulizer every 6 hours  allopurinol 100 milliGRAM(s) Oral daily  amLODIPine   Tablet 10 milliGRAM(s) Oral daily  buPROPion XL . 300 milliGRAM(s) Oral daily  dextrose 5%. 1000 milliLiter(s) (50 mL/Hr) IV Continuous <Continuous>  dextrose 50% Injectable 12.5 Gram(s) IV Push once  dextrose 50% Injectable 25 Gram(s) IV Push once  dextrose 50% Injectable 25 Gram(s) IV Push once  furosemide    Tablet 40 milliGRAM(s) Oral daily  heparin   Injectable 5000 Unit(s) SubCutaneous every 8 hours  hydrALAZINE 25 milliGRAM(s) Oral three times a day  insulin lispro (HumaLOG) corrective regimen sliding scale   SubCutaneous three times a day before meals  insulin lispro (HumaLOG) corrective regimen sliding scale   SubCutaneous at bedtime  isosorbide   mononitrate ER Tablet (IMDUR) 60 milliGRAM(s) Oral daily  labetalol 200 milliGRAM(s) Oral two times a day  methylPREDNISolone sodium succinate Injectable 40 milliGRAM(s) IV Push daily  pantoprazole    Tablet 40 milliGRAM(s) Oral before breakfast  tamsulosin 0.8 milliGRAM(s) Oral at bedtime  venlafaxine XR. 150 milliGRAM(s) Oral two times a day    MEDICATIONS  (PRN):  ALPRAZolam 2 milliGRAM(s) Oral three times a day PRN agitation  dextrose 40% Gel 15 Gram(s) Oral once PRN Blood Glucose LESS THAN 70 milliGRAM(s)/deciliter  glucagon  Injectable 1 milliGRAM(s) IntraMuscular once PRN Glucose LESS THAN 70 milligrams/deciliter    PRESENT SYMPTOMS: [ ]Unable to obtain due to poor mentation   Source if other than patient:  [ ]Family   [ ]Team     Pain: [ ]yes [x ]no  QOL impact -   Location -                    Aggravating factors -  Quality -  Radiation -  Timing-  Severity (0-10 scale):  Minimal acceptable level (0-10 scale):     CPOT:    https://www.sccm.org/getattachment/pem16m08-7y3m-5e3s-8u8l-6306r4188l4p/Critical-Care-Pain-Observation-Tool-(CPOT)      PAIN AD Score:     [email or web address] (press ctrl +  left click to view)    Dyspnea:                           [x ]Mild [ ]Moderate [ ]Severe  Anxiety:                             [ ]Mild [ x]Moderate [ ]Severe  Fatigue:                             [ ]Mild [x ]Moderate [ ]Severe  Nausea:                             [ ]Mild [ ]Moderate [ x]Severe  Loss of appetite:              [ ]Mild [ ]Moderate [x ]Severe  Constipation:                    [ ]Mild [ ]Moderate [ ]Severe    Other Symptoms:  [x ]All other review of systems negative     Palliative Performance Status Version 2:    20     %    http://npcrc.org/files/news/palliative_performance_scale_ppsv2.pdf  PHYSICAL EXAM:  Vital Signs Last 24 Hrs  T(C): 37.6 (21 Aug 2020 14:09), Max: 37.6 (21 Aug 2020 14:09)  T(F): 99.6 (21 Aug 2020 14:09), Max: 99.6 (21 Aug 2020 14:09)  HR: 74 (21 Aug 2020 19:39) (69 - 82)  BP: 163/81 (21 Aug 2020 14:09) (137/76 - 166/89)  BP(mean): 97 (20 Aug 2020 21:37) (97 - 97)  RR: 18 (21 Aug 2020 14:09) (18 - 18)  SpO2: 95% (21 Aug 2020 19:39) (92% - 97%) I&O's Summary    20 Aug 2020 07:  -  21 Aug 2020 07:00  --------------------------------------------------------  IN: 0 mL / OUT: 1000 mL / NET: -1000 mL    21 Aug 2020 07:  -  21 Aug 2020 20:10  --------------------------------------------------------  IN: 0 mL / OUT: 1250 mL / NET: -1250 mL    GENERAL: patient appears well, no acute distress, appropriate, pleasant  EYES: sclera clear, no exudates  ENMT: oropharynx clear without erythema, no exudates, moist mucous membranes  NECK: supple, soft, no thyromegaly noted  LUNGS:  coarse breath sounds b/l, transmitted upper airway sounds noted   HEART: soft S1/S2, regular rate and rhythm, no murmurs noted, + lower extremity edema  GASTROINTESTINAL: abdomen is soft, nontender, nondistended, normoactive bowel sounds, no palpable masses  INTEGUMENT: good skin turgor, no lesions noted  MUSCULOSKELETAL: no clubbing or cyanosis, no obvious deformity  NEUROLOGIC: awake, alert, oriented x3, good muscle tone in 4 extremities, no obvious sensory deficits  Psych: flat affect, not agitated    LABS:                        8.4    13.61 )-----------( 124      ( 21 Aug 2020 08:21 )             26.3   08-21    136  |  102  |  32<H>  ----------------------------<  108<H>  4.4   |  27  |  1.70<H>    Ca    9.2      21 Aug 2020 08:21  Mg     1.7     -    TPro  6.6  /  Alb  3.1<L>  /  TBili  0.5  /  DBili  x   /  AST  13<L>  /  ALT  15  /  AlkPhos  89  08-21  PT/INR - ( 20 Aug 2020 04:17 )   PT: 14.7 sec;   INR: 1.27 ratio         PTT - ( 20 Aug 2020 04:17 )  PTT:31.3 sec    Urinalysis Basic - ( 20 Aug 2020 05:45 )    Color: Yellow / Appearance: Slightly Turbid / S.010 / pH: x  Gluc: x / Ketone: Negative  / Bili: Negative / Urobili: Negative   Blood: x / Protein: 30 mg/dL / Nitrite: Negative   Leuk Esterase: Trace / RBC: x / WBC 6-10   Sq Epi: x / Non Sq Epi: Few / Bacteria: Moderate      RADIOLOGY & ADDITIONAL STUDIES: reviewed    PROTEIN CALORIE MALNUTRITION PRESENT: [ ]mild [ ]moderate [ ]severe [ ]underweight [ ]morbid obesity  https://www.andeal.org/vault/6540/web/files/ONC/Table_Clinical%20Characteristics%20to%20Document%20Malnutrition-White%20JV%20et%20al%282763.pdf    Height (cm): 175.26 (19 @ 22:16)  Weight (kg): 86.2 (20 @ 03:46), 106.6 (19 @ 22:16)  BMI (kg/m2): 28.1 (20 @ 03:46), 34.7 (19 @ 22:16)    [x ]PPSV2 < or = to 30% [ ]significant weight loss  [x ]poor nutritional intake  [ ]anasarca     Albumin, Serum: 3.1 g/dL (20 @ 08:21)   [ ]Artificial Nutrition      REFERRALS:   [ ]Chaplaincy  [x ]Hospice  [ ]Child Life  [ ]Social Work  [ ]Case management [ ]Holistic Therapy     Goals of Care Document: TAWANDA Arthur (20 @ 14:26)  Goals of Care Conversation:   Participants:  · Participants  Family  · Child(anel)  Rich Torres    Advance Directives:  · Does patient have Advance Directive  Yes  · Indicate Type  Health Care Proxy (HCP)  · Agent's Name  Rich Torres  · Phone #  [phone]  · Are any of the items on the chart  Yes  · Specify which ones are on chart  Health Care Proxy (HCP)  · Caregiver:  no    Conversation Discussion:  · Conversation  MOLST Discussed  · Conversation Details  Spoke with son/HCP Rich Torres by phone. He states his father wants all done FULL CODE,knows he doesn't want to be kept alive if he is" braindead". Discussed his current condition and son states he is worse than he has been. Son to speak to pt to see if he still wants all done. Discussed hospice at home he will speak to his Dad when the time is right      Electronic Signatures:  Karlie Arthur (ROBERT)  (Signed 21-Aug-2020 14:31)  	Authored: Goals of Care Conversation      Last Updated: 21-Aug-2020 14:31 by Karlie Arthur (ROBERT)

## 2020-08-22 LAB
A1C WITH ESTIMATED AVERAGE GLUCOSE RESULT: 4.8 % — SIGNIFICANT CHANGE UP (ref 4–5.6)
ANION GAP SERPL CALC-SCNC: 8 MMOL/L — SIGNIFICANT CHANGE UP (ref 5–17)
APPEARANCE UR: CLEAR — SIGNIFICANT CHANGE UP
BILIRUB UR-MCNC: NEGATIVE — SIGNIFICANT CHANGE UP
BUN SERPL-MCNC: 34 MG/DL — HIGH (ref 7–23)
CALCIUM SERPL-MCNC: 9.2 MG/DL — SIGNIFICANT CHANGE UP (ref 8.5–10.1)
CHLORIDE SERPL-SCNC: 99 MMOL/L — SIGNIFICANT CHANGE UP (ref 96–108)
CO2 SERPL-SCNC: 28 MMOL/L — SIGNIFICANT CHANGE UP (ref 22–31)
COLOR SPEC: SIGNIFICANT CHANGE UP
CREAT SERPL-MCNC: 1.7 MG/DL — HIGH (ref 0.5–1.3)
DIFF PNL FLD: NEGATIVE — SIGNIFICANT CHANGE UP
ESTIMATED AVERAGE GLUCOSE: 91 MG/DL — SIGNIFICANT CHANGE UP (ref 68–114)
GLUCOSE SERPL-MCNC: 84 MG/DL — SIGNIFICANT CHANGE UP (ref 70–99)
GLUCOSE UR QL: NEGATIVE — SIGNIFICANT CHANGE UP
HCT VFR BLD CALC: 26.2 % — LOW (ref 39–50)
HGB BLD-MCNC: 8.4 G/DL — LOW (ref 13–17)
KETONES UR-MCNC: NEGATIVE — SIGNIFICANT CHANGE UP
LEUKOCYTE ESTERASE UR-ACNC: NEGATIVE — SIGNIFICANT CHANGE UP
MAGNESIUM SERPL-MCNC: 1.9 MG/DL — SIGNIFICANT CHANGE UP (ref 1.6–2.6)
MCHC RBC-ENTMCNC: 28.2 PG — SIGNIFICANT CHANGE UP (ref 27–34)
MCHC RBC-ENTMCNC: 32.1 GM/DL — SIGNIFICANT CHANGE UP (ref 32–36)
MCV RBC AUTO: 87.9 FL — SIGNIFICANT CHANGE UP (ref 80–100)
NITRITE UR-MCNC: NEGATIVE — SIGNIFICANT CHANGE UP
NRBC # BLD: 0 /100 WBCS — SIGNIFICANT CHANGE UP (ref 0–0)
PH UR: 5 — SIGNIFICANT CHANGE UP (ref 5–8)
PLATELET # BLD AUTO: 128 K/UL — LOW (ref 150–400)
POTASSIUM SERPL-MCNC: 4.5 MMOL/L — SIGNIFICANT CHANGE UP (ref 3.5–5.3)
POTASSIUM SERPL-SCNC: 4.5 MMOL/L — SIGNIFICANT CHANGE UP (ref 3.5–5.3)
PROT UR-MCNC: 30 MG/DL
RBC # BLD: 2.98 M/UL — LOW (ref 4.2–5.8)
RBC # FLD: 15.8 % — HIGH (ref 10.3–14.5)
SODIUM SERPL-SCNC: 135 MMOL/L — SIGNIFICANT CHANGE UP (ref 135–145)
SP GR SPEC: 1.01 — SIGNIFICANT CHANGE UP (ref 1.01–1.02)
UROBILINOGEN FLD QL: NEGATIVE — SIGNIFICANT CHANGE UP
WBC # BLD: 12.3 K/UL — HIGH (ref 3.8–10.5)
WBC # FLD AUTO: 12.3 K/UL — HIGH (ref 3.8–10.5)

## 2020-08-22 PROCEDURE — 99233 SBSQ HOSP IP/OBS HIGH 50: CPT

## 2020-08-22 RX ORDER — PIPERACILLIN AND TAZOBACTAM 4; .5 G/20ML; G/20ML
3.38 INJECTION, POWDER, LYOPHILIZED, FOR SOLUTION INTRAVENOUS EVERY 8 HOURS
Refills: 0 | Status: DISCONTINUED | OUTPATIENT
Start: 2020-08-22 | End: 2020-08-28

## 2020-08-22 RX ORDER — ALPRAZOLAM 0.25 MG
2 TABLET ORAL THREE TIMES A DAY
Refills: 0 | Status: DISCONTINUED | OUTPATIENT
Start: 2020-08-22 | End: 2020-08-26

## 2020-08-22 RX ADMIN — PIPERACILLIN AND TAZOBACTAM 25 GRAM(S): 4; .5 INJECTION, POWDER, LYOPHILIZED, FOR SOLUTION INTRAVENOUS at 15:07

## 2020-08-22 RX ADMIN — AMLODIPINE BESYLATE 10 MILLIGRAM(S): 2.5 TABLET ORAL at 05:33

## 2020-08-22 RX ADMIN — Medication 40 MILLIGRAM(S): at 05:20

## 2020-08-22 RX ADMIN — Medication 5 MILLIGRAM(S): at 05:22

## 2020-08-22 RX ADMIN — HEPARIN SODIUM 5000 UNIT(S): 5000 INJECTION INTRAVENOUS; SUBCUTANEOUS at 22:11

## 2020-08-22 RX ADMIN — Medication 100 MILLIGRAM(S): at 12:03

## 2020-08-22 RX ADMIN — TAMSULOSIN HYDROCHLORIDE 0.8 MILLIGRAM(S): 0.4 CAPSULE ORAL at 22:11

## 2020-08-22 RX ADMIN — Medication 150 MILLIGRAM(S): at 05:24

## 2020-08-22 RX ADMIN — Medication 25 MILLIGRAM(S): at 14:56

## 2020-08-22 RX ADMIN — Medication 40 MILLIGRAM(S): at 05:23

## 2020-08-22 RX ADMIN — Medication 25 MILLIGRAM(S): at 05:20

## 2020-08-22 RX ADMIN — Medication 3 MILLILITER(S): at 07:29

## 2020-08-22 RX ADMIN — PIPERACILLIN AND TAZOBACTAM 25 GRAM(S): 4; .5 INJECTION, POWDER, LYOPHILIZED, FOR SOLUTION INTRAVENOUS at 22:10

## 2020-08-22 RX ADMIN — Medication 5 MILLIGRAM(S): at 14:55

## 2020-08-22 RX ADMIN — Medication 3 MILLILITER(S): at 13:58

## 2020-08-22 RX ADMIN — Medication 25 MILLIGRAM(S): at 22:11

## 2020-08-22 RX ADMIN — Medication 200 MILLIGRAM(S): at 17:56

## 2020-08-22 RX ADMIN — Medication 5 MILLIGRAM(S): at 14:56

## 2020-08-22 RX ADMIN — HEPARIN SODIUM 5000 UNIT(S): 5000 INJECTION INTRAVENOUS; SUBCUTANEOUS at 05:20

## 2020-08-22 RX ADMIN — ISOSORBIDE MONONITRATE 60 MILLIGRAM(S): 60 TABLET, EXTENDED RELEASE ORAL at 12:03

## 2020-08-22 RX ADMIN — Medication 2 MILLIGRAM(S): at 22:11

## 2020-08-22 RX ADMIN — Medication 200 MILLIGRAM(S): at 05:20

## 2020-08-22 RX ADMIN — Medication 3 MILLILITER(S): at 20:03

## 2020-08-22 RX ADMIN — Medication 150 MILLIGRAM(S): at 17:56

## 2020-08-22 RX ADMIN — Medication 1: at 12:21

## 2020-08-22 RX ADMIN — Medication 5 MILLIGRAM(S): at 22:11

## 2020-08-22 RX ADMIN — Medication 2 MILLIGRAM(S): at 12:17

## 2020-08-22 RX ADMIN — HEPARIN SODIUM 5000 UNIT(S): 5000 INJECTION INTRAVENOUS; SUBCUTANEOUS at 14:55

## 2020-08-22 RX ADMIN — BUPROPION HYDROCHLORIDE 300 MILLIGRAM(S): 150 TABLET, EXTENDED RELEASE ORAL at 12:03

## 2020-08-22 RX ADMIN — PANTOPRAZOLE SODIUM 40 MILLIGRAM(S): 20 TABLET, DELAYED RELEASE ORAL at 05:23

## 2020-08-22 NOTE — PROGRESS NOTE ADULT - SUBJECTIVE AND OBJECTIVE BOX
Date/Time Patient Seen:  		  Referring MD:   Data Reviewed	       Patient is a 80y old  Male who presents with a chief complaint of pneumonia (21 Aug 2020 14:31)      Subjective/HPI     PAST MEDICAL & SURGICAL HISTORY:  COPD, severe  Congestive heart failure  Pulmonary fibrosis  Pulmonary fibrosis  Smoker  BPH with obstruction/lower urinary tract symptoms  Vitamin D deficiency  GERD (gastroesophageal reflux disease)  Diabetes mellitus  Hyperlipidemia  Hypertension  S/P tonsillectomy  No significant past surgical history        Medication list         MEDICATIONS  (STANDING):  albuterol/ipratropium for Nebulization 3 milliLiter(s) Nebulizer every 6 hours  allopurinol 100 milliGRAM(s) Oral daily  amLODIPine   Tablet 10 milliGRAM(s) Oral daily  buPROPion XL . 300 milliGRAM(s) Oral daily  dextrose 5%. 1000 milliLiter(s) (50 mL/Hr) IV Continuous <Continuous>  dextrose 50% Injectable 12.5 Gram(s) IV Push once  dextrose 50% Injectable 25 Gram(s) IV Push once  dextrose 50% Injectable 25 Gram(s) IV Push once  dronabinol 5 milliGRAM(s) Oral daily  furosemide    Tablet 40 milliGRAM(s) Oral daily  heparin   Injectable 5000 Unit(s) SubCutaneous every 8 hours  hydrALAZINE 25 milliGRAM(s) Oral three times a day  insulin lispro (HumaLOG) corrective regimen sliding scale   SubCutaneous three times a day before meals  insulin lispro (HumaLOG) corrective regimen sliding scale   SubCutaneous at bedtime  isosorbide   mononitrate ER Tablet (IMDUR) 60 milliGRAM(s) Oral daily  labetalol 200 milliGRAM(s) Oral two times a day  methylPREDNISolone sodium succinate Injectable 40 milliGRAM(s) IV Push daily  metoclopramide Injectable 5 milliGRAM(s) IV Push every 8 hours  pantoprazole    Tablet 40 milliGRAM(s) Oral before breakfast  piperacillin/tazobactam IVPB.. 3.375 Gram(s) IV Intermittent every 8 hours  tamsulosin 0.8 milliGRAM(s) Oral at bedtime  venlafaxine XR. 150 milliGRAM(s) Oral two times a day    MEDICATIONS  (PRN):  ALPRAZolam 2 milliGRAM(s) Oral three times a day PRN agitation  dextrose 40% Gel 15 Gram(s) Oral once PRN Blood Glucose LESS THAN 70 milliGRAM(s)/deciliter  glucagon  Injectable 1 milliGRAM(s) IntraMuscular once PRN Glucose LESS THAN 70 milligrams/deciliter         Vitals log        ICU Vital Signs Last 24 Hrs  T(C): 36.6 (22 Aug 2020 04:59), Max: 38.3 (21 Aug 2020 20:37)  T(F): 97.8 (22 Aug 2020 04:59), Max: 101 (21 Aug 2020 20:37)  HR: 78 (22 Aug 2020 04:59) (71 - 78)  BP: 166/87 (22 Aug 2020 04:59) (151/69 - 166/89)  BP(mean): --  ABP: --  ABP(mean): --  RR: 18 (22 Aug 2020 04:59) (18 - 18)  SpO2: 94% (22 Aug 2020 04:59) (91% - 97%)           Input and Output:  I&O's Detail    21 Aug 2020 07:01  -  22 Aug 2020 07:00  --------------------------------------------------------  IN:  Total IN: 0 mL    OUT:    Voided: 1250 mL  Total OUT: 1250 mL    Total NET: -1250 mL          Lab Data                        8.4    13.61 )-----------( 124      ( 21 Aug 2020 08:21 )             26.3     08-21    136  |  102  |  32<H>  ----------------------------<  108<H>  4.4   |  27  |  1.70<H>    Ca    9.2      21 Aug 2020 08:21  Mg     1.7     08-21    TPro  6.6  /  Alb  3.1<L>  /  TBili  0.5  /  DBili  x   /  AST  13<L>  /  ALT  15  /  AlkPhos  89  08-21            Review of Systems	      Objective     Physical Examination    heart s1s2  lung dec BS  abd soft  on o2 support      Pertinent Lab findings & Imaging      Princess:  NO   Adequate UO     I&O's Detail    21 Aug 2020 07:01  -  22 Aug 2020 07:00  --------------------------------------------------------  IN:  Total IN: 0 mL    OUT:    Voided: 1250 mL  Total OUT: 1250 mL    Total NET: -1250 mL               Discussed with:     Cultures:	        Radiology

## 2020-08-22 NOTE — PROGRESS NOTE ADULT - ASSESSMENT
79 yo M with PMH of chronic diastolic CHF (normal LVF 2016), non obstructive CAD (cardiac cath 2015), BPH, interstitial lung disease, COPD (on 4L home O2), HTN, HLD, DMT2 on Janumet, GERD, BPH, depression, anxiety BIBEMS for generalized weakness. Admitted with PNA.

## 2020-08-22 NOTE — PROGRESS NOTE ADULT - SUBJECTIVE AND OBJECTIVE BOX
Patient is a 80y old  Male who presents with a chief complaint of pneumonia (20 Aug 2020 08:01)      FROM ADMISSION H+P:   HPI:  81 yo M with PMH of chronic diastolic CHF (normal LVF ), non obstructive CAD (cardiac cath ), BPH, interstitial lung disease, COPD (on 4L home O2), HTN, HLD, DMT2 on Janumet, GERD, BPH, depression, anxiety BIBEMS for generalized weakness, unable to get up to go to the bathroom. rectal temp here 99.9.  Pt is poor historian, attempted to reach son Balaji at number listed but unable to reach. History obtained via chart review. Per EMS reports patient called complaining of generalized weakness. He was unable to get up to go to the bathroom. Patient states he is sleepy but denies SOB or difficulty breathing. Denies chest pain, palpitations, muscle or body aches.     In the ED  VS: T97.8, HR 80, /74, RR 18, SpO2 100 on 4L NC  Labs: WBC 12.57, H/H 8.6/26.8, platelets 128, INR 1.27, BUN 39, Cr 2.10  CXR: right sided infiltrate   Given zothromax x1, rocephin x1, tylenol 650mg x1. (20 Aug 2020 05:22)      ----  INTERVAL HPI/OVERNIGHT EVENTS: Pt seen and evaluated at the bedside. had fever last night to 101. Pt comfortable in bed c/o nausea, denies any CP, SOB, F/Ch    ----  PAST MEDICAL & SURGICAL HISTORY:  COPD, severe  Congestive heart failure  Pulmonary fibrosis  Pulmonary fibrosis  Smoker  BPH with obstruction/lower urinary tract symptoms  Vitamin D deficiency  GERD (gastroesophageal reflux disease)  Diabetes mellitus  Hyperlipidemia  Hypertension  S/P tonsillectomy      FAMILY HISTORY:  Family history of heart disease (Grandparent)  Family history of lymphoma      ----  MEDICATIONS  (STANDING):  albuterol/ipratropium for Nebulization 3 milliLiter(s) Nebulizer every 6 hours  allopurinol 100 milliGRAM(s) Oral daily  amLODIPine   Tablet 10 milliGRAM(s) Oral daily  buPROPion XL . 300 milliGRAM(s) Oral daily  dextrose 5%. 1000 milliLiter(s) (50 mL/Hr) IV Continuous <Continuous>  dextrose 50% Injectable 12.5 Gram(s) IV Push once  dextrose 50% Injectable 25 Gram(s) IV Push once  dextrose 50% Injectable 25 Gram(s) IV Push once  dronabinol 5 milliGRAM(s) Oral daily  furosemide    Tablet 40 milliGRAM(s) Oral daily  heparin   Injectable 5000 Unit(s) SubCutaneous every 8 hours  hydrALAZINE 25 milliGRAM(s) Oral three times a day  insulin lispro (HumaLOG) corrective regimen sliding scale   SubCutaneous three times a day before meals  insulin lispro (HumaLOG) corrective regimen sliding scale   SubCutaneous at bedtime  isosorbide   mononitrate ER Tablet (IMDUR) 60 milliGRAM(s) Oral daily  labetalol 200 milliGRAM(s) Oral two times a day  methylPREDNISolone sodium succinate Injectable 40 milliGRAM(s) IV Push daily  metoclopramide Injectable 5 milliGRAM(s) IV Push every 8 hours  pantoprazole    Tablet 40 milliGRAM(s) Oral before breakfast  piperacillin/tazobactam IVPB.. 3.375 Gram(s) IV Intermittent every 8 hours  tamsulosin 0.8 milliGRAM(s) Oral at bedtime  venlafaxine XR. 150 milliGRAM(s) Oral two times a day    MEDICATIONS  (PRN):  ALPRAZolam 2 milliGRAM(s) Oral three times a day PRN agitation  dextrose 40% Gel 15 Gram(s) Oral once PRN Blood Glucose LESS THAN 70 milliGRAM(s)/deciliter  glucagon  Injectable 1 milliGRAM(s) IntraMuscular once PRN Glucose LESS THAN 70 milligrams/deciliter    ----  REVIEW OF SYSTEMS:  CONSTITUTIONAL: denies fever, chills, fatigue, weakness  HEENT: denies blurred vision, sore throat  SKIN: denies new lesions, rash  CARDIOVASCULAR: denies chest pain, chest pressure, palpitations  RESPIRATORY: denies shortness of breath, sputum production  GASTROINTESTINAL: denies nausea, vomiting, diarrhea, abdominal pain  GENITOURINARY: denies dysuria, discharge  NEUROLOGICAL: denies numbness, headache, focal weakness  MUSCULOSKELETAL: denies new joint pain, muscle aches  HEMATOLOGIC: denies gross bleeding, bruising  LYMPHATICS: denies enlarged lymph nodes, extremity swelling  PSYCHIATRIC: denies recent changes in anxiety, depression	  ENDOCRINOLOGIC: denies sweating, cold or heat intolerance    ----  PHYSICAL EXAM:  GENERAL: patient appears well, no acute distress, appropriate, pleasant  EYES: sclera clear, no exudates  ENMT: oropharynx clear without erythema, no exudates, moist mucous membranes  NECK: supple, soft, no thyromegaly noted  LUNGS:  coarse breath sounds b/l, transmitted upper airway sounds noted   HEART: soft S1/S2, regular rate and rhythm, no murmurs noted, + lower extremity edema  GASTROINTESTINAL: abdomen is soft, nontender, nondistended, normoactive bowel sounds, no palpable masses  INTEGUMENT: good skin turgor, no lesions noted  MUSCULOSKELETAL: no clubbing or cyanosis, no obvious deformity  NEUROLOGIC: awake, alert, oriented x3, good muscle tone in 4 extremities, no obvious sensory deficits    Vital Signs Last 24 Hrs  T(C): 36.6 (22 Aug 2020 04:59), Max: 38.3 (21 Aug 2020 20:37)  T(F): 97.8 (22 Aug 2020 04:59), Max: 101 (21 Aug 2020 20:37)  HR: 81 (22 Aug 2020 07:36) (71 - 81)  BP: 166/87 (22 Aug 2020 04:59) (151/69 - 166/89)  BP(mean): --  RR: 18 (22 Aug 2020 04:59) (18 - 18)  SpO2: 97% (22 Aug 2020 07:36) (91% - 97%)  LABS:                        8.4    12.30 )-----------( 128      ( 22 Aug 2020 07:42 )             26.2     22 Aug 2020 07:42    135    |  99     |  34     ----------------------------<  84     4.5     |  28     |  1.70     Ca    9.2        22 Aug 2020 07:42  Mg     1.9       22 Aug 2020 07:42        Urinalysis Basic - ( 22 Aug 2020 08:15 )    Color: Pale Yellow / Appearance: Clear / S.010 / pH: x  Gluc: x / Ketone: Negative  / Bili: Negative / Urobili: Negative   Blood: x / Protein: 30 mg/dL / Nitrite: Negative   Leuk Esterase: Negative / RBC: Negative /HPF / WBC Negative   Sq Epi: x / Non Sq Epi: Negative / Bacteria: Few      CAPILLARY BLOOD GLUCOSE      POCT Blood Glucose.: 138 mg/dL (22 Aug 2020 08:00)  POCT Blood Glucose.: 189 mg/dL (21 Aug 2020 21:40)  POCT Blood Glucose.: 176 mg/dL (21 Aug 2020 17:09)  POCT Blood Glucose.: 131 mg/dL (21 Aug 2020 12:04)    UCx       RADIOLOGY & ADDITIONAL TESTS:                ----  Personally reviewed:  Vital sign trends: [ x ] yes    [  ] no     [  ] n/a  Laboratory results: [ x ] yes    [  ] no     [  ] n/a  Radiology results: [ x ] yes    [  ] no     [  ] n/a  Culture results: [ x ] yes    [  ] no     [  ] n/a  Consultant recommendations: [ x ] yes    [  ] no     [  ] n/a Patient is a 80y old  Male who presents with a chief complaint of pneumonia (20 Aug 2020 08:01)      FROM ADMISSION H+P:   HPI:  81 yo M with PMH of chronic diastolic CHF (normal LVF ), non obstructive CAD (cardiac cath ), BPH, interstitial lung disease, COPD (on 4L home O2), HTN, HLD, DMT2 on Janumet, GERD, BPH, depression, anxiety BIBEMS for generalized weakness, unable to get up to go to the bathroom. rectal temp here 99.9.  Pt is poor historian, attempted to reach son Balaji at number listed but unable to reach. History obtained via chart review. Per EMS reports patient called complaining of generalized weakness. He was unable to get up to go to the bathroom. Patient states he is sleepy but denies SOB or difficulty breathing. Denies chest pain, palpitations, muscle or body aches.     In the ED  VS: T97.8, HR 80, /74, RR 18, SpO2 100 on 4L NC  Labs: WBC 12.57, H/H 8.6/26.8, platelets 128, INR 1.27, BUN 39, Cr 2.10  CXR: right sided infiltrate   Given zothromax x1, rocephin x1, tylenol 650mg x1. (20 Aug 2020 05:22)      ----  INTERVAL HPI/OVERNIGHT EVENTS: Pt seen and evaluated at the bedside. had fever last night to 101. Pt comfortable in bed c/o nausea, denies any CP, SOB, F/Ch. Pt was seen by palliative and is DNR/DNI.    ----  PAST MEDICAL & SURGICAL HISTORY:  COPD, severe  Congestive heart failure  Pulmonary fibrosis  Pulmonary fibrosis  Smoker  BPH with obstruction/lower urinary tract symptoms  Vitamin D deficiency  GERD (gastroesophageal reflux disease)  Diabetes mellitus  Hyperlipidemia  Hypertension  S/P tonsillectomy      FAMILY HISTORY:  Family history of heart disease (Grandparent)  Family history of lymphoma      ----  MEDICATIONS  (STANDING):  albuterol/ipratropium for Nebulization 3 milliLiter(s) Nebulizer every 6 hours  allopurinol 100 milliGRAM(s) Oral daily  amLODIPine   Tablet 10 milliGRAM(s) Oral daily  buPROPion XL . 300 milliGRAM(s) Oral daily  dextrose 5%. 1000 milliLiter(s) (50 mL/Hr) IV Continuous <Continuous>  dextrose 50% Injectable 12.5 Gram(s) IV Push once  dextrose 50% Injectable 25 Gram(s) IV Push once  dextrose 50% Injectable 25 Gram(s) IV Push once  dronabinol 5 milliGRAM(s) Oral daily  furosemide    Tablet 40 milliGRAM(s) Oral daily  heparin   Injectable 5000 Unit(s) SubCutaneous every 8 hours  hydrALAZINE 25 milliGRAM(s) Oral three times a day  insulin lispro (HumaLOG) corrective regimen sliding scale   SubCutaneous three times a day before meals  insulin lispro (HumaLOG) corrective regimen sliding scale   SubCutaneous at bedtime  isosorbide   mononitrate ER Tablet (IMDUR) 60 milliGRAM(s) Oral daily  labetalol 200 milliGRAM(s) Oral two times a day  methylPREDNISolone sodium succinate Injectable 40 milliGRAM(s) IV Push daily  metoclopramide Injectable 5 milliGRAM(s) IV Push every 8 hours  pantoprazole    Tablet 40 milliGRAM(s) Oral before breakfast  piperacillin/tazobactam IVPB.. 3.375 Gram(s) IV Intermittent every 8 hours  tamsulosin 0.8 milliGRAM(s) Oral at bedtime  venlafaxine XR. 150 milliGRAM(s) Oral two times a day    MEDICATIONS  (PRN):  ALPRAZolam 2 milliGRAM(s) Oral three times a day PRN agitation  dextrose 40% Gel 15 Gram(s) Oral once PRN Blood Glucose LESS THAN 70 milliGRAM(s)/deciliter  glucagon  Injectable 1 milliGRAM(s) IntraMuscular once PRN Glucose LESS THAN 70 milligrams/deciliter    ----  REVIEW OF SYSTEMS:  CONSTITUTIONAL: denies fever, chills, fatigue, weakness  HEENT: denies blurred vision, sore throat  SKIN: denies new lesions, rash  CARDIOVASCULAR: denies chest pain, chest pressure, palpitations  RESPIRATORY: denies shortness of breath, sputum production  GASTROINTESTINAL: denies nausea, vomiting, diarrhea, abdominal pain  GENITOURINARY: denies dysuria, discharge  NEUROLOGICAL: denies numbness, headache, focal weakness  MUSCULOSKELETAL: denies new joint pain, muscle aches  HEMATOLOGIC: denies gross bleeding, bruising  LYMPHATICS: denies enlarged lymph nodes, extremity swelling  PSYCHIATRIC: denies recent changes in anxiety, depression	  ENDOCRINOLOGIC: denies sweating, cold or heat intolerance    ----  PHYSICAL EXAM:  GENERAL: patient appears well, no acute distress, appropriate, pleasant  EYES: sclera clear, no exudates  ENMT: oropharynx clear without erythema, no exudates, moist mucous membranes  NECK: supple, soft, no thyromegaly noted  LUNGS:  coarse breath sounds b/l, transmitted upper airway sounds noted   HEART: soft S1/S2, regular rate and rhythm, no murmurs noted, + lower extremity edema  GASTROINTESTINAL: abdomen is soft, nontender, nondistended, normoactive bowel sounds, no palpable masses  INTEGUMENT: good skin turgor, no lesions noted  MUSCULOSKELETAL: no clubbing or cyanosis, no obvious deformity  NEUROLOGIC: awake, alert, oriented x3, good muscle tone in 4 extremities, no obvious sensory deficits    Vital Signs Last 24 Hrs  T(C): 36.6 (22 Aug 2020 04:59), Max: 38.3 (21 Aug 2020 20:37)  T(F): 97.8 (22 Aug 2020 04:59), Max: 101 (21 Aug 2020 20:37)  HR: 81 (22 Aug 2020 07:36) (71 - 81)  BP: 166/87 (22 Aug 2020 04:59) (151/69 - 166/89)  BP(mean): --  RR: 18 (22 Aug 2020 04:59) (18 - 18)  SpO2: 97% (22 Aug 2020 07:36) (91% - 97%)  LABS:                        8.4    12.30 )-----------( 128      ( 22 Aug 2020 07:42 )             26.2     22 Aug 2020 07:42    135    |  99     |  34     ----------------------------<  84     4.5     |  28     |  1.70     Ca    9.2        22 Aug 2020 07:42  Mg     1.9       22 Aug 2020 07:42        Urinalysis Basic - ( 22 Aug 2020 08:15 )    Color: Pale Yellow / Appearance: Clear / S.010 / pH: x  Gluc: x / Ketone: Negative  / Bili: Negative / Urobili: Negative   Blood: x / Protein: 30 mg/dL / Nitrite: Negative   Leuk Esterase: Negative / RBC: Negative /HPF / WBC Negative   Sq Epi: x / Non Sq Epi: Negative / Bacteria: Few      CAPILLARY BLOOD GLUCOSE      POCT Blood Glucose.: 138 mg/dL (22 Aug 2020 08:00)  POCT Blood Glucose.: 189 mg/dL (21 Aug 2020 21:40)  POCT Blood Glucose.: 176 mg/dL (21 Aug 2020 17:09)  POCT Blood Glucose.: 131 mg/dL (21 Aug 2020 12:04)    UCx       RADIOLOGY & ADDITIONAL TESTS:                ----  Personally reviewed:  Vital sign trends: [ x ] yes    [  ] no     [  ] n/a  Laboratory results: [ x ] yes    [  ] no     [  ] n/a  Radiology results: [ x ] yes    [  ] no     [  ] n/a  Culture results: [ x ] yes    [  ] no     [  ] n/a  Consultant recommendations: [ x ] yes    [  ] no     [  ] n/a

## 2020-08-23 ENCOUNTER — TRANSCRIPTION ENCOUNTER (OUTPATIENT)
Age: 80
End: 2020-08-23

## 2020-08-23 LAB
CRP SERPL-MCNC: 3.04 MG/DL — HIGH (ref 0–0.4)
CULTURE RESULTS: NO GROWTH — SIGNIFICANT CHANGE UP
NT-PROBNP SERPL-SCNC: 3930 PG/ML — HIGH (ref 0–450)
S PNEUM AG UR QL: NEGATIVE — SIGNIFICANT CHANGE UP
SPECIMEN SOURCE: SIGNIFICANT CHANGE UP

## 2020-08-23 PROCEDURE — 99232 SBSQ HOSP IP/OBS MODERATE 35: CPT

## 2020-08-23 RX ORDER — POLYETHYLENE GLYCOL 3350 17 G/17G
17 POWDER, FOR SOLUTION ORAL DAILY
Refills: 0 | Status: DISCONTINUED | OUTPATIENT
Start: 2020-08-23 | End: 2020-08-28

## 2020-08-23 RX ORDER — SENNA PLUS 8.6 MG/1
2 TABLET ORAL AT BEDTIME
Refills: 0 | Status: DISCONTINUED | OUTPATIENT
Start: 2020-08-23 | End: 2020-08-28

## 2020-08-23 RX ORDER — SODIUM CHLORIDE 0.65 %
1 AEROSOL, SPRAY (ML) NASAL THREE TIMES A DAY
Refills: 0 | Status: DISCONTINUED | OUTPATIENT
Start: 2020-08-23 | End: 2020-08-28

## 2020-08-23 RX ADMIN — Medication 2 MILLIGRAM(S): at 13:24

## 2020-08-23 RX ADMIN — HEPARIN SODIUM 5000 UNIT(S): 5000 INJECTION INTRAVENOUS; SUBCUTANEOUS at 13:25

## 2020-08-23 RX ADMIN — HEPARIN SODIUM 5000 UNIT(S): 5000 INJECTION INTRAVENOUS; SUBCUTANEOUS at 21:18

## 2020-08-23 RX ADMIN — Medication 200 MILLIGRAM(S): at 05:48

## 2020-08-23 RX ADMIN — Medication 5 MILLIGRAM(S): at 13:25

## 2020-08-23 RX ADMIN — Medication 2 MILLIGRAM(S): at 21:17

## 2020-08-23 RX ADMIN — HEPARIN SODIUM 5000 UNIT(S): 5000 INJECTION INTRAVENOUS; SUBCUTANEOUS at 05:48

## 2020-08-23 RX ADMIN — Medication 40 MILLIGRAM(S): at 05:48

## 2020-08-23 RX ADMIN — Medication 150 MILLIGRAM(S): at 18:57

## 2020-08-23 RX ADMIN — PIPERACILLIN AND TAZOBACTAM 25 GRAM(S): 4; .5 INJECTION, POWDER, LYOPHILIZED, FOR SOLUTION INTRAVENOUS at 05:49

## 2020-08-23 RX ADMIN — BUPROPION HYDROCHLORIDE 300 MILLIGRAM(S): 150 TABLET, EXTENDED RELEASE ORAL at 13:24

## 2020-08-23 RX ADMIN — TAMSULOSIN HYDROCHLORIDE 0.8 MILLIGRAM(S): 0.4 CAPSULE ORAL at 21:17

## 2020-08-23 RX ADMIN — PIPERACILLIN AND TAZOBACTAM 25 GRAM(S): 4; .5 INJECTION, POWDER, LYOPHILIZED, FOR SOLUTION INTRAVENOUS at 21:18

## 2020-08-23 RX ADMIN — Medication 5 MILLIGRAM(S): at 13:24

## 2020-08-23 RX ADMIN — PIPERACILLIN AND TAZOBACTAM 25 GRAM(S): 4; .5 INJECTION, POWDER, LYOPHILIZED, FOR SOLUTION INTRAVENOUS at 14:18

## 2020-08-23 RX ADMIN — Medication 1: at 12:53

## 2020-08-23 RX ADMIN — ISOSORBIDE MONONITRATE 60 MILLIGRAM(S): 60 TABLET, EXTENDED RELEASE ORAL at 13:24

## 2020-08-23 RX ADMIN — Medication 3 MILLILITER(S): at 07:59

## 2020-08-23 RX ADMIN — PANTOPRAZOLE SODIUM 40 MILLIGRAM(S): 20 TABLET, DELAYED RELEASE ORAL at 05:49

## 2020-08-23 RX ADMIN — Medication 25 MILLIGRAM(S): at 05:48

## 2020-08-23 RX ADMIN — Medication 3 MILLILITER(S): at 20:54

## 2020-08-23 RX ADMIN — Medication 100 MILLIGRAM(S): at 13:23

## 2020-08-23 RX ADMIN — POLYETHYLENE GLYCOL 3350 17 GRAM(S): 17 POWDER, FOR SOLUTION ORAL at 13:24

## 2020-08-23 RX ADMIN — Medication 25 MILLIGRAM(S): at 21:17

## 2020-08-23 RX ADMIN — Medication 5 MILLIGRAM(S): at 05:48

## 2020-08-23 RX ADMIN — AMLODIPINE BESYLATE 10 MILLIGRAM(S): 2.5 TABLET ORAL at 05:48

## 2020-08-23 RX ADMIN — Medication 25 MILLIGRAM(S): at 13:24

## 2020-08-23 RX ADMIN — Medication 2 MILLIGRAM(S): at 05:48

## 2020-08-23 RX ADMIN — Medication 150 MILLIGRAM(S): at 05:48

## 2020-08-23 RX ADMIN — Medication 3 MILLILITER(S): at 13:59

## 2020-08-23 RX ADMIN — Medication 200 MILLIGRAM(S): at 18:57

## 2020-08-23 RX ADMIN — Medication 5 MILLIGRAM(S): at 21:17

## 2020-08-23 RX ADMIN — SENNA PLUS 2 TABLET(S): 8.6 TABLET ORAL at 21:17

## 2020-08-23 RX ADMIN — Medication 3 MILLILITER(S): at 01:07

## 2020-08-23 NOTE — DISCHARGE NOTE PROVIDER - NSDCMRMEDTOKEN_GEN_ALL_CORE_FT
allopurinol 100 mg oral tablet: 1 tab(s) orally once a day  amLODIPine 10 mg oral tablet: 1 tab(s) orally once a day  aspirin 81 mg oral delayed release tablet: 1 tab(s) orally once a day  cefuroxime 500 mg oral tablet: 1 tab(s) orally every 12 hours for 2 more days starting this evening 11/29 ~9pm and ending tomorrow night  fenofibrate 160 mg oral tablet: 1 tab(s) orally once a day  furosemide 40 mg oral tablet: 1 tab(s) orally once a day  hydrALAZINE 25 mg oral tablet: 3 tab(s) orally 3 times a day   isosorbide mononitrate 60 mg oral tablet, extended release: 1 tab(s) orally once a day (in the morning)  labetalol 200 mg oral tablet: 1 tab(s) orally 2 times a day  Lasix 40 mg oral tablet: 1 tab(s) orally once a day  predniSONE 20 mg oral tablet: 1 tab(s) orally once a day for 3 more days starting tomorrow morning 11/30/19  Protonix 40 mg oral delayed release tablet: 1 tab(s) orally once a day (before a meal)  Saphris 10 mg sublingual tablet: 1 tab(s) sublingual once a day (at bedtime)  tamsulosin 0.4 mg oral capsule: 1 cap(s) orally once a day  venlafaxine extended release: 150 milligram(s) orally 2 times a day  Wellbutrin  mg/24 hours oral tablet, extended release: 1 tab(s) orally every 24 hours  Xanax 2 mg oral tablet: 1 tab(s) orally 3 times a day, As Needed allopurinol 100 mg oral tablet: 1 tab(s) orally once a day  ALPRAZolam 1 mg oral tablet: 1 tab(s) orally every 8 hours, As needed, anxiety, agitation  amLODIPine 10 mg oral tablet: 1 tab(s) orally once a day  buPROPion 300 mg/24 hours (XL) oral tablet, extended release: 1 tab(s) orally once a day  dronabinol 5 mg oral capsule: 1 cap(s) orally once a day (at bedtime) MDD:5 mg  furosemide 40 mg oral tablet: 1 tab(s) orally once a day  hydrALAZINE 25 mg oral tablet: 1 tab(s) orally 3 times a day  ipratropium-albuterol 0.5 mg-2.5 mg/3 mLinhalation solution: 3 milliliter(s) inhaled every 6 hours  isosorbide mononitrate 60 mg oral tablet, extended release: 1 tab(s) orally once a day  labetalol 200 mg oral tablet: 1 tab(s) orally 2 times a day  metoclopramide 5 mg/mL injectable solution: 1 milliliter(s) injectable every 8 hours MDD:20 mg  pantoprazole 40 mg oral delayed release tablet: 1 tab(s) orally once a day (before a meal)  polyethylene glycol 3350 oral powder for reconstitution: 17 gram(s) orally once a day  predniSONE 20 mg oral tablet: 1 tab(s) orally once a day for 3 more days starting tomorrow morning 11/30/19  Saphris 10 mg sublingual tablet: 1 tab(s) sublingual once a day (at bedtime)  senna oral tablet: 2 tab(s) orally once a day (at bedtime)  tamsulosin 0.4 mg oral capsule: 2 cap(s) orally once a day (at bedtime)  venlafaxine 150 mg oral capsule, extended release: 1 cap(s) orally 2 times a day

## 2020-08-23 NOTE — PROGRESS NOTE ADULT - SUBJECTIVE AND OBJECTIVE BOX
Date/Time Patient Seen:  		  Referring MD:   Data Reviewed	       Patient is a 80y old  Male who presents with a chief complaint of pneumonia (22 Aug 2020 09:49)      Subjective/HPI     PAST MEDICAL & SURGICAL HISTORY:  COPD, severe  Congestive heart failure  Pulmonary fibrosis  Pulmonary fibrosis  Smoker  BPH with obstruction/lower urinary tract symptoms  Vitamin D deficiency  GERD (gastroesophageal reflux disease)  Diabetes mellitus  Hyperlipidemia  Hypertension  S/P tonsillectomy  No significant past surgical history        Medication list         MEDICATIONS  (STANDING):  albuterol/ipratropium for Nebulization 3 milliLiter(s) Nebulizer every 6 hours  allopurinol 100 milliGRAM(s) Oral daily  ALPRAZolam 2 milliGRAM(s) Oral three times a day  amLODIPine   Tablet 10 milliGRAM(s) Oral daily  buPROPion XL . 300 milliGRAM(s) Oral daily  dextrose 5%. 1000 milliLiter(s) (50 mL/Hr) IV Continuous <Continuous>  dextrose 50% Injectable 12.5 Gram(s) IV Push once  dextrose 50% Injectable 25 Gram(s) IV Push once  dextrose 50% Injectable 25 Gram(s) IV Push once  dronabinol 5 milliGRAM(s) Oral daily  furosemide    Tablet 40 milliGRAM(s) Oral daily  heparin   Injectable 5000 Unit(s) SubCutaneous every 8 hours  hydrALAZINE 25 milliGRAM(s) Oral three times a day  insulin lispro (HumaLOG) corrective regimen sliding scale   SubCutaneous three times a day before meals  insulin lispro (HumaLOG) corrective regimen sliding scale   SubCutaneous at bedtime  isosorbide   mononitrate ER Tablet (IMDUR) 60 milliGRAM(s) Oral daily  labetalol 200 milliGRAM(s) Oral two times a day  methylPREDNISolone sodium succinate Injectable 40 milliGRAM(s) IV Push daily  metoclopramide Injectable 5 milliGRAM(s) IV Push every 8 hours  pantoprazole    Tablet 40 milliGRAM(s) Oral before breakfast  piperacillin/tazobactam IVPB.. 3.375 Gram(s) IV Intermittent every 8 hours  tamsulosin 0.8 milliGRAM(s) Oral at bedtime  venlafaxine XR. 150 milliGRAM(s) Oral two times a day    MEDICATIONS  (PRN):  dextrose 40% Gel 15 Gram(s) Oral once PRN Blood Glucose LESS THAN 70 milliGRAM(s)/deciliter  glucagon  Injectable 1 milliGRAM(s) IntraMuscular once PRN Glucose LESS THAN 70 milligrams/deciliter         Vitals log        ICU Vital Signs Last 24 Hrs  T(C): 37.2 (23 Aug 2020 04:50), Max: 37.2 (22 Aug 2020 14:20)  T(F): 99 (23 Aug 2020 04:50), Max: 99 (23 Aug 2020 04:50)  HR: 70 (23 Aug 2020 04:50) (64 - 88)  BP: 149/84 (23 Aug 2020 04:50) (149/84 - 159/87)  BP(mean): --  ABP: --  ABP(mean): --  RR: 18 (23 Aug 2020 04:50) (17 - 18)  SpO2: 96% (23 Aug 2020 04:50) (92% - 99%)           Input and Output:  I&O's Detail    21 Aug 2020 07:01  -  22 Aug 2020 07:00  --------------------------------------------------------  IN:  Total IN: 0 mL    OUT:    Voided: 1250 mL  Total OUT: 1250 mL    Total NET: -1250 mL      22 Aug 2020 07:01  -  23 Aug 2020 06:18  --------------------------------------------------------  IN:    Solution: 100 mL  Total IN: 100 mL    OUT:    Voided: 2650 mL  Total OUT: 2650 mL    Total NET: -2550 mL          Lab Data                        8.4    12.30 )-----------( 128      ( 22 Aug 2020 07:42 )             26.2     08-22    135  |  99  |  34<H>  ----------------------------<  84  4.5   |  28  |  1.70<H>    Ca    9.2      22 Aug 2020 07:42  Mg     1.9     08-22    TPro  6.6  /  Alb  3.1<L>  /  TBili  0.5  /  DBili  x   /  AST  13<L>  /  ALT  15  /  AlkPhos  89  08-21            Review of Systems	      Objective     Physical Examination    heart s1s2  lung dec BS  abd soft  on o2 support      Pertinent Lab findings & Imaging      Princess:  NO   Adequate UO     I&O's Detail    21 Aug 2020 07:01  -  22 Aug 2020 07:00  --------------------------------------------------------  IN:  Total IN: 0 mL    OUT:    Voided: 1250 mL  Total OUT: 1250 mL    Total NET: -1250 mL      22 Aug 2020 07:01  -  23 Aug 2020 06:18  --------------------------------------------------------  IN:    Solution: 100 mL  Total IN: 100 mL    OUT:    Voided: 2650 mL  Total OUT: 2650 mL    Total NET: -2550 mL               Discussed with:     Cultures:	        Radiology

## 2020-08-23 NOTE — PROGRESS NOTE ADULT - SUBJECTIVE AND OBJECTIVE BOX
Patient is a 80y old  Male who presents with a chief complaint of pneumonia (20 Aug 2020 08:01)      FROM ADMISSION H+P:   HPI:  79 yo M with PMH of chronic diastolic CHF (normal LVF ), non obstructive CAD (cardiac cath ), BPH, interstitial lung disease, COPD (on 4L home O2), HTN, HLD, DMT2 on Janumet, GERD, BPH, depression, anxiety BIBEMS for generalized weakness, unable to get up to go to the bathroom. rectal temp here 99.9.  Pt is poor historian, attempted to reach son Balaji at number listed but unable to reach. History obtained via chart review. Per EMS reports patient called complaining of generalized weakness. He was unable to get up to go to the bathroom. Patient states he is sleepy but denies SOB or difficulty breathing. Denies chest pain, palpitations, muscle or body aches.     In the ED  VS: T97.8, HR 80, /74, RR 18, SpO2 100 on 4L NC  Labs: WBC 12.57, H/H 8.6/26.8, platelets 128, INR 1.27, BUN 39, Cr 2.10  CXR: right sided infiltrate   Given zothromax x1, rocephin x1, tylenol 650mg x1. (20 Aug 2020 05:22)      ----  INTERVAL HPI/OVERNIGHT EVENTS: Pt seen and evaluated at the bedside. had fever last night to 101. Pt comfortable in bed c/o anxiety and nausea, denies any CP, SOB, F/Ch. Pt was seen by palliative and is DNR/DNI.    ----  PAST MEDICAL & SURGICAL HISTORY:  COPD, severe  Congestive heart failure  Pulmonary fibrosis  Pulmonary fibrosis  Smoker  BPH with obstruction/lower urinary tract symptoms  Vitamin D deficiency  GERD (gastroesophageal reflux disease)  Diabetes mellitus  Hyperlipidemia  Hypertension  S/P tonsillectomy      FAMILY HISTORY:  Family history of heart disease (Grandparent)  Family history of lymphoma      ----  MEDICATIONS  (STANDING):  albuterol/ipratropium for Nebulization 3 milliLiter(s) Nebulizer every 6 hours  allopurinol 100 milliGRAM(s) Oral daily  amLODIPine   Tablet 10 milliGRAM(s) Oral daily  buPROPion XL . 300 milliGRAM(s) Oral daily  dextrose 5%. 1000 milliLiter(s) (50 mL/Hr) IV Continuous <Continuous>  dextrose 50% Injectable 12.5 Gram(s) IV Push once  dextrose 50% Injectable 25 Gram(s) IV Push once  dextrose 50% Injectable 25 Gram(s) IV Push once  dronabinol 5 milliGRAM(s) Oral daily  furosemide    Tablet 40 milliGRAM(s) Oral daily  heparin   Injectable 5000 Unit(s) SubCutaneous every 8 hours  hydrALAZINE 25 milliGRAM(s) Oral three times a day  insulin lispro (HumaLOG) corrective regimen sliding scale   SubCutaneous three times a day before meals  insulin lispro (HumaLOG) corrective regimen sliding scale   SubCutaneous at bedtime  isosorbide   mononitrate ER Tablet (IMDUR) 60 milliGRAM(s) Oral daily  labetalol 200 milliGRAM(s) Oral two times a day  methylPREDNISolone sodium succinate Injectable 40 milliGRAM(s) IV Push daily  metoclopramide Injectable 5 milliGRAM(s) IV Push every 8 hours  pantoprazole    Tablet 40 milliGRAM(s) Oral before breakfast  piperacillin/tazobactam IVPB.. 3.375 Gram(s) IV Intermittent every 8 hours  tamsulosin 0.8 milliGRAM(s) Oral at bedtime  venlafaxine XR. 150 milliGRAM(s) Oral two times a day    MEDICATIONS  (PRN):  ALPRAZolam 2 milliGRAM(s) Oral three times a day PRN agitation  dextrose 40% Gel 15 Gram(s) Oral once PRN Blood Glucose LESS THAN 70 milliGRAM(s)/deciliter  glucagon  Injectable 1 milliGRAM(s) IntraMuscular once PRN Glucose LESS THAN 70 milligrams/deciliter    ----  REVIEW OF SYSTEMS:  CONSTITUTIONAL: denies fever, chills, fatigue, weakness  HEENT: denies blurred vision, sore throat  SKIN: denies new lesions, rash  CARDIOVASCULAR: denies chest pain, chest pressure, palpitations  RESPIRATORY: denies shortness of breath, sputum production  GASTROINTESTINAL: denies nausea, vomiting, diarrhea, abdominal pain  GENITOURINARY: denies dysuria, discharge  NEUROLOGICAL: denies numbness, headache, focal weakness  MUSCULOSKELETAL: denies new joint pain, muscle aches  HEMATOLOGIC: denies gross bleeding, bruising  LYMPHATICS: denies enlarged lymph nodes, extremity swelling  PSYCHIATRIC: denies recent changes in anxiety, depression	  ENDOCRINOLOGIC: denies sweating, cold or heat intolerance    ----  PHYSICAL EXAM:  GENERAL: patient appears well, no acute distress, appropriate, pleasant  EYES: sclera clear, no exudates  ENMT: oropharynx clear without erythema, no exudates, moist mucous membranes  NECK: supple, soft, no thyromegaly noted  LUNGS:  coarse breath sounds b/l, transmitted upper airway sounds noted   HEART: soft S1/S2, regular rate and rhythm, no murmurs noted, + lower extremity edema  GASTROINTESTINAL: abdomen is soft, nontender, nondistended, normoactive bowel sounds, no palpable masses  INTEGUMENT: good skin turgor, no lesions noted  MUSCULOSKELETAL: no clubbing or cyanosis, no obvious deformity  NEUROLOGIC: awake, alert, oriented x3, good muscle tone in 4 extremities, no obvious sensory deficits    Vital Signs Last 24 Hrs  T(C): 37.2 (23 Aug 2020 04:50), Max: 37.2 (22 Aug 2020 14:20)  T(F): 99 (23 Aug 2020 04:50), Max: 99 (23 Aug 2020 04:50)  HR: 70 (23 Aug 2020 04:50) (64 - 88)  BP: 149/84 (23 Aug 2020 04:50) (149/84 - 159/87)  BP(mean): --  RR: 18 (23 Aug 2020 04:50) (17 - 18)  SpO2: 96% (23 Aug 2020 04:50) (92% - 99%)    LABS:      Ca    9.2        22 Aug 2020 07:42        Urinalysis Basic - ( 22 Aug 2020 08:15 )    Color: Pale Yellow / Appearance: Clear / S.010 / pH: x  Gluc: x / Ketone: Negative  / Bili: Negative / Urobili: Negative   Blood: x / Protein: 30 mg/dL / Nitrite: Negative   Leuk Esterase: Negative / RBC: Negative /HPF / WBC Negative   Sq Epi: x / Non Sq Epi: Negative / Bacteria: Few      CAPILLARY BLOOD GLUCOSE      POCT Blood Glucose.: 145 mg/dL (23 Aug 2020 08:08)  POCT Blood Glucose.: 108 mg/dL (22 Aug 2020 21:45)  POCT Blood Glucose.: 168 mg/dL (22 Aug 2020 16:36)  POCT Blood Glucose.: 171 mg/dL (22 Aug 2020 12:04)    UCx       RADIOLOGY & ADDITIONAL TESTS:                  ----  Personally reviewed:  Vital sign trends: [ x ] yes    [  ] no     [  ] n/a  Laboratory results: [ x ] yes    [  ] no     [  ] n/a  Radiology results: [ x ] yes    [  ] no     [  ] n/a  Culture results: [ x ] yes    [  ] no     [  ] n/a  Consultant recommendations: [ x ] yes    [  ] no     [  ] n/a

## 2020-08-23 NOTE — DISCHARGE NOTE PROVIDER - CARE PROVIDER_API CALL
Frederick Bush  INTERNAL MEDICINE  [address] [place] [address]  Phone: [phone]  Fax: [phone]  Follow Up Time:     Jonny Mcallister  CARDIOVASCULAR DISEASE  [address] [place] [address]  Phone: [phone]  Fax: [phone]  Follow Up Time:

## 2020-08-23 NOTE — DISCHARGE NOTE PROVIDER - NSDCCPCAREPLAN_GEN_ALL_CORE_FT
PRINCIPAL DISCHARGE DIAGNOSIS  Diagnosis: Generalized weakness  Assessment and Plan of Treatment: 2/2 pneomonia, treated with IV Zosyn and switched to po antibx..  Follow with out patient Pul.      SECONDARY DISCHARGE DIAGNOSES  Diagnosis: COPD, severe  Assessment and Plan of Treatment: COPD, severe: C/W 4L O2 nc and monitor saturation at home.    Diagnosis: Hypertension  Assessment and Plan of Treatment: Hypertension: C/W home emds.    Diagnosis: Pneumonia due to infectious organism, unspecified laterality, unspecified part of lung  Assessment and Plan of Treatment: AS above.    Diagnosis: Chronic lung disease  Assessment and Plan of Treatment: Chronic lung disease: advanced stage. C/W 4L O2 NC. PRINCIPAL DISCHARGE DIAGNOSIS  Diagnosis: Generalized weakness  Assessment and Plan of Treatment: 2/2 pneomonia, treated with IV Zosyn and switched to po antibx..  Follow with out patient Pul.      SECONDARY DISCHARGE DIAGNOSES  Diagnosis: Hospice care patient  Assessment and Plan of Treatment: palliative eval done and Pt is for hospice care at home.    Diagnosis: COPD, severe  Assessment and Plan of Treatment: COPD, severe: C/W 4L O2 nc and monitor saturation at home.    Diagnosis: Hypertension  Assessment and Plan of Treatment: Hypertension: C/W home emds.    Diagnosis: Pneumonia due to infectious organism, unspecified laterality, unspecified part of lung  Assessment and Plan of Treatment: AS above.    Diagnosis: Chronic lung disease  Assessment and Plan of Treatment: Chronic lung disease: advanced stage. C/W 4L O2 NC. PRINCIPAL DISCHARGE DIAGNOSIS  Diagnosis: Generalized weakness  Assessment and Plan of Treatment: 2/2 pneomonia, treated with IV Zosyn and switched to po antibx..        SECONDARY DISCHARGE DIAGNOSES  Diagnosis: Hospice care patient  Assessment and Plan of Treatment: palliative eval done and Pt is for hospice care at home.  - Continue Xanax and Atarax as needed for Anxiety. Hold for lethargy or sedation  - Continue Marinol for appetite stimulation. Continue Reglan for prokinetic action and nausea  -    Diagnosis: Chronic lung disease  Assessment and Plan of Treatment: Chronic lung disease: advanced stage. Continue  4L O2 NC.    Diagnosis: Hypertension  Assessment and Plan of Treatment: Hypertension: PRINCIPAL DISCHARGE DIAGNOSIS  Diagnosis: Generalized weakness  Assessment and Plan of Treatment: You had weakness likely related to pneumonia. You were treated with IV antibiotics for 8 days while hospitalized  - Will continue Prednisone for fibrotic lung disease  - Continue Duonebulizer for COPD    Make an appointment to follow up with your primary care provider within 1 week of hospital discharge.      SECONDARY DISCHARGE DIAGNOSES  Diagnosis: Hospice care patient  Assessment and Plan of Treatment: Palliative evaluation for increasing patient comfort .   - Continue Xanax and Atarax as needed for Anxiety. Hold for lethargy or sedation  - Continue Marinol for appetite stimulation. Continue Reglan for prokinetic action and nausea  - Make an appointment to follow up with your primary care provider within 1 week of hospital discharge regarding any questions about these palliative treatments.    Diagnosis: Chronic lung disease  Assessment and Plan of Treatment: Chronic lung disease: advanced stage. Continue  4L O2 NC.    Diagnosis: Hypertension  Assessment and Plan of Treatment: You have elevated blood pressures. Your home medications were continued in the hospital. Your Blood pressures ranged from 115-160/70's.   - May continue Labetolol, hydralyzine and amlodipine for blood pressure control.  - Make an appointment to follow up with your primary care provider to see if one of these medications can be stopped.

## 2020-08-23 NOTE — DISCHARGE NOTE PROVIDER - HOSPITAL COURSE
FROM ADMISSION H+P:     HPI:    81 yo M with PMH of chronic diastolic CHF (normal LVF 2016), non obstructive CAD (cardiac cath 2015), BPH, interstitial lung disease, COPD (on 4L home O2), HTN, HLD, DMT2 on Janumet, GERD, BPH, depression, anxiety BIBEMS for generalized weakness, unable to get up to go to the bathroom. rectal temp here 99.9.  Pt is poor historian, attempted to reach son Balaji at number listed but unable to reach. History obtained via chart review. Per EMS reports patient called complaining of generalized weakness. He was unable to get up to go to the bathroom. Patient states he is sleepy but denies SOB or difficulty breathing. Denies chest pain, palpitations, muscle or body aches.         In the ED    VS: T97.8, HR 80, /74, RR 18, SpO2 100 on 4L NC    Labs: WBC 12.57, H/H 8.6/26.8, platelets 128, INR 1.27, BUN 39, Cr 2.10    CXR: right sided infiltrate     Given zothromax x1, rocephin x1, tylenol 650mg x1. (20 Aug 2020 05:22)            ---    HOSPITAL COURSE: CT findings compatible with interstitial pulmonary fibrosis on background of emphysema. No acute finding. Pt was monitored Off antibx. Started on IV steroids    Developed fever and started on Zosyn as per Pulm Dr. Banuelos recommendations. speech and swallow eval noted: dysphagia 3 diet with thin liq. Pt PT eval and required help with ADLs. Congestive heart failure. repeat echo results showed normal LVEF. pt remained at base line with 4L nc and xanax 2mg TID. DM: on FS and ISS.        ---    CONSULTANTS:         ---    TIME SPENT:    The total amount of time spent reviewing the hospital notes, laboratory values, imaging findings, assessing/counseling the patient, discussing with consultant physicians, social work, nursing staff was -- minutes        ---    FINAL DISCHARGE DIAGNOSIS LIST:    Please see last daily progress note for final discharge diagnoses        ---    Primary care provider was made aware of plan for discharge:      [  ] NO     [  ] YES FROM ADMISSION H+P:     HPI:    81 yo M with PMH of chronic diastolic CHF (normal LVF 2016), non obstructive CAD (cardiac cath 2015), BPH, interstitial lung disease, COPD (on 4L home O2), HTN, HLD, DMT2 on Janumet, GERD, BPH, depression, anxiety BIBEMS for generalized weakness, unable to get up to go to the bathroom. rectal temp here 99.9.  Pt is poor historian, attempted to reach son Balaji at number listed but unable to reach. History obtained via chart review. Per EMS reports patient called complaining of generalized weakness. He was unable to get up to go to the bathroom. Patient states he is sleepy but denies SOB or difficulty breathing. Denies chest pain, palpitations, muscle or body aches.         In the ED    VS: T97.8, HR 80, /74, RR 18, SpO2 100 on 4L NC    Labs: WBC 12.57, H/H 8.6/26.8, platelets 128, INR 1.27, BUN 39, Cr 2.10    CXR: right sided infiltrate     Given zothromax x1, rocephin x1, tylenol 650mg x1. (20 Aug 2020 05:22)            ---    HOSPITAL COURSE: CT findings compatible with interstitial pulmonary fibrosis on background of emphysema. No acute finding. Pt was monitored Off antibx. Started on IV steroids    Developed fever and started on Zosyn as per Pulm Dr. Banuelos recommendations. speech and swallow eval noted: dysphagia 3 diet with thin liq. Pt PT eval and required help with ADLs. Congestive heart failure. repeat echo results showed normal LVEF. pt remained at base line with 4L nc and xanax 2mg TID. DM: on FS and ISS. palliative care consult done pt is for Hospice care at home. DNR/DNI        ---    CONSULTANTS:         ---    TIME SPENT:    The total amount of time spent reviewing the hospital notes, laboratory values, imaging findings, assessing/counseling the patient, discussing with consultant physicians, social work, nursing staff was -- minutes        ---    FINAL DISCHARGE DIAGNOSIS LIST:    Please see last daily progress note for final discharge diagnoses        ---    Primary care provider was made aware of plan for discharge:      [  ] NO     [  ] YES FROM ADMISSION H+P:     HPI:    79 yo M with PMH of chronic diastolic CHF (normal LVF 2016), non obstructive CAD (cardiac cath 2015), BPH, interstitial lung disease, COPD (on 4L home O2), HTN, HLD, DMT2 on Janumet, GERD, BPH, depression, anxiety BIBEMS for generalized weakness, unable to get up to go to the bathroom. rectal temp here 99.9.  Pt is poor historian, attempted to reach son Balaji at number listed but unable to reach. History obtained via chart review. Per EMS reports patient called complaining of generalized weakness. He was unable to get up to go to the bathroom. Patient states he is sleepy but denies SOB or difficulty breathing. Denies chest pain, palpitations, muscle or body aches.         In the ED    VS: T97.8, HR 80, /74, RR 18, SpO2 100 on 4L NC    Labs: WBC 12.57, H/H 8.6/26.8, platelets 128, INR 1.27, BUN 39, Cr 2.10    CXR: right sided infiltrate     Given zothromax x1, rocephin x1, tylenol 650mg x1. (20 Aug 2020 05:22)            ---    HOSPITAL COURSE: CT findings compatible with interstitial pulmonary fibrosis on background of emphysema. No acute finding. Given 4 days of IV solumedrol.    Developed fever and started on Zosyn as per Pulm Dr. Banuelos recommendations. speech and swallow eval noted: dysphagia 3 diet with thin liquid. nutrition consulted for optimization.  Pt PT eval and patient required help with ADLs. Congestive heart failure. repeat echo results showed normal LVEF. pt remained at base line with 4L nc and xanax prn for anxiety related dyspnea. Patient's A1c <5, no insulin required. Palliative care consulted and added appetite stimulants and antiemetics and managed anxiety with prn medications. DNR/DNI. Patient determined candidate for hospice care.         Patient is stable for discharge home with home care.         T(C): 37.1 (08-28-20 @ 04:40), Max: 37.3 (08-27-20 @ 13:19)    HR: 68 (08-28-20 @ 07:32) (68 - 95)    BP: 143/85 (08-28-20 @ 04:40) (104/66 - 143/85)    RR: 18 (08-28-20 @ 04:40) (17 - 18)    SpO2: 95% (08-28-20 @ 07:32) (92% - 98%)        GENERAL: NAD, awake in bed, nasal cannula 3 L    HEENT:  anicteric, moist mucous membranes    CHEST/LUNG:  CTA b/l, no rales, wheezes, or rhonchi    HEART:  RRR, S1, S2    ABDOMEN:  BS+, soft, nontender, nondistended    EXTREMITIES: no edema, cyanosis, or calf tenderness    NERVOUS SYSTEM:  Awake and alert, oriented x 2, answers questions and follows commands             ---    CONSULTANTS:     Dr. Ariel Morataya - Palliative     Dr. Banuelos - Pulmonary        ---    TIME SPENT:    The total amount of time spent reviewing the hospital notes, laboratory values, imaging findings, assessing/counseling the patient, discussing with consultant physicians, social work, nursing staff was -- minutes        ---    FINAL DISCHARGE DIAGNOSIS LIST:    Please see last daily progress note for final discharge diagnoses        ---    Primary care provider was made aware of plan for discharge:      [  ] NO     [  ] YES FROM ADMISSION H+P:     HPI:    79 yo M with PMH of chronic diastolic CHF (normal LVF 2016), non obstructive CAD (cardiac cath 2015), BPH, interstitial lung disease, COPD (on 4L home O2), HTN, HLD, DMT2 on Janumet, GERD, BPH, depression, anxiety BIBEMS for generalized weakness, unable to get up to go to the bathroom. rectal temp here 99.9.  Pt is poor historian, attempted to reach son Balaji at number listed but unable to reach. History obtained via chart review. Per EMS reports patient called complaining of generalized weakness. He was unable to get up to go to the bathroom. Patient states he is sleepy but denies SOB or difficulty breathing. Denies chest pain, palpitations, muscle or body aches.         In the ED    VS: T97.8, HR 80, /74, RR 18, SpO2 100 on 4L NC    Labs: WBC 12.57, H/H 8.6/26.8, platelets 128, INR 1.27, BUN 39, Cr 2.10    CXR: right sided infiltrate     Given zothromax x1, rocephin x1, tylenol 650mg x1. (20 Aug 2020 05:22)            ---    HOSPITAL COURSE: CT findings compatible with interstitial pulmonary fibrosis on background of emphysema. No acute finding. Given 4 days of IV solumedrol.    Developed fever and started on Zosyn as per Pulm Dr. Banuelos recommendations. speech and swallow eval noted: dysphagia 3 diet with thin liquid. nutrition consulted for optimization.  Pt PT eval and patient required help with ADLs. Congestive heart failure. repeat echo results showed normal LVEF. pt remained at base line with 4L nc and xanax prn for anxiety related dyspnea. Patient's A1c <5, no insulin required. Palliative care consulted and added appetite stimulants and antiemetics and managed anxiety with prn medications. DNR/DNI. Patient determined candidate for hospice care.         Patient is stable for discharge home with home care.         T(C): 37.1 (08-28-20 @ 04:40), Max: 37.3 (08-27-20 @ 13:19)    HR: 68 (08-28-20 @ 07:32) (68 - 95)    BP: 143/85 (08-28-20 @ 04:40) (104/66 - 143/85)    RR: 18 (08-28-20 @ 04:40) (17 - 18)    SpO2: 95% (08-28-20 @ 07:32) (92% - 98%)        GENERAL: NAD, awake in bed, nasal cannula 3 L    HEENT:  anicteric, moist mucous membranes    CHEST/LUNG:  CTA b/l, no rales, wheezes, or rhonchi    HEART:  RRR, S1, S2    ABDOMEN:  BS+, soft, nontender, nondistended    EXTREMITIES: no edema, cyanosis, or calf tenderness    NERVOUS SYSTEM:  Awake and alert, oriented x 2, answers questions and follows commands             ---    CONSULTANTS:     Dr. Ariel Morataya - Palliative     Dr. Banuelos - Pulmonary        ---    TIME SPENT: 75 minutes FROM ADMISSION H+P:     HPI:    81 yo M with PMH of chronic diastolic CHF (normal LVF 2016), non obstructive CAD (cardiac cath 2015), BPH, interstitial lung disease, COPD (on 4L home O2), HTN, HLD, DMT2 on Janumet, GERD, BPH, depression, anxiety BIBEMS for generalized weakness, unable to get up to go to the bathroom. rectal temp here 99.9.  Pt is poor historian, attempted to reach son Balaji at number listed but unable to reach. History obtained via chart review. Per EMS reports patient called complaining of generalized weakness. He was unable to get up to go to the bathroom. Patient states he is sleepy but denies SOB or difficulty breathing. Denies chest pain, palpitations, muscle or body aches.         In the ED    VS: T97.8, HR 80, /74, RR 18, SpO2 100 on 4L NC    Labs: WBC 12.57, H/H 8.6/26.8, platelets 128, INR 1.27, BUN 39, Cr 2.10    CXR: right sided infiltrate     Given zothromax x1, rocephin x1, tylenol 650mg x1. (20 Aug 2020 05:22)            ---    HOSPITAL COURSE: CT findings compatible with interstitial pulmonary fibrosis on background of emphysema. No acute finding. Given 4 days of IV solumedrol.    Developed fever and started on Zosyn as per Pulm Dr. Banuelos recommendations. speech and swallow eval noted: dysphagia 3 diet with thin liquid. nutrition consulted for optimization.  Pt PT eval and patient required help with ADLs. Congestive heart failure. repeat echo results showed normal LVEF. pt remained at base line with 4L nc and xanax prn for anxiety related dyspnea. Patient's A1c <5, no insulin required. Palliative care consulted and added appetite stimulants and antiemetics and managed anxiety with prn medications. DNR/DNI. Patient will go home with 24 hours care.         Patient is stable for discharge home with home care.         T(C): 37.1 (08-28-20 @ 04:40), Max: 37.3 (08-27-20 @ 13:19)    HR: 68 (08-28-20 @ 07:32) (68 - 95)    BP: 143/85 (08-28-20 @ 04:40) (104/66 - 143/85)    RR: 18 (08-28-20 @ 04:40) (17 - 18)    SpO2: 95% (08-28-20 @ 07:32) (92% - 98%)        GENERAL: NAD, awake in bed, nasal cannula 3 L    HEENT:  anicteric, moist mucous membranes    CHEST/LUNG:  CTA b/l, no rales, wheezes, or rhonchi    HEART:  RRR, S1, S2    ABDOMEN:  BS+, soft, nontender, nondistended    EXTREMITIES: no edema, cyanosis, or calf tenderness    NERVOUS SYSTEM:  Awake and alert, oriented x 2, answers questions and follows commands             ---    CONSULTANTS:     Dr. Ariel Morataya - Palliative     Dr. Banuelos - Pulmonary        ---    TIME SPENT: 75 minutes

## 2020-08-23 NOTE — DISCHARGE NOTE PROVIDER - CARE PROVIDERS DIRECT ADDRESSES
[email or web address]

## 2020-08-24 LAB
ANION GAP SERPL CALC-SCNC: 8 MMOL/L — SIGNIFICANT CHANGE UP (ref 5–17)
BUN SERPL-MCNC: 42 MG/DL — HIGH (ref 7–23)
CALCIUM SERPL-MCNC: 9 MG/DL — SIGNIFICANT CHANGE UP (ref 8.5–10.1)
CHLORIDE SERPL-SCNC: 95 MMOL/L — LOW (ref 96–108)
CO2 SERPL-SCNC: 30 MMOL/L — SIGNIFICANT CHANGE UP (ref 22–31)
CREAT SERPL-MCNC: 2 MG/DL — HIGH (ref 0.5–1.3)
GLUCOSE SERPL-MCNC: 98 MG/DL — SIGNIFICANT CHANGE UP (ref 70–99)
HCT VFR BLD CALC: 26.3 % — LOW (ref 39–50)
HGB BLD-MCNC: 8.6 G/DL — LOW (ref 13–17)
MCHC RBC-ENTMCNC: 28.7 PG — SIGNIFICANT CHANGE UP (ref 27–34)
MCHC RBC-ENTMCNC: 32.7 GM/DL — SIGNIFICANT CHANGE UP (ref 32–36)
MCV RBC AUTO: 87.7 FL — SIGNIFICANT CHANGE UP (ref 80–100)
NRBC # BLD: 0 /100 WBCS — SIGNIFICANT CHANGE UP (ref 0–0)
PLATELET # BLD AUTO: 135 K/UL — LOW (ref 150–400)
POTASSIUM SERPL-MCNC: 4.3 MMOL/L — SIGNIFICANT CHANGE UP (ref 3.5–5.3)
POTASSIUM SERPL-SCNC: 4.3 MMOL/L — SIGNIFICANT CHANGE UP (ref 3.5–5.3)
RBC # BLD: 3 M/UL — LOW (ref 4.2–5.8)
RBC # FLD: 15.3 % — HIGH (ref 10.3–14.5)
SODIUM SERPL-SCNC: 133 MMOL/L — LOW (ref 135–145)
WBC # BLD: 13.89 K/UL — HIGH (ref 3.8–10.5)
WBC # FLD AUTO: 13.89 K/UL — HIGH (ref 3.8–10.5)

## 2020-08-24 PROCEDURE — 99232 SBSQ HOSP IP/OBS MODERATE 35: CPT

## 2020-08-24 PROCEDURE — 99233 SBSQ HOSP IP/OBS HIGH 50: CPT

## 2020-08-24 RX ORDER — OLANZAPINE 15 MG/1
2.5 TABLET, FILM COATED ORAL AT BEDTIME
Refills: 0 | Status: DISCONTINUED | OUTPATIENT
Start: 2020-08-24 | End: 2020-08-28

## 2020-08-24 RX ADMIN — Medication 200 MILLIGRAM(S): at 05:27

## 2020-08-24 RX ADMIN — PIPERACILLIN AND TAZOBACTAM 25 GRAM(S): 4; .5 INJECTION, POWDER, LYOPHILIZED, FOR SOLUTION INTRAVENOUS at 21:30

## 2020-08-24 RX ADMIN — Medication 2 MILLIGRAM(S): at 21:30

## 2020-08-24 RX ADMIN — BUPROPION HYDROCHLORIDE 300 MILLIGRAM(S): 150 TABLET, EXTENDED RELEASE ORAL at 12:42

## 2020-08-24 RX ADMIN — Medication 5 MILLIGRAM(S): at 12:42

## 2020-08-24 RX ADMIN — Medication 1 SPRAY(S): at 05:27

## 2020-08-24 RX ADMIN — PIPERACILLIN AND TAZOBACTAM 25 GRAM(S): 4; .5 INJECTION, POWDER, LYOPHILIZED, FOR SOLUTION INTRAVENOUS at 14:52

## 2020-08-24 RX ADMIN — Medication 40 MILLIGRAM(S): at 05:26

## 2020-08-24 RX ADMIN — HEPARIN SODIUM 5000 UNIT(S): 5000 INJECTION INTRAVENOUS; SUBCUTANEOUS at 14:52

## 2020-08-24 RX ADMIN — AMLODIPINE BESYLATE 10 MILLIGRAM(S): 2.5 TABLET ORAL at 05:26

## 2020-08-24 RX ADMIN — ISOSORBIDE MONONITRATE 60 MILLIGRAM(S): 60 TABLET, EXTENDED RELEASE ORAL at 12:42

## 2020-08-24 RX ADMIN — Medication 25 MILLIGRAM(S): at 05:26

## 2020-08-24 RX ADMIN — OLANZAPINE 2.5 MILLIGRAM(S): 15 TABLET, FILM COATED ORAL at 21:30

## 2020-08-24 RX ADMIN — SENNA PLUS 2 TABLET(S): 8.6 TABLET ORAL at 21:29

## 2020-08-24 RX ADMIN — HEPARIN SODIUM 5000 UNIT(S): 5000 INJECTION INTRAVENOUS; SUBCUTANEOUS at 21:30

## 2020-08-24 RX ADMIN — PANTOPRAZOLE SODIUM 40 MILLIGRAM(S): 20 TABLET, DELAYED RELEASE ORAL at 05:27

## 2020-08-24 RX ADMIN — Medication 2 MILLIGRAM(S): at 14:51

## 2020-08-24 RX ADMIN — Medication 40 MILLIGRAM(S): at 05:27

## 2020-08-24 RX ADMIN — Medication 5 MILLIGRAM(S): at 21:29

## 2020-08-24 RX ADMIN — TAMSULOSIN HYDROCHLORIDE 0.8 MILLIGRAM(S): 0.4 CAPSULE ORAL at 21:29

## 2020-08-24 RX ADMIN — Medication 5 MILLIGRAM(S): at 05:27

## 2020-08-24 RX ADMIN — Medication 3 MILLILITER(S): at 19:50

## 2020-08-24 RX ADMIN — Medication 200 MILLIGRAM(S): at 17:55

## 2020-08-24 RX ADMIN — Medication 3 MILLILITER(S): at 08:03

## 2020-08-24 RX ADMIN — HEPARIN SODIUM 5000 UNIT(S): 5000 INJECTION INTRAVENOUS; SUBCUTANEOUS at 05:27

## 2020-08-24 RX ADMIN — Medication 25 MILLIGRAM(S): at 14:51

## 2020-08-24 RX ADMIN — Medication 2: at 12:42

## 2020-08-24 RX ADMIN — Medication 100 MILLIGRAM(S): at 12:42

## 2020-08-24 RX ADMIN — Medication 5 MILLIGRAM(S): at 14:51

## 2020-08-24 RX ADMIN — Medication 2 MILLIGRAM(S): at 05:26

## 2020-08-24 RX ADMIN — PIPERACILLIN AND TAZOBACTAM 25 GRAM(S): 4; .5 INJECTION, POWDER, LYOPHILIZED, FOR SOLUTION INTRAVENOUS at 05:26

## 2020-08-24 RX ADMIN — Medication 25 MILLIGRAM(S): at 21:30

## 2020-08-24 RX ADMIN — Medication 3 MILLILITER(S): at 13:45

## 2020-08-24 RX ADMIN — Medication 150 MILLIGRAM(S): at 05:26

## 2020-08-24 RX ADMIN — Medication 150 MILLIGRAM(S): at 17:55

## 2020-08-24 RX ADMIN — POLYETHYLENE GLYCOL 3350 17 GRAM(S): 17 POWDER, FOR SOLUTION ORAL at 12:42

## 2020-08-24 NOTE — PROGRESS NOTE ADULT - ASSESSMENT
79 yo M with PMH of chronic diastolic CHF (normal LVF 2016), non obstructive CAD (cardiac cath 2015), BPH, interstitial lung disease, COPD (on 4L home O2), HTN, HLD, DMT2 on Janumet, GERD, BPH, depression, anxiety BIBEMS for generalized weakness. Admitted with PNA.     1) Decreased PO intake/anorexia/dysphagia   - comfort feeding as tolerated (dysphagia 3 diet)  - no feeding tubes  - Trial of marinol as appetite stimulant as discussed with pt & HCP > started 5 mg PO daily.  - added zyprexa (see below)    2) Failure to thrive  3) Goals of care/advance care planning  - Palliative performance scale score: 20% (poor)  - Prognosis: days-weeks (pt is hospice eligible)   - Code status: DNR/DNI ([facility] form filled out and placed in the chart)  - GOC: trial of IV antibiotics and IVF. No feeding tube. Home hospice upon discharge.   - HCP: Pt's son Balaji  - Psychosocial support provided  - SW to start referral to home hospice    4) Nausea likely 2/2 GERD and diabetic gastroparesis  - improved  - continue reglan 5 mg IVPB q8h (prokinetic)  - zofran prn  - continue PPI  - zyprexa (see below)  - can use xanax if nausea is related to anxiety (anticipatory nausea)    5) Anxiousssed and depressed mood  - continue home antidepressants and xanax prn  - added zyprexa 2.5 mg PO qHS    Discussed with the primary team. Will continue to follow.    Gabino Mcgee MD

## 2020-08-24 NOTE — PROGRESS NOTE ADULT - SUBJECTIVE AND OBJECTIVE BOX
Patient is a 80y old  Male who presents with a chief complaint of pneumonia (20 Aug 2020 08:01)      FROM ADMISSION H+P:   HPI:  79 yo M with PMH of chronic diastolic CHF (normal LVF 2016), non obstructive CAD (cardiac cath 2015), BPH, interstitial lung disease, COPD (on 4L home O2), HTN, HLD, DMT2 on Janumet, GERD, BPH, depression, anxiety BIBEMS for generalized weakness, unable to get up to go to the bathroom. rectal temp here 99.9.  Pt is poor historian, attempted to reach son Balaji at number listed but unable to reach. History obtained via chart review. Per EMS reports patient called complaining of generalized weakness. He was unable to get up to go to the bathroom. Patient states he is sleepy but denies SOB or difficulty breathing. Denies chest pain, palpitations, muscle or body aches.     In the ED  VS: T97.8, HR 80, /74, RR 18, SpO2 100 on 4L NC  Labs: WBC 12.57, H/H 8.6/26.8, platelets 128, INR 1.27, BUN 39, Cr 2.10  CXR: right sided infiltrate   Given zothromax x1, rocephin x1, tylenol 650mg x1. (20 Aug 2020 05:22)      ----  INTERVAL HPI/OVERNIGHT EVENTS: Pt seen and evaluated at the bedside. Pt comfortable in bed c/o anxiety and nausea, denies any CP, SOB, F/Ch. Pt was seen by palliative and is DNR/DNI. For hospice care at home    ----  PAST MEDICAL & SURGICAL HISTORY:  COPD, severe  Congestive heart failure  Pulmonary fibrosis  Pulmonary fibrosis  Smoker  BPH with obstruction/lower urinary tract symptoms  Vitamin D deficiency  GERD (gastroesophageal reflux disease)  Diabetes mellitus  Hyperlipidemia  Hypertension  S/P tonsillectomy      FAMILY HISTORY:  Family history of heart disease (Grandparent)  Family history of lymphoma      ----  MEDICATIONS  (STANDING):  albuterol/ipratropium for Nebulization 3 milliLiter(s) Nebulizer every 6 hours  allopurinol 100 milliGRAM(s) Oral daily  amLODIPine   Tablet 10 milliGRAM(s) Oral daily  buPROPion XL . 300 milliGRAM(s) Oral daily  dextrose 5%. 1000 milliLiter(s) (50 mL/Hr) IV Continuous <Continuous>  dextrose 50% Injectable 12.5 Gram(s) IV Push once  dextrose 50% Injectable 25 Gram(s) IV Push once  dextrose 50% Injectable 25 Gram(s) IV Push once  dronabinol 5 milliGRAM(s) Oral daily  furosemide    Tablet 40 milliGRAM(s) Oral daily  heparin   Injectable 5000 Unit(s) SubCutaneous every 8 hours  hydrALAZINE 25 milliGRAM(s) Oral three times a day  insulin lispro (HumaLOG) corrective regimen sliding scale   SubCutaneous three times a day before meals  insulin lispro (HumaLOG) corrective regimen sliding scale   SubCutaneous at bedtime  isosorbide   mononitrate ER Tablet (IMDUR) 60 milliGRAM(s) Oral daily  labetalol 200 milliGRAM(s) Oral two times a day  methylPREDNISolone sodium succinate Injectable 40 milliGRAM(s) IV Push daily  metoclopramide Injectable 5 milliGRAM(s) IV Push every 8 hours  pantoprazole    Tablet 40 milliGRAM(s) Oral before breakfast  piperacillin/tazobactam IVPB.. 3.375 Gram(s) IV Intermittent every 8 hours  tamsulosin 0.8 milliGRAM(s) Oral at bedtime  venlafaxine XR. 150 milliGRAM(s) Oral two times a day    MEDICATIONS  (PRN):  ALPRAZolam 2 milliGRAM(s) Oral three times a day PRN agitation  dextrose 40% Gel 15 Gram(s) Oral once PRN Blood Glucose LESS THAN 70 milliGRAM(s)/deciliter  glucagon  Injectable 1 milliGRAM(s) IntraMuscular once PRN Glucose LESS THAN 70 milligrams/deciliter    ----  REVIEW OF SYSTEMS:  CONSTITUTIONAL: denies fever, chills, fatigue, weakness  HEENT: denies blurred vision, sore throat  SKIN: denies new lesions, rash  CARDIOVASCULAR: denies chest pain, chest pressure, palpitations  RESPIRATORY: denies shortness of breath, sputum production  GASTROINTESTINAL:  nausea improving, no vomiting, diarrhea, abdominal pain  GENITOURINARY: denies dysuria, discharge  NEUROLOGICAL: denies numbness, headache, focal weakness  MUSCULOSKELETAL: denies new joint pain, muscle aches  PSYCHIATRIC: + anxiety, depression	  ENDOCRINOLOGIC: denies sweating, cold or heat intolerance    ----  PHYSICAL EXAM:  GENERAL: patient appears well, no acute distress, appropriate, pleasant  EYES: sclera clear, no exudates  ENMT: oropharynx clear without erythema, no exudates, moist mucous membranes  NECK: supple, soft, no thyromegaly noted  LUNGS:  coarse breath sounds b/l, transmitted upper airway sounds noted   HEART: soft S1/S2, regular rate and rhythm, no murmurs noted, + lower extremity edema  GASTROINTESTINAL: abdomen is soft, nontender, nondistended, normoactive bowel sounds, no palpable masses  INTEGUMENT: good skin turgor, no lesions noted  MUSCULOSKELETAL: no clubbing or cyanosis, no obvious deformity  NEUROLOGIC: awake, alert, oriented x3, good muscle tone in 4 extremities, no obvious sensory deficits    Vital Signs Last 24 Hrs  T(C): 37.2 (24 Aug 2020 05:17), Max: 37.2 (24 Aug 2020 05:17)  T(F): 99 (24 Aug 2020 05:17), Max: 99 (24 Aug 2020 05:17)  HR: 85 (24 Aug 2020 08:21) (76 - 88)  BP: 157/85 (24 Aug 2020 05:17) (116/92 - 157/85)  BP(mean): --  RR: 17 (24 Aug 2020 05:17) (17 - 18)  SpO2: 98% (24 Aug 2020 08:21) (92% - 98%)  LABS:                        8.6    13.89 )-----------( 135      ( 24 Aug 2020 08:17 )             26.3     24 Aug 2020 08:17    133    |  95     |  42     ----------------------------<  98     4.3     |  30     |  2.00     Ca    9.0        24 Aug 2020 08:17          CAPILLARY BLOOD GLUCOSE      POCT Blood Glucose.: 120 mg/dL (24 Aug 2020 07:49)  POCT Blood Glucose.: 109 mg/dL (23 Aug 2020 22:05)  POCT Blood Glucose.: 134 mg/dL (23 Aug 2020 16:25)  POCT Blood Glucose.: 192 mg/dL (23 Aug 2020 12:05)    UCx       RADIOLOGY & ADDITIONAL TESTS:                    ----  Personally reviewed:  Vital sign trends: [ x ] yes    [  ] no     [  ] n/a  Laboratory results: [ x ] yes    [  ] no     [  ] n/a  Radiology results: [ x ] yes    [  ] no     [  ] n/a  Culture results: [ x ] yes    [  ] no     [  ] n/a  Consultant recommendations: [ x ] yes    [  ] no     [  ] n/a

## 2020-08-24 NOTE — PROGRESS NOTE ADULT - SUBJECTIVE AND OBJECTIVE BOX
SUBJECTIVE AND OBJECTIVE:  INTERVAL HPI/OVERNIGHT EVENTS:    DNR on chart: Yes    Allergies    No Known Allergies    Intolerances    MEDICATIONS  (STANDING):  albuterol/ipratropium for Nebulization 3 milliLiter(s) Nebulizer every 6 hours  allopurinol 100 milliGRAM(s) Oral daily  ALPRAZolam 2 milliGRAM(s) Oral three times a day  amLODIPine   Tablet 10 milliGRAM(s) Oral daily  buPROPion XL . 300 milliGRAM(s) Oral daily  dextrose 5%. 1000 milliLiter(s) (50 mL/Hr) IV Continuous <Continuous>  dextrose 50% Injectable 12.5 Gram(s) IV Push once  dextrose 50% Injectable 25 Gram(s) IV Push once  dextrose 50% Injectable 25 Gram(s) IV Push once  dronabinol 5 milliGRAM(s) Oral daily  furosemide    Tablet 40 milliGRAM(s) Oral daily  heparin   Injectable 5000 Unit(s) SubCutaneous every 8 hours  hydrALAZINE 25 milliGRAM(s) Oral three times a day  insulin lispro (HumaLOG) corrective regimen sliding scale   SubCutaneous three times a day before meals  insulin lispro (HumaLOG) corrective regimen sliding scale   SubCutaneous at bedtime  isosorbide   mononitrate ER Tablet (IMDUR) 60 milliGRAM(s) Oral daily  labetalol 200 milliGRAM(s) Oral two times a day  metoclopramide Injectable 5 milliGRAM(s) IV Push every 8 hours  pantoprazole    Tablet 40 milliGRAM(s) Oral before breakfast  piperacillin/tazobactam IVPB.. 3.375 Gram(s) IV Intermittent every 8 hours  polyethylene glycol 3350 17 Gram(s) Oral daily  senna 2 Tablet(s) Oral at bedtime  tamsulosin 0.8 milliGRAM(s) Oral at bedtime  venlafaxine XR. 150 milliGRAM(s) Oral two times a day    MEDICATIONS  (PRN):  bisacodyl 5 milliGRAM(s) Oral every 12 hours PRN Constipation  dextrose 40% Gel 15 Gram(s) Oral once PRN Blood Glucose LESS THAN 70 milliGRAM(s)/deciliter  glucagon  Injectable 1 milliGRAM(s) IntraMuscular once PRN Glucose LESS THAN 70 milligrams/deciliter  sodium chloride 0.65% Nasal 1 Spray(s) Both Nostrils three times a day PRN Nasal Congestion      ITEMS UNCHECKED ARE NOT PRESENT    PRESENT SYMPTOMS: [ ]Unable to obtain due to poor mentation   Source if other than patient:  [ ]Family   [ ]Team     Pain:  [ ]yes [ ]no  QOL impact -   Location -                    Aggravating factors -  Quality -  Radiation -  Timing-  Severity (0-10 scale):  Minimal acceptable level (0-10 scale):     Dyspnea:                           [ ]Mild [ ]Moderate [ ]Severe  Anxiety:                             [ ]Mild [ ]Moderate [ ]Severe  Fatigue:                             [ ]Mild [ ]Moderate [ ]Severe  Nausea:                             [ ]Mild [ ]Moderate [ ]Severe  Loss of appetite:              [ ]Mild [ ]Moderate [ ]Severe  Constipation:                    [ ]Mild [ ]Moderate [ ]Severe    CPOT:    https://www.[facility].org/getattachment/[email or web address]/Critical-Care-Pain-Observation-Tool-(CPOT)    PAIN AD Score:	  http://geriatrictoolkit.[facility]/cog/painad.pdf (Ctrl + left click to view)    Other Symptoms:  [ ]All other review of systems negative     Palliative Performance Status Version 2:         %      http://npcrc.org/files/news/palliative_performance_scale_ppsv2.pdf  PHYSICAL EXAM:  Vital Signs Last 24 Hrs  T(C): 37.2 (24 Aug 2020 05:17), Max: 37.2 (24 Aug 2020 05:17)  T(F): 99 (24 Aug 2020 05:17), Max: 99 (24 Aug 2020 05:17)  HR: 85 (24 Aug 2020 08:21) (76 - 88)  BP: 157/85 (24 Aug 2020 05:17) (116/92 - 157/85)  BP(mean): --  RR: 17 (24 Aug 2020 05:17) (17 - 18)  SpO2: 98% (24 Aug 2020 08:21) (92% - 98%) I&O's Summary    23 Aug 2020 07:01  -  24 Aug 2020 07:00  --------------------------------------------------------  IN: 175 mL / OUT: 1100 mL / NET: -925 mL       GENERAL:  [ ]Alert  [ ]Oriented x   [ ]Lethargic  [ ]Cachexia  [ ]Unarousable  [ ]Verbal  [ ]Non-Verbal  Behavioral:   [ ]Anxiety  [ ]Delirium [ ]Agitation [ ]Other  HEENT:  [ ]Normal   [ ]Dry mouth   [ ]ET Tube/Trach  [ ]Oral lesions  PULMONARY:   [ ]Clear [ ]Tachypnea  [ ]Audible excessive secretions   [ ]Rhonchi        [ ]Right [ ]Left [ ]Bilateral  [ ]Crackles        [ ]Right [ ]Left [ ]Bilateral  [ ]Wheezing     [ ]Right [ ]Left [ ]Bilateral  [ ]Diminished BS [ ] Right [ ]Left [ ]Bilateral  CARDIOVASCULAR:    [ ]Regular [ ]Irregular [ ]Tachy  [ ]Alhaji [ ]Murmur [ ]Other  GASTROINTESTINAL:  [ ]Soft  [ ]Distended   [ ]+BS  [ ]Non tender [ ]Tender  [ ]PEG [ ]OGT/ NGT   Last BM:      GENITOURINARY:  [ ]Normal [ ]Incontinent   [ ]Oliguria/Anuria   [ ]Sánchez  MUSCULOSKELETAL:   [ ]Normal   [ ]Weakness  [ ]Bed/Wheelchair bound [ ]Edema  NEUROLOGIC:   [ ]No focal deficits  [ ] Cognitive impairment  [ ] Dysphagia [ ]Dysarthria [ ] Paresis [ ]Other   SKIN:   [ ]Normal  [ ]Rash   [ ]Pressure ulcer(s) [ ]y [ ]n present on admission    CRITICAL CARE:  [ ]Shock Present  [ ]Septic [ ]Cardiogenic [ ]Neurologic [ ]Hypovolemic  [ ]Vasopressors [ ]Inotropes  [ ]Respiratory failure present [ ]Mechanical Ventilation [ ]Non-invasive ventilatory support [ ]High-Flow   [ ]Acute  [ ]Chronic [ ]Hypoxic  [ ]Hypercarbic [ ]Other  [ ]Other organ failure     LABS:                        8.6    13.89 )-----------( 135      ( 24 Aug 2020 08:17 )             26.3   08-24    133<L>  |  95<L>  |  42<H>  ----------------------------<  98  4.3   |  30  |  2.00<H>    Ca    9.0      24 Aug 2020 08:17          RADIOLOGY & ADDITIONAL STUDIES:    Protein Calorie Malnutrition Present: [ ]mild [ ]moderate [ ]severe [ ]underweight [ ]morbid obesity  https://www.andeal.org/vault/4000/web/files/ONC/Table_Clinical%20Characteristics%20to%20Document%20Malnutrition-White%20JV%20et%20al%202012.pdf    Height (cm): 175.26 (11-23-19 @ 22:16)  Weight (kg): 86.2 (08-20-20 @ 03:46), 106.6 (11-23-19 @ 22:16)  BMI (kg/m2): 28.1 (08-20-20 @ 03:46), 34.7 (11-23-19 @ 22:16)    [ ]PPSV2 < or = 30%  [ ]significant weight loss [ ]poor nutritional intake [ ]anasarca   Albumin, Serum: 3.1 g/dL (08-21-20 @ 08:21)   [ ]Artificial Nutrition    REFERRALS:   [ ]Chaplaincy  [ ]Hospice  [ ]Child Life  [ ]Social Work  [ ]Case management [ ]Holistic Therapy     Goals of Care Document:  TAWANDA Arthur (08-21-20 @ 14:26)  Goals of Care Conversation:   Participants:  · Participants  Family  · Child(anel)  Rich Torres    Advance Directives:  · Does patient have Advance Directive  Yes  · Indicate Type  Health Care Proxy (HCP)  · Agent's Name  Rich Torres  · Phone #  [phone]  · Are any of the items on the chart  Yes  · Specify which ones are on chart  Health Care Proxy (HCP)  · Caregiver:  no    Conversation Discussion:  · Conversation  MOLST Discussed  · Conversation Details  Spoke with son/HCP Rich Torres by phone. He states his father wants all done FULL CODE,knows he doesn't want to be kept alive if he is" braindead". Discussed his current condition and son states he is worse than he has been. Son to speak to pt to see if he still wants all done. Discussed hospice at home he will speak to his Dad when the time is right      Electronic Signatures:  Karlie Arthur (ROBERT)  (Signed 21-Aug-2020 14:31)  	Authored: Goals of Care Conversation      Last Updated: 21-Aug-2020 14:31 by Karlie Arthur (ROBERT) SUBJECTIVE AND OBJECTIVE/INTERVAL HPI/OVERNIGHT EVENTS:  - no acute events overnight  - Stated nausea has improved with reglan. Appetite still poor but was seen eating pancakes for breakfast. C/o his anxiety is still not well controlled though appeared calm during visit. Denied pain or SOB.     DNR on chart: Yes    Allergies    No Known Allergies    Intolerances    MEDICATIONS  (STANDING):  albuterol/ipratropium for Nebulization 3 milliLiter(s) Nebulizer every 6 hours  allopurinol 100 milliGRAM(s) Oral daily  ALPRAZolam 2 milliGRAM(s) Oral three times a day  amLODIPine   Tablet 10 milliGRAM(s) Oral daily  buPROPion XL . 300 milliGRAM(s) Oral daily  dextrose 5%. 1000 milliLiter(s) (50 mL/Hr) IV Continuous <Continuous>  dextrose 50% Injectable 12.5 Gram(s) IV Push once  dextrose 50% Injectable 25 Gram(s) IV Push once  dextrose 50% Injectable 25 Gram(s) IV Push once  dronabinol 5 milliGRAM(s) Oral daily  furosemide    Tablet 40 milliGRAM(s) Oral daily  heparin   Injectable 5000 Unit(s) SubCutaneous every 8 hours  hydrALAZINE 25 milliGRAM(s) Oral three times a day  insulin lispro (HumaLOG) corrective regimen sliding scale   SubCutaneous three times a day before meals  insulin lispro (HumaLOG) corrective regimen sliding scale   SubCutaneous at bedtime  isosorbide   mononitrate ER Tablet (IMDUR) 60 milliGRAM(s) Oral daily  labetalol 200 milliGRAM(s) Oral two times a day  metoclopramide Injectable 5 milliGRAM(s) IV Push every 8 hours  pantoprazole    Tablet 40 milliGRAM(s) Oral before breakfast  piperacillin/tazobactam IVPB.. 3.375 Gram(s) IV Intermittent every 8 hours  polyethylene glycol 3350 17 Gram(s) Oral daily  senna 2 Tablet(s) Oral at bedtime  tamsulosin 0.8 milliGRAM(s) Oral at bedtime  venlafaxine XR. 150 milliGRAM(s) Oral two times a day    MEDICATIONS  (PRN):  bisacodyl 5 milliGRAM(s) Oral every 12 hours PRN Constipation  dextrose 40% Gel 15 Gram(s) Oral once PRN Blood Glucose LESS THAN 70 milliGRAM(s)/deciliter  glucagon  Injectable 1 milliGRAM(s) IntraMuscular once PRN Glucose LESS THAN 70 milligrams/deciliter  sodium chloride 0.65% Nasal 1 Spray(s) Both Nostrils three times a day PRN Nasal Congestion      ITEMS UNCHECKED ARE NOT PRESENT    PRESENT SYMPTOMS: [ ]Unable to obtain due to poor mentation   Source if other than patient:  [ ]Family   [ ]Team     Pain:  [ ]yes [ x]no  QOL impact -   Location -                    Aggravating factors -  Quality -  Radiation -  Timing-  Severity (0-10 scale):  Minimal acceptable level (0-10 scale):     Dyspnea:                           [ ]Mild [ ]Moderate [ ]Severe  Anxiety:                             [x ]Mild [ ]Moderate [ ]Severe  Fatigue:                             [ ]Mild [ ]Moderate [ ]Severe  Nausea:                             [ ]Mild [ ]Moderate [ ]Severe  Loss of appetite:              [ ]Mild [ ]Moderate [ ]Severe  Constipation:                    [ ]Mild [ ]Moderate [ ]Severe    CPOT:    https://www.[facility].org/getattachment/[email or web address]/Critical-Care-Pain-Observation-Tool-(CPOT)    PAIN AD Score:	  http://geriatrictoolkit.[facility]/cog/painad.pdf (Ctrl + left click to view)    Other Symptoms:  [x ]All other review of systems negative     Palliative Performance Status Version 2:      20 %      http://npcrc.org/files/news/palliative_perfor20nce_scale_ppsv2.pdf  PHYSICAL EXAM:  Vital Signs Last 24 Hrs  T(C): 37.2 (24 Aug 2020 05:17), Max: 37.2 (24 Aug 2020 05:17)  T(F): 99 (24 Aug 2020 05:17), Max: 99 (24 Aug 2020 05:17)  HR: 85 (24 Aug 2020 08:21) (76 - 88)  BP: 157/85 (24 Aug 2020 05:17) (116/92 - 157/85)  BP(mean): --  RR: 17 (24 Aug 2020 05:17) (17 - 18)  SpO2: 98% (24 Aug 2020 08:21) (92% - 98%) I&O's Summary    23 Aug 2020 07:01  -  24 Aug 2020 07:00  --------------------------------------------------------  IN: 175 mL / OUT: 1100 mL / NET: -925 mL    GENERAL: patient appears well, no acute distress, appropriate, pleasant  EYES: sclera clear, no exudates  ENMT: oropharynx clear without erythema, no exudates, moist mucous membranes  NECK: supple, soft, no thyromegaly noted  LUNGS:  coarse breath sounds b/l, transmitted upper airway sounds noted   HEART: soft S1/S2, regular rate and rhythm, no murmurs noted, + lower extremity edema  GASTROINTESTINAL: abdomen is soft, nontender, nondistended, normoactive bowel sounds, no palpable masses  INTEGUMENT: good skin turgor, no lesions noted  MUSCULOSKELETAL: no clubbing or cyanosis, no obvious deformity  NEUROLOGIC: awake, alert, oriented x3, good muscle tone in 4 extremities, no obvious sensory deficits  Psych: flat affect, not agitated    LABS:                        8.6    13.89 )-----------( 135      ( 24 Aug 2020 08:17 )             26.3   08-24    133<L>  |  95<L>  |  42<H>  ----------------------------<  98  4.3   |  30  |  2.00<H>    Ca    9.0      24 Aug 2020 08:17      RADIOLOGY & ADDITIONAL STUDIES: reviewed    Protein Calorie Malnutrition Present: [ ]mild [ ]moderate [ ]severe [ ]underweight [ ]morbid obesity  https://www.andeal.org/vault/2440/web/files/ONC/Table_Clinical%20Characteristics%20to%20Document%20Malnutrition-White%20JV%20et%20al%202012.pdf    Height (cm): 175.26 (11-23-19 @ 22:16)  Weight (kg): 86.2 (08-20-20 @ 03:46), 106.6 (11-23-19 @ 22:16)  BMI (kg/m2): 28.1 (08-20-20 @ 03:46), 34.7 (11-23-19 @ 22:16)    [ ]PPSV2 < or = 30%  [ ]significant weight loss [ ]poor nutritional intake [ ]anasarca   Albumin, Serum: 3.1 g/dL (08-21-20 @ 08:21)   [ ]Artificial Nutrition    REFERRALS:   [ ]Chaplaincy  [x ]Hospice  [ ]Child Life  [x ]Social Work  [ ]Case management [ ]Holistic Therapy     Goals of Care Document:  TAWANDA Arthur (08-21-20 @ 14:26)  Goals of Care Conversation:   Participants:  · Participants  Family  · Child(anel)  Rich Torres    Advance Directives:  · Does patient have Advance Directive  Yes  · Indicate Type  Health Care Proxy (HCP)  · Agent's Name  Rich Torres  · Phone #  [phone]  · Are any of the items on the chart  Yes  · Specify which ones are on chart  Health Care Proxy (HCP)  · Caregiver:  no    Conversation Discussion:  · Conversation  MOLST Discussed  · Conversation Details  Spoke with son/HCP Rich Torres by phone. He states his father wants all done FULL CODE,knows he doesn't want to be kept alive if he is" braindead". Discussed his current condition and son states he is worse than he has been. Son to speak to pt to see if he still wants all done. Discussed hospice at home he will speak to his Dad when the time is right      Electronic Signatures:  Karlie Arthur (ROBERT)  (Signed 21-Aug-2020 14:31)  	Authored: Goals of Care Conversation      Last Updated: 21-Aug-2020 14:31 by Karlie Arthur (ROBERT)

## 2020-08-24 NOTE — PROGRESS NOTE ADULT - SUBJECTIVE AND OBJECTIVE BOX
Date/Time Patient Seen:  		  Referring MD:   Data Reviewed	       Patient is a 80y old  Male who presents with a chief complaint of pneumonia (23 Aug 2020 13:52)      Subjective/HPI     PAST MEDICAL & SURGICAL HISTORY:  COPD, severe  Congestive heart failure  Pulmonary fibrosis  Pulmonary fibrosis  Smoker  BPH with obstruction/lower urinary tract symptoms  Vitamin D deficiency  GERD (gastroesophageal reflux disease)  Diabetes mellitus  Hyperlipidemia  Hypertension  S/P tonsillectomy  No significant past surgical history        Medication list         MEDICATIONS  (STANDING):  albuterol/ipratropium for Nebulization 3 milliLiter(s) Nebulizer every 6 hours  allopurinol 100 milliGRAM(s) Oral daily  ALPRAZolam 2 milliGRAM(s) Oral three times a day  amLODIPine   Tablet 10 milliGRAM(s) Oral daily  buPROPion XL . 300 milliGRAM(s) Oral daily  dextrose 5%. 1000 milliLiter(s) (50 mL/Hr) IV Continuous <Continuous>  dextrose 50% Injectable 12.5 Gram(s) IV Push once  dextrose 50% Injectable 25 Gram(s) IV Push once  dextrose 50% Injectable 25 Gram(s) IV Push once  dronabinol 5 milliGRAM(s) Oral daily  furosemide    Tablet 40 milliGRAM(s) Oral daily  heparin   Injectable 5000 Unit(s) SubCutaneous every 8 hours  hydrALAZINE 25 milliGRAM(s) Oral three times a day  insulin lispro (HumaLOG) corrective regimen sliding scale   SubCutaneous three times a day before meals  insulin lispro (HumaLOG) corrective regimen sliding scale   SubCutaneous at bedtime  isosorbide   mononitrate ER Tablet (IMDUR) 60 milliGRAM(s) Oral daily  labetalol 200 milliGRAM(s) Oral two times a day  methylPREDNISolone sodium succinate Injectable 40 milliGRAM(s) IV Push daily  metoclopramide Injectable 5 milliGRAM(s) IV Push every 8 hours  pantoprazole    Tablet 40 milliGRAM(s) Oral before breakfast  piperacillin/tazobactam IVPB.. 3.375 Gram(s) IV Intermittent every 8 hours  polyethylene glycol 3350 17 Gram(s) Oral daily  senna 2 Tablet(s) Oral at bedtime  tamsulosin 0.8 milliGRAM(s) Oral at bedtime  venlafaxine XR. 150 milliGRAM(s) Oral two times a day    MEDICATIONS  (PRN):  bisacodyl 5 milliGRAM(s) Oral every 12 hours PRN Constipation  dextrose 40% Gel 15 Gram(s) Oral once PRN Blood Glucose LESS THAN 70 milliGRAM(s)/deciliter  glucagon  Injectable 1 milliGRAM(s) IntraMuscular once PRN Glucose LESS THAN 70 milligrams/deciliter  sodium chloride 0.65% Nasal 1 Spray(s) Both Nostrils three times a day PRN Nasal Congestion         Vitals log        ICU Vital Signs Last 24 Hrs  T(C): 37.2 (24 Aug 2020 05:17), Max: 37.2 (24 Aug 2020 05:17)  T(F): 99 (24 Aug 2020 05:17), Max: 99 (24 Aug 2020 05:17)  HR: 87 (24 Aug 2020 05:17) (76 - 88)  BP: 157/85 (24 Aug 2020 05:17) (116/92 - 157/85)  BP(mean): --  ABP: --  ABP(mean): --  RR: 17 (24 Aug 2020 05:17) (17 - 18)  SpO2: 92% (24 Aug 2020 05:17) (92% - 98%)           Input and Output:  I&O's Detail    22 Aug 2020 07:01  -  23 Aug 2020 07:00  --------------------------------------------------------  IN:    Solution: 150 mL  Total IN: 150 mL    OUT:    Voided: 3350 mL  Total OUT: 3350 mL    Total NET: -3200 mL      23 Aug 2020 07:01  -  24 Aug 2020 06:28  --------------------------------------------------------  IN:    Solution: 175 mL  Total IN: 175 mL    OUT:    Voided: 1100 mL  Total OUT: 1100 mL    Total NET: -925 mL          Lab Data                        8.4    12.30 )-----------( 128      ( 22 Aug 2020 07:42 )             26.2     08-22    135  |  99  |  34<H>  ----------------------------<  84  4.5   |  28  |  1.70<H>    Ca    9.2      22 Aug 2020 07:42  Mg     1.9     08-22              Review of Systems	      Objective     Physical Examination    heart s1s2  lung dec BS  abd soft      Pertinent Lab findings & Imaging      Princess:  NO   Adequate UO     I&O's Detail    22 Aug 2020 07:01  -  23 Aug 2020 07:00  --------------------------------------------------------  IN:    Solution: 150 mL  Total IN: 150 mL    OUT:    Voided: 3350 mL  Total OUT: 3350 mL    Total NET: -3200 mL      23 Aug 2020 07:01  -  24 Aug 2020 06:28  --------------------------------------------------------  IN:    Solution: 175 mL  Total IN: 175 mL    OUT:    Voided: 1100 mL  Total OUT: 1100 mL    Total NET: -925 mL               Discussed with:     Cultures:	        Radiology

## 2020-08-24 NOTE — PROGRESS NOTE ADULT - ATTENDING COMMENTS
Palliative consult appreciated, pt is for hospice care at home.  started on Marinol for nausea and poor po intake.

## 2020-08-25 LAB
ANION GAP SERPL CALC-SCNC: 6 MMOL/L — SIGNIFICANT CHANGE UP (ref 5–17)
BUN SERPL-MCNC: 44 MG/DL — HIGH (ref 7–23)
CALCIUM SERPL-MCNC: 8.9 MG/DL — SIGNIFICANT CHANGE UP (ref 8.5–10.1)
CHLORIDE SERPL-SCNC: 93 MMOL/L — LOW (ref 96–108)
CO2 SERPL-SCNC: 33 MMOL/L — HIGH (ref 22–31)
CREAT SERPL-MCNC: 2 MG/DL — HIGH (ref 0.5–1.3)
CULTURE RESULTS: SIGNIFICANT CHANGE UP
CULTURE RESULTS: SIGNIFICANT CHANGE UP
GLUCOSE SERPL-MCNC: 86 MG/DL — SIGNIFICANT CHANGE UP (ref 70–99)
HCT VFR BLD CALC: 28.3 % — LOW (ref 39–50)
HGB BLD-MCNC: 9.2 G/DL — LOW (ref 13–17)
MCHC RBC-ENTMCNC: 28.4 PG — SIGNIFICANT CHANGE UP (ref 27–34)
MCHC RBC-ENTMCNC: 32.5 GM/DL — SIGNIFICANT CHANGE UP (ref 32–36)
MCV RBC AUTO: 87.3 FL — SIGNIFICANT CHANGE UP (ref 80–100)
NRBC # BLD: 0 /100 WBCS — SIGNIFICANT CHANGE UP (ref 0–0)
PLATELET # BLD AUTO: 134 K/UL — LOW (ref 150–400)
POTASSIUM SERPL-MCNC: 4 MMOL/L — SIGNIFICANT CHANGE UP (ref 3.5–5.3)
POTASSIUM SERPL-SCNC: 4 MMOL/L — SIGNIFICANT CHANGE UP (ref 3.5–5.3)
RBC # BLD: 3.24 M/UL — LOW (ref 4.2–5.8)
RBC # FLD: 15.4 % — HIGH (ref 10.3–14.5)
SODIUM SERPL-SCNC: 132 MMOL/L — LOW (ref 135–145)
SPECIMEN SOURCE: SIGNIFICANT CHANGE UP
SPECIMEN SOURCE: SIGNIFICANT CHANGE UP
WBC # BLD: 13.7 K/UL — HIGH (ref 3.8–10.5)
WBC # FLD AUTO: 13.7 K/UL — HIGH (ref 3.8–10.5)

## 2020-08-25 PROCEDURE — 99233 SBSQ HOSP IP/OBS HIGH 50: CPT

## 2020-08-25 PROCEDURE — 99232 SBSQ HOSP IP/OBS MODERATE 35: CPT

## 2020-08-25 RX ORDER — NYSTATIN CREAM 100000 [USP'U]/G
1 CREAM TOPICAL ONCE
Refills: 0 | Status: COMPLETED | OUTPATIENT
Start: 2020-08-25 | End: 2020-08-25

## 2020-08-25 RX ADMIN — Medication 2 MILLIGRAM(S): at 05:49

## 2020-08-25 RX ADMIN — NYSTATIN CREAM 1 APPLICATION(S): 100000 CREAM TOPICAL at 21:46

## 2020-08-25 RX ADMIN — PIPERACILLIN AND TAZOBACTAM 25 GRAM(S): 4; .5 INJECTION, POWDER, LYOPHILIZED, FOR SOLUTION INTRAVENOUS at 14:47

## 2020-08-25 RX ADMIN — Medication 5 MILLIGRAM(S): at 12:35

## 2020-08-25 RX ADMIN — Medication 40 MILLIGRAM(S): at 05:50

## 2020-08-25 RX ADMIN — BUPROPION HYDROCHLORIDE 300 MILLIGRAM(S): 150 TABLET, EXTENDED RELEASE ORAL at 12:36

## 2020-08-25 RX ADMIN — Medication 5 MILLIGRAM(S): at 21:46

## 2020-08-25 RX ADMIN — AMLODIPINE BESYLATE 10 MILLIGRAM(S): 2.5 TABLET ORAL at 05:51

## 2020-08-25 RX ADMIN — PIPERACILLIN AND TAZOBACTAM 25 GRAM(S): 4; .5 INJECTION, POWDER, LYOPHILIZED, FOR SOLUTION INTRAVENOUS at 05:49

## 2020-08-25 RX ADMIN — Medication 3 MILLILITER(S): at 13:20

## 2020-08-25 RX ADMIN — Medication 3 MILLILITER(S): at 07:45

## 2020-08-25 RX ADMIN — Medication 5 MILLIGRAM(S): at 05:50

## 2020-08-25 RX ADMIN — TAMSULOSIN HYDROCHLORIDE 0.8 MILLIGRAM(S): 0.4 CAPSULE ORAL at 21:45

## 2020-08-25 RX ADMIN — Medication 2 MILLIGRAM(S): at 21:45

## 2020-08-25 RX ADMIN — OLANZAPINE 2.5 MILLIGRAM(S): 15 TABLET, FILM COATED ORAL at 21:45

## 2020-08-25 RX ADMIN — PANTOPRAZOLE SODIUM 40 MILLIGRAM(S): 20 TABLET, DELAYED RELEASE ORAL at 05:52

## 2020-08-25 RX ADMIN — HEPARIN SODIUM 5000 UNIT(S): 5000 INJECTION INTRAVENOUS; SUBCUTANEOUS at 05:51

## 2020-08-25 RX ADMIN — Medication 150 MILLIGRAM(S): at 17:23

## 2020-08-25 RX ADMIN — PIPERACILLIN AND TAZOBACTAM 25 GRAM(S): 4; .5 INJECTION, POWDER, LYOPHILIZED, FOR SOLUTION INTRAVENOUS at 21:47

## 2020-08-25 RX ADMIN — Medication 25 MILLIGRAM(S): at 05:50

## 2020-08-25 RX ADMIN — Medication 25 MILLIGRAM(S): at 14:46

## 2020-08-25 RX ADMIN — Medication 3 MILLILITER(S): at 20:21

## 2020-08-25 RX ADMIN — Medication 5 MILLIGRAM(S): at 14:46

## 2020-08-25 RX ADMIN — Medication 200 MILLIGRAM(S): at 17:23

## 2020-08-25 RX ADMIN — Medication 200 MILLIGRAM(S): at 05:50

## 2020-08-25 RX ADMIN — ISOSORBIDE MONONITRATE 60 MILLIGRAM(S): 60 TABLET, EXTENDED RELEASE ORAL at 12:35

## 2020-08-25 RX ADMIN — POLYETHYLENE GLYCOL 3350 17 GRAM(S): 17 POWDER, FOR SOLUTION ORAL at 12:36

## 2020-08-25 RX ADMIN — Medication 2 MILLIGRAM(S): at 14:46

## 2020-08-25 RX ADMIN — Medication 25 MILLIGRAM(S): at 21:45

## 2020-08-25 RX ADMIN — HEPARIN SODIUM 5000 UNIT(S): 5000 INJECTION INTRAVENOUS; SUBCUTANEOUS at 14:47

## 2020-08-25 RX ADMIN — SENNA PLUS 2 TABLET(S): 8.6 TABLET ORAL at 21:45

## 2020-08-25 RX ADMIN — Medication 100 MILLIGRAM(S): at 12:36

## 2020-08-25 RX ADMIN — HEPARIN SODIUM 5000 UNIT(S): 5000 INJECTION INTRAVENOUS; SUBCUTANEOUS at 21:46

## 2020-08-25 RX ADMIN — Medication 150 MILLIGRAM(S): at 05:49

## 2020-08-25 NOTE — PROGRESS NOTE ADULT - SUBJECTIVE AND OBJECTIVE BOX
Patient is a 80y old  Male who presents with a chief complaint of pneumonia (20 Aug 2020 08:01)      FROM ADMISSION H+P:   HPI:  81 yo M with PMH of chronic diastolic CHF (normal LVF 2016), non obstructive CAD (cardiac cath 2015), BPH, interstitial lung disease, COPD (on 4L home O2), HTN, HLD, DMT2 on Janumet, GERD, BPH, depression, anxiety BIBEMS for generalized weakness, unable to get up to go to the bathroom. rectal temp here 99.9.  Pt is poor historian, attempted to reach son Balaji at number listed but unable to reach. History obtained via chart review. Per EMS reports patient called complaining of generalized weakness. He was unable to get up to go to the bathroom. Patient states he is sleepy but denies SOB or difficulty breathing. Denies chest pain, palpitations, muscle or body aches.     In the ED  VS: T97.8, HR 80, /74, RR 18, SpO2 100 on 4L NC  Labs: WBC 12.57, H/H 8.6/26.8, platelets 128, INR 1.27, BUN 39, Cr 2.10  CXR: right sided infiltrate   Given zothromax x1, rocephin x1, tylenol 650mg x1. (20 Aug 2020 05:22)      ----  INTERVAL HPI/OVERNIGHT EVENTS: Pt seen and evaluated at the bedside. Pt comfortable in bed c/o anxiety and nausea, denies any CP, SOB, F/Ch. Pt was seen by palliative and is DNR/DNI. For hospice care at home    ----  PAST MEDICAL & SURGICAL HISTORY:  COPD, severe  Congestive heart failure  Pulmonary fibrosis  Pulmonary fibrosis  Smoker  BPH with obstruction/lower urinary tract symptoms  Vitamin D deficiency  GERD (gastroesophageal reflux disease)  Diabetes mellitus  Hyperlipidemia  Hypertension  S/P tonsillectomy      FAMILY HISTORY:  Family history of heart disease (Grandparent)  Family history of lymphoma      ----  MEDICATIONS  (STANDING):  albuterol/ipratropium for Nebulization 3 milliLiter(s) Nebulizer every 6 hours  allopurinol 100 milliGRAM(s) Oral daily  ALPRAZolam 2 milliGRAM(s) Oral three times a day  amLODIPine   Tablet 10 milliGRAM(s) Oral daily  buPROPion XL . 300 milliGRAM(s) Oral daily  dextrose 5%. 1000 milliLiter(s) (50 mL/Hr) IV Continuous <Continuous>  dextrose 50% Injectable 12.5 Gram(s) IV Push once  dextrose 50% Injectable 25 Gram(s) IV Push once  dextrose 50% Injectable 25 Gram(s) IV Push once  dronabinol 5 milliGRAM(s) Oral daily  furosemide    Tablet 40 milliGRAM(s) Oral daily  heparin   Injectable 5000 Unit(s) SubCutaneous every 8 hours  hydrALAZINE 25 milliGRAM(s) Oral three times a day  insulin lispro (HumaLOG) corrective regimen sliding scale   SubCutaneous three times a day before meals  insulin lispro (HumaLOG) corrective regimen sliding scale   SubCutaneous at bedtime  isosorbide   mononitrate ER Tablet (IMDUR) 60 milliGRAM(s) Oral daily  labetalol 200 milliGRAM(s) Oral two times a day  metoclopramide Injectable 5 milliGRAM(s) IV Push every 8 hours  OLANZapine 2.5 milliGRAM(s) Oral at bedtime  pantoprazole    Tablet 40 milliGRAM(s) Oral before breakfast  piperacillin/tazobactam IVPB.. 3.375 Gram(s) IV Intermittent every 8 hours  polyethylene glycol 3350 17 Gram(s) Oral daily  senna 2 Tablet(s) Oral at bedtime  tamsulosin 0.8 milliGRAM(s) Oral at bedtime  venlafaxine XR. 150 milliGRAM(s) Oral two times a day    MEDICATIONS  (PRN):  bisacodyl 5 milliGRAM(s) Oral every 12 hours PRN Constipation  dextrose 40% Gel 15 Gram(s) Oral once PRN Blood Glucose LESS THAN 70 milliGRAM(s)/deciliter  glucagon  Injectable 1 milliGRAM(s) IntraMuscular once PRN Glucose LESS THAN 70 milligrams/deciliter  sodium chloride 0.65% Nasal 1 Spray(s) Both Nostrils three times a day PRN Nasal Congestion  ----  REVIEW OF SYSTEMS:  CONSTITUTIONAL: denies fever, chills, fatigue, weakness  HEENT: denies blurred vision, sore throat  SKIN: denies new lesions, rash  CARDIOVASCULAR: denies chest pain, chest pressure, palpitations  RESPIRATORY: denies shortness of breath, sputum production  GASTROINTESTINAL:  nausea improving, no vomiting, diarrhea, abdominal pain  GENITOURINARY: denies dysuria, discharge  NEUROLOGICAL: denies numbness, headache, focal weakness  MUSCULOSKELETAL: denies new joint pain, muscle aches  PSYCHIATRIC: + anxiety, depression	  ENDOCRINOLOGIC: denies sweating, cold or heat intolerance    ----  PHYSICAL EXAM:  GENERAL: patient appears well, no acute distress, appropriate, pleasant  EYES: sclera clear, no exudates  ENMT: oropharynx clear without erythema, no exudates, moist mucous membranes  NECK: supple, soft, no thyromegaly noted  LUNGS:  coarse breath sounds b/l, transmitted upper airway sounds noted   HEART: soft S1/S2, regular rate and rhythm, no murmurs noted, + lower extremity edema  GASTROINTESTINAL: abdomen is soft, nontender, nondistended, normoactive bowel sounds, no palpable masses  INTEGUMENT: good skin turgor, no lesions noted  MUSCULOSKELETAL: no clubbing or cyanosis, no obvious deformity  NEUROLOGIC: awake, alert, oriented x3, good muscle tone in 4 extremities, no obvious sensory deficits    Vital Signs Last 24 Hrs  T(C): 37.5 (25 Aug 2020 04:45), Max: 37.5 (25 Aug 2020 04:45)  T(F): 99.5 (25 Aug 2020 04:45), Max: 99.5 (25 Aug 2020 04:45)  HR: 80 (25 Aug 2020 07:45) (63 - 85)  BP: 164/90 (25 Aug 2020 04:45) (133/69 - 164/90)  BP(mean): 97 (24 Aug 2020 21:47) (97 - 97)  RR: 19 (25 Aug 2020 04:45) (18 - 19)  SpO2: 95% (25 Aug 2020 07:45) (92% - 97%)    LABS:                        9.2    13.70 )-----------( 134      ( 25 Aug 2020 07:36 )             28.3     25 Aug 2020 07:36    132    |  93     |  44     ----------------------------<  86     4.0     |  33     |  2.00     Ca    8.9        25 Aug 2020 07:36          CAPILLARY BLOOD GLUCOSE      POCT Blood Glucose.: 113 mg/dL (25 Aug 2020 07:52)  POCT Blood Glucose.: 102 mg/dL (24 Aug 2020 22:12)  POCT Blood Glucose.: 124 mg/dL (24 Aug 2020 17:04)  POCT Blood Glucose.: 245 mg/dL (24 Aug 2020 12:11)    UCx       RADIOLOGY & ADDITIONAL TESTS:                      ----  Personally reviewed:  Vital sign trends: [ x ] yes    [  ] no     [  ] n/a  Laboratory results: [ x ] yes    [  ] no     [  ] n/a  Radiology results: [ x ] yes    [  ] no     [  ] n/a  Culture results: [ x ] yes    [  ] no     [  ] n/a  Consultant recommendations: [ x ] yes    [  ] no     [  ] n/a

## 2020-08-25 NOTE — PROGRESS NOTE ADULT - SUBJECTIVE AND OBJECTIVE BOX
SUBJECTIVE AND OBJECTIVE:  INTERVAL HPI/OVERNIGHT EVENTS:    DNR on chart: Yes    Allergies    No Known Allergies    Intolerances    MEDICATIONS  (STANDING):  albuterol/ipratropium for Nebulization 3 milliLiter(s) Nebulizer every 6 hours  allopurinol 100 milliGRAM(s) Oral daily  ALPRAZolam 2 milliGRAM(s) Oral three times a day  amLODIPine   Tablet 10 milliGRAM(s) Oral daily  buPROPion XL . 300 milliGRAM(s) Oral daily  dextrose 5%. 1000 milliLiter(s) (50 mL/Hr) IV Continuous <Continuous>  dextrose 50% Injectable 12.5 Gram(s) IV Push once  dextrose 50% Injectable 25 Gram(s) IV Push once  dextrose 50% Injectable 25 Gram(s) IV Push once  dronabinol 5 milliGRAM(s) Oral daily  furosemide    Tablet 40 milliGRAM(s) Oral daily  heparin   Injectable 5000 Unit(s) SubCutaneous every 8 hours  hydrALAZINE 25 milliGRAM(s) Oral three times a day  insulin lispro (HumaLOG) corrective regimen sliding scale   SubCutaneous three times a day before meals  insulin lispro (HumaLOG) corrective regimen sliding scale   SubCutaneous at bedtime  isosorbide   mononitrate ER Tablet (IMDUR) 60 milliGRAM(s) Oral daily  labetalol 200 milliGRAM(s) Oral two times a day  metoclopramide Injectable 5 milliGRAM(s) IV Push every 8 hours  OLANZapine 2.5 milliGRAM(s) Oral at bedtime  pantoprazole    Tablet 40 milliGRAM(s) Oral before breakfast  piperacillin/tazobactam IVPB.. 3.375 Gram(s) IV Intermittent every 8 hours  polyethylene glycol 3350 17 Gram(s) Oral daily  senna 2 Tablet(s) Oral at bedtime  tamsulosin 0.8 milliGRAM(s) Oral at bedtime  venlafaxine XR. 150 milliGRAM(s) Oral two times a day    MEDICATIONS  (PRN):  bisacodyl 5 milliGRAM(s) Oral every 12 hours PRN Constipation  dextrose 40% Gel 15 Gram(s) Oral once PRN Blood Glucose LESS THAN 70 milliGRAM(s)/deciliter  glucagon  Injectable 1 milliGRAM(s) IntraMuscular once PRN Glucose LESS THAN 70 milligrams/deciliter  sodium chloride 0.65% Nasal 1 Spray(s) Both Nostrils three times a day PRN Nasal Congestion      ITEMS UNCHECKED ARE NOT PRESENT    PRESENT SYMPTOMS: [ ]Unable to obtain due to poor mentation   Source if other than patient:  [ ]Family   [ ]Team     Pain:  [ ]yes [ ]no  QOL impact -   Location -                    Aggravating factors -  Quality -  Radiation -  Timing-  Severity (0-10 scale):  Minimal acceptable level (0-10 scale):     Dyspnea:                           [ ]Mild [ ]Moderate [ ]Severe  Anxiety:                             [ ]Mild [ ]Moderate [ ]Severe  Fatigue:                             [ ]Mild [ ]Moderate [ ]Severe  Nausea:                             [ ]Mild [ ]Moderate [ ]Severe  Loss of appetite:              [ ]Mild [ ]Moderate [ ]Severe  Constipation:                    [ ]Mild [ ]Moderate [ ]Severe    CPOT:    https://www.[facility].org/getattachment/[email or web address]/Critical-Care-Pain-Observation-Tool-(CPOT)    PAIN AD Score:	  http://geriatrictoolkit.[facility]/cog/painad.pdf (Ctrl + left click to view)    Other Symptoms:  [ ]All other review of systems negative     Palliative Performance Status Version 2:         %      http://npcrc.org/files/news/palliative_performance_scale_ppsv2.pdf  PHYSICAL EXAM:  Vital Signs Last 24 Hrs  T(C): 37.5 (25 Aug 2020 04:45), Max: 37.5 (25 Aug 2020 04:45)  T(F): 99.5 (25 Aug 2020 04:45), Max: 99.5 (25 Aug 2020 04:45)  HR: 80 (25 Aug 2020 07:45) (63 - 85)  BP: 164/90 (25 Aug 2020 04:45) (133/69 - 164/90)  BP(mean): 97 (24 Aug 2020 21:47) (97 - 97)  RR: 19 (25 Aug 2020 04:45) (18 - 19)  SpO2: 95% (25 Aug 2020 07:45) (92% - 97%) I&O's Summary    24 Aug 2020 07:01  -  25 Aug 2020 07:00  --------------------------------------------------------  IN: 0 mL / OUT: 1500 mL / NET: -1500 mL       GENERAL:  [ ]Alert  [ ]Oriented x   [ ]Lethargic  [ ]Cachexia  [ ]Unarousable  [ ]Verbal  [ ]Non-Verbal  Behavioral:   [ ]Anxiety  [ ]Delirium [ ]Agitation [ ]Other  HEENT:  [ ]Normal   [ ]Dry mouth   [ ]ET Tube/Trach  [ ]Oral lesions  PULMONARY:   [ ]Clear [ ]Tachypnea  [ ]Audible excessive secretions   [ ]Rhonchi        [ ]Right [ ]Left [ ]Bilateral  [ ]Crackles        [ ]Right [ ]Left [ ]Bilateral  [ ]Wheezing     [ ]Right [ ]Left [ ]Bilateral  [ ]Diminished BS [ ] Right [ ]Left [ ]Bilateral  CARDIOVASCULAR:    [ ]Regular [ ]Irregular [ ]Tachy  [ ]Alhaji [ ]Murmur [ ]Other  GASTROINTESTINAL:  [ ]Soft  [ ]Distended   [ ]+BS  [ ]Non tender [ ]Tender  [ ]PEG [ ]OGT/ NGT   Last BM:      GENITOURINARY:  [ ]Normal [ ]Incontinent   [ ]Oliguria/Anuria   [ ]Sánchez  MUSCULOSKELETAL:   [ ]Normal   [ ]Weakness  [ ]Bed/Wheelchair bound [ ]Edema  NEUROLOGIC:   [ ]No focal deficits  [ ] Cognitive impairment  [ ] Dysphagia [ ]Dysarthria [ ] Paresis [ ]Other   SKIN:   [ ]Normal  [ ]Rash   [ ]Pressure ulcer(s) [ ]y [ ]n present on admission    CRITICAL CARE:  [ ]Shock Present  [ ]Septic [ ]Cardiogenic [ ]Neurologic [ ]Hypovolemic  [ ]Vasopressors [ ]Inotropes  [ ]Respiratory failure present [ ]Mechanical Ventilation [ ]Non-invasive ventilatory support [ ]High-Flow   [ ]Acute  [ ]Chronic [ ]Hypoxic  [ ]Hypercarbic [ ]Other  [ ]Other organ failure     LABS:                        9.2    13.70 )-----------( 134      ( 25 Aug 2020 07:36 )             28.3   08-25    132<L>  |  93<L>  |  44<H>  ----------------------------<  86  4.0   |  33<H>  |  2.00<H>    Ca    8.9      25 Aug 2020 07:36          RADIOLOGY & ADDITIONAL STUDIES:    Protein Calorie Malnutrition Present: [ ]mild [ ]moderate [ ]severe [ ]underweight [ ]morbid obesity  https://www.andeal.org/vault/2440/web/files/ONC/Table_Clinical%20Characteristics%20to%20Document%20Malnutrition-White%20JV%20et%20al%202012.pdf    Height (cm): 175.26 (11-23-19 @ 22:16)  Weight (kg): 86.2 (08-20-20 @ 03:46), 106.6 (11-23-19 @ 22:16)  BMI (kg/m2): 28.1 (08-20-20 @ 03:46), 34.7 (11-23-19 @ 22:16)    [ ]PPSV2 < or = 30%  [ ]significant weight loss [ ]poor nutritional intake [ ]anasarca   Albumin, Serum: 3.1 g/dL (08-21-20 @ 08:21)   [ ]Artificial Nutrition    REFERRALS:   [ ]Chaplaincy  [ ]Hospice  [ ]Child Life  [ ]Social Work  [ ]Case management [ ]Holistic Therapy     Goals of Care Document:  TAWANDA Arthur (08-21-20 @ 14:26)  Goals of Care Conversation:   Participants:  · Participants  Family  · Child(anel)  Rich Torres    Advance Directives:  · Does patient have Advance Directive  Yes  · Indicate Type  Health Care Proxy (HCP)  · Agent's Name  Rich Torres  · Phone #  [phone]  · Are any of the items on the chart  Yes  · Specify which ones are on chart  Health Care Proxy (HCP)  · Caregiver:  no    Conversation Discussion:  · Conversation  MOLST Discussed  · Conversation Details  Spoke with son/HCP Rich Torres by phone. He states his father wants all done FULL CODE,knows he doesn't want to be kept alive if he is" braindead". Discussed his current condition and son states he is worse than he has been. Son to speak to pt to see if he still wants all done. Discussed hospice at home he will speak to his Dad when the time is right      Electronic Signatures:  Karlie Arthur (ROBERT)  (Signed 21-Aug-2020 14:31)  	Authored: Goals of Care Conversation      Last Updated: 21-Aug-2020 14:31 by Karlie Arthur (ROBERT) SUBJECTIVE AND OBJECTIVE/INTERVAL HPI/OVERNIGHT EVENTS:  - no acute events overnight  - pt was resting comfortably in bed and calm. Received xanax prn x2    DNR on chart: Yes    Allergies    No Known Allergies    Intolerances    MEDICATIONS  (STANDING):  albuterol/ipratropium for Nebulization 3 milliLiter(s) Nebulizer every 6 hours  allopurinol 100 milliGRAM(s) Oral daily  ALPRAZolam 2 milliGRAM(s) Oral three times a day  amLODIPine   Tablet 10 milliGRAM(s) Oral daily  buPROPion XL . 300 milliGRAM(s) Oral daily  dextrose 5%. 1000 milliLiter(s) (50 mL/Hr) IV Continuous <Continuous>  dextrose 50% Injectable 12.5 Gram(s) IV Push once  dextrose 50% Injectable 25 Gram(s) IV Push once  dextrose 50% Injectable 25 Gram(s) IV Push once  dronabinol 5 milliGRAM(s) Oral daily  furosemide    Tablet 40 milliGRAM(s) Oral daily  heparin   Injectable 5000 Unit(s) SubCutaneous every 8 hours  hydrALAZINE 25 milliGRAM(s) Oral three times a day  insulin lispro (HumaLOG) corrective regimen sliding scale   SubCutaneous three times a day before meals  insulin lispro (HumaLOG) corrective regimen sliding scale   SubCutaneous at bedtime  isosorbide   mononitrate ER Tablet (IMDUR) 60 milliGRAM(s) Oral daily  labetalol 200 milliGRAM(s) Oral two times a day  metoclopramide Injectable 5 milliGRAM(s) IV Push every 8 hours  OLANZapine 2.5 milliGRAM(s) Oral at bedtime  pantoprazole    Tablet 40 milliGRAM(s) Oral before breakfast  piperacillin/tazobactam IVPB.. 3.375 Gram(s) IV Intermittent every 8 hours  polyethylene glycol 3350 17 Gram(s) Oral daily  senna 2 Tablet(s) Oral at bedtime  tamsulosin 0.8 milliGRAM(s) Oral at bedtime  venlafaxine XR. 150 milliGRAM(s) Oral two times a day    MEDICATIONS  (PRN):  bisacodyl 5 milliGRAM(s) Oral every 12 hours PRN Constipation  dextrose 40% Gel 15 Gram(s) Oral once PRN Blood Glucose LESS THAN 70 milliGRAM(s)/deciliter  glucagon  Injectable 1 milliGRAM(s) IntraMuscular once PRN Glucose LESS THAN 70 milligrams/deciliter  sodium chloride 0.65% Nasal 1 Spray(s) Both Nostrils three times a day PRN Nasal Congestion      ITEMS UNCHECKED ARE NOT PRESENT    PRESENT SYMPTOMS: [ ]Unable to obtain due to poor mentation   Source if other than patient:  [ ]Family   [ ]Team     Pain:  [ ]yes [x ]no  QOL impact -   Location -                    Aggravating factors -  Quality -  Radiation -  Timing-  Severity (0-10 scale):  Minimal acceptable level (0-10 scale):     Dyspnea:                           [ ]Mild [ ]Moderate [ ]Severe  Anxiety:                             [ ]Mild [ ]Moderate [ ]Severe  Fatigue:                             [ ]Mild [ ]Moderate [ ]Severe  Nausea:                             [ ]Mild [ ]Moderate [ ]Severe  Loss of appetite:              [ ]Mild [ ]Moderate [ ]Severe  Constipation:                    [ ]Mild [ ]Moderate [ ]Severe    CPOT:    https://www.[facility].org/getattachment/[email or web address]/Critical-Care-Pain-Observation-Tool-(CPOT)    PAIN AD Score:	  http://geriatrictoolkit.[facility]/cog/painad.pdf (Ctrl + left click to view)    Other Symptoms:  [ x]All other review of systems negative     Palliative Performance Status Version 2:        20 %      http://npcrc.org/files/news/palliative_performance_scale_ppsv2.pdf  PHYSICAL EXAM:  Vital Signs Last 24 Hrs  T(C): 37.5 (25 Aug 2020 04:45), Max: 37.5 (25 Aug 2020 04:45)  T(F): 99.5 (25 Aug 2020 04:45), Max: 99.5 (25 Aug 2020 04:45)  HR: 80 (25 Aug 2020 07:45) (63 - 85)  BP: 164/90 (25 Aug 2020 04:45) (133/69 - 164/90)  BP(mean): 97 (24 Aug 2020 21:47) (97 - 97)  RR: 19 (25 Aug 2020 04:45) (18 - 19)  SpO2: 95% (25 Aug 2020 07:45) (92% - 97%) I&O's Summary    24 Aug 2020 07:01  -  25 Aug 2020 07:00  --------------------------------------------------------  IN: 0 mL / OUT: 1500 mL / NET: -1500 mL    GENERAL: patient appears well, no acute distress, appropriate, pleasant  EYES: sclera clear, no exudates  ENMT: oropharynx clear without erythema, no exudates, moist mucous membranes  NECK: supple, soft, no thyromegaly noted  LUNGS:  coarse breath sounds b/l, transmitted upper airway sounds noted   HEART: soft S1/S2, regular rate and rhythm, no murmurs noted, + lower extremity edema  GASTROINTESTINAL: abdomen is soft, nontender, nondistended, normoactive bowel sounds, no palpable masses  INTEGUMENT: good skin turgor, no lesions noted  MUSCULOSKELETAL: no clubbing or cyanosis, no obvious deformity  NEUROLOGIC: awake, alert, oriented x3, good muscle tone in 4 extremities, no obvious sensory deficits  Psych: flat affect, not agitated    LABS:                        9.2    13.70 )-----------( 134      ( 25 Aug 2020 07:36 )             28.3   08-25    132<L>  |  93<L>  |  44<H>  ----------------------------<  86  4.0   |  33<H>  |  2.00<H>    Ca    8.9      25 Aug 2020 07:36          RADIOLOGY & ADDITIONAL STUDIES:    Protein Calorie Malnutrition Present: [ ]mild [ ]moderate [ ]severe [ ]underweight [ ]morbid obesity  https://www.andeal.org/vault/2440/web/files/ONC/Table_Clinical%20Characteristics%20to%20Document%20Malnutrition-White%20JV%20et%20al%202012.pdf    Height (cm): 175.26 (11-23-19 @ 22:16)  Weight (kg): 86.2 (08-20-20 @ 03:46), 106.6 (11-23-19 @ 22:16)  BMI (kg/m2): 28.1 (08-20-20 @ 03:46), 34.7 (11-23-19 @ 22:16)    [ ]PPSV2 < or = 30%  [ ]significant weight loss [ ]poor nutritional intake [ ]anasarca   Albumin, Serum: 3.1 g/dL (08-21-20 @ 08:21)   [ ]Artificial Nutrition    REFERRALS:   [ ]Chaplaincy  [ ]Hospice  [ ]Child Life  [ ]Social Work  [ ]Case management [ ]Holistic Therapy     Goals of Care Document:  TAWANDA Arthur (08-21-20 @ 14:26)  Goals of Care Conversation:   Participants:  · Participants  Family  · Child(anel)  Rich Torres    Advance Directives:  · Does patient have Advance Directive  Yes  · Indicate Type  Health Care Proxy (HCP)  · Agent's Name  Rich Torres  · Phone #  [phone]  · Are any of the items on the chart  Yes  · Specify which ones are on chart  Health Care Proxy (HCP)  · Caregiver:  no    Conversation Discussion:  · Conversation  MOLST Discussed  · Conversation Details  Spoke with son/HCP Rich Torres by phone. He states his father wants all done FULL CODE,knows he doesn't want to be kept alive if he is" braindead". Discussed his current condition and son states he is worse than he has been. Son to speak to pt to see if he still wants all done. Discussed hospice at home he will speak to his Dad when the time is right      Electronic Signatures:  Karlie Arthur (ROBERT)  (Signed 21-Aug-2020 14:31)  	Authored: Goals of Care Conversation      Last Updated: 21-Aug-2020 14:31 by Karlie Arthur (ROBERT)

## 2020-08-25 NOTE — PROGRESS NOTE ADULT - SUBJECTIVE AND OBJECTIVE BOX
Date/Time Patient Seen:  		  Referring MD:   Data Reviewed	       Patient is a 80y old  Male who presents with a chief complaint of pneumonia (24 Aug 2020 11:06)      Subjective/HPI     PAST MEDICAL & SURGICAL HISTORY:  COPD, severe  Congestive heart failure  Pulmonary fibrosis  Pulmonary fibrosis  Smoker  BPH with obstruction/lower urinary tract symptoms  Vitamin D deficiency  GERD (gastroesophageal reflux disease)  Diabetes mellitus  Hyperlipidemia  Hypertension  S/P tonsillectomy  No significant past surgical history        Medication list         MEDICATIONS  (STANDING):  albuterol/ipratropium for Nebulization 3 milliLiter(s) Nebulizer every 6 hours  allopurinol 100 milliGRAM(s) Oral daily  ALPRAZolam 2 milliGRAM(s) Oral three times a day  amLODIPine   Tablet 10 milliGRAM(s) Oral daily  buPROPion XL . 300 milliGRAM(s) Oral daily  dextrose 5%. 1000 milliLiter(s) (50 mL/Hr) IV Continuous <Continuous>  dextrose 50% Injectable 12.5 Gram(s) IV Push once  dextrose 50% Injectable 25 Gram(s) IV Push once  dextrose 50% Injectable 25 Gram(s) IV Push once  dronabinol 5 milliGRAM(s) Oral daily  furosemide    Tablet 40 milliGRAM(s) Oral daily  heparin   Injectable 5000 Unit(s) SubCutaneous every 8 hours  hydrALAZINE 25 milliGRAM(s) Oral three times a day  insulin lispro (HumaLOG) corrective regimen sliding scale   SubCutaneous three times a day before meals  insulin lispro (HumaLOG) corrective regimen sliding scale   SubCutaneous at bedtime  isosorbide   mononitrate ER Tablet (IMDUR) 60 milliGRAM(s) Oral daily  labetalol 200 milliGRAM(s) Oral two times a day  metoclopramide Injectable 5 milliGRAM(s) IV Push every 8 hours  OLANZapine 2.5 milliGRAM(s) Oral at bedtime  pantoprazole    Tablet 40 milliGRAM(s) Oral before breakfast  piperacillin/tazobactam IVPB.. 3.375 Gram(s) IV Intermittent every 8 hours  polyethylene glycol 3350 17 Gram(s) Oral daily  senna 2 Tablet(s) Oral at bedtime  tamsulosin 0.8 milliGRAM(s) Oral at bedtime  venlafaxine XR. 150 milliGRAM(s) Oral two times a day    MEDICATIONS  (PRN):  bisacodyl 5 milliGRAM(s) Oral every 12 hours PRN Constipation  dextrose 40% Gel 15 Gram(s) Oral once PRN Blood Glucose LESS THAN 70 milliGRAM(s)/deciliter  glucagon  Injectable 1 milliGRAM(s) IntraMuscular once PRN Glucose LESS THAN 70 milligrams/deciliter  sodium chloride 0.65% Nasal 1 Spray(s) Both Nostrils three times a day PRN Nasal Congestion         Vitals log        ICU Vital Signs Last 24 Hrs  T(C): 37.5 (25 Aug 2020 04:45), Max: 37.5 (25 Aug 2020 04:45)  T(F): 99.5 (25 Aug 2020 04:45), Max: 99.5 (25 Aug 2020 04:45)  HR: 82 (25 Aug 2020 04:45) (63 - 86)  BP: 164/90 (25 Aug 2020 04:45) (133/69 - 164/90)  BP(mean): 97 (24 Aug 2020 21:47) (97 - 97)  ABP: --  ABP(mean): --  RR: 19 (25 Aug 2020 04:45) (18 - 19)  SpO2: 92% (25 Aug 2020 04:45) (92% - 98%)           Input and Output:  I&O's Detail    23 Aug 2020 07:01  -  24 Aug 2020 07:00  --------------------------------------------------------  IN:    Solution: 175 mL  Total IN: 175 mL    OUT:    Voided: 1100 mL  Total OUT: 1100 mL    Total NET: -925 mL      24 Aug 2020 07:01  -  25 Aug 2020 06:20  --------------------------------------------------------  IN:  Total IN: 0 mL    OUT:    Voided: 500 mL  Total OUT: 500 mL    Total NET: -500 mL          Lab Data                        8.6    13.89 )-----------( 135      ( 24 Aug 2020 08:17 )             26.3     08-24    133<L>  |  95<L>  |  42<H>  ----------------------------<  98  4.3   |  30  |  2.00<H>    Ca    9.0      24 Aug 2020 08:17              Review of Systems	      Objective     Physical Examination    heart s1s2  lung dec BS  abd soft  on o2 support  verbal  head nc      Pertinent Lab findings & Imaging      Princess:  NO   Adequate UO     I&O's Detail    23 Aug 2020 07:01  -  24 Aug 2020 07:00  --------------------------------------------------------  IN:    Solution: 175 mL  Total IN: 175 mL    OUT:    Voided: 1100 mL  Total OUT: 1100 mL    Total NET: -925 mL      24 Aug 2020 07:01  -  25 Aug 2020 06:20  --------------------------------------------------------  IN:  Total IN: 0 mL    OUT:    Voided: 500 mL  Total OUT: 500 mL    Total NET: -500 mL               Discussed with:     Cultures:	        Radiology

## 2020-08-25 NOTE — PROGRESS NOTE ADULT - NSREFPHYEXREFTO_GEN_ALL_CORE
ED Physical Exam

## 2020-08-25 NOTE — PROGRESS NOTE ADULT - ASSESSMENT
79 yo M with PMH of chronic diastolic CHF (normal LVF 2016), non obstructive CAD (cardiac cath 2015), BPH, interstitial lung disease, COPD (on 4L home O2), HTN, HLD, DMT2 on Janumet, GERD, BPH, depression, anxiety BIBEMS for generalized weakness. Admitted with PNA.     1) Decreased PO intake/anorexia/dysphagia   - comfort feeding as tolerated (dysphagia 3 diet)  - no feeding tubes  - Trial of marinol as appetite stimulant as discussed with pt & HCP > started 5 mg PO daily.  - added zyprexa (see below)    2) Failure to thrive  3) Goals of care/advance care planning  - Palliative performance scale score: 20% (poor)  - Prognosis: days-weeks (pt is hospice eligible)   - Code status: DNR/DNI (MOLST form filled out and placed in the chart)  - GOC: trial of IV antibiotics and IVF. No feeding tube. Home hospice upon discharge.   - HCP: Pt's son Balaji  - Psychosocial support provided  - SW to start referral to home hospice    4) Nausea likely 2/2 GERD and diabetic gastroparesis  - improved  - continue reglan 5 mg IVPB q8h (prokinetic)  - zofran prn  - continue PPI  - zyprexa (see below)  - can use xanax if nausea is related to anxiety (anticipatory nausea)    5) Anxiousssed and depressed mood  - continue home antidepressants and xanax prn  - continue zyprexa 2.5 mg PO qHS    Discussed with the primary team. Will continue to follow.    Gabino Mcgee MD 79 yo M with PMH of chronic diastolic CHF (normal LVF 2016), non obstructive CAD (cardiac cath 2015), BPH, interstitial lung disease, COPD (on 4L home O2), HTN, HLD, DMT2 on Janumet, GERD, BPH, depression, anxiety BIBEMS for generalized weakness. Admitted with PNA.     1) Decreased PO intake/anorexia/dysphagia   - comfort feeding as tolerated (dysphagia 3 diet)  - no feeding tubes  - Trial of marinol as appetite stimulant as discussed with pt & HCP > started 5 mg PO daily.  - zyprexa (see below)    2) Failure to thrive  3) Goals of care/advance care planning  - Palliative performance scale score: 20% (poor)  - Prognosis: days-weeks (pt is hospice eligible)   - Code status: DNR/DNI ([facility]OL[facility] form filled out and placed in the chart)  - GOC: trial of IV antibiotics and IVF. No feeding tube. Home hospice upon discharge.   - HCP: Pt's son Balaji  - Psychosocial support provided  - SW to start referral to home hospice    4) Nausea likely 2/2 GERD and diabetic gastroparesis  - improved  - continue reglan 5 mg IVPB q8h (prokinetic)  - zofran prn  - continue PPI  - zyprexa (see below)  - can use xanax if nausea is related to anxiety (anticipatory nausea)    5) Anxiety and depressed mood  - continue home antidepressants and xanax prn  - continue zyprexa 2.5 mg PO qHS    Discussed with the primary team. Will continue to follow.    Gabino Mcgee MD

## 2020-08-26 ENCOUNTER — RX RENEWAL (OUTPATIENT)
Age: 80
End: 2020-08-26

## 2020-08-26 LAB
ANION GAP SERPL CALC-SCNC: 9 MMOL/L — SIGNIFICANT CHANGE UP (ref 5–17)
BUN SERPL-MCNC: 43 MG/DL — HIGH (ref 7–23)
CALCIUM SERPL-MCNC: 9.3 MG/DL — SIGNIFICANT CHANGE UP (ref 8.5–10.1)
CHLORIDE SERPL-SCNC: 93 MMOL/L — LOW (ref 96–108)
CO2 SERPL-SCNC: 30 MMOL/L — SIGNIFICANT CHANGE UP (ref 22–31)
CREAT SERPL-MCNC: 2.2 MG/DL — HIGH (ref 0.5–1.3)
GLUCOSE SERPL-MCNC: 88 MG/DL — SIGNIFICANT CHANGE UP (ref 70–99)
HCT VFR BLD CALC: 27.6 % — LOW (ref 39–50)
HGB BLD-MCNC: 9.1 G/DL — LOW (ref 13–17)
MCHC RBC-ENTMCNC: 28.5 PG — SIGNIFICANT CHANGE UP (ref 27–34)
MCHC RBC-ENTMCNC: 33 GM/DL — SIGNIFICANT CHANGE UP (ref 32–36)
MCV RBC AUTO: 86.5 FL — SIGNIFICANT CHANGE UP (ref 80–100)
NRBC # BLD: 0 /100 WBCS — SIGNIFICANT CHANGE UP (ref 0–0)
PLATELET # BLD AUTO: 131 K/UL — LOW (ref 150–400)
POTASSIUM SERPL-MCNC: 4 MMOL/L — SIGNIFICANT CHANGE UP (ref 3.5–5.3)
POTASSIUM SERPL-SCNC: 4 MMOL/L — SIGNIFICANT CHANGE UP (ref 3.5–5.3)
RBC # BLD: 3.19 M/UL — LOW (ref 4.2–5.8)
RBC # FLD: 15.3 % — HIGH (ref 10.3–14.5)
SODIUM SERPL-SCNC: 132 MMOL/L — LOW (ref 135–145)
WBC # BLD: 16.23 K/UL — HIGH (ref 3.8–10.5)
WBC # FLD AUTO: 16.23 K/UL — HIGH (ref 3.8–10.5)

## 2020-08-26 PROCEDURE — 99233 SBSQ HOSP IP/OBS HIGH 50: CPT | Mod: GC

## 2020-08-26 RX ORDER — ALPRAZOLAM 0.25 MG
2 TABLET ORAL THREE TIMES A DAY
Refills: 0 | Status: DISCONTINUED | OUTPATIENT
Start: 2020-08-26 | End: 2020-08-27

## 2020-08-26 RX ORDER — TAMSULOSIN HYDROCHLORIDE 0.4 MG/1
0.4 CAPSULE ORAL
Qty: 56 | Refills: 4 | Status: ACTIVE | COMMUNITY
Start: 2019-09-27 | End: 1900-01-01

## 2020-08-26 RX ORDER — HYDROXYZINE HCL 10 MG
25 TABLET ORAL
Refills: 0 | Status: DISCONTINUED | OUTPATIENT
Start: 2020-08-26 | End: 2020-08-27

## 2020-08-26 RX ORDER — ALLOPURINOL 100 MG/1
100 TABLET ORAL
Qty: 28 | Refills: 3 | Status: ACTIVE | COMMUNITY
Start: 2018-01-06 | End: 1900-01-01

## 2020-08-26 RX ADMIN — PIPERACILLIN AND TAZOBACTAM 25 GRAM(S): 4; .5 INJECTION, POWDER, LYOPHILIZED, FOR SOLUTION INTRAVENOUS at 13:26

## 2020-08-26 RX ADMIN — Medication 100 MILLIGRAM(S): at 12:36

## 2020-08-26 RX ADMIN — Medication 200 MILLIGRAM(S): at 17:07

## 2020-08-26 RX ADMIN — Medication 2 MILLIGRAM(S): at 13:27

## 2020-08-26 RX ADMIN — Medication 150 MILLIGRAM(S): at 05:32

## 2020-08-26 RX ADMIN — Medication 40 MILLIGRAM(S): at 05:33

## 2020-08-26 RX ADMIN — Medication 25 MILLIGRAM(S): at 05:33

## 2020-08-26 RX ADMIN — PIPERACILLIN AND TAZOBACTAM 25 GRAM(S): 4; .5 INJECTION, POWDER, LYOPHILIZED, FOR SOLUTION INTRAVENOUS at 05:34

## 2020-08-26 RX ADMIN — Medication 200 MILLIGRAM(S): at 05:33

## 2020-08-26 RX ADMIN — Medication 3: at 12:21

## 2020-08-26 RX ADMIN — SENNA PLUS 2 TABLET(S): 8.6 TABLET ORAL at 22:17

## 2020-08-26 RX ADMIN — Medication 5 MILLIGRAM(S): at 13:27

## 2020-08-26 RX ADMIN — Medication 5 MILLIGRAM(S): at 22:17

## 2020-08-26 RX ADMIN — Medication 25 MILLIGRAM(S): at 22:17

## 2020-08-26 RX ADMIN — Medication 3 MILLILITER(S): at 07:20

## 2020-08-26 RX ADMIN — HEPARIN SODIUM 5000 UNIT(S): 5000 INJECTION INTRAVENOUS; SUBCUTANEOUS at 22:17

## 2020-08-26 RX ADMIN — Medication 3 MILLILITER(S): at 13:21

## 2020-08-26 RX ADMIN — ISOSORBIDE MONONITRATE 60 MILLIGRAM(S): 60 TABLET, EXTENDED RELEASE ORAL at 12:38

## 2020-08-26 RX ADMIN — PANTOPRAZOLE SODIUM 40 MILLIGRAM(S): 20 TABLET, DELAYED RELEASE ORAL at 05:33

## 2020-08-26 RX ADMIN — Medication 2 MILLIGRAM(S): at 05:32

## 2020-08-26 RX ADMIN — Medication 3 MILLILITER(S): at 19:42

## 2020-08-26 RX ADMIN — OLANZAPINE 2.5 MILLIGRAM(S): 15 TABLET, FILM COATED ORAL at 22:17

## 2020-08-26 RX ADMIN — TAMSULOSIN HYDROCHLORIDE 0.8 MILLIGRAM(S): 0.4 CAPSULE ORAL at 22:17

## 2020-08-26 RX ADMIN — Medication 1: at 16:56

## 2020-08-26 RX ADMIN — Medication 5 MILLIGRAM(S): at 05:33

## 2020-08-26 RX ADMIN — HEPARIN SODIUM 5000 UNIT(S): 5000 INJECTION INTRAVENOUS; SUBCUTANEOUS at 05:33

## 2020-08-26 RX ADMIN — Medication 25 MILLIGRAM(S): at 13:28

## 2020-08-26 RX ADMIN — AMLODIPINE BESYLATE 10 MILLIGRAM(S): 2.5 TABLET ORAL at 05:33

## 2020-08-26 RX ADMIN — POLYETHYLENE GLYCOL 3350 17 GRAM(S): 17 POWDER, FOR SOLUTION ORAL at 12:37

## 2020-08-26 RX ADMIN — PIPERACILLIN AND TAZOBACTAM 25 GRAM(S): 4; .5 INJECTION, POWDER, LYOPHILIZED, FOR SOLUTION INTRAVENOUS at 22:17

## 2020-08-26 RX ADMIN — Medication 150 MILLIGRAM(S): at 17:07

## 2020-08-26 RX ADMIN — Medication 5 MILLIGRAM(S): at 12:37

## 2020-08-26 RX ADMIN — BUPROPION HYDROCHLORIDE 300 MILLIGRAM(S): 150 TABLET, EXTENDED RELEASE ORAL at 12:37

## 2020-08-26 RX ADMIN — HEPARIN SODIUM 5000 UNIT(S): 5000 INJECTION INTRAVENOUS; SUBCUTANEOUS at 13:27

## 2020-08-26 RX ADMIN — Medication 2 MILLIGRAM(S): at 22:18

## 2020-08-26 NOTE — PROGRESS NOTE ADULT - SUBJECTIVE AND OBJECTIVE BOX
Date/Time Patient Seen:  		  Referring MD:   Data Reviewed	       Patient is a 80y old  Male who presents with a chief complaint of pneumonia (25 Aug 2020 10:50)      Subjective/HPI     PAST MEDICAL & SURGICAL HISTORY:  COPD, severe  Congestive heart failure  Pulmonary fibrosis  Pulmonary fibrosis  Smoker  BPH with obstruction/lower urinary tract symptoms  Vitamin D deficiency  GERD (gastroesophageal reflux disease)  Diabetes mellitus  Hyperlipidemia  Hypertension  S/P tonsillectomy  No significant past surgical history        Medication list         MEDICATIONS  (STANDING):  albuterol/ipratropium for Nebulization 3 milliLiter(s) Nebulizer every 6 hours  allopurinol 100 milliGRAM(s) Oral daily  ALPRAZolam 2 milliGRAM(s) Oral three times a day  amLODIPine   Tablet 10 milliGRAM(s) Oral daily  buPROPion XL . 300 milliGRAM(s) Oral daily  dextrose 5%. 1000 milliLiter(s) (50 mL/Hr) IV Continuous <Continuous>  dextrose 50% Injectable 12.5 Gram(s) IV Push once  dextrose 50% Injectable 25 Gram(s) IV Push once  dextrose 50% Injectable 25 Gram(s) IV Push once  dronabinol 5 milliGRAM(s) Oral daily  furosemide    Tablet 40 milliGRAM(s) Oral daily  heparin   Injectable 5000 Unit(s) SubCutaneous every 8 hours  hydrALAZINE 25 milliGRAM(s) Oral three times a day  insulin lispro (HumaLOG) corrective regimen sliding scale   SubCutaneous three times a day before meals  insulin lispro (HumaLOG) corrective regimen sliding scale   SubCutaneous at bedtime  isosorbide   mononitrate ER Tablet (IMDUR) 60 milliGRAM(s) Oral daily  labetalol 200 milliGRAM(s) Oral two times a day  metoclopramide Injectable 5 milliGRAM(s) IV Push every 8 hours  OLANZapine 2.5 milliGRAM(s) Oral at bedtime  pantoprazole    Tablet 40 milliGRAM(s) Oral before breakfast  piperacillin/tazobactam IVPB.. 3.375 Gram(s) IV Intermittent every 8 hours  polyethylene glycol 3350 17 Gram(s) Oral daily  senna 2 Tablet(s) Oral at bedtime  tamsulosin 0.8 milliGRAM(s) Oral at bedtime  venlafaxine XR. 150 milliGRAM(s) Oral two times a day    MEDICATIONS  (PRN):  bisacodyl 5 milliGRAM(s) Oral every 12 hours PRN Constipation  dextrose 40% Gel 15 Gram(s) Oral once PRN Blood Glucose LESS THAN 70 milliGRAM(s)/deciliter  glucagon  Injectable 1 milliGRAM(s) IntraMuscular once PRN Glucose LESS THAN 70 milligrams/deciliter  sodium chloride 0.65% Nasal 1 Spray(s) Both Nostrils three times a day PRN Nasal Congestion         Vitals log        ICU Vital Signs Last 24 Hrs  T(C): 37.4 (26 Aug 2020 04:05), Max: 37.4 (26 Aug 2020 04:05)  T(F): 99.4 (26 Aug 2020 04:05), Max: 99.4 (26 Aug 2020 04:05)  HR: 83 (26 Aug 2020 04:05) (74 - 85)  BP: 140/78 (26 Aug 2020 04:05) (115/69 - 152/89)  BP(mean): 84 (25 Aug 2020 21:43) (84 - 84)  ABP: --  ABP(mean): --  RR: 19 (26 Aug 2020 04:05) (19 - 19)  SpO2: 96% (26 Aug 2020 04:05) (94% - 96%)           Input and Output:  I&O's Detail    24 Aug 2020 07:01  -  25 Aug 2020 07:00  --------------------------------------------------------  IN:  Total IN: 0 mL    OUT:    Voided: 1500 mL  Total OUT: 1500 mL    Total NET: -1500 mL      25 Aug 2020 07:01  -  26 Aug 2020 06:17  --------------------------------------------------------  IN:  Total IN: 0 mL    OUT:    Voided: 2275 mL  Total OUT: 2275 mL    Total NET: -2275 mL          Lab Data                        9.2    13.70 )-----------( 134      ( 25 Aug 2020 07:36 )             28.3     08-25    132<L>  |  93<L>  |  44<H>  ----------------------------<  86  4.0   |  33<H>  |  2.00<H>    Ca    8.9      25 Aug 2020 07:36              Review of Systems	      Objective     Physical Examination    heart s1s2  lung dec BS  abd soft  obese  head nc  on o2 support      Pertinent Lab findings & Imaging      Princess:  NO   Adequate UO     I&O's Detail    24 Aug 2020 07:01  -  25 Aug 2020 07:00  --------------------------------------------------------  IN:  Total IN: 0 mL    OUT:    Voided: 1500 mL  Total OUT: 1500 mL    Total NET: -1500 mL      25 Aug 2020 07:01  -  26 Aug 2020 06:17  --------------------------------------------------------  IN:  Total IN: 0 mL    OUT:    Voided: 2275 mL  Total OUT: 2275 mL    Total NET: -2275 mL               Discussed with:     Cultures:	        Radiology

## 2020-08-26 NOTE — PROGRESS NOTE ADULT - ASSESSMENT
79 yo M with PMH of chronic diastolic CHF (normal LVF 2016), non obstructive CAD (cardiac cath 2015), BPH, interstitial lung disease, COPD (on 4L home O2), HTN, HLD, DMT2 on Janumet, GERD, BPH, depression, anxiety BIBEMS for generalized weakness. Admitted with PNA.  Comfort Measures in place.

## 2020-08-26 NOTE — PHARMACOTHERAPY INTERVENTION NOTE - COMMENTS
80 year old male with history of COPD, on 4 L nasal canula, ordered for xanax TID - checked iSTOP, pt last filled for 30 days in Feb 2020. Called Dr Trejo, spoke to resident to inform them pt has not picked up med in ~6 months. Recommended adding PRN and hold parameters if MD would like to continue use of med, to reduce risk of respiratory depression. Per MD, pt is palliatiave and high anxiety. Of note, Dr Ariel Morataya note mentions use of xanax PRN - resident to add hold parameters but continue standing order, not PRN

## 2020-08-26 NOTE — CHART NOTE - NSCHARTNOTEFT_GEN_A_CORE
Assessment: Pt seen for nutrition follow up.  Chart reviewed, hospital course noted.  Pt asleep, attempted to awake for interview.  Pt awoke briefly but answers to questions were largely unintelligible before pt fell back asleep.  Per RN, pt ate only chicken salad at lunch with assist.  No signs of muscle wasting or fat loss appreciated at this time.  Per MD noted, "Pt was seen by palliative and is DNR/DNI. For hospice care at home".    Fecal incontinence noted today.       Factors impacting intake: [ ] none [ ] nausea  [ ] vomiting [ ] diarrhea [ ] constipation  [ ]chewing problems [ ] swallowing issues  [x] other: lethargy    Diet Presciption: Diet, Dysphagia 3 Soft-Thin Liquids (08-20-20 @ 12:42)    Intake: varies, 25-75%    Current Weight: Weight (kg): 8/20 86.2 kg, 8/25 213.1#, 8/26 216#   % Weight Change    Pertinent Medications: MEDICATIONS  (STANDING):  albuterol/ipratropium for Nebulization 3 milliLiter(s) Nebulizer every 6 hours  allopurinol 100 milliGRAM(s) Oral daily  ALPRAZolam 2 milliGRAM(s) Oral three times a day  amLODIPine   Tablet 10 milliGRAM(s) Oral daily  buPROPion XL . 300 milliGRAM(s) Oral daily  dextrose 5%. 1000 milliLiter(s) (50 mL/Hr) IV Continuous <Continuous>  dextrose 50% Injectable 12.5 Gram(s) IV Push once  dextrose 50% Injectable 25 Gram(s) IV Push once  dextrose 50% Injectable 25 Gram(s) IV Push once  dronabinol 5 milliGRAM(s) Oral daily  furosemide    Tablet 40 milliGRAM(s) Oral daily  heparin   Injectable 5000 Unit(s) SubCutaneous every 8 hours  hydrALAZINE 25 milliGRAM(s) Oral three times a day  insulin lispro (HumaLOG) corrective regimen sliding scale   SubCutaneous three times a day before meals  insulin lispro (HumaLOG) corrective regimen sliding scale   SubCutaneous at bedtime  isosorbide   mononitrate ER Tablet (IMDUR) 60 milliGRAM(s) Oral daily  labetalol 200 milliGRAM(s) Oral two times a day  metoclopramide Injectable 5 milliGRAM(s) IV Push every 8 hours  OLANZapine 2.5 milliGRAM(s) Oral at bedtime  pantoprazole    Tablet 40 milliGRAM(s) Oral before breakfast  piperacillin/tazobactam IVPB.. 3.375 Gram(s) IV Intermittent every 8 hours  polyethylene glycol 3350 17 Gram(s) Oral daily  senna 2 Tablet(s) Oral at bedtime  tamsulosin 0.8 milliGRAM(s) Oral at bedtime  venlafaxine XR. 150 milliGRAM(s) Oral two times a day    MEDICATIONS  (PRN):  bisacodyl 5 milliGRAM(s) Oral every 12 hours PRN Constipation  dextrose 40% Gel 15 Gram(s) Oral once PRN Blood Glucose LESS THAN 70 milliGRAM(s)/deciliter  glucagon  Injectable 1 milliGRAM(s) IntraMuscular once PRN Glucose LESS THAN 70 milligrams/deciliter  hydrOXYzine hydrochloride 25 milliGRAM(s) Oral four times a day PRN Anxiety  sodium chloride 0.65% Nasal 1 Spray(s) Both Nostrils three times a day PRN Nasal Congestion    Pertinent Labs: 08-26 Na132 mmol/L<L> Glu 88 mg/dL K+ 4.0 mmol/L Cr  2.20 mg/dL<H> BUN 43 mg/dL<H> 08-21 Alb 3.1 g/dL<L>     CAPILLARY BLOOD GLUCOSE      POCT Blood Glucose.: 256 mg/dL (26 Aug 2020 11:43)  POCT Blood Glucose.: 109 mg/dL (26 Aug 2020 07:54)  POCT Blood Glucose.: 93 mg/dL (25 Aug 2020 21:40)  POCT Blood Glucose.: 144 mg/dL (25 Aug 2020 16:48)    Skin: no pressure injuries  Edema: none noted    Estimated Needs:   [x] no change since previous assessment  [ ] recalculated:     Previous Nutrition Diagnosis:    [x] Decreased Nutrient Needs (Na)    Nutrition Diagnosis is [x] ongoing  [ ] resolved [ ] not applicable     New Nutrition Diagnosis: [x] not applicable       Interventions:   Recommend  [ ] Change Diet To:  [ ] Nutrition Supplement  [ ] Nutrition Support  [x] Other: IF po intake improves to >=75% consistently recommend add 2gm Na to diet Rx.  Assist with meals, encourage po intake.  Consider adding 8oz Glucerna bid if po intake consistently below 50%.    Monitoring and Evaluation:   [x] PO intake [ x ] Tolerance to diet prescription [ x ] weights [ x ] labs[ x ] follow up per protocol  [ ] other:

## 2020-08-26 NOTE — PROGRESS NOTE ADULT - NSHPATTENDINGPLANDISCUSS_GEN_ALL_CORE
Ashok, Pt, RN, palliative care
Son, Pt, RN
Ashok, Pt, RN, palliative care
1E IDR team, Dr Rafia remy

## 2020-08-26 NOTE — PROGRESS NOTE ADULT - ATTENDING COMMENTS
81 yo M with PMH of chronic diastolic CHF (normal LVF 2016), non obstructive CAD (cardiac cath 2015), BPH, interstitial lung disease, COPD (on 4L home O2), HTN, HLD, DMT2 on Janumet, GERD, BPH, depression, anxiety BIBEMS for generalized weakness. Admitted with PNA.  Comfort Measures in place. Plan: apprec palliative collaboration and coordination, cont antibiotics as per ID, once completes course consider hospice referral and end of life care, monitor clinical course and pt/family wishes 79 yo M with PMH of chronic diastolic CHF (normal LVF 2016), non obstructive CAD (cardiac cath 2015), BPH, interstitial lung disease, COPD (on 4L home O2), HTN, HLD, DMT2 on Janumet, GERD, BPH, depression, anxiety BIBEMS for generalized weakness. Admitted with PNA.  Comfort Measures in place. Plan: apprec palliative collaboration and coordination, cont antibiotics as per pulm and palliative trial, once completes course consider hospice referral and end of life care, monitor clinical course and pt/family wishes

## 2020-08-26 NOTE — PROGRESS NOTE ADULT - SUBJECTIVE AND OBJECTIVE BOX
Patient is a 80y old  Male who presents with a chief complaint of pneumonia (26 Aug 2020 06:17)      CHARTING IN PROGRESS    INTERVAL HPI/OVERNIGHT EVENTS:    MEDICATIONS  (STANDING):  albuterol/ipratropium for Nebulization 3 milliLiter(s) Nebulizer every 6 hours  allopurinol 100 milliGRAM(s) Oral daily  ALPRAZolam 2 milliGRAM(s) Oral three times a day  amLODIPine   Tablet 10 milliGRAM(s) Oral daily  buPROPion XL . 300 milliGRAM(s) Oral daily  dextrose 5%. 1000 milliLiter(s) (50 mL/Hr) IV Continuous <Continuous>  dextrose 50% Injectable 12.5 Gram(s) IV Push once  dextrose 50% Injectable 25 Gram(s) IV Push once  dextrose 50% Injectable 25 Gram(s) IV Push once  dronabinol 5 milliGRAM(s) Oral daily  furosemide    Tablet 40 milliGRAM(s) Oral daily  heparin   Injectable 5000 Unit(s) SubCutaneous every 8 hours  hydrALAZINE 25 milliGRAM(s) Oral three times a day  insulin lispro (HumaLOG) corrective regimen sliding scale   SubCutaneous three times a day before meals  insulin lispro (HumaLOG) corrective regimen sliding scale   SubCutaneous at bedtime  isosorbide   mononitrate ER Tablet (IMDUR) 60 milliGRAM(s) Oral daily  labetalol 200 milliGRAM(s) Oral two times a day  metoclopramide Injectable 5 milliGRAM(s) IV Push every 8 hours  OLANZapine 2.5 milliGRAM(s) Oral at bedtime  pantoprazole    Tablet 40 milliGRAM(s) Oral before breakfast  piperacillin/tazobactam IVPB.. 3.375 Gram(s) IV Intermittent every 8 hours  polyethylene glycol 3350 17 Gram(s) Oral daily  senna 2 Tablet(s) Oral at bedtime  tamsulosin 0.8 milliGRAM(s) Oral at bedtime  venlafaxine XR. 150 milliGRAM(s) Oral two times a day    MEDICATIONS  (PRN):  bisacodyl 5 milliGRAM(s) Oral every 12 hours PRN Constipation  dextrose 40% Gel 15 Gram(s) Oral once PRN Blood Glucose LESS THAN 70 milliGRAM(s)/deciliter  glucagon  Injectable 1 milliGRAM(s) IntraMuscular once PRN Glucose LESS THAN 70 milligrams/deciliter  sodium chloride 0.65% Nasal 1 Spray(s) Both Nostrils three times a day PRN Nasal Congestion      Allergies    No Known Allergies    Intolerances        REVIEW OF SYSTEMS:  CONSTITUTIONAL: No fever or chills  HEENT:  No headache, no sore throat  RESPIRATORY: No cough, wheezing, or shortness of breath  CARDIOVASCULAR: No chest pain, palpitations  GASTROINTESTINAL: No abd pain, nausea, vomiting, or diarrhea  GENITOURINARY: No dysuria, frequency, or hematuria  NEUROLOGICAL: no focal weakness or dizziness  MUSCULOSKELETAL: no myalgias     Vital Signs Last 24 Hrs  T(C): 37.4 (26 Aug 2020 04:05), Max: 37.4 (26 Aug 2020 04:05)  T(F): 99.4 (26 Aug 2020 04:05), Max: 99.4 (26 Aug 2020 04:05)  HR: 86 (26 Aug 2020 08:02) (74 - 86)  BP: 140/78 (26 Aug 2020 04:05) (115/69 - 152/89)  BP(mean): 84 (25 Aug 2020 21:43) (84 - 84)  RR: 19 (26 Aug 2020 04:05) (19 - 19)  SpO2: 96% (26 Aug 2020 08:02) (94% - 96%)    PHYSICAL EXAM:  GENERAL: NAD  HEENT:  anicteric, moist mucous membranes  CHEST/LUNG:  CTA b/l, no rales, wheezes, or rhonchi  HEART:  RRR, S1, S2  ABDOMEN:  BS+, soft, nontender, nondistended  EXTREMITIES: no edema, cyanosis, or calf tenderness  NERVOUS SYSTEM: answers questions and follows commands appropriately    LABS:                        9.1    16.23 )-----------( 131      ( 26 Aug 2020 09:15 )             27.6     CBC Full  -  ( 26 Aug 2020 09:15 )  WBC Count : 16.23 K/uL  Hemoglobin : 9.1 g/dL  Hematocrit : 27.6 %  Platelet Count - Automated : 131 K/uL  Mean Cell Volume : 86.5 fl  Mean Cell Hemoglobin : 28.5 pg  Mean Cell Hemoglobin Concentration : 33.0 gm/dL  Auto Neutrophil # : x  Auto Lymphocyte # : x  Auto Monocyte # : x  Auto Eosinophil # : x  Auto Basophil # : x  Auto Neutrophil % : x  Auto Lymphocyte % : x  Auto Monocyte % : x  Auto Eosinophil % : x  Auto Basophil % : x    26 Aug 2020 09:15    132    |  93     |  43     ----------------------------<  88     4.0     |  30     |  2.20     Ca    9.3        26 Aug 2020 09:15          CAPILLARY BLOOD GLUCOSE      POCT Blood Glucose.: 109 mg/dL (26 Aug 2020 07:54)  POCT Blood Glucose.: 93 mg/dL (25 Aug 2020 21:40)  POCT Blood Glucose.: 144 mg/dL (25 Aug 2020 16:48)  POCT Blood Glucose.: 115 mg/dL (25 Aug 2020 11:57)        Culture - Urine (collected 08-22-20 @ 11:41)  Source: .Urine Clean Catch (Midstream)  Final Report (08-23-20 @ 13:07):    No growth    Culture - Blood (collected 08-22-20 @ 01:31)  Source: .Blood Blood  Preliminary Report (08-23-20 @ 02:01):    No growth to date.    Culture - Blood (collected 08-20-20 @ 09:00)  Source: .Blood Blood  Final Report (08-25-20 @ 10:00):    No Growth Final    Culture - Blood (collected 08-20-20 @ 09:00)  Source: .Blood Blood  Final Report (08-25-20 @ 10:00):    No Growth Final        RADIOLOGY & ADDITIONAL TESTS:    Personally reviewed.     Consultant(s) Notes Reviewed:  [x] YES  [ ] NO Patient is a 80y old  Male who presents with a chief complaint of pneumonia (26 Aug 2020 06:17)      INTERVAL HPI/OVERNIGHT EVENTS: Patient seen and examined at bedside. Offered no complaints    MEDICATIONS  (STANDING):  albuterol/ipratropium for Nebulization 3 milliLiter(s) Nebulizer every 6 hours  allopurinol 100 milliGRAM(s) Oral daily  ALPRAZolam 2 milliGRAM(s) Oral three times a day  amLODIPine   Tablet 10 milliGRAM(s) Oral daily  buPROPion XL . 300 milliGRAM(s) Oral daily  dextrose 5%. 1000 milliLiter(s) (50 mL/Hr) IV Continuous <Continuous>  dextrose 50% Injectable 12.5 Gram(s) IV Push once  dextrose 50% Injectable 25 Gram(s) IV Push once  dextrose 50% Injectable 25 Gram(s) IV Push once  dronabinol 5 milliGRAM(s) Oral daily  furosemide    Tablet 40 milliGRAM(s) Oral daily  heparin   Injectable 5000 Unit(s) SubCutaneous every 8 hours  hydrALAZINE 25 milliGRAM(s) Oral three times a day  insulin lispro (HumaLOG) corrective regimen sliding scale   SubCutaneous three times a day before meals  insulin lispro (HumaLOG) corrective regimen sliding scale   SubCutaneous at bedtime  isosorbide   mononitrate ER Tablet (IMDUR) 60 milliGRAM(s) Oral daily  labetalol 200 milliGRAM(s) Oral two times a day  metoclopramide Injectable 5 milliGRAM(s) IV Push every 8 hours  OLANZapine 2.5 milliGRAM(s) Oral at bedtime  pantoprazole    Tablet 40 milliGRAM(s) Oral before breakfast  piperacillin/tazobactam IVPB.. 3.375 Gram(s) IV Intermittent every 8 hours  polyethylene glycol 3350 17 Gram(s) Oral daily  senna 2 Tablet(s) Oral at bedtime  tamsulosin 0.8 milliGRAM(s) Oral at bedtime  venlafaxine XR. 150 milliGRAM(s) Oral two times a day    MEDICATIONS  (PRN):  bisacodyl 5 milliGRAM(s) Oral every 12 hours PRN Constipation  dextrose 40% Gel 15 Gram(s) Oral once PRN Blood Glucose LESS THAN 70 milliGRAM(s)/deciliter  glucagon  Injectable 1 milliGRAM(s) IntraMuscular once PRN Glucose LESS THAN 70 milligrams/deciliter  sodium chloride 0.65% Nasal 1 Spray(s) Both Nostrils three times a day PRN Nasal Congestion      Allergies    No Known Allergies    Intolerances        REVIEW OF SYSTEMS:  CONSTITUTIONAL: No fever or chills  HEENT:  No headache, no sore throat  RESPIRATORY: No cough, wheezing, or shortness of breath  CARDIOVASCULAR: No chest pain, palpitations  GASTROINTESTINAL: No abd pain, nausea, vomiting, or diarrhea  GENITOURINARY: No dysuria, frequency, or hematuria  NEUROLOGICAL: no focal weakness or dizziness  MUSCULOSKELETAL: no myalgias     Vital Signs Last 24 Hrs  T(C): 37.4 (26 Aug 2020 04:05), Max: 37.4 (26 Aug 2020 04:05)  T(F): 99.4 (26 Aug 2020 04:05), Max: 99.4 (26 Aug 2020 04:05)  HR: 86 (26 Aug 2020 08:02) (74 - 86)  BP: 140/78 (26 Aug 2020 04:05) (115/69 - 152/89)  BP(mean): 84 (25 Aug 2020 21:43) (84 - 84)  RR: 19 (26 Aug 2020 04:05) (19 - 19)  SpO2: 96% (26 Aug 2020 08:02) (94% - 96%)    PHYSICAL EXAM:  GENERAL: NAD  HEENT:  anicteric, moist mucous membranes  CHEST/LUNG:  CTA b/l, no rales, wheezes, or rhonchi  HEART:  RRR, S1, S2  ABDOMEN:  BS+, soft, nontender, nondistended  EXTREMITIES: no edema, cyanosis, or calf tenderness  NERVOUS SYSTEM: answers questions and follows commands appropriately    LABS:                        9.1    16.23 )-----------( 131      ( 26 Aug 2020 09:15 )             27.6     CBC Full  -  ( 26 Aug 2020 09:15 )  WBC Count : 16.23 K/uL  Hemoglobin : 9.1 g/dL  Hematocrit : 27.6 %  Platelet Count - Automated : 131 K/uL  Mean Cell Volume : 86.5 fl  Mean Cell Hemoglobin : 28.5 pg  Mean Cell Hemoglobin Concentration : 33.0 gm/dL  Auto Neutrophil # : x  Auto Lymphocyte # : x  Auto Monocyte # : x  Auto Eosinophil # : x  Auto Basophil # : x  Auto Neutrophil % : x  Auto Lymphocyte % : x  Auto Monocyte % : x  Auto Eosinophil % : x  Auto Basophil % : x    26 Aug 2020 09:15    132    |  93     |  43     ----------------------------<  88     4.0     |  30     |  2.20     Ca    9.3        26 Aug 2020 09:15          CAPILLARY BLOOD GLUCOSE      POCT Blood Glucose.: 109 mg/dL (26 Aug 2020 07:54)  POCT Blood Glucose.: 93 mg/dL (25 Aug 2020 21:40)  POCT Blood Glucose.: 144 mg/dL (25 Aug 2020 16:48)  POCT Blood Glucose.: 115 mg/dL (25 Aug 2020 11:57)        Culture - Urine (collected 08-22-20 @ 11:41)  Source: .Urine Clean Catch (Midstream)  Final Report (08-23-20 @ 13:07):    No growth    Culture - Blood (collected 08-22-20 @ 01:31)  Source: .Blood Blood  Preliminary Report (08-23-20 @ 02:01):    No growth to date.    Culture - Blood (collected 08-20-20 @ 09:00)  Source: .Blood Blood  Final Report (08-25-20 @ 10:00):    No Growth Final    Culture - Blood (collected 08-20-20 @ 09:00)  Source: .Blood Blood  Final Report (08-25-20 @ 10:00):    No Growth Final        RADIOLOGY & ADDITIONAL TESTS:    Personally reviewed.     Consultant(s) Notes Reviewed:  [x] YES  [ ] NO

## 2020-08-27 ENCOUNTER — TRANSCRIPTION ENCOUNTER (OUTPATIENT)
Age: 80
End: 2020-08-27

## 2020-08-27 DIAGNOSIS — R62.7 ADULT FAILURE TO THRIVE: ICD-10-CM

## 2020-08-27 LAB
CULTURE RESULTS: SIGNIFICANT CHANGE UP
SPECIMEN SOURCE: SIGNIFICANT CHANGE UP

## 2020-08-27 PROCEDURE — 99233 SBSQ HOSP IP/OBS HIGH 50: CPT | Mod: GC

## 2020-08-27 RX ORDER — HYDROXYZINE HCL 10 MG
25 TABLET ORAL
Refills: 0 | Status: DISCONTINUED | OUTPATIENT
Start: 2020-08-27 | End: 2020-08-28

## 2020-08-27 RX ORDER — ALPRAZOLAM 0.25 MG
1 TABLET ORAL EVERY 8 HOURS
Refills: 0 | Status: DISCONTINUED | OUTPATIENT
Start: 2020-08-27 | End: 2020-08-28

## 2020-08-27 RX ADMIN — PANTOPRAZOLE SODIUM 40 MILLIGRAM(S): 20 TABLET, DELAYED RELEASE ORAL at 05:36

## 2020-08-27 RX ADMIN — HEPARIN SODIUM 5000 UNIT(S): 5000 INJECTION INTRAVENOUS; SUBCUTANEOUS at 05:37

## 2020-08-27 RX ADMIN — BUPROPION HYDROCHLORIDE 300 MILLIGRAM(S): 150 TABLET, EXTENDED RELEASE ORAL at 12:58

## 2020-08-27 RX ADMIN — Medication 100 MILLIGRAM(S): at 12:58

## 2020-08-27 RX ADMIN — TAMSULOSIN HYDROCHLORIDE 0.8 MILLIGRAM(S): 0.4 CAPSULE ORAL at 22:16

## 2020-08-27 RX ADMIN — SENNA PLUS 2 TABLET(S): 8.6 TABLET ORAL at 22:16

## 2020-08-27 RX ADMIN — Medication 200 MILLIGRAM(S): at 05:36

## 2020-08-27 RX ADMIN — Medication 5 MILLIGRAM(S): at 22:16

## 2020-08-27 RX ADMIN — Medication 25 MILLIGRAM(S): at 22:16

## 2020-08-27 RX ADMIN — HEPARIN SODIUM 5000 UNIT(S): 5000 INJECTION INTRAVENOUS; SUBCUTANEOUS at 22:17

## 2020-08-27 RX ADMIN — Medication 5 MILLIGRAM(S): at 13:14

## 2020-08-27 RX ADMIN — AMLODIPINE BESYLATE 10 MILLIGRAM(S): 2.5 TABLET ORAL at 05:36

## 2020-08-27 RX ADMIN — OLANZAPINE 2.5 MILLIGRAM(S): 15 TABLET, FILM COATED ORAL at 22:16

## 2020-08-27 RX ADMIN — Medication 25 MILLIGRAM(S): at 13:14

## 2020-08-27 RX ADMIN — Medication 25 MILLIGRAM(S): at 05:36

## 2020-08-27 RX ADMIN — PIPERACILLIN AND TAZOBACTAM 25 GRAM(S): 4; .5 INJECTION, POWDER, LYOPHILIZED, FOR SOLUTION INTRAVENOUS at 13:14

## 2020-08-27 RX ADMIN — Medication 40 MILLIGRAM(S): at 05:36

## 2020-08-27 RX ADMIN — HEPARIN SODIUM 5000 UNIT(S): 5000 INJECTION INTRAVENOUS; SUBCUTANEOUS at 13:14

## 2020-08-27 RX ADMIN — Medication 3 MILLILITER(S): at 13:48

## 2020-08-27 RX ADMIN — Medication 2 MILLIGRAM(S): at 05:36

## 2020-08-27 RX ADMIN — Medication 3 MILLILITER(S): at 20:21

## 2020-08-27 RX ADMIN — PIPERACILLIN AND TAZOBACTAM 25 GRAM(S): 4; .5 INJECTION, POWDER, LYOPHILIZED, FOR SOLUTION INTRAVENOUS at 05:37

## 2020-08-27 RX ADMIN — PIPERACILLIN AND TAZOBACTAM 25 GRAM(S): 4; .5 INJECTION, POWDER, LYOPHILIZED, FOR SOLUTION INTRAVENOUS at 22:17

## 2020-08-27 RX ADMIN — ISOSORBIDE MONONITRATE 60 MILLIGRAM(S): 60 TABLET, EXTENDED RELEASE ORAL at 13:00

## 2020-08-27 RX ADMIN — POLYETHYLENE GLYCOL 3350 17 GRAM(S): 17 POWDER, FOR SOLUTION ORAL at 12:58

## 2020-08-27 RX ADMIN — Medication 3 MILLILITER(S): at 07:33

## 2020-08-27 RX ADMIN — Medication 5 MILLIGRAM(S): at 05:37

## 2020-08-27 RX ADMIN — Medication 150 MILLIGRAM(S): at 05:36

## 2020-08-27 RX ADMIN — Medication 200 MILLIGRAM(S): at 17:13

## 2020-08-27 RX ADMIN — Medication 150 MILLIGRAM(S): at 17:13

## 2020-08-27 NOTE — PROGRESS NOTE ADULT - PROBLEM SELECTOR PLAN 7
C/W protonix
on 4L NC at home chronically   duonebs q6 PRN for SOB or wheezing

## 2020-08-27 NOTE — PROGRESS NOTE ADULT - PROBLEM SELECTOR PROBLEM 9
BPH with obstruction/lower urinary tract symptoms

## 2020-08-27 NOTE — PROGRESS NOTE ADULT - PROBLEM SELECTOR PLAN 10
DVT ppx: heparin subq  12. Stage 2 pressure injury, sacrum- wound care rn consulted   IMPROVE VTE Individual Risk Assessment          RISK                                                          Points    [  ] Previous VTE                                                3  [  ] Thrombophilia                                             2  [  ] Lower limb paralysis                                   2        (unable to hold up >15 seconds)    [  ] Current Cancer                                            2         (within 6 months)  [ x ] Immobilization > 24 hrs                              1  [  ] ICU/CCU stay > 24 hours                            1  [ x ] Age > 60                                                    1    IMPROVE VTE Score _____2____
DVT ppx: heparin subq  12. Stage 2 pressure injury, sacrum- wound care rn consulted   IMPROVE VTE Individual Risk Assessment          RISK                                                          Points    [  ] Previous VTE                                                3  [  ] Thrombophilia                                             2  [  ] Lower limb paralysis                                   2        (unable to hold up >15 seconds)    [  ] Current Cancer                                            2         (within 6 months)  [ x ] Immobilization > 24 hrs                              1  [  ] ICU/CCU stay > 24 hours                            1  [ x ] Age > 60                                                    1    IMPROVE VTE Score _____2____
DVT ppx: heparin subq  11.  Dysphagia 3, thin liquids   12. Stage 2 pressure injury, sacrum- wound care rn consulted
DVT ppx: heparin subq  12. Stage 2 pressure injury, sacrum- wound care rn consulted   IMPROVE VTE Individual Risk Assessment          RISK                                                          Points    [  ] Previous VTE                                                3  [  ] Thrombophilia                                             2  [  ] Lower limb paralysis                                   2        (unable to hold up >15 seconds)    [  ] Current Cancer                                            2         (within 6 months)  [ x ] Immobilization > 24 hrs                              1  [  ] ICU/CCU stay > 24 hours                            1  [ x ] Age > 60                                                    1    IMPROVE VTE Score _____2____

## 2020-08-27 NOTE — PROGRESS NOTE ADULT - SUBJECTIVE AND OBJECTIVE BOX
Date/Time Patient Seen:  		  Referring MD:   Data Reviewed	       Patient is a 80y old  Male who presents with a chief complaint of pneumonia (26 Aug 2020 08:36)      Subjective/HPI     PAST MEDICAL & SURGICAL HISTORY:  COPD, severe  Congestive heart failure  Pulmonary fibrosis  Pulmonary fibrosis  Smoker  BPH with obstruction/lower urinary tract symptoms  Vitamin D deficiency  GERD (gastroesophageal reflux disease)  Diabetes mellitus  Hyperlipidemia  Hypertension  S/P tonsillectomy  No significant past surgical history        Medication list         MEDICATIONS  (STANDING):  albuterol/ipratropium for Nebulization 3 milliLiter(s) Nebulizer every 6 hours  allopurinol 100 milliGRAM(s) Oral daily  ALPRAZolam 2 milliGRAM(s) Oral three times a day  amLODIPine   Tablet 10 milliGRAM(s) Oral daily  buPROPion XL . 300 milliGRAM(s) Oral daily  dextrose 5%. 1000 milliLiter(s) (50 mL/Hr) IV Continuous <Continuous>  dextrose 50% Injectable 12.5 Gram(s) IV Push once  dextrose 50% Injectable 25 Gram(s) IV Push once  dextrose 50% Injectable 25 Gram(s) IV Push once  dronabinol 5 milliGRAM(s) Oral daily  furosemide    Tablet 40 milliGRAM(s) Oral daily  heparin   Injectable 5000 Unit(s) SubCutaneous every 8 hours  hydrALAZINE 25 milliGRAM(s) Oral three times a day  insulin lispro (HumaLOG) corrective regimen sliding scale   SubCutaneous three times a day before meals  insulin lispro (HumaLOG) corrective regimen sliding scale   SubCutaneous at bedtime  isosorbide   mononitrate ER Tablet (IMDUR) 60 milliGRAM(s) Oral daily  labetalol 200 milliGRAM(s) Oral two times a day  metoclopramide Injectable 5 milliGRAM(s) IV Push every 8 hours  OLANZapine 2.5 milliGRAM(s) Oral at bedtime  pantoprazole    Tablet 40 milliGRAM(s) Oral before breakfast  piperacillin/tazobactam IVPB.. 3.375 Gram(s) IV Intermittent every 8 hours  polyethylene glycol 3350 17 Gram(s) Oral daily  senna 2 Tablet(s) Oral at bedtime  tamsulosin 0.8 milliGRAM(s) Oral at bedtime  venlafaxine XR. 150 milliGRAM(s) Oral two times a day    MEDICATIONS  (PRN):  bisacodyl 5 milliGRAM(s) Oral every 12 hours PRN Constipation  dextrose 40% Gel 15 Gram(s) Oral once PRN Blood Glucose LESS THAN 70 milliGRAM(s)/deciliter  glucagon  Injectable 1 milliGRAM(s) IntraMuscular once PRN Glucose LESS THAN 70 milligrams/deciliter  hydrOXYzine hydrochloride 25 milliGRAM(s) Oral four times a day PRN Anxiety  sodium chloride 0.65% Nasal 1 Spray(s) Both Nostrils three times a day PRN Nasal Congestion         Vitals log        ICU Vital Signs Last 24 Hrs  T(C): 36.9 (27 Aug 2020 04:50), Max: 37.2 (26 Aug 2020 14:17)  T(F): 98.4 (27 Aug 2020 04:50), Max: 99 (26 Aug 2020 14:17)  HR: 83 (27 Aug 2020 04:50) (78 - 86)  BP: 154/67 (27 Aug 2020 04:50) (107/71 - 154/67)  BP(mean): --  ABP: --  ABP(mean): --  RR: 18 (27 Aug 2020 04:50) (18 - 18)  SpO2: 93% (27 Aug 2020 04:50) (93% - 96%)           Input and Output:  I&O's Detail    25 Aug 2020 07:01  -  26 Aug 2020 07:00  --------------------------------------------------------  IN:  Total IN: 0 mL    OUT:    Voided: 2275 mL  Total OUT: 2275 mL    Total NET: -2275 mL      26 Aug 2020 07:01  -  27 Aug 2020 06:41  --------------------------------------------------------  IN:  Total IN: 0 mL    OUT:    Voided: 600 mL  Total OUT: 600 mL    Total NET: -600 mL          Lab Data                        9.1    16.23 )-----------( 131      ( 26 Aug 2020 09:15 )             27.6     08-26    132<L>  |  93<L>  |  43<H>  ----------------------------<  88  4.0   |  30  |  2.20<H>    Ca    9.3      26 Aug 2020 09:15              Review of Systems	      Objective     Physical Examination    heart s1s2  lung dec BS  abd soft      Pertinent Lab findings & Imaging      Princess:  NO   Adequate UO     I&O's Detail    25 Aug 2020 07:01  -  26 Aug 2020 07:00  --------------------------------------------------------  IN:  Total IN: 0 mL    OUT:    Voided: 2275 mL  Total OUT: 2275 mL    Total NET: -2275 mL      26 Aug 2020 07:01  -  27 Aug 2020 06:41  --------------------------------------------------------  IN:  Total IN: 0 mL    OUT:    Voided: 600 mL  Total OUT: 600 mL    Total NET: -600 mL               Discussed with:     Cultures:	        Radiology

## 2020-08-27 NOTE — PROGRESS NOTE ADULT - PROBLEM SELECTOR PLAN 3
per chart review patient with history of anxiety on xanax  ISTOP reviewed, patient appears to be on 2mg TID PRN   will continue with hold parameters to avoid withdrawal
per chart review patient with history of anxiety on xanax  ISTOP reviewed, patient appears to be on 2mg TID PRN   will continue with hold parameters to avoid withdrawal
hx of Xanax use 2 mg, patient lethargic after Xanax dose this am   - Placed Xanax 1 mg  PRN hold for lethargy or sedation  - Added Atarax for breakthrough anxiety  - Continue Zyprexa
per chart review patient with history of anxiety on xanax  ISTOP reviewed, patient appears to be on 2mg TID PRN   will continue with hold parameters to avoid withdrawal

## 2020-08-27 NOTE — PROGRESS NOTE ADULT - PROBLEM SELECTOR PROBLEM 5
Diabetes mellitus
Diabetes mellitus
CKD (chronic kidney disease)
Diabetes mellitus

## 2020-08-27 NOTE — PROGRESS NOTE ADULT - PROBLEM SELECTOR PLAN 2
repeat echo pending results showed normal LVEF  caution with IVF   monitor I&O  monitor electrolytes, keep mg>2 and k>4  continue home dose lasix 40mg daily and isosorbide
last echo 2016,  repeat echo pending results  caution with IVF   daily weights   monitor I&O  monitor electrolytes, keep mg>2 and k>4  continue home dose lasix 40mg daily and isosorbide
last echo 2016, will order repeat   caution with IVF   daily weights   monitor I&O  monitor electrolytes, keep mg>2 and k>4  continue home dose lasix 40mg daily and isosorbide
repeat echo pending results showed normal LVEF  caution with IVF   monitor I&O  monitor electrolytes, keep mg>2 and k>4  continue home dose lasix 40mg daily and isosorbide
repeat echo showed normal LVEF  caution with IVF   monitor I&O  monitor electrolytes, keep mg>2 and k>4  continue home dose lasix 40mg daily and isosorbide
repeat echo pending results showed normal LVEF  caution with IVF   monitor I&O  monitor electrolytes, keep mg>2 and k>4  continue home dose lasix 40mg daily and isosorbide

## 2020-08-27 NOTE — PROGRESS NOTE ADULT - PROBLEM SELECTOR PLAN 5
CKD stage 4   Cr improving, appears to be at baseline  avoid nephrotoxic meds  monitor am BMP CKD stage 4 ,  baseline appears to be 2.0  Cr increased today to 2.2 today, encouraging eating, though poor appetite,  avoiding aggressive IVF given acute on chronic heart failure  avoid nephrotoxic meds

## 2020-08-27 NOTE — PROGRESS NOTE ADULT - PROBLEM SELECTOR PROBLEM 4
CKD (chronic kidney disease)
FTT (failure to thrive) in adult

## 2020-08-27 NOTE — PROGRESS NOTE ADULT - PROBLEM SELECTOR PLAN 6
on 4L NC at home chronically   duonebs q6 PRN for SOB or wheezing
on 4L NC at home chronically   duonebs q6 PRN for SOB or wheezing
Diabetes type II (not on home insulin)  Hold oral hypoglycemic meds  Insulin Corrective Scale  Finger sticks per routine  Consistent Carb Diet after evaluation by speech and swallow  Hemoglobin A1c in AM  Hypoglycemia protocol
on 4L NC at home chronically   duonebs q6 PRN for SOB or wheezing

## 2020-08-27 NOTE — PROGRESS NOTE ADULT - PROBLEM SELECTOR PLAN 8
C/W with home meds, confirmed with [facility] pharmacy.  Labetalol, Hydralazine, amlodipine and isosorbide.
C/W with home meds, confirmed with [facility] pharmacy.  Labetalol, Hydralazine, amlodipine and isosorbide.
C/W with home meds, confirmed with [facility] pharmacy.  Labetalol, Hydralazine, amlodipine and isosorbide.
C/W with home meds, confirmed with [facility] pharmacy.  Labetalol, Hydralazine, amlodipine and isosorbide.
C/W with home meds, confirmed with [facility] pharmacy.  Labetalol, Hydralazine, amlodipine and isosorbide.
C/W with home meds, confirmed with [facility] pharmacy.  Labetalol, Hydralazine, amlodipine and isosorbide.
C/W with home meds, confirmed with [facility] pharmacy.  Labetalol, Hydralazine, amlodipine and isosorbide.
C/W with home meds, confirmed with [facility] pharmacy.  Labetalol, Hydralazine, amlodipine and isosorbide.

## 2020-08-27 NOTE — DISCHARGE NOTE NURSING/CASE MANAGEMENT/SOCIAL WORK - PATIENT PORTAL LINK FT
You can access the FollowMyHealth Patient Portal offered by [facility] by registering at the following website: [email or web address]. By joining [facility]’s FollowMyHealth portal, you will also be able to view your health information using other applications (apps) compatible with our system.

## 2020-08-27 NOTE — DISCHARGE NOTE NURSING/CASE MANAGEMENT/SOCIAL WORK - NSDCPETBCESMAN_GEN_ALL_CORE
Called patient for room without answer
If you are a smoker, it is important for your health to stop smoking. Please be aware that second hand smoke is also harmful.

## 2020-08-27 NOTE — PROGRESS NOTE ADULT - PROBLEM SELECTOR PROBLEM 6
COPD, severe
Diabetes mellitus

## 2020-08-27 NOTE — PROGRESS NOTE ADULT - PROBLEM SELECTOR PLAN 4
CKD stage 4   Cr improving, appears to be at baseline  avoid nephrotoxic meds  monitor am BMP
CKD stage 4   Cr improving, appears to be at baseline  avoid nephrotoxic meds  monitor am BMP
CKD stage 4   Cr appears to be at baseline  avoid nephrotoxic meds  monitor am BMP
CKD stage 4   Cr improving, appears to be at baseline  avoid nephrotoxic meds  monitor am BMP
Patient poor appetite, nausea and GERD   -  now improved  - continue reglan 5 mg IVPB q8h (prokinetic)  - Continue  zofran prn  - continue Pantoprazole  - Continue Marinol 5 mg daily   - Nutrition consult, does not meet criteria for malnourishment  -Palliative Care consulted

## 2020-08-27 NOTE — PROGRESS NOTE ADULT - SUBJECTIVE AND OBJECTIVE BOX
Patient is a 80y old  Male who presents with a chief complaint of pneumonia (27 Aug 2020 06:41)      INTERVAL HPI/OVERNIGHT EVENTS:    MEDICATIONS  (STANDING):  albuterol/ipratropium for Nebulization 3 milliLiter(s) Nebulizer every 6 hours  allopurinol 100 milliGRAM(s) Oral daily  amLODIPine   Tablet 10 milliGRAM(s) Oral daily  buPROPion XL . 300 milliGRAM(s) Oral daily  dextrose 5%. 1000 milliLiter(s) (50 mL/Hr) IV Continuous <Continuous>  dextrose 50% Injectable 12.5 Gram(s) IV Push once  dextrose 50% Injectable 25 Gram(s) IV Push once  dextrose 50% Injectable 25 Gram(s) IV Push once  dronabinol 5 milliGRAM(s) Oral daily  furosemide    Tablet 40 milliGRAM(s) Oral daily  heparin   Injectable 5000 Unit(s) SubCutaneous every 8 hours  hydrALAZINE 25 milliGRAM(s) Oral three times a day  insulin lispro (HumaLOG) corrective regimen sliding scale   SubCutaneous three times a day before meals  insulin lispro (HumaLOG) corrective regimen sliding scale   SubCutaneous at bedtime  isosorbide   mononitrate ER Tablet (IMDUR) 60 milliGRAM(s) Oral daily  labetalol 200 milliGRAM(s) Oral two times a day  metoclopramide Injectable 5 milliGRAM(s) IV Push every 8 hours  OLANZapine 2.5 milliGRAM(s) Oral at bedtime  pantoprazole    Tablet 40 milliGRAM(s) Oral before breakfast  piperacillin/tazobactam IVPB.. 3.375 Gram(s) IV Intermittent every 8 hours  polyethylene glycol 3350 17 Gram(s) Oral daily  senna 2 Tablet(s) Oral at bedtime  tamsulosin 0.8 milliGRAM(s) Oral at bedtime  venlafaxine XR. 150 milliGRAM(s) Oral two times a day    MEDICATIONS  (PRN):  ALPRAZolam 1 milliGRAM(s) Oral every 8 hours PRN anxiety, agitation  bisacodyl 5 milliGRAM(s) Oral every 12 hours PRN Constipation  dextrose 40% Gel 15 Gram(s) Oral once PRN Blood Glucose LESS THAN 70 milliGRAM(s)/deciliter  glucagon  Injectable 1 milliGRAM(s) IntraMuscular once PRN Glucose LESS THAN 70 milligrams/deciliter  hydrOXYzine hydrochloride 25 milliGRAM(s) Oral two times a day PRN Anxiety  sodium chloride 0.65% Nasal 1 Spray(s) Both Nostrils three times a day PRN Nasal Congestion      Allergies    No Known Allergies    Intolerances        REVIEW OF SYSTEMS:  CONSTITUTIONAL: No fever or chills  HEENT:  No headache, no sore throat  RESPIRATORY: No cough, wheezing, or shortness of breath  CARDIOVASCULAR: No chest pain, palpitations  GASTROINTESTINAL: No abd pain, nausea, vomiting, or diarrhea  GENITOURINARY: No dysuria, frequency, or hematuria  NEUROLOGICAL: no focal weakness or dizziness  MUSCULOSKELETAL: no myalgias     Vital Signs Last 24 Hrs  T(C): 36.9 (27 Aug 2020 04:50), Max: 37.2 (26 Aug 2020 14:17)  T(F): 98.4 (27 Aug 2020 04:50), Max: 99 (26 Aug 2020 14:17)  HR: 84 (27 Aug 2020 08:16) (78 - 88)  BP: 154/67 (27 Aug 2020 04:50) (107/71 - 154/67)  BP(mean): --  RR: 18 (27 Aug 2020 04:50) (18 - 18)  SpO2: 93% (27 Aug 2020 04:50) (93% - 95%)    PHYSICAL EXAM:  GENERAL: NAD, sleeping in bed  HEENT:  anicteric, moist mucous membranes  CHEST/LUNG:  CTA b/l, no rales, wheezes, or rhonchi  HEART:  RRR, S1, S2  ABDOMEN:  BS+, soft, nontender, nondistended  EXTREMITIES: no edema, cyanosis, or calf tenderness  NERVOUS SYSTEM:  Lethargic, awakens to voice, answers questions and follows commands appropriately    LABS:    CBC Full  -  ( 26 Aug 2020 09:15 )  WBC Count : 16.23 K/uL  Hemoglobin : 9.1 g/dL  Hematocrit : 27.6 %  Platelet Count - Automated : 131 K/uL  Mean Cell Volume : 86.5 fl  Mean Cell Hemoglobin : 28.5 pg  Mean Cell Hemoglobin Concentration : 33.0 gm/dL  Auto Neutrophil # : x  Auto Lymphocyte # : x  Auto Monocyte # : x  Auto Eosinophil # : x  Auto Basophil # : x  Auto Neutrophil % : x  Auto Lymphocyte % : x  Auto Monocyte % : x  Auto Eosinophil % : x  Auto Basophil % : x      Ca    9.3        26 Aug 2020 09:15        CAPILLARY BLOOD GLUCOSE      POCT Blood Glucose.: 118 mg/dL (27 Aug 2020 07:51)  POCT Blood Glucose.: 106 mg/dL (26 Aug 2020 21:19)  POCT Blood Glucose.: 180 mg/dL (26 Aug 2020 16:46)  POCT Blood Glucose.: 256 mg/dL (26 Aug 2020 11:43)        Culture - Urine (collected 08-22-20 @ 11:41)  Source: .Urine Clean Catch (Midstream)  Final Report (08-23-20 @ 13:07):    No growth    Culture - Blood (collected 08-22-20 @ 01:31)  Source: .Blood Blood  Final Report (08-27-20 @ 02:00):    No Growth Final        RADIOLOGY & ADDITIONAL TESTS:    Personally reviewed.     Consultant(s) Notes Reviewed:  [x] YES  [ ] NO Patient is a 80y old  Male who presents with a chief complaint of pneumonia (27 Aug 2020 06:41)      INTERVAL HPI/OVERNIGHT EVENTS:  Patient seen and examined at bedside. Sleeping but arousable to voice with repeated stimulation. Patient states he feels like he is going to have a bowel movement soon. Denies dyspnea, cp or discomfort. States he does not want breakfast and just to sleep. RN reports slept well overnight.     MEDICATIONS  (STANDING):  albuterol/ipratropium for Nebulization 3 milliLiter(s) Nebulizer every 6 hours  allopurinol 100 milliGRAM(s) Oral daily  amLODIPine   Tablet 10 milliGRAM(s) Oral daily  buPROPion XL . 300 milliGRAM(s) Oral daily  dextrose 5%. 1000 milliLiter(s) (50 mL/Hr) IV Continuous <Continuous>  dextrose 50% Injectable 12.5 Gram(s) IV Push once  dextrose 50% Injectable 25 Gram(s) IV Push once  dextrose 50% Injectable 25 Gram(s) IV Push once  dronabinol 5 milliGRAM(s) Oral daily  furosemide    Tablet 40 milliGRAM(s) Oral daily  heparin   Injectable 5000 Unit(s) SubCutaneous every 8 hours  hydrALAZINE 25 milliGRAM(s) Oral three times a day  insulin lispro (HumaLOG) corrective regimen sliding scale   SubCutaneous three times a day before meals  insulin lispro (HumaLOG) corrective regimen sliding scale   SubCutaneous at bedtime  isosorbide   mononitrate ER Tablet (IMDUR) 60 milliGRAM(s) Oral daily  labetalol 200 milliGRAM(s) Oral two times a day  metoclopramide Injectable 5 milliGRAM(s) IV Push every 8 hours  OLANZapine 2.5 milliGRAM(s) Oral at bedtime  pantoprazole    Tablet 40 milliGRAM(s) Oral before breakfast  piperacillin/tazobactam IVPB.. 3.375 Gram(s) IV Intermittent every 8 hours  polyethylene glycol 3350 17 Gram(s) Oral daily  senna 2 Tablet(s) Oral at bedtime  tamsulosin 0.8 milliGRAM(s) Oral at bedtime  venlafaxine XR. 150 milliGRAM(s) Oral two times a day    MEDICATIONS  (PRN):  ALPRAZolam 1 milliGRAM(s) Oral every 8 hours PRN anxiety, agitation  bisacodyl 5 milliGRAM(s) Oral every 12 hours PRN Constipation  dextrose 40% Gel 15 Gram(s) Oral once PRN Blood Glucose LESS THAN 70 milliGRAM(s)/deciliter  glucagon  Injectable 1 milliGRAM(s) IntraMuscular once PRN Glucose LESS THAN 70 milligrams/deciliter  hydrOXYzine hydrochloride 25 milliGRAM(s) Oral two times a day PRN Anxiety  sodium chloride 0.65% Nasal 1 Spray(s) Both Nostrils three times a day PRN Nasal Congestion      Allergies    No Known Allergies    Intolerances        REVIEW OF SYSTEMS:  CONSTITUTIONAL: No fever or chills  HEENT:  No headache, no sore throat  RESPIRATORY: No cough, wheezing, or shortness of breath  CARDIOVASCULAR: No chest pain, palpitations  GASTROINTESTINAL: No abd pain, nausea, vomiting, or diarrhea  GENITOURINARY: No dysuria, frequency, or hematuria  NEUROLOGICAL: no focal weakness or dizziness  MUSCULOSKELETAL: no myalgias     Vital Signs Last 24 Hrs  T(C): 36.9 (27 Aug 2020 04:50), Max: 37.2 (26 Aug 2020 14:17)  T(F): 98.4 (27 Aug 2020 04:50), Max: 99 (26 Aug 2020 14:17)  HR: 84 (27 Aug 2020 08:16) (78 - 88)  BP: 154/67 (27 Aug 2020 04:50) (107/71 - 154/67)  BP(mean): --  RR: 18 (27 Aug 2020 04:50) (18 - 18)  SpO2: 93% (27 Aug 2020 04:50) (93% - 95%)    PHYSICAL EXAM:  GENERAL: NAD, sleeping in bed  HEENT:  anicteric, moist mucous membranes  CHEST/LUNG:  CTA b/l, no rales, wheezes, or rhonchi  HEART:  RRR, S1, S2  ABDOMEN:  BS+, soft, nontender, nondistended  EXTREMITIES: no edema, cyanosis, or calf tenderness  NERVOUS SYSTEM:  Lethargic, awakens to voice, answers questions and follows commands appropriately    LABS:    CBC Full  -  ( 26 Aug 2020 09:15 )  WBC Count : 16.23 K/uL  Hemoglobin : 9.1 g/dL  Hematocrit : 27.6 %  Platelet Count - Automated : 131 K/uL  Mean Cell Volume : 86.5 fl  Mean Cell Hemoglobin : 28.5 pg  Mean Cell Hemoglobin Concentration : 33.0 gm/dL  Auto Neutrophil # : x  Auto Lymphocyte # : x  Auto Monocyte # : x  Auto Eosinophil # : x  Auto Basophil # : x  Auto Neutrophil % : x  Auto Lymphocyte % : x  Auto Monocyte % : x  Auto Eosinophil % : x  Auto Basophil % : x      Ca    9.3        26 Aug 2020 09:15        CAPILLARY BLOOD GLUCOSE      POCT Blood Glucose.: 118 mg/dL (27 Aug 2020 07:51)  POCT Blood Glucose.: 106 mg/dL (26 Aug 2020 21:19)  POCT Blood Glucose.: 180 mg/dL (26 Aug 2020 16:46)  POCT Blood Glucose.: 256 mg/dL (26 Aug 2020 11:43)        Culture - Urine (collected 08-22-20 @ 11:41)  Source: .Urine Clean Catch (Midstream)  Final Report (08-23-20 @ 13:07):    No growth    Culture - Blood (collected 08-22-20 @ 01:31)  Source: .Blood Blood  Final Report (08-27-20 @ 02:00):    No Growth Final        RADIOLOGY & ADDITIONAL TESTS:    Personally reviewed.     Consultant(s) Notes Reviewed:  [x] YES  [ ] NO

## 2020-08-27 NOTE — PROGRESS NOTE ADULT - PROBLEM SELECTOR PROBLEM 7
GERD (gastroesophageal reflux disease)
GERD (gastroesophageal reflux disease)
COPD, severe
GERD (gastroesophageal reflux disease)

## 2020-08-27 NOTE — PROGRESS NOTE ADULT - PROBLEM SELECTOR PROBLEM 2
Congestive heart failure

## 2020-08-28 VITALS — OXYGEN SATURATION: 95 %

## 2020-08-28 LAB
ANION GAP SERPL CALC-SCNC: 7 MMOL/L — SIGNIFICANT CHANGE UP (ref 5–17)
BUN SERPL-MCNC: 48 MG/DL — HIGH (ref 7–23)
CALCIUM SERPL-MCNC: 9.4 MG/DL — SIGNIFICANT CHANGE UP (ref 8.5–10.1)
CHLORIDE SERPL-SCNC: 97 MMOL/L — SIGNIFICANT CHANGE UP (ref 96–108)
CO2 SERPL-SCNC: 30 MMOL/L — SIGNIFICANT CHANGE UP (ref 22–31)
CREAT SERPL-MCNC: 2.3 MG/DL — HIGH (ref 0.5–1.3)
GLUCOSE SERPL-MCNC: 118 MG/DL — HIGH (ref 70–99)
HCT VFR BLD CALC: 28.5 % — LOW (ref 39–50)
HGB BLD-MCNC: 9.3 G/DL — LOW (ref 13–17)
MCHC RBC-ENTMCNC: 28.6 PG — SIGNIFICANT CHANGE UP (ref 27–34)
MCHC RBC-ENTMCNC: 32.6 GM/DL — SIGNIFICANT CHANGE UP (ref 32–36)
MCV RBC AUTO: 87.7 FL — SIGNIFICANT CHANGE UP (ref 80–100)
NRBC # BLD: 0 /100 WBCS — SIGNIFICANT CHANGE UP (ref 0–0)
PLATELET # BLD AUTO: 142 K/UL — LOW (ref 150–400)
POTASSIUM SERPL-MCNC: 4.2 MMOL/L — SIGNIFICANT CHANGE UP (ref 3.5–5.3)
POTASSIUM SERPL-SCNC: 4.2 MMOL/L — SIGNIFICANT CHANGE UP (ref 3.5–5.3)
RBC # BLD: 3.25 M/UL — LOW (ref 4.2–5.8)
RBC # FLD: 15.4 % — HIGH (ref 10.3–14.5)
SODIUM SERPL-SCNC: 134 MMOL/L — LOW (ref 135–145)
WBC # BLD: 14.02 K/UL — HIGH (ref 3.8–10.5)
WBC # FLD AUTO: 14.02 K/UL — HIGH (ref 3.8–10.5)

## 2020-08-28 PROCEDURE — 94760 N-INVAS EAR/PLS OXIMETRY 1: CPT

## 2020-08-28 PROCEDURE — 85027 COMPLETE CBC AUTOMATED: CPT

## 2020-08-28 PROCEDURE — 83735 ASSAY OF MAGNESIUM: CPT

## 2020-08-28 PROCEDURE — 82803 BLOOD GASES ANY COMBINATION: CPT

## 2020-08-28 PROCEDURE — 71045 X-RAY EXAM CHEST 1 VIEW: CPT

## 2020-08-28 PROCEDURE — 87641 MR-STAPH DNA AMP PROBE: CPT

## 2020-08-28 PROCEDURE — 87640 STAPH A DNA AMP PROBE: CPT

## 2020-08-28 PROCEDURE — 84443 ASSAY THYROID STIM HORMONE: CPT

## 2020-08-28 PROCEDURE — 94640 AIRWAY INHALATION TREATMENT: CPT

## 2020-08-28 PROCEDURE — 87040 BLOOD CULTURE FOR BACTERIA: CPT

## 2020-08-28 PROCEDURE — 83880 ASSAY OF NATRIURETIC PEPTIDE: CPT

## 2020-08-28 PROCEDURE — 97162 PT EVAL MOD COMPLEX 30 MIN: CPT

## 2020-08-28 PROCEDURE — 36415 COLL VENOUS BLD VENIPUNCTURE: CPT

## 2020-08-28 PROCEDURE — 71250 CT THORAX DX C-: CPT

## 2020-08-28 PROCEDURE — 97110 THERAPEUTIC EXERCISES: CPT

## 2020-08-28 PROCEDURE — 83605 ASSAY OF LACTIC ACID: CPT

## 2020-08-28 PROCEDURE — 97530 THERAPEUTIC ACTIVITIES: CPT

## 2020-08-28 PROCEDURE — 97116 GAIT TRAINING THERAPY: CPT

## 2020-08-28 PROCEDURE — 92610 EVALUATE SWALLOWING FUNCTION: CPT

## 2020-08-28 PROCEDURE — 86140 C-REACTIVE PROTEIN: CPT

## 2020-08-28 PROCEDURE — 85730 THROMBOPLASTIN TIME PARTIAL: CPT

## 2020-08-28 PROCEDURE — 87449 NOS EACH ORGANISM AG IA: CPT

## 2020-08-28 PROCEDURE — 87635 SARS-COV-2 COVID-19 AMP PRB: CPT

## 2020-08-28 PROCEDURE — 80048 BASIC METABOLIC PNL TOTAL CA: CPT

## 2020-08-28 PROCEDURE — 87086 URINE CULTURE/COLONY COUNT: CPT

## 2020-08-28 PROCEDURE — 99285 EMERGENCY DEPT VISIT HI MDM: CPT | Mod: 25

## 2020-08-28 PROCEDURE — 93005 ELECTROCARDIOGRAM TRACING: CPT

## 2020-08-28 PROCEDURE — 83036 HEMOGLOBIN GLYCOSYLATED A1C: CPT

## 2020-08-28 PROCEDURE — 99221 1ST HOSP IP/OBS SF/LOW 40: CPT

## 2020-08-28 PROCEDURE — 84145 PROCALCITONIN (PCT): CPT

## 2020-08-28 PROCEDURE — 87899 AGENT NOS ASSAY W/OPTIC: CPT

## 2020-08-28 PROCEDURE — 82962 GLUCOSE BLOOD TEST: CPT

## 2020-08-28 PROCEDURE — 86769 SARS-COV-2 COVID-19 ANTIBODY: CPT

## 2020-08-28 PROCEDURE — 93306 TTE W/DOPPLER COMPLETE: CPT

## 2020-08-28 PROCEDURE — 81001 URINALYSIS AUTO W/SCOPE: CPT

## 2020-08-28 PROCEDURE — 80053 COMPREHEN METABOLIC PANEL: CPT

## 2020-08-28 PROCEDURE — 85610 PROTHROMBIN TIME: CPT

## 2020-08-28 PROCEDURE — 99239 HOSP IP/OBS DSCHRG MGMT >30: CPT | Mod: GC

## 2020-08-28 PROCEDURE — 85652 RBC SED RATE AUTOMATED: CPT

## 2020-08-28 RX ORDER — SENNA PLUS 8.6 MG/1
2 TABLET ORAL
Qty: 0 | Refills: 0 | DISCHARGE
Start: 2020-08-28

## 2020-08-28 RX ORDER — POLYETHYLENE GLYCOL 3350 17 G/17G
17 POWDER, FOR SOLUTION ORAL
Qty: 0 | Refills: 0 | DISCHARGE
Start: 2020-08-28

## 2020-08-28 RX ORDER — AMLODIPINE BESYLATE 2.5 MG/1
1 TABLET ORAL
Qty: 0 | Refills: 0 | DISCHARGE
Start: 2020-08-28

## 2020-08-28 RX ORDER — FUROSEMIDE 40 MG
1 TABLET ORAL
Qty: 0 | Refills: 0 | DISCHARGE
Start: 2020-08-28

## 2020-08-28 RX ORDER — BUPROPION HYDROCHLORIDE 150 MG/1
1 TABLET, EXTENDED RELEASE ORAL
Qty: 0 | Refills: 0 | DISCHARGE
Start: 2020-08-28

## 2020-08-28 RX ORDER — ISOSORBIDE MONONITRATE 60 MG/1
1 TABLET, EXTENDED RELEASE ORAL
Qty: 0 | Refills: 0 | DISCHARGE
Start: 2020-08-28

## 2020-08-28 RX ORDER — ISOSORBIDE MONONITRATE 60 MG/1
1 TABLET, EXTENDED RELEASE ORAL
Qty: 0 | Refills: 0 | DISCHARGE

## 2020-08-28 RX ORDER — LABETALOL HCL 100 MG
1 TABLET ORAL
Qty: 0 | Refills: 0 | DISCHARGE

## 2020-08-28 RX ORDER — ALPRAZOLAM 0.25 MG
1 TABLET ORAL
Qty: 0 | Refills: 0 | DISCHARGE
Start: 2020-08-28

## 2020-08-28 RX ORDER — FUROSEMIDE 40 MG
1 TABLET ORAL
Qty: 0 | Refills: 0 | DISCHARGE

## 2020-08-28 RX ORDER — LABETALOL HCL 100 MG
1 TABLET ORAL
Qty: 0 | Refills: 0 | DISCHARGE
Start: 2020-08-28

## 2020-08-28 RX ORDER — VENLAFAXINE HCL 75 MG
150 CAPSULE, EXT RELEASE 24 HR ORAL
Qty: 0 | Refills: 0 | DISCHARGE

## 2020-08-28 RX ORDER — FENOFIBRATE,MICRONIZED 130 MG
1 CAPSULE ORAL
Qty: 0 | Refills: 0 | DISCHARGE

## 2020-08-28 RX ORDER — HYDRALAZINE HCL 50 MG
1 TABLET ORAL
Qty: 0 | Refills: 0 | DISCHARGE
Start: 2020-08-28

## 2020-08-28 RX ORDER — TAMSULOSIN HYDROCHLORIDE 0.4 MG/1
2 CAPSULE ORAL
Qty: 0 | Refills: 0 | DISCHARGE
Start: 2020-08-28

## 2020-08-28 RX ORDER — DRONABINOL 2.5 MG
1 CAPSULE ORAL
Qty: 21 | Refills: 0
Start: 2020-08-28 | End: 2020-09-17

## 2020-08-28 RX ORDER — PANTOPRAZOLE SODIUM 20 MG/1
1 TABLET, DELAYED RELEASE ORAL
Qty: 0 | Refills: 0 | DISCHARGE
Start: 2020-08-28

## 2020-08-28 RX ORDER — METOCLOPRAMIDE HCL 10 MG
1 TABLET ORAL
Qty: 42 | Refills: 0
Start: 2020-08-28 | End: 2020-09-10

## 2020-08-28 RX ORDER — VENLAFAXINE HCL 75 MG
1 CAPSULE, EXT RELEASE 24 HR ORAL
Qty: 0 | Refills: 0 | DISCHARGE
Start: 2020-08-28

## 2020-08-28 RX ORDER — IPRATROPIUM/ALBUTEROL SULFATE 18-103MCG
3 AEROSOL WITH ADAPTER (GRAM) INHALATION
Qty: 1 | Refills: 0
Start: 2020-08-28

## 2020-08-28 RX ORDER — TAMSULOSIN HYDROCHLORIDE 0.4 MG/1
1 CAPSULE ORAL
Qty: 0 | Refills: 0 | DISCHARGE

## 2020-08-28 RX ORDER — ALLOPURINOL 300 MG
1 TABLET ORAL
Qty: 0 | Refills: 0 | DISCHARGE
Start: 2020-08-28

## 2020-08-28 RX ORDER — BUPROPION HYDROCHLORIDE 150 MG/1
1 TABLET, EXTENDED RELEASE ORAL
Qty: 0 | Refills: 0 | DISCHARGE

## 2020-08-28 RX ADMIN — Medication 5 MILLIGRAM(S): at 05:06

## 2020-08-28 RX ADMIN — PANTOPRAZOLE SODIUM 40 MILLIGRAM(S): 20 TABLET, DELAYED RELEASE ORAL at 06:06

## 2020-08-28 RX ADMIN — Medication 40 MILLIGRAM(S): at 05:07

## 2020-08-28 RX ADMIN — Medication 25 MILLIGRAM(S): at 05:06

## 2020-08-28 RX ADMIN — Medication 150 MILLIGRAM(S): at 05:06

## 2020-08-28 RX ADMIN — Medication 200 MILLIGRAM(S): at 05:06

## 2020-08-28 RX ADMIN — Medication 3 MILLILITER(S): at 07:29

## 2020-08-28 RX ADMIN — HEPARIN SODIUM 5000 UNIT(S): 5000 INJECTION INTRAVENOUS; SUBCUTANEOUS at 06:06

## 2020-08-28 RX ADMIN — PIPERACILLIN AND TAZOBACTAM 25 GRAM(S): 4; .5 INJECTION, POWDER, LYOPHILIZED, FOR SOLUTION INTRAVENOUS at 05:06

## 2020-08-28 RX ADMIN — AMLODIPINE BESYLATE 10 MILLIGRAM(S): 2.5 TABLET ORAL at 05:08

## 2020-08-28 NOTE — PROGRESS NOTE ADULT - REASON FOR ADMISSION
pneumonia

## 2020-08-28 NOTE — PROGRESS NOTE ADULT - PROBLEM SELECTOR PLAN 1
Pt is afebrile and restarted on Zosyn  as per Pul recomm.  02 sat wnl on 4L home O2   CXR showed right lobe infiltrate.  CT findings compatible with interstitial pulmonary fibrosis on background of emphysema. No acute finding.  speech and swallow eval noted: dysphagia 3 diet with thin liq.  Pulm Dr. Banuelos consult, recs appreciated  PT consult  SW consult   aspiration precautions
report Nausea this am -   monitored off ABX  CT chest official report - shows chronic lung disease and emphysema and ILD - known   spoke with pt's Outside Pulm MD - Dr. Page -   chr lung disease - in advanced Form - o2 dep.   cont NEBS - will add Systemic Steroids  monitor labs  monitor for needs  assist with ADL  [facility]vs rx regimen  old records reviewed  prognosis guarded  spoke with Son - updated
Planned for DC today - with aide at home - CM notes reviewed -   remains on ABX for presumed PNA - on Zosyn -   cxr reviewed - CT chest reviewed - chr pleuroparenchymal disease -   pall care eval noted - DNR DNI  supportive medical regimen and assist with ADL  on o2 support - keep sat > 88 pct  HF - on lasix  COPD - cont NEBS - completed 4 days of Systemic Steroids - Solumedrol -   suction PRN  chest PT  assist with ADL  out of bed if able to tolerate it  serial labs  MRSA screen neg  cx and labs reviewed.
VS noted - Afebrile -   remains frail and weak and with need for Assist  ABX for presumed PNA - on Zosyn -   cxr reviewed  CT chest reviewed - chr pleuroparenchymal disease -   pall care eval noted - DNR DNI  supportive medical regimen and assist with ADL  on o2 support - keep sat > 88 pct  HF - on lasix  COPD - cont NEBS - completed 4 days of Systemic Steroids - Solumedrol -   suction PRN  chest PT  assist with ADL  out of bed if able to tolerate it  serial labs  MRSA screen neg  cx and labs reviewed.
c/o Constipation -   remains frail and weak and with need for Assist  ABX for presumed PNA -   cxr reviewed  CT chest reviewed - chr pleuroparenchymal disease -   pall care eval noted - DNR DNI  supportive medical regimen and assist with ADL  on o2 support - keep sat > 88 pct  HF - on lasix  COPD - on Steroids and NEBS  suction PRN  chest PT  assist with ADL  out of bed if able to tolerate it  serial labs  MRSA screen neg  cx and labs reviewed.
copd  ex smoker  fever noted - 101 Tm -   will restart ABX for presumed PNA -   cxr reviewed  CT chest reviewed - chr pleuroparenchymal disease -   pall care eval noted - DNR DNI  supportive medical regimen and assist with ADL  on o2 support - keep sat > 88 pct  HF - on lasix  COPD - on Steroids and NEBS  suction PRN  chest PT  assist with ADL  out of bed if able to tolerate it  serial labs  MRSA screen neg  cx and labs reviewed
remains on ABX for presumed PNA - on Zosyn -   cxr reviewed - CT chest reviewed - chr pleuroparenchymal disease -   pall care eval noted - DNR DNI  supportive medical regimen and assist with ADL  on o2 support - keep sat > 88 pct  HF - on lasix  COPD - cont NEBS - completed 4 days of Systemic Steroids - Solumedrol -   suction PRN  chest PT  assist with ADL  out of bed if able to tolerate it  serial labs  MRSA screen neg  cx and labs reviewed.
No active PNA, afebrile, 02 sat wnl on home O2   CXR showed right lobe infiltrate.  CT findings compatible with interstitial pulmonary fibrosis on background of emphysema. No acute finding.  Off antibx. Started on IV steroids  speech and swallow eval noted: dysphagia 3 diet with thin liq.  Pulm Dr. Banuelos consult, recs appreciated  PT consult  SW consult   aspiration precautions
Pt is afebrile and restarted on Zosyn  as per Pul recomm.  02 sat wnl on 4L home O2   CXR showed right lobe infiltrate.  CT findings compatible with interstitial pulmonary fibrosis on background of emphysema. No acute finding.  c/w on IV steroids  speech and swallow eval noted: dysphagia 3 diet with thin liq.  Pulm Dr. Banuelos consult, recs appreciated  PT consult  SW consult   aspiration precautions
Pt is afebrile and restarted on Zosyn  as per Pul recomm.  02 sat wnl on 4L home O2   CXR showed right lobe infiltrate.  CT findings compatible with interstitial pulmonary fibrosis on background of emphysema. No acute finding.  speech and swallow eval noted: dysphagia 3 diet with thin liq.  Pulm Dr. Banuelos consult, recs appreciated  PT consult  SW consult   aspiration precautions
Pt is afebrile and restarted on Zosyn by pul.   02 sat wnl on 4L home O2   CXR showed right lobe infiltrate.  CT findings compatible with interstitial pulmonary fibrosis on background of emphysema. No acute finding.  Off antibx. Started on IV steroids  speech and swallow eval noted: dysphagia 3 diet with thin liq.  Pulm Dr. Banuelos consult, recs appreciated  PT consult  SW consult   aspiration precautions
Pt was febrile and restarted on Zosyn by pul.   02 sat wnl on 4L home O2   CXR showed right lobe infiltrate.  CT findings compatible with interstitial pulmonary fibrosis on background of emphysema. No acute finding.  Off antibx. Started on IV steroids  speech and swallow eval noted: dysphagia 3 diet with thin liq.  Pulm Dr. Banuelos consult, recs appreciated  PT consult  SW consult   aspiration precautions
possible aspiration PNA given bedbound status   leucocytosis, afebrile, 02 sat wnl on home O2   CXR showed right lobe infiltrate  s/p Azithromycin x1 and Rocephin x1 in ED, will continue zosyn pending CT chest   f/u MRSA PCR, CRP, ESR, legionella urine antigen, strept urine antigen, and BCx.  speech and swallow eval noted: dysphagia 3 diet with thin liq.  Pulm Dr. Banuelos consult, recs appreciated  PT consult  SW consult   aspiration precautions
Pt is afebrile and restarted on Zosyn  as per Pul recomm.  02 sat wnl on 4L home O2   CXR showed right lobe infiltrate.  CT findings compatible with interstitial pulmonary fibrosis on background of emphysema. No acute finding.  speech and swallow eval noted: dysphagia 3 diet with thin liq.  Pulm Dr. Banuelos consult, recs appreciated  PT consult  SW consult   aspiration precautions

## 2020-08-28 NOTE — PROGRESS NOTE ADULT - PROBLEM SELECTOR PROBLEM 1
Chronic lung disease
Pneumonia due to infectious organism, unspecified laterality, unspecified part of lung
Chronic lung disease
Chronic lung disease

## 2020-08-28 NOTE — PROGRESS NOTE ADULT - SUBJECTIVE AND OBJECTIVE BOX
Date/Time Patient Seen:  		  Referring MD:   Data Reviewed	       Patient is a 80y old  Male who presents with a chief complaint of pneumonia (27 Aug 2020 08:41)      Subjective/HPI     PAST MEDICAL & SURGICAL HISTORY:  COPD, severe  Congestive heart failure  Pulmonary fibrosis  Pulmonary fibrosis  Smoker  BPH with obstruction/lower urinary tract symptoms  Vitamin D deficiency  GERD (gastroesophageal reflux disease)  Diabetes mellitus  Hyperlipidemia  Hypertension  S/P tonsillectomy  No significant past surgical history        Medication list         MEDICATIONS  (STANDING):  albuterol/ipratropium for Nebulization 3 milliLiter(s) Nebulizer every 6 hours  allopurinol 100 milliGRAM(s) Oral daily  amLODIPine   Tablet 10 milliGRAM(s) Oral daily  buPROPion XL . 300 milliGRAM(s) Oral daily  dextrose 5%. 1000 milliLiter(s) (50 mL/Hr) IV Continuous <Continuous>  dextrose 50% Injectable 12.5 Gram(s) IV Push once  dextrose 50% Injectable 25 Gram(s) IV Push once  dextrose 50% Injectable 25 Gram(s) IV Push once  dronabinol 5 milliGRAM(s) Oral daily  furosemide    Tablet 40 milliGRAM(s) Oral daily  heparin   Injectable 5000 Unit(s) SubCutaneous every 8 hours  hydrALAZINE 25 milliGRAM(s) Oral three times a day  insulin lispro (HumaLOG) corrective regimen sliding scale   SubCutaneous three times a day before meals  insulin lispro (HumaLOG) corrective regimen sliding scale   SubCutaneous at bedtime  isosorbide   mononitrate ER Tablet (IMDUR) 60 milliGRAM(s) Oral daily  labetalol 200 milliGRAM(s) Oral two times a day  metoclopramide Injectable 5 milliGRAM(s) IV Push every 8 hours  OLANZapine 2.5 milliGRAM(s) Oral at bedtime  pantoprazole    Tablet 40 milliGRAM(s) Oral before breakfast  piperacillin/tazobactam IVPB.. 3.375 Gram(s) IV Intermittent every 8 hours  polyethylene glycol 3350 17 Gram(s) Oral daily  senna 2 Tablet(s) Oral at bedtime  tamsulosin 0.8 milliGRAM(s) Oral at bedtime  venlafaxine XR. 150 milliGRAM(s) Oral two times a day    MEDICATIONS  (PRN):  ALPRAZolam 1 milliGRAM(s) Oral every 8 hours PRN anxiety, agitation  bisacodyl 5 milliGRAM(s) Oral every 12 hours PRN Constipation  dextrose 40% Gel 15 Gram(s) Oral once PRN Blood Glucose LESS THAN 70 milliGRAM(s)/deciliter  glucagon  Injectable 1 milliGRAM(s) IntraMuscular once PRN Glucose LESS THAN 70 milligrams/deciliter  hydrOXYzine hydrochloride 25 milliGRAM(s) Oral two times a day PRN Anxiety  sodium chloride 0.65% Nasal 1 Spray(s) Both Nostrils three times a day PRN Nasal Congestion         Vitals log        ICU Vital Signs Last 24 Hrs  T(C): 37.1 (28 Aug 2020 04:40), Max: 37.3 (27 Aug 2020 13:19)  T(F): 98.8 (28 Aug 2020 04:40), Max: 99.2 (27 Aug 2020 13:19)  HR: 88 (28 Aug 2020 04:40) (79 - 95)  BP: 143/85 (28 Aug 2020 04:40) (104/66 - 143/85)  BP(mean): --  ABP: --  ABP(mean): --  RR: 18 (28 Aug 2020 04:40) (17 - 18)  SpO2: 92% (28 Aug 2020 04:40) (92% - 98%)           Input and Output:  I&O's Detail    26 Aug 2020 07:01  -  27 Aug 2020 07:00  --------------------------------------------------------  IN:  Total IN: 0 mL    OUT:    Voided: 600 mL  Total OUT: 600 mL    Total NET: -600 mL      27 Aug 2020 07:01  -  28 Aug 2020 06:10  --------------------------------------------------------  IN:    Oral Fluid: 120 mL    Solution: 75 mL  Total IN: 195 mL    OUT:    Voided: 950 mL  Total OUT: 950 mL    Total NET: -755 mL          Lab Data                        9.1    16.23 )-----------( 131      ( 26 Aug 2020 09:15 )             27.6     08-26    132<L>  |  93<L>  |  43<H>  ----------------------------<  88  4.0   |  30  |  2.20<H>    Ca    9.3      26 Aug 2020 09:15              Review of Systems	      Objective     Physical Examination    heart s1s2  lung dec BS  abd soft      Pertinent Lab findings & Imaging      Princess:  NO   Adequate UO     I&O's Detail    26 Aug 2020 07:01  -  27 Aug 2020 07:00  --------------------------------------------------------  IN:  Total IN: 0 mL    OUT:    Voided: 600 mL  Total OUT: 600 mL    Total NET: -600 mL      27 Aug 2020 07:01  -  28 Aug 2020 06:10  --------------------------------------------------------  IN:    Oral Fluid: 120 mL    Solution: 75 mL  Total IN: 195 mL    OUT:    Voided: 950 mL  Total OUT: 950 mL    Total NET: -755 mL               Discussed with:     Cultures:	        Radiology

## 2020-08-28 NOTE — PROGRESS NOTE ADULT - PROVIDER SPECIALTY LIST ADULT
Hospitalist
Palliative Care
Palliative Care
Pulmonology
Hospitalist
Hospitalist

## 2020-09-02 ENCOUNTER — APPOINTMENT (OUTPATIENT)
Dept: INTERNAL MEDICINE | Facility: CLINIC | Age: 80
End: 2020-09-02
Payer: COMMERCIAL

## 2020-09-02 DIAGNOSIS — E11.9 TYPE 2 DIABETES MELLITUS W/OUT COMPLICATIONS: ICD-10-CM

## 2020-09-02 DIAGNOSIS — R06.89 OTHER ABNORMALITIES OF BREATHING: ICD-10-CM

## 2020-09-02 DIAGNOSIS — J44.9 CHRONIC OBSTRUCTIVE PULMONARY DISEASE, UNSPECIFIED: ICD-10-CM

## 2020-09-02 PROCEDURE — 99496 TRANSJ CARE MGMT HIGH F2F 7D: CPT | Mod: 95

## 2020-09-02 NOTE — HEALTH RISK ASSESSMENT
[No] : In the past 12 months have you used drugs other than those required for medical reasons? No [No falls in past year] : Patient reported no falls in the past year [0] : 2) Feeling down, depressed, or hopeless: Not at all (0) [] : No [[id number]] : 0

## 2020-09-02 NOTE — PHYSICAL EXAM
[No Acute Distress] : no acute distress [Well Developed] : well developed [Well Nourished] : well nourished [Well-Appearing] : well-appearing [Normal Voice/Communication] : normal voice/communication [Normal Affect] : the affect was normal [Speech Grossly Normal] : speech grossly normal [Memory Grossly Normal] : memory grossly normal [Normal Mood] : the mood was normal [Alert and Oriented x3] : oriented to person, place, and time [Normal Insight/Judgement] : insight and judgment were intact [de-identified] : bed bound

## 2020-09-02 NOTE — HISTORY OF PRESENT ILLNESS
[Home] : at home, [unfilled] , at the time of the visit. [Medical Office: ([facility])___] : at the medical office located in  [Verbal consent obtained from patient] : the patient, [unfilled] [Post-hospitalization from ___ Hospital] : Post-hospitalization from [unfilled] Hospital [Admitted on: ___] : The patient was admitted on [unfilled] [Patient Contacted By: ____] : and contacted by [unfilled] [Discharged on ___] : discharged on [unfilled] [FreeTextEntry2] : Hospitalized for SOB and weakness\par discharged home care and PT\par treated for pneumonia IV antibiotics

## 2020-09-02 NOTE — REVIEW OF SYSTEMS
[Shortness Of Breath] : shortness of breath [Dyspnea on Exertion] : dyspnea on exertion [Negative] : Neurological

## 2020-10-23 ENCOUNTER — RX RENEWAL (OUTPATIENT)
Age: 80
End: 2020-10-23

## 2020-10-26 NOTE — ED ADULT NURSE NOTE - PAIN: PRESENCE, MLM
Pt is scheduled with Dr. Moeller on 11/10 @ 9:30am @ [facility] . Closing encounter as no further follow up is needed.     Pau Hopper    [facility]     denies pain/discomfort

## 2021-05-17 NOTE — SWALLOW BEDSIDE ASSESSMENT ADULT - MUCOSAL QUALITY
[facility]  [address] [place]   [address]  Tel. [phone]    May 17, 2021    Katy Islas  [address]  [facility] [address]      Dear Katy,    I am a clinic care coordinator who works with Aura Rosario PA-C at [facility]. I wanted to introduce myself and provide you with my contact information for you to be able to call me with any questions or concerns. Below is a description of clinic care coordination and how I can further assist you.      The clinic care coordination team is made up of a registered nurse,  and community health worker who understand the health care system. The goal of clinic care coordination is to help you manage your health and improve access to the health care system in the most efficient manner. The team can assist you in meeting your health care goals by providing education, coordinating services, strengthening the communication among your providers and supporting you with any resource needs.    Please feel free to contact me at [phone] with any questions or concerns. We are focused on providing you with the highest-quality healthcare experience possible and that all starts with you.     Sincerely,     Sheryl Medina, [facility] - Care Coordination  [facility]   [phone]      
adequate and hydrated

## 2022-03-21 NOTE — ED ADULT NURSE NOTE - SUICIDE SCREENING QUESTION 2
Minocycline Pregnancy And Lactation Text: This medication is Pregnancy Category D and not consider safe during pregnancy. It is also excreted in breast milk. No

## 2022-06-07 NOTE — SWALLOW BEDSIDE ASSESSMENT ADULT - H & P REVIEW
yes Detail Level: Detailed Patient Specific Counseling (Will Not Stick From Patient To Patient): -\\nReferred pt to Dr. Cevallos’s office for surgical excision due to the location of the cyst.

## 2022-07-07 NOTE — PATIENT PROFILE ADULT - NSPROPTRIGHTSUPPORTPERSON_GEN_A_NUR
same name as above
Patient requests all Lab, Cardiology, and Radiology Results on their Discharge Instructions

## 2022-10-22 NOTE — SWALLOW BEDSIDE ASSESSMENT ADULT - SPECIFY REASON(S)
SKILLED REASON for visit: teaching hyperglycemia  CAREGIVER INVOLVEMENT:  is available as needed for assistance with iadls, adls, meal prep, medication management, taking to md appointments. MEDICATIONS: reviewed and all medications are available in the home this visit. Patient denies any medication changes at this time of assessment. EDUCATION PROVIDED: regarding medications, medication interactions, and look alike medications (specify): reviewed side effects, purposes, dosage, frequencies. High risk medication teaching regarding anticoagulants, hyperglycemic agents or opiod narcotics performed (specify) na  Medications  are effective at this time. SUPPLIES: by type and quantity ordered/delivered this visit include:   PATIENT EDUCATION ON THIS VISIT:  PATIENT LEVEL OF UNDERSTANDING: comprehension education that was provided at this time by engaging in all education provided and is able to verbalize understanding, pt denies any questions or concerns at this time. SKILLED CARE PERFORMED THIS VISIT     Clinician reminded patient/cg on proper disposal of sharps as follows: Containers should be made of hard plastic, be puncture-resistant and leakproof, such as a laundry detergent or bleach bottle. When the container is ¾ full, it should be sealed with tape and labeled. DO NOT RECYCLE prior to discarding in the regular trash. . Pt instructed to follow with diabetic diet- monitoring sugar intake, limiting foods with high sugar content.  SN discussed dietary adjustments such as: reduce portion sizes, limit daily carbohydrate intake to not greater than 45-60 grams per meal for a total of <180 grams of carbohydrate daily, avoid concentrated carbohydrates such as soft drinks, sweet tea, and sweet treats and to increase physical activity along with strength training as tolerated to facilitate weight loss and build muscle mass  PATIENT RESPONSE TO PROCEDURE PERFORMED:   Agency Progress toward goals: goals/teaching reviewed. patient is progressing towards goals at this time, Patient's Progress towards personal goals: pt reporting they are progressing towards their goals at this time. Home exercise program:hep deep breathing  Continued need for the following skills: Nursing  Plan for next visit: routine  Patient and/or caregiver notified and agrees to changes in the Plan of Care NA  The following discharge planning was discussed with the pt/caregiver: Patient will be discharged once education has completed, patient is medically stable and  independent with care.   Nayeli r/o aspiration

## 2023-02-06 NOTE — ED PROVIDER NOTE - PSH
S/P tonsillectomy Dorsal Nasal Flap Text: The defect edges were debeveled with a #15 scalpel blade.  Given the location of the defect and the proximity to free margins a dorsal nasal flap was deemed most appropriate.  Using a sterile surgical marker, an appropriate dorsal nasal flap was drawn around the defect.    The area thus outlined was incised deep to adipose tissue with a #15 scalpel blade.  The skin margins were undermined to an appropriate distance in all directions utilizing iris scissors.

## 2023-05-02 NOTE — SWALLOW BEDSIDE ASSESSMENT ADULT - SWALLOW EVAL: FEEDING ASSISTANCE
no assistance needed Cimzia Counseling:  I discussed with the patient the risks of Cimzia including but not limited to immunosuppression, allergic reactions and infections.  The patient understands that monitoring is required including a PPD at baseline and must alert us or the primary physician if symptoms of infection or other concerning signs are noted.

## 2023-05-17 NOTE — ED ADULT NURSE NOTE - CAS EDN INTEG ASSESS
WDL Rotation Flap Text: The defect edges were debeveled with a #15 scalpel blade.  Given the location of the defect, shape of the defect and the proximity to free margins a rotation flap was deemed most appropriate.  Using a sterile surgical marker, an appropriate rotation flap was drawn incorporating the defect and placing the expected incisions within the relaxed skin tension lines where possible.    The area thus outlined was incised deep to adipose tissue with a #15 scalpel blade.  The skin margins were undermined to an appropriate distance in all directions utilizing iris scissors.

## 2023-06-11 NOTE — PROGRESS NOTE ADULT - ATTENDING COMMENTS
Note written by attending, see above.  Time spent: 30min. More than 50% of the visit was spent counseling the patient on his condition - suspected aspiration PNA, COPD exacerbation, dysphagia, MBS.
Note written by attending, see above.  Time spent: 50min. More than 50% of the visit was spent counseling the patient on his condition - suspected aspiration PNA, COPD exacerbation, dysphagia, MBS,
The patient was personally seen and examined, in addition to being examined and evaluated by NP.  All elements of the note were edited where appropriate.
I personally saw and examined the patient in detail.  I have spoken to the above provider regarding the assessment and plan of care.  I reviewed the above assessment and plan of care, and agree.  I have made changes in the body of the note where appropriate.
I saw and examined the patient personally. Spoke with above provider regarding this case. I reviewed the above findings completely.  I agree with the above history, physical, and plan which I have edited where appropriate.
Seen/examined. agree with above.
Chart reviewed    Patient seen and examined     Agree with plan as outlined above
allergic reaction

## 2023-09-28 NOTE — REVIEW OF SYSTEMS
Pre-procedure teaching reinforced. [Fatigue] : fatigue [Recent Wt Gain (___ Lbs)] : recent [unfilled] ~Ulb weight gain [Sinus Problems] : sinus problems [Dry Mouth] : dry mouth [Cough] : cough [Dyspnea] : dyspnea [Edema] : ~T edema was present [Negative] : Sleep Disorder

## 2024-02-23 NOTE — DISCHARGE NOTE PROVIDER - NSDCHHENCOUNTER_GEN_ALL_CORE
----- Message from Octavia Condon sent at 2/23/2024 12:25 PM CST -----  Name of Who is Calling: IMER ISRAEL [[id number]]            What is the request in detail: Patient is requesting call back about denied meds              Can the clinic reply by [facility]: no              What Number to Call Back if not [facility]: [phone]         
No care due was identified.  [facility] Embedded Care Due Messages. Reference number: [id number].   2/23/2024 9:51:34 AM CST  
Refill Routing Note   Medication(s) are not appropriate for processing by [facility] for the following reason(s):        Required vitals abnormal  Required labs abnormal    [facility] action(s):  Defer  Quick Discontinue        Medication Therapy Plan: Atorvastatin increased to 80 mg (6/26/23); Quick dc atorvastatin 40mg      Appointments  past 12m or future 3m with PCP    Date Provider   Last Visit   6/28/2023 Janeth Walter MD   Next Visit   Visit date not found Janeth Walter MD   ED visits in past 90 days: 0        Note composed:11:42 AM 02/23/2024          
29-Nov-2018

## 2024-03-16 NOTE — PATIENT PROFILE ADULT. - PATIENT REPRESENTATIVE: ( YOU CAN CHOOSE ANY PERSON THAT CAN ASSIST YOU WITH YOUR HEALTH CARE PREFERENCES, DOES NOT HAVE TO BE A SPOUSE, IMMEDIATE FAMILY OR SIGNIFICANT OTHER/PARTNER)
Please call as soon as possible to make an appointment to follow up with your Primary Care Physician & Urologist    WE SENT YOUR URINE FOR A CULTURE. The full results will not be available until 3-5 days. Please contact your UROLOGIST/PCP check the results to see if you require additional antibiotics.     Please call your PCP/Urologist or return to the emergency department immediately for any new or worsening symptoms.    Yes

## 2024-09-11 NOTE — ED PROVIDER NOTE - HIGHEST TEMPERATURE
Pt contacted as pre-procedure phone call prior to planned CTA coronary for 9/13/24. Reviewed with patient arrival time to main registration, nothing to eat or drink by mouth 2 hours prior to arrival, no caffeine after midnight, please take premedications night before and morning of procedure with a small sip of water as instructed,  recommended,  reviewed procedure instructions and allowed time for questions, and reviewed home medications, allergies, and medical history.    fahrenheit/101.3

## 2024-10-03 NOTE — DIETITIAN INITIAL EVALUATION ADULT. - ADHERENCE
Behavioral Health IP Nursing Progress Note    Suicidal Ideation:  Patient verbally denies suicidal ideation     Current C-SSRS score: Negative Screen - White (10/02/24 5636)      Protective Factors / Reason for Living: Social supports, Responsibility to children    Interventions:   Q 15 min safety checks  1:1 Nursing assessments   Group therapy       Subjective: Patient denies SI/HI/AVH. Patient reports \"I'm nervous about getting out and heading to [facility]. I would say my mood is irritable today because another patient said I'm always whinnying about things\". Patient reports to eating good and attending groups. Patient is able to state needs. Verbalized safety on unit.        Objective:   Mental Health: Patient behavior observed to be anxious and irritable. Patient is cooperative during assessment. During dinner time patient began to yell at staff regarding the left over food. Patient wanted to know why he couldn't have the left over food from the return cart. 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 I know you are going to throw that away. Why can't we share food with each other\". Patient stated to staff \"Its your damn job to get me things\". Writer was able to redirect patient and have a conversation a food safety along with treating everyone on the unit with respect. Patient apologized for outburst and asked for hydroxyzine for anxiety. Patient is up ad anna and compliant with medication, meals and assessments.       Medical:   Pain     Assessment / Actions:   PRN Medications given?   Yes. Patient Response: Hydroxyzine 75mg administered at 1718 for anxiety. Patient reassessed \"I feel more calm\".     Plan:   Treatment Plan reviewed.   Denies following modified diet PTA

## 2024-11-05 NOTE — PATIENT PROFILE ADULT. - EXTENSIONS OF SELF_ADULT
11/05/24      Rafa Watkins  [address] [facility] [address]      Dear Rafa:    We are committed to helping you live well during and after your hospital stay. Through our Care Transitions Program, we give you and your family individualized support during your transition home.     Upon leaving either the hospital or skilled facility, you will be given a Care   Transitions Nurse. Our priority is to help you with your recovery once you get   home and get back to the things you need and want to do.  Services are free of   charge.    How it Works:    Your Transitions Nurse will call you within 24 to 72 hours after discharge. If helpful to you, a family member may be there for this call. We will review the following items to make sure you have everything you need as part of your discharge plan:    Your written discharge instructions and care plan  Your meds  Your next doctor’s visit(s)  Any health or other concerns    Over the next 30 days, your Transitions Nurse will call you, most often once a week. These calls give you the chance to ask questions about your health care needs.    Your Transitions Nurse can link you with your doctor(s) and services as needed and give helpful facts to help keep you on track throughout your healing.    Your Transitions Nurse is available to you Monday thru Friday, 8am to 4:30pm at [phone].    We look forward to working with you on your health care journey.    Sincerely,    Ceci Matt RN  Care Transitions Nurse?   [facility] [place]  
Eyeglasses

## 2025-06-24 NOTE — ED ADULT NURSE NOTE - BREATH SOUNDS, MLM
Ocrevus therapy plan, ADT face sheet, and most recent OV note from 1/20/25 were faxed to the [facility] at fax # [phone]. Confirmation of successful fax delivery received. They should be contacting patient within 48 hours to schedule.     RN attempted to reach patient to provide an update but there was no answer/no option for VM. Sent patient a [employer] message that his request has been completed.      FAUZIA DempseyN RN Care Coordinator  Neurology/Neurosurgery/PM&R/Pain Management      Rales/Wheezes